# Patient Record
Sex: FEMALE | Race: WHITE | Employment: PART TIME | URBAN - NONMETROPOLITAN AREA
[De-identification: names, ages, dates, MRNs, and addresses within clinical notes are randomized per-mention and may not be internally consistent; named-entity substitution may affect disease eponyms.]

---

## 2017-01-03 ENCOUNTER — NURSE TRIAGE (OUTPATIENT)
Dept: ADMINISTRATIVE | Age: 62
End: 2017-01-03

## 2017-02-20 ENCOUNTER — TELEPHONE (OUTPATIENT)
Dept: FAMILY MEDICINE CLINIC | Age: 62
End: 2017-02-20

## 2017-03-22 ENCOUNTER — OFFICE VISIT (OUTPATIENT)
Dept: PULMONOLOGY | Age: 62
End: 2017-03-22

## 2017-03-22 VITALS
HEART RATE: 78 BPM | BODY MASS INDEX: 29.3 KG/M2 | SYSTOLIC BLOOD PRESSURE: 120 MMHG | OXYGEN SATURATION: 97 % | WEIGHT: 171.6 LBS | HEIGHT: 64 IN | DIASTOLIC BLOOD PRESSURE: 84 MMHG

## 2017-03-22 DIAGNOSIS — G47.421 NARCOLEPSY DUE TO UNDERLYING CONDITION WITH CATAPLEXY: ICD-10-CM

## 2017-03-22 DIAGNOSIS — R40.0 DAYTIME SOMNOLENCE: ICD-10-CM

## 2017-03-22 DIAGNOSIS — G47.411 PRIMARY NARCOLEPSY WITH CATAPLEXY: Primary | ICD-10-CM

## 2017-03-22 PROCEDURE — G8419 CALC BMI OUT NRM PARAM NOF/U: HCPCS | Performed by: PHYSICIAN ASSISTANT

## 2017-03-22 PROCEDURE — 99203 OFFICE O/P NEW LOW 30 MIN: CPT | Performed by: PHYSICIAN ASSISTANT

## 2017-03-22 PROCEDURE — 1036F TOBACCO NON-USER: CPT | Performed by: PHYSICIAN ASSISTANT

## 2017-03-22 PROCEDURE — 3014F SCREEN MAMMO DOC REV: CPT | Performed by: PHYSICIAN ASSISTANT

## 2017-03-22 PROCEDURE — 3017F COLORECTAL CA SCREEN DOC REV: CPT | Performed by: PHYSICIAN ASSISTANT

## 2017-03-22 PROCEDURE — G8484 FLU IMMUNIZE NO ADMIN: HCPCS | Performed by: PHYSICIAN ASSISTANT

## 2017-03-22 PROCEDURE — G8427 DOCREV CUR MEDS BY ELIG CLIN: HCPCS | Performed by: PHYSICIAN ASSISTANT

## 2017-03-22 RX ORDER — MODAFINIL 200 MG/1
TABLET ORAL
Qty: 360 TABLET | Refills: 0 | Status: SHIPPED | OUTPATIENT
Start: 2017-03-22 | End: 2017-10-18 | Stop reason: SDUPTHER

## 2017-06-09 RX ORDER — LEVOTHYROXINE AND LIOTHYRONINE 57; 13.5 UG/1; UG/1
135 TABLET ORAL DAILY
Qty: 45 TABLET | Refills: 3 | Status: SHIPPED | OUTPATIENT
Start: 2017-06-09 | End: 2017-07-11 | Stop reason: SDUPTHER

## 2017-07-11 RX ORDER — LEVOTHYROXINE AND LIOTHYRONINE 57; 13.5 UG/1; UG/1
135 TABLET ORAL DAILY
Qty: 45 TABLET | Refills: 3 | Status: SHIPPED | OUTPATIENT
Start: 2017-07-11 | End: 2017-08-25 | Stop reason: SDUPTHER

## 2017-07-11 RX ORDER — ESOMEPRAZOLE MAGNESIUM 40 MG/1
40 CAPSULE, DELAYED RELEASE ORAL
Qty: 30 CAPSULE | Refills: 3 | Status: SHIPPED | OUTPATIENT
Start: 2017-07-11 | End: 2017-08-25 | Stop reason: SDUPTHER

## 2017-08-18 LAB
ABSOLUTE BASO #: 0 K/UL (ref 0–0.1)
ABSOLUTE EOS #: 0.2 K/UL (ref 0.1–0.4)
ABSOLUTE LYMPH #: 1.9 K/UL (ref 0.8–5.2)
ABSOLUTE MONO #: 0.7 K/UL (ref 0.1–0.9)
ABSOLUTE NEUT #: 3.2 K/UL (ref 1.3–9.1)
ANTI-NUCLEAR ANTIBODY (ANA): NORMAL
BASOPHILS RELATIVE PERCENT: 0.7 %
CALCIUM SERPL-MCNC: 9.5 MG/DL (ref 8.7–10.8)
EOSINOPHILS RELATIVE PERCENT: 3.2 %
ESTRADIOL LEVEL: 15 PG/ML
HCT VFR BLD CALC: 44.7 % (ref 36–48)
HEMOGLOBIN: 14.5 G/DL (ref 12–16)
LYMPHOCYTE %: 31.1 %
MAGNESIUM: 2.3 MG/DL (ref 1.7–2.4)
MCH RBC QN AUTO: 29.2 PG (ref 27–34)
MCHC RBC AUTO-ENTMCNC: 32.4 G/DL (ref 31–36)
MCV RBC AUTO: 90.1 FL (ref 80–100)
MONOCYTES # BLD: 11.1 %
NEUTROPHILS RELATIVE PERCENT: 53.7 %
PDW BLD-RTO: 12.3 % (ref 10.8–14.8)
PLATELETS: 265 K/UL (ref 150–450)
RBC: 4.96 M/UL (ref 4–5.5)
RHEUMATOID FACTOR: <10 IU/ML
SEDIMENTATION RATE, ERYTHROCYTE: 1 MM/HR (ref 0–30)
WBC: 6 K/UL (ref 3.7–10.8)

## 2017-08-22 LAB
ALBUMIN SERUM: 4.2 G/DL (ref 3.6–5.1)
DEHYDROEPIANDROSTERONE: 3.4 NG/ML (ref 1.3–9.8)
DHEAS (DHEA SULFATE): 87 UG/DL
HIGH SENSITIVE C-REACTIVE PROTEIN: 1.8 MG/L
PARATHYROID HORMONE INTACT: 31 PG/ML (ref 11–67)
SEX HORMONE BINDING GLOBULIN: 47 NMOL/L (ref 30–135)
TESTOSTERONE FREE: 2 PG/ML (ref 0.6–3.8)
TESTOSTERONE, LCMS: 14 NG/DL (ref 5–32)
VITAMIN D 25-HYDROXY: 61 NG/ML

## 2017-08-25 ENCOUNTER — OFFICE VISIT (OUTPATIENT)
Dept: FAMILY MEDICINE CLINIC | Age: 62
End: 2017-08-25
Payer: COMMERCIAL

## 2017-08-25 VITALS
WEIGHT: 174 LBS | DIASTOLIC BLOOD PRESSURE: 70 MMHG | SYSTOLIC BLOOD PRESSURE: 110 MMHG | RESPIRATION RATE: 12 BRPM | HEIGHT: 64 IN | TEMPERATURE: 98.4 F | BODY MASS INDEX: 29.71 KG/M2 | HEART RATE: 72 BPM

## 2017-08-25 DIAGNOSIS — F32.A ANXIETY AND DEPRESSION: ICD-10-CM

## 2017-08-25 DIAGNOSIS — R40.0 DAYTIME SOMNOLENCE: ICD-10-CM

## 2017-08-25 DIAGNOSIS — R11.0 NAUSEA: ICD-10-CM

## 2017-08-25 DIAGNOSIS — B37.9 CANDIDA ALBICANS INFECTION: ICD-10-CM

## 2017-08-25 DIAGNOSIS — F41.9 ANXIETY AND DEPRESSION: ICD-10-CM

## 2017-08-25 DIAGNOSIS — E78.2 MIXED HYPERLIPIDEMIA: ICD-10-CM

## 2017-08-25 DIAGNOSIS — K59.09 OTHER CONSTIPATION: ICD-10-CM

## 2017-08-25 DIAGNOSIS — K21.00 GASTROESOPHAGEAL REFLUX DISEASE WITH ESOPHAGITIS: ICD-10-CM

## 2017-08-25 DIAGNOSIS — K56.609 SBO (SMALL BOWEL OBSTRUCTION) (HCC): ICD-10-CM

## 2017-08-25 DIAGNOSIS — M25.511 RIGHT SHOULDER PAIN, UNSPECIFIED CHRONICITY: ICD-10-CM

## 2017-08-25 DIAGNOSIS — K90.0 CELIAC DISEASE: ICD-10-CM

## 2017-08-25 DIAGNOSIS — R19.4 CHANGE IN BOWEL HABITS: ICD-10-CM

## 2017-08-25 DIAGNOSIS — R35.0 URINARY FREQUENCY: ICD-10-CM

## 2017-08-25 DIAGNOSIS — R10.13 EPIGASTRIC PAIN: ICD-10-CM

## 2017-08-25 DIAGNOSIS — Z09 S/P ABDOMINAL SURGERY, FOLLOW-UP EXAM: ICD-10-CM

## 2017-08-25 DIAGNOSIS — R63.0 DECREASED APPETITE: ICD-10-CM

## 2017-08-25 DIAGNOSIS — R53.1 WEAKNESS GENERALIZED: ICD-10-CM

## 2017-08-25 DIAGNOSIS — D50.0 IRON DEFICIENCY ANEMIA DUE TO CHRONIC BLOOD LOSS: ICD-10-CM

## 2017-08-25 DIAGNOSIS — E03.4 HYPOTHYROIDISM DUE TO ACQUIRED ATROPHY OF THYROID: ICD-10-CM

## 2017-08-25 DIAGNOSIS — Z91.09 ENVIRONMENTAL ALLERGIES: ICD-10-CM

## 2017-08-25 PROCEDURE — 3014F SCREEN MAMMO DOC REV: CPT | Performed by: FAMILY MEDICINE

## 2017-08-25 PROCEDURE — 99214 OFFICE O/P EST MOD 30 MIN: CPT | Performed by: FAMILY MEDICINE

## 2017-08-25 PROCEDURE — 1036F TOBACCO NON-USER: CPT | Performed by: FAMILY MEDICINE

## 2017-08-25 PROCEDURE — G8419 CALC BMI OUT NRM PARAM NOF/U: HCPCS | Performed by: FAMILY MEDICINE

## 2017-08-25 PROCEDURE — 3017F COLORECTAL CA SCREEN DOC REV: CPT | Performed by: FAMILY MEDICINE

## 2017-08-25 PROCEDURE — G8427 DOCREV CUR MEDS BY ELIG CLIN: HCPCS | Performed by: FAMILY MEDICINE

## 2017-08-25 RX ORDER — RANITIDINE 300 MG/1
300 TABLET ORAL EVERY EVENING
Qty: 90 TABLET | Refills: 3 | Status: ON HOLD | OUTPATIENT
Start: 2017-08-25 | End: 2018-09-21 | Stop reason: HOSPADM

## 2017-08-25 RX ORDER — ESOMEPRAZOLE MAGNESIUM 40 MG/1
40 CAPSULE, DELAYED RELEASE ORAL
Qty: 90 CAPSULE | Refills: 3 | Status: SHIPPED | OUTPATIENT
Start: 2017-08-25 | End: 2018-08-28 | Stop reason: SDUPTHER

## 2017-08-25 RX ORDER — LEVOTHYROXINE AND LIOTHYRONINE 57; 13.5 UG/1; UG/1
135 TABLET ORAL DAILY
Qty: 135 TABLET | Refills: 3 | Status: ON HOLD | OUTPATIENT
Start: 2017-08-25 | End: 2018-02-09 | Stop reason: HOSPADM

## 2017-08-25 ASSESSMENT — ENCOUNTER SYMPTOMS
BACK PAIN: 1
ABDOMINAL PAIN: 1
ABDOMINAL DISTENTION: 1
CONSTIPATION: 1
SINUS PRESSURE: 1
RESPIRATORY NEGATIVE: 1

## 2017-10-06 RX ORDER — ROSUVASTATIN CALCIUM 20 MG/1
20 TABLET, COATED ORAL DAILY
Qty: 90 TABLET | Refills: 3 | Status: SHIPPED | OUTPATIENT
Start: 2017-10-06 | End: 2018-11-05 | Stop reason: SDUPTHER

## 2017-10-06 RX ORDER — ESCITALOPRAM OXALATE 10 MG/1
10 TABLET ORAL DAILY
Qty: 30 TABLET | Refills: 0 | Status: SHIPPED | OUTPATIENT
Start: 2017-10-06 | End: 2018-02-07

## 2017-10-17 ENCOUNTER — TELEPHONE (OUTPATIENT)
Dept: PULMONOLOGY | Age: 62
End: 2017-10-17

## 2017-10-17 DIAGNOSIS — G47.421 NARCOLEPSY DUE TO UNDERLYING CONDITION WITH CATAPLEXY: ICD-10-CM

## 2017-10-17 DIAGNOSIS — R40.0 DAYTIME SOMNOLENCE: ICD-10-CM

## 2017-10-18 RX ORDER — MODAFINIL 200 MG/1
200 TABLET ORAL 2 TIMES DAILY
Qty: 60 TABLET | Refills: 0 | Status: SHIPPED | OUTPATIENT
Start: 2017-10-18 | End: 2017-11-14 | Stop reason: SDUPTHER

## 2017-10-18 NOTE — TELEPHONE ENCOUNTER
I spoke with Pharmacist from [facility] jordyn, she stated the script is too old since it is a controlled substance, she needs a new RX escribed for the modafinil.  PLEASE :)

## 2017-10-18 NOTE — TELEPHONE ENCOUNTER
The [facility] report shows it has not been filled since March but she was given 360 pills.   Tell pharmacy to go ahead a fill the script

## 2017-10-18 NOTE — TELEPHONE ENCOUNTER
I sent in a month prescription since she is getting it local.  Also based on her last refill she has been taking it 1 pill BID and that is how I filled it

## 2017-10-18 NOTE — TELEPHONE ENCOUNTER
Patient states she is taking it everyday, when she was here you gave her a refill and it wasn't needed at the time. She states she takes it faithfully. But she said she just didn't need the refill the last time she was here so now she needs a refill and that is the one they have. There was a mix up from the switch from [facility] to you? Please advise.

## 2017-12-19 LAB
LIPASE: 25 IU/L (ref 11–82)
T3 TOTAL: 49 NG/DL (ref 84–172)
T4 TOTAL: 1.3 MCG/DL (ref 4.5–12.5)
TSH SERPL DL<=0.05 MIU/L-ACNC: 17.3 UIU/ML (ref 0.4–4.4)

## 2017-12-20 LAB
IRON SATURATION: 20 % (ref 20–50)
IRON, SERUM: 62 UG/DL (ref 37–145)
T3 FREE: 1.6 PG/ML (ref 2.2–4.2)
THYROID PEROXIDASE ANTIBODY: 1 IU/ML
TOTAL IRON BINDING CAPACITY: 310 UG/DL (ref 250–450)
UNSATURATED IRON BINDING CAPACITY: 248 UG/DL (ref 112–347)

## 2017-12-29 ENCOUNTER — OFFICE VISIT (OUTPATIENT)
Dept: FAMILY MEDICINE CLINIC | Age: 62
End: 2017-12-29
Payer: COMMERCIAL

## 2017-12-29 VITALS
BODY MASS INDEX: 30.87 KG/M2 | HEART RATE: 103 BPM | DIASTOLIC BLOOD PRESSURE: 78 MMHG | HEIGHT: 64 IN | WEIGHT: 180.8 LBS | TEMPERATURE: 98 F | OXYGEN SATURATION: 98 % | SYSTOLIC BLOOD PRESSURE: 120 MMHG

## 2017-12-29 DIAGNOSIS — F41.9 ANXIETY AND DEPRESSION: ICD-10-CM

## 2017-12-29 DIAGNOSIS — Z09 S/P ABDOMINAL SURGERY, FOLLOW-UP EXAM: ICD-10-CM

## 2017-12-29 DIAGNOSIS — E03.8 HYPOTHYROIDISM DUE TO HASHIMOTO'S THYROIDITIS: Primary | ICD-10-CM

## 2017-12-29 DIAGNOSIS — R53.1 WEAKNESS GENERALIZED: ICD-10-CM

## 2017-12-29 DIAGNOSIS — F32.A ANXIETY AND DEPRESSION: ICD-10-CM

## 2017-12-29 DIAGNOSIS — K90.0 CELIAC DISEASE: ICD-10-CM

## 2017-12-29 DIAGNOSIS — G47.421 NARCOLEPSY DUE TO UNDERLYING CONDITION WITH CATAPLEXY: ICD-10-CM

## 2017-12-29 DIAGNOSIS — B37.9 CANDIDA ALBICANS INFECTION: ICD-10-CM

## 2017-12-29 DIAGNOSIS — M25.511 RIGHT SHOULDER PAIN, UNSPECIFIED CHRONICITY: ICD-10-CM

## 2017-12-29 DIAGNOSIS — R63.0 DECREASED APPETITE: ICD-10-CM

## 2017-12-29 DIAGNOSIS — K59.01 SLOW TRANSIT CONSTIPATION: ICD-10-CM

## 2017-12-29 DIAGNOSIS — E06.3 HYPOTHYROIDISM DUE TO HASHIMOTO'S THYROIDITIS: Primary | ICD-10-CM

## 2017-12-29 DIAGNOSIS — R40.0 DAYTIME SOMNOLENCE: ICD-10-CM

## 2017-12-29 DIAGNOSIS — R11.0 NAUSEA: ICD-10-CM

## 2017-12-29 DIAGNOSIS — R19.4 CHANGE IN BOWEL HABITS: ICD-10-CM

## 2017-12-29 DIAGNOSIS — R35.0 URINARY FREQUENCY: ICD-10-CM

## 2017-12-29 DIAGNOSIS — Z91.09 ENVIRONMENTAL ALLERGIES: ICD-10-CM

## 2017-12-29 DIAGNOSIS — K21.00 GASTROESOPHAGEAL REFLUX DISEASE WITH ESOPHAGITIS: ICD-10-CM

## 2017-12-29 DIAGNOSIS — E78.2 MIXED HYPERLIPIDEMIA: ICD-10-CM

## 2017-12-29 DIAGNOSIS — D50.8 OTHER IRON DEFICIENCY ANEMIA: ICD-10-CM

## 2017-12-29 PROCEDURE — 3014F SCREEN MAMMO DOC REV: CPT | Performed by: FAMILY MEDICINE

## 2017-12-29 PROCEDURE — 1036F TOBACCO NON-USER: CPT | Performed by: FAMILY MEDICINE

## 2017-12-29 PROCEDURE — G8417 CALC BMI ABV UP PARAM F/U: HCPCS | Performed by: FAMILY MEDICINE

## 2017-12-29 PROCEDURE — G8427 DOCREV CUR MEDS BY ELIG CLIN: HCPCS | Performed by: FAMILY MEDICINE

## 2017-12-29 PROCEDURE — 99214 OFFICE O/P EST MOD 30 MIN: CPT | Performed by: FAMILY MEDICINE

## 2017-12-29 PROCEDURE — 3017F COLORECTAL CA SCREEN DOC REV: CPT | Performed by: FAMILY MEDICINE

## 2017-12-29 PROCEDURE — G8484 FLU IMMUNIZE NO ADMIN: HCPCS | Performed by: FAMILY MEDICINE

## 2017-12-29 ASSESSMENT — ENCOUNTER SYMPTOMS
SINUS PAIN: 1
ABDOMINAL PAIN: 1
SINUS PRESSURE: 1
ABDOMINAL DISTENTION: 1
CONSTIPATION: 1
BACK PAIN: 1
ROS SKIN COMMENTS: DRY

## 2017-12-29 NOTE — PROGRESS NOTES
Subjective:      Patient ID: Pablo Epperson is a 58 y.o. female. HPI   Pablo Epperson is a 58 y.o. White female. Neftali Valera  presents to the [address] today for   Chief Complaint   Patient presents with    Follow-up     2 month WEE follow up     Discuss Labs     thyroid labs     Other     check right ear feels plugged    ,  and;   Hypothyroidism due to Hashimoto's thyroiditis    Other iron deficiency anemia    Narcolepsy due to underlying condition with cataplexy    Celiac disease    Gastroesophageal reflux disease with esophagitis    S/P abdominal surgery, follow-up exam    Anxiety and depression    Mixed hyperlipidemia    Environmental allergies    Nausea    Slow transit constipation    Decreased appetite    Weakness generalized    Urinary frequency    Daytime somnolence    Candida albicans infection    Change in bowel habits    Right shoulder pain, unspecified chronicity      I have reviewed Pablo Epperson medical, surgical and other pertinent history in detail, and have updated medication and allergy information in the computerized patient record. Clinical Care Team:     -Referring Provider for today's consult: Self Referred  -Primary Care Provider: Paulino Lopez MD    Medical/Surgical History:   She  has a past medical history of Anemia; Anxiety; Arm DVT (deep venous thromboembolism), acute ([facility] [address] Arthritis; Broken shoulder; Cancer ([facility] [address] Celiac disease; CRPS (complex regional pain syndrome type I); Depression; GERD (gastroesophageal reflux disease); GERD (gastroesophageal reflux disease); Headache(784.0); History of blood transfusion; Hx of blood clots; Hx of reactive hypoglycemia; Hyperlipidemia; Hypothyroidism; IBS (irritable bowel syndrome); Irritable bowel syndrome; Meniere disease; Mitral valve prolapse syndrome; MRSA (methicillin resistant Staphylococcus aureus); Narcolepsy; Partial bowel obstruction;  Restless legs syndrome; RSD upper limb; and Unspecified diseases of blood and blood-forming organs. Her  has a past surgical history that includes Cholecystectomy (2003 ); Appendectomy (1985); Abdominal adhesion surgery (10/2010); knee surgery (Right, 2006); sinus surgery (x4 1982, 1990, 1997, 2005 ); Abdomen surgery (04/23/2013); Hysterectomy (1985); Carpal tunnel release (Left, 2006); sigmoidoscopy (1/14/97); other surgical history (2006); Colonoscopy (8296,1522,9486, 2015); Upper gastrointestinal endoscopy (1998,2009,2010,2011,2012, 2015); Endoscopy, colon, diagnostic; and fracture surgery. Family/Social History:     Her family history includes Asthma in her brother; Blindness in her maternal grandmother; Cancer in her father and paternal grandfather; Celiac Disease in her brother; Colon Cancer (age of onset: 79) in her father, paternal aunt, paternal uncle, and paternal uncle; Diabetes in her maternal grandmother, mother, and paternal grandmother; Emphysema in her maternal grandfather;  Problems in her brother; Heart Attack (age of onset: 66) in her mother; Heart Disease in her brother and mother; High Blood Pressure in her brother and mother; Irritable Bowel Syndrome in her sister; Kidney Disease in her brother and mother; Other in her sister; Ovarian Cancer (age of onset: 48) in her paternal grandmother; Stroke in her mother and paternal grandmother. She  reports that she has never smoked. She has never used smokeless tobacco. She reports that she drinks alcohol. She reports that she does not use drugs.     Medications/Allergies/Immunizations:     Her current medication(s) include   Current Outpatient Prescriptions:     Thyroid (LEVOTHYROXINE-LIOTHYRONINE) 15 MG TABS, Take 1 tablet by mouth every morning (before breakfast), Disp: 30 tablet, Rfl: 2    modafinil (PROVIGIL) 200 MG tablet, TAKE 1 TABLET BY MOUTH 2 TIMES DAILY, Disp: 60 tablet, Rfl: 0    rosuvastatin (CRESTOR) 20 MG tablet, Take 1 tablet by mouth daily, Disp: 90 tablet, Rfl: 3    escitalopram (LEXAPRO) 10 MG tablet, Take 1 tablet by mouth daily, Disp: 30 tablet, Rfl: 0    Menaquinone-7 (VITAMIN K2 PO), Take 1 capsule by mouth 2 times daily, Disp: , Rfl:     ranitidine (ZANTAC) 300 MG tablet, Take 1 tablet by mouth every evening Taken at bedtime, Disp: 90 tablet, Rfl: 3    thyroid (NP THYROID) 90 MG tablet, Take 1.5 tablets by mouth daily, Disp: 135 tablet, Rfl: 3    esomeprazole (NEXIUM) 40 MG delayed release capsule, Take 1 capsule by mouth every morning (before breakfast), Disp: 90 capsule, Rfl: 3    DHEA 25 MG TABS, Take 1 tablet by mouth every morning (before breakfast) , Disp: , Rfl:     ascorbic acid (VITAMIN C) 1000 MG tablet, Take 1,000 mg by mouth 4 times daily (before meals and nightly), Disp: , Rfl:     Lysine 500 MG TABS, Take 1 tablet by mouth 4 times daily (before meals and nightly), Disp: , Rfl:     Cinnamon 500 MG CAPS, Take 2 capsules by mouth 4 times daily (before meals and nightly), Disp: , Rfl:     B Complex-Folic Acid (Q-609 BALANCED TR PO), Take 100 mg by mouth 3 times daily (before meals) [facility] , Disp: , Rfl:     Magnesium Oxide 250 MG TABS, Take 1 tablet by mouth three times daily Increase if need more movements , Disp: , Rfl:     HYDROcodone-acetaminophen (NORCO) 5-325 MG per tablet, 1  To  2  divya  4 hours  Prn pain, Disp: 40 tablet, Rfl: 0    guaiFENesin (MUCINEX) 600 MG SR tablet, Take 1,200 mg by mouth 2 times daily. , Disp: , Rfl:     vitamin D (CHOLECALCIFEROL) 5000 UNITS CAPS capsule, Take 5,000 Units by mouth daily Skip sunday, Disp: , Rfl:     Omega 3 1000 MG CAPS, Take 4 capsules by mouth 4 times daily (before meals and nightly) [facility] # [id number] or [employer] pharmaceutical grade at  [facility] , Disp: , Rfl:     calcium carbonate (OSCAL) 500 MG TABS tablet, Take 250 mg by mouth 5 times daily , Disp: , Rfl:     Magnesium Citrate 100 MG TABS, Take 1 capsule by mouth 5 times daily With last bite of each meal , Disp: , Rfl:     ferrous gluconate (FERGON) 225 (27 FE) MG tablet, Take 1 tablet by mouth daily. , Disp: 30 tablet, Rfl: 0    Triamcinolone Acetonide (NASACORT AQ NA), by Nasal route., Disp: , Rfl:     folic acid (FOLVITE) 1 MG tablet, Take 1 mg by mouth daily. , Disp: , Rfl:   Allergies: Dilaudid [hydromorphone hcl]; Iron; Percodan [oxycodone-aspirin]; Fenoprofen calcium; Gluten; Percocet  [oxycodone-acetaminophen]; Reglan [metoclopramide]; and Sulfa antibiotics,  Immunizations: There is no immunization history for the selected administration types on file for this patient. History of Present Illness:     Ankita's had concerns including Follow-up (2 month WEE follow up ); Discuss Labs (thyroid labs ); and Other (check right ear feels plugged ). Ann-Marie Chen  presents to the [employer] today for;   Chief Complaint   Patient presents with    Follow-up     2 month WEE follow up     Discuss Labs     thyroid labs     Other     check right ear feels plugged    , ,  abnormal labs follow up and these conditions as she  Is looking today for:     Hypothyroidism due to Hashimoto's thyroiditis    Other iron deficiency anemia    Narcolepsy due to underlying condition with cataplexy    Celiac disease    Gastroesophageal reflux disease with esophagitis    S/P abdominal surgery, follow-up exam    Anxiety and depression    Mixed hyperlipidemia    Environmental allergies    Nausea    Slow transit constipation    Decreased appetite    Weakness generalized    Urinary frequency    Daytime somnolence    Candida albicans infection    Change in bowel habits    Right shoulder pain, unspecified chronicity          Review of Systems   Constitutional: Positive for fatigue and unexpected weight change. HENT: Positive for congestion, ear discharge, sinus pain, sinus pressure and tinnitus. Eyes: Positive for visual disturbance. Cardiovascular: Negative. Gastrointestinal: Positive for abdominal distention, abdominal pain and constipation. Endocrine: Negative. Genitourinary: Negative. Musculoskeletal: Positive for back pain. Skin:        dry   Allergic/Immunologic: Positive for environmental allergies. Neurological: Positive for dizziness, light-headedness and headaches. Hematological: Negative. Psychiatric/Behavioral: Positive for decreased concentration, dysphoric mood and sleep disturbance. All other systems reviewed and are negative. Objective:   Physical Exam   Constitutional: She is oriented to person, place, and time. She appears well-developed and well-nourished. HENT:   Head: Normocephalic. Mouth/Throat: Oropharyngeal exudate present. Pulmonary/Chest: Effort normal and breath sounds normal.   Neurological: She is alert and oriented to person, place, and time. Skin: Skin is warm. Psychiatric: She has a normal mood and affect. Thought content normal.   Nursing note and vitals reviewed. Assessment:      Laboratory Data:   Lab results were searched in Care Everywhere and/or those brought by the pateint were reviewed today with Leah River and she has a copy of their most recent labs to take home with them as noted below;       Imaging Data:   Imaging Data:       Assessment & Plan:       Impression:  1. Hypothyroidism due to Hashimoto's thyroiditis    2. Other iron deficiency anemia    3. Narcolepsy due to underlying condition with cataplexy    4. Celiac disease    5. Gastroesophageal reflux disease with esophagitis    6. S/P abdominal surgery, follow-up exam    7. Anxiety and depression    8. Mixed hyperlipidemia    9. Environmental allergies    10. Nausea    11. Slow transit constipation    12. Decreased appetite    13. Weakness generalized    14. Urinary frequency    15. Daytime somnolence    16. Candida albicans infection    17. Change in bowel habits    18.  Right shoulder pain, unspecified chronicity      Assessment and Plan:  After reviewing the patients chief complaints, reviewing their lab findings in great detail (with the patient and those accompanying them) which correlate to their chief complaints, symptoms, and or medical conditions; suggestions were made relating to changes in diet and or supplements which may improve the complaints and which will be reflected in their future lab findings; Chief Complaint   Patient presents with    Follow-up     2 month WEE follow up     Discuss Labs     thyroid labs     Other     check right ear feels plugged    ;    Plans for the next visits:  - Abnormal and non-optimal Labs were ordered today to be repeated in the next 120-365 days to assess changes from adjustments in nutrition and or nutrients. - Patient instructed when having a blood draw to ask the  to divide their lab draws into multiple draws over several days if not feeling good at the time of the lab draw or if either prefers to do several smaller blood draws over several days  - Patient instructed to check with insurer before each lab draw and to go to the lab which the insurer directs them for the most cost effective lab draw with the least patient's cost  - Maria Esther Pineda  will be scheduled subsequent to those results. Susanne Allen will bring in her drink and food log to her next visit    Chronic Problems Addressed on this Visit:                                   1.  Intensity of Service; Uncontrolled items at this visit; Chief Complaint   Patient presents with    Follow-up     2 month WEE follow up    [address]     thyroid labs     Other     check right ear feels plugged    ; Improved items reviewed at this visit; Stable items noted at this visit;  2.  Patient's foods and drinks were reviewed with the patient,       - Maria Esther Pineda will bring food+drink symptom log to next visit for inclusion in their record      - 75 better food list reviewed & given to patient with the omega 6 food list to avoid         - Gluten in corn and oats abstracts sheet reviewed and given to the patient today   3. Greater than 25 minutes were spent face to face on this visit of which >50% was for counseling and coordination of care. Patient's foods, drinks and supplements were reviewed with the patient,   - they will bring a food drink symptom log to future visits for inclusion in their record    - 75 better food list reviewed & given to patient along with the omega 6 food list to avoid      - Gluten in corn and oats abstracts sheet reviewed and given to the patient today    - 51 Foods containing Latex-like proteins was reviewed and copy given to the patient     - Nutrient Supplements list provided and copy given to the patient     - [employer] web site offered to patient to review at their convenience by staff with login information    - www.efaeducation. org site reviewed with the patient so they can identify better foods    - www.cornicopia. org site reviewed with the patient so they can reduce carrageenan by reviewing the carrageenan buyers guide on that site and picking safer foods    Note:   I have discussed with the patient that with all nutraceuticals, there is often mixed data and emerging research which needs to be monitored; as well as an array of NIH fact sheets on nutrients and supplements. If I have recommended cinnamon at the request of this patient to assist them in control of their blood sugar, triglyceride and or weight issues. I discussed that the patient's clinical use of cinnamon bark, calcium, magnesium, Vitamin D and pharmaceutical grade [employer] #[id number] fish oil or triple-strength fish oil, and balanced B-50 or B-100 time-released B complex which does not contain Vit C in the tablet. The dose will be for a time-limited trial to determine their individual effectiveness and tolerance in this patient. I also referred the patient to the reviewing such items as [employer]. [employer] NMCD: Nutrition, Metabolism, and Cardiovascular Diseases (journal) and NCAAM.gov, Searching www.nih.gov for any concerns about long-term use and hepatotoxicity of cinnamon and other nutrients and suggest they frequently search nih.gov for the latest non-proprietary information on nutriceuticals as well as consider a subscription to ICRTec for details on reviewed supplements, or at the least review the nutrient files at [facility], [address] bark, an insulin mimetic, reduces some High Carbohydrate Dietary Impacts. Methylhydroxychalcone polymers insulin-enhancing properties in fat cells are responsible for enhanced glucose uptake, inhibiting hepatic HMG-CoA reductase and lowers lipids. www.jacn. org/content/20/4/327.full     But cinnamon with additives such as Cinnamon Extract are not effective as insulin mimetics. https://www.Captual.net/     Nutrients for Start up from [employer] or [employer] for ease to get started now ;  Gustavo Dougherty has some useable products;  - Triple Strength Fish Oil, enteric coated  - Vit D 3 5000 IU gel caps  - Iron ferrous sulfat 325 mg tabs  - Centrum Silver look-a-like for most patients not needing iron, or  - Centrum plain look-a-like if need iron    Local pharmacy chains such as [facility], [address] [place] [address], [facility].  [employer];  - Triple Strength Fish Oil (enteric coated if available) or    If not enteric coated, can take from freezer for less burps  - B-50 or B-100 time released balanced B complex tabs not containing Vit C in tablet  - Cinnamon bark 500 mg (with Chromium but not extracts)   some brands list 1000 mg / serving of 2 500 mg capsules,    some brands have 1000 mg caps with the chromium but capsule size is huge  - Calcium carbonate/citrate, magnesium oxide/citrate, Vit D 3  as 3-4 tabs/caps/serving     Some Local Brands may contain Zinc which is acceptable for the first bottle or two  - Magnesium oxide 250 mg tabs for those having < 2 bowel movements daily  - soaks, or magnesium spray or cream    Food Drink Symptom Log;  I asked this patient to track these items and any other symptoms on their list on a weekly basis to document their progress or lack of same. This can be done on the symptom tracking sheet available to them at today's visit but looks like this:                                                      Rate on scale of 0-10 with zero = not noticeable  Symptom:                            Week 1               2                 3                 4               Etc            Hair loss    Foot cramps    Paresthesia    Aches    IBS (irritable bowel)    Constipation    Diarrhea  Nocturia    (up to bathroom at night)    Fatigue/Energy level    Stress      On the other side of the sheet they can track their food, drink, environment, activity, symptoms etc      Avoiding Latex-like proteins in my foods; Avocados, Bananas, Celery, Figs & Kiwi proteins have latex-like proteins to inflame our immune systems, see the 51 latex-like foods list  How Can I Have A Latex Allergy? Eating foods with latex-like protein exposes us to latex allergies. Our body cannot tell the difference between these latex-like proteins and latex from rubber products since many people are allergic to fruit, vegetables and latex. Read labels on pre-packaged foods. This list to avoid is only a guide if you are known allergic to latex or have a latex rash on your chin, cheeks and lines on your neck and chest. The amount of latex is different in each food product or fruit variety. Foods to Avoid out of Season if not grown locally: Melon, Nectarine, Papaya, Cherry, Passion fruit, Plum, Chestnuts, and Tomato. Avocado, Banana, Celery, Figs, and Kiwi always contain Latex-like protein and are almost universally toxic    Whats in Season? Strawberries taste better in June than December because June is strawberry season so buy locally grown produce \"in season\" for the best flavor, cost and less Latex. www.nutritioncouncil.org/pdf/healthy/SeasonalProduce. pdf ,   Management of Latex, http://medicalcenter. o[employer].edu/  search for latex

## 2018-01-08 ENCOUNTER — TELEPHONE (OUTPATIENT)
Dept: FAMILY MEDICINE CLINIC | Age: 63
End: 2018-01-08

## 2018-01-08 RX ORDER — LEVOTHYROXINE SODIUM 0.03 MG/1
25 TABLET ORAL
Qty: 30 TABLET | Refills: 3 | Status: SHIPPED | OUTPATIENT
Start: 2018-01-08 | End: 2018-02-19 | Stop reason: ALTCHOICE

## 2018-01-08 NOTE — TELEPHONE ENCOUNTER
Spoke with pt and she stated that Dr Joaquin Lester wanted to get her back on the Synthroid and he sent in NP Thyroid 15mg. The pt has refused to get this from the pharmacy because she was told that it would be Synthroid. The pharmacy said that the smallest dose for Synthroid is 25mg. Please advise as what you want the pt to take so that she can get started since it was supposed to be started on 12/29/17.

## 2018-01-08 NOTE — TELEPHONE ENCOUNTER
Can use either since low on both; would she like armour 30 mg  Results for Cynthia Garner (MRN [id number]) as of 1/8/2018 13:53   Ref.  Range 12/18/2017 12:10   T3, Total Latest Ref Range: 84 - 172 ng/dl 49 (L)   T4, Total Latest Ref Range: 4.5 - 12.5 mcg/dl 1.3 (L)

## 2018-02-07 ENCOUNTER — HOSPITAL ENCOUNTER (INPATIENT)
Age: 63
LOS: 2 days | Discharge: HOME | DRG: 392 | End: 2018-02-09
Attending: INTERNAL MEDICINE | Admitting: INTERNAL MEDICINE
Payer: COMMERCIAL

## 2018-02-07 ENCOUNTER — APPOINTMENT (OUTPATIENT)
Dept: CT IMAGING | Age: 63
DRG: 392 | End: 2018-02-07
Payer: COMMERCIAL

## 2018-02-07 DIAGNOSIS — E03.9 HYPOTHYROIDISM, UNSPECIFIED TYPE: ICD-10-CM

## 2018-02-07 DIAGNOSIS — R10.84 GENERALIZED ABDOMINAL PAIN: Primary | ICD-10-CM

## 2018-02-07 DIAGNOSIS — R19.5 HEME POSITIVE STOOL: ICD-10-CM

## 2018-02-07 LAB
ALBUMIN SERPL-MCNC: 4.1 G/DL (ref 3.5–5.1)
ALP BLD-CCNC: 89 U/L (ref 38–126)
ALT SERPL-CCNC: 34 U/L (ref 11–66)
AMYLASE: 57 U/L (ref 20–104)
ANION GAP SERPL CALCULATED.3IONS-SCNC: 15 MEQ/L (ref 8–16)
AST SERPL-CCNC: 34 U/L (ref 5–40)
BACTERIA: ABNORMAL /HPF
BASOPHILS # BLD: 0.4 %
BASOPHILS ABSOLUTE: 0 THOU/MM3 (ref 0–0.1)
BILIRUB SERPL-MCNC: 0.3 MG/DL (ref 0.3–1.2)
BILIRUBIN DIRECT: < 0.2 MG/DL (ref 0–0.3)
BILIRUBIN URINE: NEGATIVE
BLOOD, URINE: NEGATIVE
BUN BLDV-MCNC: 12 MG/DL (ref 7–22)
CALCIUM SERPL-MCNC: 9.4 MG/DL (ref 8.5–10.5)
CASTS 2: ABNORMAL /LPF
CASTS UA: ABNORMAL /LPF
CHARACTER, URINE: ABNORMAL
CHLORIDE BLD-SCNC: 104 MEQ/L (ref 98–111)
CO2: 24 MEQ/L (ref 23–33)
COLOR: YELLOW
CREAT SERPL-MCNC: 0.5 MG/DL (ref 0.4–1.2)
CRYSTALS, UA: ABNORMAL
EKG ATRIAL RATE: 104 BPM
EKG P AXIS: 48 DEGREES
EKG P-R INTERVAL: 142 MS
EKG Q-T INTERVAL: 352 MS
EKG QRS DURATION: 78 MS
EKG QTC CALCULATION (BAZETT): 462 MS
EKG R AXIS: -13 DEGREES
EKG T AXIS: 52 DEGREES
EKG VENTRICULAR RATE: 104 BPM
EOSINOPHIL # BLD: 2 %
EOSINOPHILS ABSOLUTE: 0.1 THOU/MM3 (ref 0–0.4)
EPITHELIAL CELLS, UA: ABNORMAL /HPF
FLU A ANTIGEN: NEGATIVE
FLU B ANTIGEN: NEGATIVE
GFR SERPL CREATININE-BSD FRML MDRD: > 90 ML/MIN/1.73M2
GLUCOSE BLD-MCNC: 96 MG/DL (ref 70–108)
GLUCOSE URINE: NEGATIVE MG/DL
HCT VFR BLD CALC: 46.1 % (ref 37–47)
HEMOCCULT STL QL: POSITIVE
HEMOGLOBIN: 15.3 GM/DL (ref 12–16)
KETONES, URINE: NEGATIVE
LACTIC ACID: 1.3 MMOL/L (ref 0.5–2.2)
LEUKOCYTE ESTERASE, URINE: NEGATIVE
LIPASE: 27.9 U/L (ref 5.6–51.3)
LYMPHOCYTES # BLD: 20.5 %
LYMPHOCYTES ABSOLUTE: 1.2 THOU/MM3 (ref 1–4.8)
MCH RBC QN AUTO: 31.2 PG (ref 27–31)
MCHC RBC AUTO-ENTMCNC: 33.2 GM/DL (ref 33–37)
MCV RBC AUTO: 93.9 FL (ref 81–99)
MISCELLANEOUS 2: ABNORMAL
MONOCYTES # BLD: 8.6 %
MONOCYTES ABSOLUTE: 0.5 THOU/MM3 (ref 0.4–1.3)
NITRITE, URINE: NEGATIVE
NUCLEATED RED BLOOD CELLS: 0 /100 WBC
OSMOLALITY CALCULATION: 284.6 MOSMOL/KG (ref 275–300)
PDW BLD-RTO: 13.9 % (ref 11.5–14.5)
PH UA: 8
PLATELET # BLD: 206 THOU/MM3 (ref 130–400)
PMV BLD AUTO: 8.4 FL (ref 7.4–10.4)
POTASSIUM SERPL-SCNC: 4.2 MEQ/L (ref 3.5–5.2)
PROTEIN UA: NEGATIVE
RBC # BLD: 4.92 MILL/MM3 (ref 4.2–5.4)
RBC URINE: ABNORMAL /HPF
RENAL EPITHELIAL, UA: ABNORMAL
SEG NEUTROPHILS: 68.5 %
SEGMENTED NEUTROPHILS ABSOLUTE COUNT: 4.1 THOU/MM3 (ref 1.8–7.7)
SODIUM BLD-SCNC: 143 MEQ/L (ref 135–145)
SPECIFIC GRAVITY, URINE: 1.01 (ref 1–1.03)
T4 FREE: 1.36 NG/DL (ref 0.93–1.76)
TOTAL PROTEIN: 6.9 G/DL (ref 6.1–8)
TROPONIN T: < 0.01 NG/ML
TSH SERPL DL<=0.05 MIU/L-ACNC: 0.05 UIU/ML (ref 0.4–4.2)
UROBILINOGEN, URINE: 0.2 EU/DL
WBC # BLD: 6 THOU/MM3 (ref 4.8–10.8)
WBC UA: ABNORMAL /HPF
YEAST: ABNORMAL

## 2018-02-07 PROCEDURE — 74177 CT ABD & PELVIS W/CONTRAST: CPT

## 2018-02-07 PROCEDURE — 96374 THER/PROPH/DIAG INJ IV PUSH: CPT

## 2018-02-07 PROCEDURE — 82248 BILIRUBIN DIRECT: CPT

## 2018-02-07 PROCEDURE — 93005 ELECTROCARDIOGRAM TRACING: CPT | Performed by: INTERNAL MEDICINE

## 2018-02-07 PROCEDURE — 83605 ASSAY OF LACTIC ACID: CPT

## 2018-02-07 PROCEDURE — 99285 EMERGENCY DEPT VISIT HI MDM: CPT

## 2018-02-07 PROCEDURE — 36415 COLL VENOUS BLD VENIPUNCTURE: CPT

## 2018-02-07 PROCEDURE — 6360000002 HC RX W HCPCS: Performed by: INTERNAL MEDICINE

## 2018-02-07 PROCEDURE — 1200000003 HC TELEMETRY R&B

## 2018-02-07 PROCEDURE — 81001 URINALYSIS AUTO W/SCOPE: CPT

## 2018-02-07 PROCEDURE — 83690 ASSAY OF LIPASE: CPT

## 2018-02-07 PROCEDURE — 2580000003 HC RX 258: Performed by: INTERNAL MEDICINE

## 2018-02-07 PROCEDURE — 84439 ASSAY OF FREE THYROXINE: CPT

## 2018-02-07 PROCEDURE — 82150 ASSAY OF AMYLASE: CPT

## 2018-02-07 PROCEDURE — 82272 OCCULT BLD FECES 1-3 TESTS: CPT

## 2018-02-07 PROCEDURE — 87804 INFLUENZA ASSAY W/OPTIC: CPT

## 2018-02-07 PROCEDURE — 80053 COMPREHEN METABOLIC PANEL: CPT

## 2018-02-07 PROCEDURE — 85025 COMPLETE CBC W/AUTO DIFF WBC: CPT

## 2018-02-07 PROCEDURE — 93005 ELECTROCARDIOGRAM TRACING: CPT

## 2018-02-07 PROCEDURE — 84443 ASSAY THYROID STIM HORMONE: CPT

## 2018-02-07 PROCEDURE — 6360000004 HC RX CONTRAST MEDICATION: Performed by: INTERNAL MEDICINE

## 2018-02-07 PROCEDURE — 84484 ASSAY OF TROPONIN QUANT: CPT

## 2018-02-07 RX ORDER — PANTOPRAZOLE SODIUM 40 MG/10ML
40 INJECTION, POWDER, LYOPHILIZED, FOR SOLUTION INTRAVENOUS DAILY
Status: DISCONTINUED | OUTPATIENT
Start: 2018-02-08 | End: 2018-02-09 | Stop reason: HOSPADM

## 2018-02-07 RX ORDER — ASCORBIC ACID 500 MG
1000 TABLET ORAL
Status: DISCONTINUED | OUTPATIENT
Start: 2018-02-08 | End: 2018-02-08

## 2018-02-07 RX ORDER — HYDROCODONE BITARTRATE AND ACETAMINOPHEN 5; 325 MG/1; MG/1
1 TABLET ORAL EVERY 6 HOURS PRN
Status: DISCONTINUED | OUTPATIENT
Start: 2018-02-07 | End: 2018-02-09 | Stop reason: HOSPADM

## 2018-02-07 RX ORDER — DULOXETIN HYDROCHLORIDE 60 MG/1
60 CAPSULE, DELAYED RELEASE ORAL DAILY
COMMUNITY
End: 2018-02-23

## 2018-02-07 RX ORDER — ONDANSETRON 2 MG/ML
4 INJECTION INTRAMUSCULAR; INTRAVENOUS ONCE
Status: COMPLETED | OUTPATIENT
Start: 2018-02-07 | End: 2018-02-07

## 2018-02-07 RX ORDER — DULOXETIN HYDROCHLORIDE 60 MG/1
60 CAPSULE, DELAYED RELEASE ORAL DAILY
Status: DISCONTINUED | OUTPATIENT
Start: 2018-02-08 | End: 2018-02-09 | Stop reason: HOSPADM

## 2018-02-07 RX ORDER — SODIUM CHLORIDE 450 MG/100ML
INJECTION, SOLUTION INTRAVENOUS ONCE
Status: COMPLETED | OUTPATIENT
Start: 2018-02-08 | End: 2018-02-08

## 2018-02-07 RX ORDER — CALCIUM CARBONATE 500(1250)
250 TABLET ORAL
Status: DISCONTINUED | OUTPATIENT
Start: 2018-02-08 | End: 2018-02-09 | Stop reason: HOSPADM

## 2018-02-07 RX ORDER — ONDANSETRON 2 MG/ML
4 INJECTION INTRAMUSCULAR; INTRAVENOUS ONCE
Status: DISCONTINUED | OUTPATIENT
Start: 2018-02-07 | End: 2018-02-07

## 2018-02-07 RX ORDER — CALCIUM CARBONATE 260MG(650)
1 TABLET,CHEWABLE ORAL
Status: DISCONTINUED | OUTPATIENT
Start: 2018-02-08 | End: 2018-02-08

## 2018-02-07 RX ORDER — LEVOTHYROXINE SODIUM 0.03 MG/1
12.5 TABLET ORAL DAILY
Status: DISCONTINUED | OUTPATIENT
Start: 2018-02-08 | End: 2018-02-09 | Stop reason: HOSPADM

## 2018-02-07 RX ORDER — FOLIC ACID 1 MG/1
1 TABLET ORAL DAILY
Status: DISCONTINUED | OUTPATIENT
Start: 2018-02-08 | End: 2018-02-09 | Stop reason: HOSPADM

## 2018-02-07 RX ORDER — RANITIDINE 150 MG/1
300 TABLET ORAL EVERY EVENING
Status: DISCONTINUED | OUTPATIENT
Start: 2018-02-08 | End: 2018-02-08

## 2018-02-07 RX ORDER — MULTIVITAMIN/IRON/FOLIC ACID 18MG-0.4MG
250 TABLET ORAL 3 TIMES DAILY
Status: DISCONTINUED | OUTPATIENT
Start: 2018-02-08 | End: 2018-02-09 | Stop reason: HOSPADM

## 2018-02-07 RX ORDER — AMPICILLIN TRIHYDRATE 250 MG
2 CAPSULE ORAL
Status: DISCONTINUED | OUTPATIENT
Start: 2018-02-08 | End: 2018-02-08

## 2018-02-07 RX ORDER — QUINIDINE GLUCONATE 324 MG
240 TABLET, EXTENDED RELEASE ORAL DAILY
Status: DISCONTINUED | OUTPATIENT
Start: 2018-02-08 | End: 2018-02-09 | Stop reason: HOSPADM

## 2018-02-07 RX ORDER — LORAZEPAM 1 MG/1
1 TABLET ORAL ONCE
Status: DISCONTINUED | OUTPATIENT
Start: 2018-02-07 | End: 2018-02-08 | Stop reason: HOSPADM

## 2018-02-07 RX ORDER — MODAFINIL 100 MG/1
200 TABLET ORAL 2 TIMES DAILY
Status: DISCONTINUED | OUTPATIENT
Start: 2018-02-08 | End: 2018-02-09 | Stop reason: HOSPADM

## 2018-02-07 RX ORDER — 0.9 % SODIUM CHLORIDE 0.9 %
1000 INTRAVENOUS SOLUTION INTRAVENOUS ONCE
Status: COMPLETED | OUTPATIENT
Start: 2018-02-07 | End: 2018-02-07

## 2018-02-07 RX ADMIN — ONDANSETRON 4 MG: 2 INJECTION INTRAMUSCULAR; INTRAVENOUS at 17:58

## 2018-02-07 RX ADMIN — IOPAMIDOL 80 ML: 755 INJECTION, SOLUTION INTRAVENOUS at 19:54

## 2018-02-07 RX ADMIN — SODIUM CHLORIDE 1000 ML: 9 INJECTION, SOLUTION INTRAVENOUS at 18:17

## 2018-02-07 RX ADMIN — ONDANSETRON 4 MG: 2 INJECTION INTRAMUSCULAR; INTRAVENOUS at 23:00

## 2018-02-07 ASSESSMENT — ENCOUNTER SYMPTOMS
SORE THROAT: 0
RHINORRHEA: 0
ABDOMINAL PAIN: 1
NAUSEA: 1
COUGH: 0
DIARRHEA: 1
BLOOD IN STOOL: 1
VOMITING: 0
EYE DISCHARGE: 0
EYE PAIN: 0
BACK PAIN: 0
SHORTNESS OF BREATH: 0
WHEEZING: 0

## 2018-02-07 ASSESSMENT — PAIN DESCRIPTION - LOCATION: LOCATION: ABDOMEN

## 2018-02-07 ASSESSMENT — PAIN DESCRIPTION - FREQUENCY: FREQUENCY: CONTINUOUS

## 2018-02-07 ASSESSMENT — PAIN SCALES - GENERAL: PAINLEVEL_OUTOF10: 10

## 2018-02-07 ASSESSMENT — PAIN DESCRIPTION - PAIN TYPE: TYPE: ACUTE PAIN

## 2018-02-07 ASSESSMENT — PAIN DESCRIPTION - ORIENTATION: ORIENTATION: LEFT;UPPER

## 2018-02-07 ASSESSMENT — PAIN DESCRIPTION - DESCRIPTORS: DESCRIPTORS: CRAMPING

## 2018-02-07 NOTE — ED PROVIDER NOTES
evaluated. Abdominal pain and blood in her stool     MDM:  Patient did not wanted to have Ativan in the emergency department. Patient was given normal saline in the emergency department. Patient does have heme positive stool but her hemoglobin is very good. Patient's tachycardia did improve with IV fluids. Patient's CT scan of abdomen and pelvis was reviewed with the family and the patient in the room and they were told that there is increase in size of the mass in that area. Patient will be admitted by  for further workup and management. Dr. Violet Berman is the primary care physician and he was being covered by Dr. oliver, Dr. oliver advised the patient to be admitted by     CRITICAL CARE:   none     CONSULTS:  Dr Gavin Perez:  none     FINAL IMPRESSION      1. Generalized abdominal pain    2. Heme positive stool          DISPOSITION/PLAN   Admit    PATIENT REFERRED TO:  Elvira Lundborg, MD  [address] [place] [address]  [phone]            DISCHARGE MEDICATIONS:  New Prescriptions    No medications on file       (Please note that portions of this note were completed with a voice recognition program.  Efforts were made to edit the dictations but occasionally words are mis-transcribed.)    Scribe:  Marlene Silva 2/7/18 5:57 PM Scribing for and in the presence of Adia Vinson MD.    Signed by: Elena Mills, 02/07/18 10:47 PM    Provider:  I personally performed the services described in the documentation, reviewed and edited the documentation which was dictated to the scribe in my presence, and it accurately records my words and actions.     Adia Vinson MD 2/7/18 10:47 PM                          Adia Vinson MD  02/07/18 [id number]

## 2018-02-07 NOTE — ED NOTES
PT resting on cot, respires easy and unlabored. Lights dimmed for comfort. Updated on radiology testing. Spouse at bedside. Will continue to monitor.       Carin Conklin RN  02/07/18 [id number]

## 2018-02-07 NOTE — ED TRIAGE NOTES
Pt to ED with c/o abdominal pain, dizziness, and headache. PT stated symptoms started Sunday. PT stated this morning she had blood in the toilet. Pt stated abdominal pain is in LUQ and it feels like cramping. PT c/o nausea and diarrhea at this time. EKG completed.

## 2018-02-08 ENCOUNTER — ANESTHESIA EVENT (OUTPATIENT)
Dept: ENDOSCOPY | Age: 63
DRG: 392 | End: 2018-02-08
Payer: COMMERCIAL

## 2018-02-08 ENCOUNTER — APPOINTMENT (OUTPATIENT)
Dept: MRI IMAGING | Age: 63
DRG: 392 | End: 2018-02-08
Payer: COMMERCIAL

## 2018-02-08 ENCOUNTER — APPOINTMENT (OUTPATIENT)
Dept: CT IMAGING | Age: 63
DRG: 392 | End: 2018-02-08
Payer: COMMERCIAL

## 2018-02-08 ENCOUNTER — ANESTHESIA (OUTPATIENT)
Dept: ENDOSCOPY | Age: 63
DRG: 392 | End: 2018-02-08
Payer: COMMERCIAL

## 2018-02-08 VITALS — DIASTOLIC BLOOD PRESSURE: 69 MMHG | SYSTOLIC BLOOD PRESSURE: 169 MMHG | OXYGEN SATURATION: 99 %

## 2018-02-08 LAB
ALBUMIN SERPL-MCNC: 3.8 G/DL (ref 3.5–5.1)
ALP BLD-CCNC: 83 U/L (ref 38–126)
ALT SERPL-CCNC: 28 U/L (ref 11–66)
ANION GAP SERPL CALCULATED.3IONS-SCNC: 12 MEQ/L (ref 8–16)
AST SERPL-CCNC: 29 U/L (ref 5–40)
BASOPHILS # BLD: 0.5 %
BASOPHILS ABSOLUTE: 0 THOU/MM3 (ref 0–0.1)
BILIRUB SERPL-MCNC: 0.3 MG/DL (ref 0.3–1.2)
BUN BLDV-MCNC: 12 MG/DL (ref 7–22)
CALCIUM SERPL-MCNC: 9 MG/DL (ref 8.5–10.5)
CHLORIDE BLD-SCNC: 105 MEQ/L (ref 98–111)
CO2: 25 MEQ/L (ref 23–33)
CREAT SERPL-MCNC: 0.5 MG/DL (ref 0.4–1.2)
EOSINOPHIL # BLD: 3.5 %
EOSINOPHILS ABSOLUTE: 0.2 THOU/MM3 (ref 0–0.4)
GFR SERPL CREATININE-BSD FRML MDRD: > 90 ML/MIN/1.73M2
GLUCOSE BLD-MCNC: 87 MG/DL (ref 70–108)
HCT VFR BLD CALC: 43.2 % (ref 37–47)
HCT VFR BLD CALC: 43.5 % (ref 37–47)
HEMOGLOBIN: 14 GM/DL (ref 12–16)
HEMOGLOBIN: 14.2 GM/DL (ref 12–16)
LYMPHOCYTES # BLD: 37.9 %
LYMPHOCYTES ABSOLUTE: 2.3 THOU/MM3 (ref 1–4.8)
MCH RBC QN AUTO: 30 PG (ref 27–31)
MCHC RBC AUTO-ENTMCNC: 32.4 GM/DL (ref 33–37)
MCV RBC AUTO: 92.7 FL (ref 81–99)
MONOCYTES # BLD: 9.5 %
MONOCYTES ABSOLUTE: 0.6 THOU/MM3 (ref 0.4–1.3)
NUCLEATED RED BLOOD CELLS: 0 /100 WBC
PDW BLD-RTO: 14.3 % (ref 11.5–14.5)
PLATELET # BLD: 204 THOU/MM3 (ref 130–400)
PMV BLD AUTO: 8.3 FL (ref 7.4–10.4)
POTASSIUM SERPL-SCNC: 4.1 MEQ/L (ref 3.5–5.2)
RBC # BLD: 4.66 MILL/MM3 (ref 4.2–5.4)
SEG NEUTROPHILS: 48.6 %
SEGMENTED NEUTROPHILS ABSOLUTE COUNT: 2.9 THOU/MM3 (ref 1.8–7.7)
SODIUM BLD-SCNC: 142 MEQ/L (ref 135–145)
TOTAL PROTEIN: 6.5 G/DL (ref 6.1–8)
WBC # BLD: 6 THOU/MM3 (ref 4.8–10.8)

## 2018-02-08 PROCEDURE — 74183 MRI ABD W/O CNTR FLWD CNTR: CPT

## 2018-02-08 PROCEDURE — A9579 GAD-BASE MR CONTRAST NOS,1ML: HCPCS | Performed by: INTERNAL MEDICINE

## 2018-02-08 PROCEDURE — 3700000000 HC ANESTHESIA ATTENDED CARE: Performed by: INTERNAL MEDICINE

## 2018-02-08 PROCEDURE — 80053 COMPREHEN METABOLIC PANEL: CPT

## 2018-02-08 PROCEDURE — 93010 ELECTROCARDIOGRAM REPORT: CPT | Performed by: INTERNAL MEDICINE

## 2018-02-08 PROCEDURE — 7100000000 HC PACU RECOVERY - FIRST 15 MIN: Performed by: INTERNAL MEDICINE

## 2018-02-08 PROCEDURE — C9113 INJ PANTOPRAZOLE SODIUM, VIA: HCPCS | Performed by: INTERNAL MEDICINE

## 2018-02-08 PROCEDURE — 6360000002 HC RX W HCPCS: Performed by: INTERNAL MEDICINE

## 2018-02-08 PROCEDURE — 85018 HEMOGLOBIN: CPT

## 2018-02-08 PROCEDURE — 88305 TISSUE EXAM BY PATHOLOGIST: CPT

## 2018-02-08 PROCEDURE — 2580000003 HC RX 258: Performed by: NURSE ANESTHETIST, CERTIFIED REGISTERED

## 2018-02-08 PROCEDURE — 6360000004 HC RX CONTRAST MEDICATION: Performed by: INTERNAL MEDICINE

## 2018-02-08 PROCEDURE — 2500000003 HC RX 250 WO HCPCS: Performed by: NURSE ANESTHETIST, CERTIFIED REGISTERED

## 2018-02-08 PROCEDURE — 36415 COLL VENOUS BLD VENIPUNCTURE: CPT

## 2018-02-08 PROCEDURE — 85025 COMPLETE CBC W/AUTO DIFF WBC: CPT

## 2018-02-08 PROCEDURE — 87081 CULTURE SCREEN ONLY: CPT

## 2018-02-08 PROCEDURE — 85014 HEMATOCRIT: CPT

## 2018-02-08 PROCEDURE — 1200000003 HC TELEMETRY R&B

## 2018-02-08 PROCEDURE — 70450 CT HEAD/BRAIN W/O DYE: CPT

## 2018-02-08 PROCEDURE — 0DB98ZX EXCISION OF DUODENUM, VIA NATURAL OR ARTIFICIAL OPENING ENDOSCOPIC, DIAGNOSTIC: ICD-10-PCS | Performed by: INTERNAL MEDICINE

## 2018-02-08 PROCEDURE — 3609012400 HC EGD TRANSORAL BIOPSY SINGLE/MULTIPLE: Performed by: INTERNAL MEDICINE

## 2018-02-08 PROCEDURE — 99253 IP/OBS CNSLTJ NEW/EST LOW 45: CPT | Performed by: SURGERY

## 2018-02-08 PROCEDURE — 2580000003 HC RX 258: Performed by: INTERNAL MEDICINE

## 2018-02-08 PROCEDURE — 3700000001 HC ADD 15 MINUTES (ANESTHESIA): Performed by: INTERNAL MEDICINE

## 2018-02-08 PROCEDURE — 6370000000 HC RX 637 (ALT 250 FOR IP): Performed by: INTERNAL MEDICINE

## 2018-02-08 PROCEDURE — 6360000002 HC RX W HCPCS: Performed by: NURSE ANESTHETIST, CERTIFIED REGISTERED

## 2018-02-08 RX ORDER — FEXOFENADINE HCL 180 MG/1
180 TABLET ORAL DAILY PRN
COMMUNITY
End: 2020-06-09

## 2018-02-08 RX ORDER — SODIUM CHLORIDE 9 MG/ML
INJECTION, SOLUTION INTRAVENOUS CONTINUOUS PRN
Status: DISCONTINUED | OUTPATIENT
Start: 2018-02-08 | End: 2018-02-08 | Stop reason: SDUPTHER

## 2018-02-08 RX ORDER — ONDANSETRON 2 MG/ML
4 INJECTION INTRAMUSCULAR; INTRAVENOUS EVERY 6 HOURS PRN
Status: DISCONTINUED | OUTPATIENT
Start: 2018-02-08 | End: 2018-02-09 | Stop reason: HOSPADM

## 2018-02-08 RX ORDER — LIDOCAINE HYDROCHLORIDE 20 MG/ML
INJECTION, SOLUTION EPIDURAL; INFILTRATION; INTRACAUDAL; PERINEURAL PRN
Status: DISCONTINUED | OUTPATIENT
Start: 2018-02-08 | End: 2018-02-08 | Stop reason: SDUPTHER

## 2018-02-08 RX ORDER — ASCORBIC ACID 500 MG
500 TABLET ORAL 3 TIMES DAILY
Status: DISCONTINUED | OUTPATIENT
Start: 2018-02-08 | End: 2018-02-09 | Stop reason: HOSPADM

## 2018-02-08 RX ORDER — FAMOTIDINE 20 MG/1
40 TABLET, FILM COATED ORAL EVERY EVENING
Status: DISCONTINUED | OUTPATIENT
Start: 2018-02-08 | End: 2018-02-09 | Stop reason: HOSPADM

## 2018-02-08 RX ORDER — PROPOFOL 10 MG/ML
INJECTION, EMULSION INTRAVENOUS PRN
Status: DISCONTINUED | OUTPATIENT
Start: 2018-02-08 | End: 2018-02-08 | Stop reason: SDUPTHER

## 2018-02-08 RX ORDER — MULTIVITAMIN WITH IRON
250 TABLET ORAL 2 TIMES DAILY
Status: DISCONTINUED | OUTPATIENT
Start: 2018-02-08 | End: 2018-02-08

## 2018-02-08 RX ADMIN — PANTOPRAZOLE SODIUM 40 MG: 40 INJECTION, POWDER, FOR SOLUTION INTRAVENOUS at 08:42

## 2018-02-08 RX ADMIN — CALCIUM 250 MG: 500 TABLET ORAL at 01:31

## 2018-02-08 RX ADMIN — PROPOFOL 70 MG: 10 INJECTION, EMULSION INTRAVENOUS at 11:39

## 2018-02-08 RX ADMIN — SODIUM CHLORIDE: 9 INJECTION, SOLUTION INTRAVENOUS at 11:30

## 2018-02-08 RX ADMIN — GADOTERIDOL 15 ML: 279.3 INJECTION, SOLUTION INTRAVENOUS at 17:13

## 2018-02-08 RX ADMIN — SODIUM CHLORIDE: 4.5 INJECTION, SOLUTION INTRAVENOUS at 01:34

## 2018-02-08 RX ADMIN — FAMOTIDINE 40 MG: 20 TABLET, FILM COATED ORAL at 18:24

## 2018-02-08 RX ADMIN — CALCIUM 250 MG: 500 TABLET ORAL at 15:13

## 2018-02-08 RX ADMIN — Medication 250 MG: at 21:17

## 2018-02-08 RX ADMIN — FOLIC ACID 1 MG: 1 TABLET ORAL at 13:01

## 2018-02-08 RX ADMIN — MODAFINIL 200 MG: 100 TABLET ORAL at 13:00

## 2018-02-08 RX ADMIN — VITAMIN D, TAB 1000IU (100/BT) 5000 UNITS: 25 TAB at 13:01

## 2018-02-08 RX ADMIN — CALCIUM 250 MG: 500 TABLET ORAL at 13:02

## 2018-02-08 RX ADMIN — Medication 250 MG: at 15:13

## 2018-02-08 RX ADMIN — Medication 500 MG: at 15:13

## 2018-02-08 RX ADMIN — LEVOTHYROXINE SODIUM 12.5 MCG: 25 TABLET ORAL at 05:01

## 2018-02-08 RX ADMIN — PROPOFOL 20 MG: 10 INJECTION, EMULSION INTRAVENOUS at 11:42

## 2018-02-08 RX ADMIN — Medication 500 MG: at 21:16

## 2018-02-08 RX ADMIN — HYDROCODONE BITARTRATE AND ACETAMINOPHEN 1 TABLET: 5; 325 TABLET ORAL at 03:47

## 2018-02-08 RX ADMIN — CALCIUM 250 MG: 500 TABLET ORAL at 21:16

## 2018-02-08 RX ADMIN — CALCIUM 250 MG: 500 TABLET ORAL at 05:00

## 2018-02-08 RX ADMIN — CALCIUM 250 MG: 500 TABLET ORAL at 18:24

## 2018-02-08 RX ADMIN — Medication 500 MG: at 13:01

## 2018-02-08 RX ADMIN — Medication 240 MG: at 13:00

## 2018-02-08 RX ADMIN — Medication 250 MG: at 13:01

## 2018-02-08 RX ADMIN — LIDOCAINE HYDROCHLORIDE 100 MG: 20 INJECTION, SOLUTION EPIDURAL; INFILTRATION; INTRACAUDAL; PERINEURAL at 11:39

## 2018-02-08 RX ADMIN — DULOXETINE HYDROCHLORIDE 60 MG: 60 CAPSULE, DELAYED RELEASE ORAL at 13:01

## 2018-02-08 ASSESSMENT — PAIN SCALES - GENERAL
PAINLEVEL_OUTOF10: 5
PAINLEVEL_OUTOF10: 6
PAINLEVEL_OUTOF10: 6
PAINLEVEL_OUTOF10: 0
PAINLEVEL_OUTOF10: 3
PAINLEVEL_OUTOF10: 3
PAINLEVEL_OUTOF10: 0
PAINLEVEL_OUTOF10: 4

## 2018-02-08 ASSESSMENT — PAIN - FUNCTIONAL ASSESSMENT: PAIN_FUNCTIONAL_ASSESSMENT: 0-10

## 2018-02-08 ASSESSMENT — PAIN DESCRIPTION - LOCATION
LOCATION: ABDOMEN

## 2018-02-08 ASSESSMENT — PAIN DESCRIPTION - PAIN TYPE
TYPE: ACUTE PAIN

## 2018-02-08 ASSESSMENT — PAIN DESCRIPTION - FREQUENCY: FREQUENCY: CONTINUOUS

## 2018-02-08 ASSESSMENT — PAIN DESCRIPTION - DESCRIPTORS: DESCRIPTORS: CRAMPING

## 2018-02-08 NOTE — CARE COORDINATION
18, 8:45 AM      Dot Perez       Admitted from: ED     2018/ [facility] day: 1   Location: -023-A Reason for admit: Abdominal pain [R10.9] Status: inpatient  Admit order signed?: no  - message left for Dr QUIROZ:  has a past medical history of Anemia; Anxiety; Arm DVT (deep venous thromboembolism), acute ([facility] [address] Arthritis; Broken shoulder; Cancer ([facility] [address] Celiac disease; CRPS (complex regional pain syndrome type I); Depression; GERD (gastroesophageal reflux disease); GERD (gastroesophageal reflux disease); Headache(784.0); History of blood transfusion; Hx of blood clots; Hx of reactive hypoglycemia; Hyperlipidemia; Hypothyroidism; IBS (irritable bowel syndrome); Irritable bowel syndrome; Meniere disease; Mitral valve prolapse syndrome; MRSA (methicillin resistant Staphylococcus aureus); Narcolepsy; Partial bowel obstruction; Restless legs syndrome; RSD upper limb; RSD upper limb; and Unspecified diseases of blood and blood-forming organs. Procedure:    Pertinent abnormal Imagin/7  CT abdomen with contrast\"  Impression:        1. There is a 4.1 x 3.9 cm mixed attenuation lesion demonstrated within the mesentery of the left mid abdomen on axial image 36. This is increased in size from the prior examination, at which time it measured approximately 2.3 x 2.67 m. Previously this   demonstrated more a cystic appearance. However, on the current examination there are areas of fat attenuation as well. Differential considerations would include a mesenteric dermoid/teratoma versus an extra-adrenal myelolipoma. Liposarcoma is not   excluded but considered less likely.  Consider further characterization with abdominal MRI with and without contrast.         Medications:  Scheduled Meds:   ascorbic acid  500 mg Oral TID    magnesium  250 mg Oral BID    famotidine  40 mg Oral QPM    calcium elemental  250 mg Oral 5x Daily    DULoxetine  60 mg Oral Daily    ferrous gluconate  240 mg Oral Daily   

## 2018-02-08 NOTE — FLOWSHEET NOTE
02/08/18 0118   Provider Notification   Reason for Communication Evaluate   Provider Name Dr. Butler Headings   Provider Notification Physician   Method of Communication Secure Message   Response Waiting for response   Notification Time Josias Butler Headings paged regarding consult for abdominal pain, waiting for response. 56 - Dr. Butler Headings returned call, new order Zofran q4 hours PRN.  Will add to list.

## 2018-02-08 NOTE — ANESTHESIA PRE PROCEDURE
Evaluation  Patient summary reviewed and Nursing notes reviewed no history of anesthetic complications:   Airway: Mallampati: II  TM distance: >3 FB   Neck ROM: full  Mouth opening: > = 3 FB Dental:          Pulmonary:Negative Pulmonary ROS and normal exam                               Cardiovascular:  Exercise tolerance: good (>4 METS),   (+) hyperlipidemia      ECG reviewed               Beta Blocker:  Not on Beta Blocker         Neuro/Psych:   (+) headaches: migraine headaches, psychiatric history:depression/anxiety             GI/Hepatic/Renal:   (+) GERD: well controlled,           Endo/Other:    (+) hypothyroidism::., .          Pt had no PAT visit       Abdominal:       Abdomen: soft. Vascular:                                        Anesthesia Plan      MAC     ASA 3       Induction: intravenous. Anesthetic plan and risks discussed with patient. Plan discussed with attending.     Attending anesthesiologist reviewed and agrees with Pre Eval content              Xena Mcgovern CRNA   2/8/2018

## 2018-02-08 NOTE — H&P
[address] [place] [address]                               HISTORY AND PHYSICAL    PATIENT NAME: Cephas Severe                 :        1955  MED REC NO:   [id number]                           ROOM:       0023  ACCOUNT NO:   [de-identified]                           ADMIT DATE: 2018  PROVIDER:     Imtiaz Mills M.D.          CHIEF COMPLAINT:  Abdominal pain. HISTORY OF PRESENT ILLNESS:  This is a 14-year-old woman with past medical  history of small bowel obstruction, had resection twice; history of  anxiety; celiac disease; complex regional pain syndrome on her right  shoulder following fracture; acid reflux; chronic migraine headaches;  irritable bowel syndrome; Meniere's disease; mitral valve prolapse. Apparently, has been having abdominal discomfort for the last 4 days. She  also had some nausea, but no emesis but has been having loose stools. Yesterday, she had one bowel movement with bright red blood. She also felt  dizzy and had headache and she could not take any longer and requested her   to bring her to the hospital.  Workup in the ER, a CAT scan did  show a mesenteric lesion that was not as cystic as it was before and it  increased in size. Her Hemoccult stools were positive. Hemoglobin was  stable. GI was consulted. In fact, she already had undergone EGD earlier  this morning and had hiatal hernia noted. She had no further rectal  bleeding. She does have chronic headaches. She complains of no headache  right now, but has been lightheaded. She also has ringing in her ears as  she has Meniere's disease. She also has history of narcolepsy. The  patient denied having any fever, any chills.     PAST MEDICAL HISTORY:  As dictated, has narcolepsy, restless leg syndrome,  previous history of bowel obstruction requiring surgery with resection of  bowel x2, history of irritable bowel syndrome, celiac disease, Meniere's  disease, mitral valve prolapse, hypothyroidism and is on two different  kinds of thyroid medications, hyperlipidemia, blood clot in her right arm  following a PICC line placement and as well as on Coumadin for 6 months,  history of acid reflux, anxiety and depression, history of broken shoulder  and had complex regional pain syndrome following. PAST SURGICAL HISTORY:  Includes bowel resection x2, cholecystectomy,  appendectomy, right knee arthroscopy, sinus surgery, total abdominal  hysterectomy, carpal tunnel release, EGD, and colonoscopy. ALLERGIES:  Listed were DILAUDID, IRON, PERCODAN, FENOPROFEN, GLUTEN,  REGLAN, SULFA. MEDICATIONS:  List had Allegra, Cymbalta, Synthroid, Provigil, Crestor,  Zantac, [place] Thyroid, Nexium, DHEA, vitamin C and lysine, Norco, Mucinex,  cholecalciferol, Omega-3, Fergon, and Nasacort. FAMILY HISTORY:  Significant for diabetes, high blood pressure, strokes,  heart disease, cancer. SOCIAL HISTORY:  She lives with her . She denies any smoking. Agrees to one alcoholic drink a week. Denies any illicit drug use. REVIEW OF SYSTEMS:  Positive for lower abdominal pain. Positive for  diarrhea, nausea. No significant weight loss. Positive for blood in her  stools. Positive for headache, dizziness. No chest pain. No  palpitations. No difficulty breathing. No numbness or tingling or skin  rash. PHYSICAL EXAMINATION:  VITAL SIGNS:  Blood pressure was 138/80, pulse of 82, respirations 18,  temperature 97.7. HEENT:  Pupils are reacting to light. No temporal artery tenderness. Tongue is moist.  Buccal mucosa is moist.  NECK:  Supple. HEART:  S1, S3 heard with a regular rhythm, not tachycardic. LUNGS:  Air exchange is appreciated bilaterally. ABDOMEN:  Soft. Bowel sounds are appreciated. There is no guarding or  tenderness or rebound. RECTAL:  Deferred. EXTREMITIES:  No ankle edema.   Dorsalis pedis and posterior

## 2018-02-08 NOTE — H&P
Per Op History & Physical    Patient: Donneta Galeazzi: 1955  [facility] Rec#: [id number] Acc#: [id number]   Provider Performing Procedure: Ana Laura Demarco  Primary Care Physician: Elvira Lundborg, MD    PRE-PROCEDURE   Full CODE [x]Yes  DNR-CCA/DNR-CC []Yes   Brief History/Pre-Procedure Diagnosis:nausea, vomiting and history of  celiac disease          MEDICAL HISTORY    []Additional information:       has a past medical history of Anemia; Anxiety; Arm DVT (deep venous thromboembolism), acute ([facility] [address] Arthritis; Broken shoulder; Cancer ([facility] [address] Celiac disease; CRPS (complex regional pain syndrome type I); Depression; GERD (gastroesophageal reflux disease); GERD (gastroesophageal reflux disease); Headache(784.0); History of blood transfusion; Hx of blood clots; Hx of reactive hypoglycemia; Hyperlipidemia; Hypothyroidism; IBS (irritable bowel syndrome); Irritable bowel syndrome; Meniere disease; Mitral valve prolapse syndrome; MRSA (methicillin resistant Staphylococcus aureus); Narcolepsy; Partial bowel obstruction; Restless legs syndrome; RSD upper limb; RSD upper limb; and Unspecified diseases of blood and blood-forming organs. SURGICAL HISTORY   has a past surgical history that includes Cholecystectomy (2003 ); Appendectomy (1985); Abdominal adhesion surgery (10/2010); knee surgery (Right, 2006); sinus surgery (x4 1982, 1990, 1997, 2005 ); Abdomen surgery (04/23/2013); Hysterectomy (1985); Carpal tunnel release (Left, 2006); sigmoidoscopy (1/14/97); other surgical history (2006); Colonoscopy (9725,0107,7559, 2015); Upper gastrointestinal endoscopy (1998,2009,2010,2011,2012, 2015); Endoscopy, colon, diagnostic; and fracture surgery.   Additional information:       ALLERGIES   Allergies as of 02/07/2018 - Review Complete 02/07/2018   Allergen Reaction Noted    Dilaudid [hydromorphone hcl]  03/07/2012    Iron Anaphylaxis 04/25/2013    Percodan [oxycodone-aspirin]  03/07/2012    Fenoprofen calcium Hives tablet Take 600 mg by mouth daily    Yes Historical Provider, MD   vitamin D (CHOLECALCIFEROL) 5000 UNITS CAPS capsule Take 5,000 Units by mouth daily    Yes Historical Provider, MD   Omega 3 1000 MG CAPS Take 4 capsules by mouth 4 times daily (before meals and nightly) [facility] # [id number] or [employer] pharmaceutical grade at   [facility]    Yes Historical Provider, MD   calcium carbonate (OSCAL) 500 MG TABS tablet Take 250 mg by mouth 5 times daily    Yes Historical Provider, MD   Magnesium Citrate 100 MG TABS Take 1 capsule by mouth 5 times daily With last bite of each meal    Yes Historical Provider, MD   ferrous gluconate (FERGON) 225 (27 FE) MG tablet Take 1 tablet by mouth daily. 4/7/15  Yes Alix Alicea MD   triamcinolone (NASACORT AQ) 55 MCG/ACT nasal inhaler 2 sprays by Nasal route daily    Yes Historical Provider, MD     Additional information:       PHYSICAL:   Heart:  [x]Regular rate and rhythm  []Other:    Lungs:  [x]Clear    []Other:    Abdomen: [x]Soft    []Other:    Mental Status: [x]Alert & Oriented  []Other:      VITAL SIGNS   See admitting nurses note      PLANNED PROCEDURE   [x]EGD  []Colonoscopy []Flex Sigmoid     Consent: I have discussed with the patient and/or the patient representative the indication, alternatives, and the possible risks and/or complications of the planned procedure and the anesthesia methods. The patient and/or patient representative appear to understand and agree to proceed. SEDATION  Please see anesthesia note. The medication Planned :  Planned agent:[x]Midazolam []Meperidine [x]Sublimaze []Morphine  []Diazepam  [x]Propofol     Airway Assessment:   See anesthesia no please     Monitoring and Safety: The patient will be placed on a cardiac monitor and vital signs, pulse oximetry and level of consciousness will be continuously evaluated throughout the procedure.  The patient will be closely monitored until recovery from the medications is complete and the patient has returned to baseline status. Respiratory therapy will be on standby during the procedure. [x]Pre-procedure diagnostic studies complete and results available. Comment:    [x]Previous sedation/anesthesia experiences assessed. Comment:    [x]The patient is an appropriate candidate to undergo the planned procedure sedation and anesthesia. (Refer to nursing sedation/analgesia documentation record)  [x]Formulation and discussion of sedation/procedure plan, risks, and expectations with patient and/or responsible adult completed. [x]Patient examined immediately prior to the procedure.  (Refer to nursing sedation/analgesia documentation record)    Papito Grande MD   Electronically signed 2/8/2018 at 11:32 AM

## 2018-02-08 NOTE — PROGRESS NOTES
Asked to see patient re: tinnitus bothering her. She is in hospital for rectal bleeding. She is a former patient in our clinic. She has not been seen for tinnitus but there is nothing we can do to alleviate her tinnitus. She needs to be evaluated on discharge. Please set up hearing test, VNG and an appointment in the clinic.     Shani Collado

## 2018-02-08 NOTE — ED NOTES
Pt resting on cot, respires easy and unlabored. Family at bedside.       Caryn Hernandez RN  02/07/18 8378

## 2018-02-09 ENCOUNTER — TELEPHONE (OUTPATIENT)
Dept: INTERNAL MEDICINE CLINIC | Age: 63
End: 2018-02-09

## 2018-02-09 VITALS
HEART RATE: 66 BPM | SYSTOLIC BLOOD PRESSURE: 117 MMHG | RESPIRATION RATE: 18 BRPM | OXYGEN SATURATION: 95 % | WEIGHT: 175.3 LBS | DIASTOLIC BLOOD PRESSURE: 70 MMHG | BODY MASS INDEX: 29.2 KG/M2 | HEIGHT: 65 IN | TEMPERATURE: 97.8 F

## 2018-02-09 LAB
ALBUMIN SERPL-MCNC: 3.7 G/DL (ref 3.5–5.1)
ALP BLD-CCNC: 82 U/L (ref 38–126)
ALT SERPL-CCNC: 30 U/L (ref 11–66)
ANION GAP SERPL CALCULATED.3IONS-SCNC: 12 MEQ/L (ref 8–16)
AST SERPL-CCNC: 29 U/L (ref 5–40)
BASOPHILS # BLD: 0.7 %
BASOPHILS ABSOLUTE: 0 THOU/MM3 (ref 0–0.1)
BILIRUB SERPL-MCNC: 0.2 MG/DL (ref 0.3–1.2)
BUN BLDV-MCNC: 16 MG/DL (ref 7–22)
CALCIUM SERPL-MCNC: 9.2 MG/DL (ref 8.5–10.5)
CHLORIDE BLD-SCNC: 99 MEQ/L (ref 98–111)
CO2: 26 MEQ/L (ref 23–33)
CREAT SERPL-MCNC: 0.5 MG/DL (ref 0.4–1.2)
EOSINOPHIL # BLD: 4.4 %
EOSINOPHILS ABSOLUTE: 0.3 THOU/MM3 (ref 0–0.4)
GFR SERPL CREATININE-BSD FRML MDRD: > 90 ML/MIN/1.73M2
GLUCOSE BLD-MCNC: 94 MG/DL (ref 70–108)
HCT VFR BLD CALC: 43.2 % (ref 37–47)
HEMOGLOBIN: 14.5 GM/DL (ref 12–16)
LYMPHOCYTES # BLD: 36.1 %
LYMPHOCYTES ABSOLUTE: 2.2 THOU/MM3 (ref 1–4.8)
MCH RBC QN AUTO: 30.3 PG (ref 27–31)
MCHC RBC AUTO-ENTMCNC: 33.5 GM/DL (ref 33–37)
MCV RBC AUTO: 90.3 FL (ref 81–99)
MONOCYTES # BLD: 9.9 %
MONOCYTES ABSOLUTE: 0.6 THOU/MM3 (ref 0.4–1.3)
MRSA SCREEN: NORMAL
NUCLEATED RED BLOOD CELLS: 0 /100 WBC
PDW BLD-RTO: 14.1 % (ref 11.5–14.5)
PLATELET # BLD: 199 THOU/MM3 (ref 130–400)
PMV BLD AUTO: 8.1 FL (ref 7.4–10.4)
POTASSIUM SERPL-SCNC: 4.5 MEQ/L (ref 3.5–5.2)
RBC # BLD: 4.78 MILL/MM3 (ref 4.2–5.4)
SEG NEUTROPHILS: 48.9 %
SEGMENTED NEUTROPHILS ABSOLUTE COUNT: 3 THOU/MM3 (ref 1.8–7.7)
SODIUM BLD-SCNC: 137 MEQ/L (ref 135–145)
T4 FREE: 1 NG/DL (ref 0.93–1.76)
TOTAL PROTEIN: 6.4 G/DL (ref 6.1–8)
TSH SERPL DL<=0.05 MIU/L-ACNC: 0.1 UIU/ML (ref 0.4–4.2)
WBC # BLD: 6.2 THOU/MM3 (ref 4.8–10.8)

## 2018-02-09 PROCEDURE — 84443 ASSAY THYROID STIM HORMONE: CPT

## 2018-02-09 PROCEDURE — 80053 COMPREHEN METABOLIC PANEL: CPT

## 2018-02-09 PROCEDURE — 84439 ASSAY OF FREE THYROXINE: CPT

## 2018-02-09 PROCEDURE — 6370000000 HC RX 637 (ALT 250 FOR IP): Performed by: INTERNAL MEDICINE

## 2018-02-09 PROCEDURE — 36415 COLL VENOUS BLD VENIPUNCTURE: CPT

## 2018-02-09 PROCEDURE — 99253 IP/OBS CNSLTJ NEW/EST LOW 45: CPT | Performed by: UROLOGY

## 2018-02-09 PROCEDURE — 6360000002 HC RX W HCPCS: Performed by: INTERNAL MEDICINE

## 2018-02-09 PROCEDURE — 85025 COMPLETE CBC W/AUTO DIFF WBC: CPT

## 2018-02-09 PROCEDURE — C9113 INJ PANTOPRAZOLE SODIUM, VIA: HCPCS | Performed by: INTERNAL MEDICINE

## 2018-02-09 RX ADMIN — Medication 250 MG: at 16:07

## 2018-02-09 RX ADMIN — VITAMIN D, TAB 1000IU (100/BT) 5000 UNITS: 25 TAB at 09:39

## 2018-02-09 RX ADMIN — PANTOPRAZOLE SODIUM 40 MG: 40 INJECTION, POWDER, FOR SOLUTION INTRAVENOUS at 09:39

## 2018-02-09 RX ADMIN — LEVOTHYROXINE SODIUM 12.5 MCG: 25 TABLET ORAL at 04:31

## 2018-02-09 RX ADMIN — Medication 500 MG: at 16:07

## 2018-02-09 RX ADMIN — FOLIC ACID 1 MG: 1 TABLET ORAL at 09:39

## 2018-02-09 RX ADMIN — MODAFINIL 200 MG: 100 TABLET ORAL at 16:07

## 2018-02-09 RX ADMIN — CALCIUM 250 MG: 500 TABLET ORAL at 04:30

## 2018-02-09 RX ADMIN — CALCIUM 250 MG: 500 TABLET ORAL at 10:57

## 2018-02-09 RX ADMIN — Medication 500 MG: at 09:39

## 2018-02-09 RX ADMIN — DULOXETINE HYDROCHLORIDE 60 MG: 60 CAPSULE, DELAYED RELEASE ORAL at 09:39

## 2018-02-09 RX ADMIN — MODAFINIL 200 MG: 100 TABLET ORAL at 09:40

## 2018-02-09 RX ADMIN — Medication 240 MG: at 09:40

## 2018-02-09 RX ADMIN — CALCIUM 250 MG: 500 TABLET ORAL at 16:07

## 2018-02-09 RX ADMIN — Medication 250 MG: at 09:39

## 2018-02-09 ASSESSMENT — PAIN DESCRIPTION - LOCATION
LOCATION: ABDOMEN

## 2018-02-09 ASSESSMENT — PAIN SCALES - GENERAL
PAINLEVEL_OUTOF10: 4
PAINLEVEL_OUTOF10: 3
PAINLEVEL_OUTOF10: 3

## 2018-02-09 ASSESSMENT — PAIN DESCRIPTION - PAIN TYPE
TYPE: ACUTE PAIN

## 2018-02-09 NOTE — PROGRESS NOTES
Inpatient Monitoring, Education Initiated (Encouraged oral intake and good protein sources.  )    Nutrition Evaluation:   · Evaluation: Goals set   · Goals: Pt will consume 75% or more of meals during LOS. · Monitoring: Meal Intake, Diet Tolerance, Weight, Pertinent Labs, Nausea or Vomiting, Diarrhea (Abdominal pain)    See Adult Nutrition Doc Flowsheet for more detail.      Electronically signed by Bjorn Manning RD, LD on 2/9/18 at 12:08 PM    Contact Number: [phone]

## 2018-02-09 NOTE — PLAN OF CARE
Problem: Nutrition  Goal: Optimal nutrition therapy  Outcome: Ongoing  Nutrition Problem: Inadequate oral intake  Intervention: Food and/or Nutrient Delivery: Continue current diet (Offered ONS, pt declined need. Provided handout with gluten free menu options. )  Nutritional Goals: Pt will consume 75% or more of meals during LOS.

## 2018-02-09 NOTE — CONSULTS
[address] [id number]                                   CONSULTATION    PATIENT NAME: Ethyl Boys                 :        1955  MED REC NO:   [id number]                           ROOM:       0023  ACCOUNT NO:   [de-identified]                           ADMIT DATE: 2018  PROVIDER:     Bishop Kobs, M.D. Raynold Claude:  2018    HISTORY OF PRESENT ILLNESS:  The patient is known to the practice, seen on  2018 with abdominal discomfort, nausea, vomiting. Denies diarrhea. The patient with significant past medical history of celiac disease. The  patient has nausea and vomit. She has severe abdominal pain over a sufficient  area. Actually, the pain is severe. Also, she  diarrhea. She has  no associated chills or fever. Pain came on and off for a few days, has  not improved. She has no odynophagia or dysphagia. She said she is very  compliant with celiac disease diet. She lost weight. Appetite is fine. Pain is going on for the last 4 days. She has also one bowel movement with  bright red blood per rectum. Recent changes in bowel habit. Abnormal imaging   study is with recurrent of cyst or mesenteric lesion , which has disappear in the   limited  , because her stomach was enlarged. The patient is afraid  Past that she has history of celiac disease, she has had this before, she to R/o   malignant. At the time of evaluation, she is improved. completed. PAST MEDICAL HISTORY:  Significant for celiac disease, recurrent bowel  obstruction in  requiring surgical management , restless leg  syndrome, IBS, Meniere's disease, mitral valve prolapse, hypothyroidism,  gastroesophageal reflux, anxiety, depression, cholecystectomy,  appendectomy, arthroscopy of the knee, total abdominal hysterectomy, carpal  tunnel release. ALLERGIES:  IRON, PERCODAN, DILAUDID, SULFA.     MEDICATIONS:  She is on

## 2018-02-09 NOTE — CONSULTS
Urology Consult Note      History Obtained From:  patient    ED Chief complaint:   Chief Complaint   Patient presents with    Abdominal Pain    Rectal Bleeding    Headache       Reason for consultation: Abdominal mass    HISTORY OF PRESENT ILLNESS:      The patient is a 58 y.o. female with significant past medical history of abdominal pain who presented with a recent CT/MRI showing an abdominal mass near the lower pole of the left kidney. It appears to be benign. Denies voiding complaints. Denies gross hematuria, dysuria, Hx stone disease or past  surgery. Voiding spontaneously.   Past Medical History:        Diagnosis Date    Anemia     Anxiety     Arm DVT (deep venous thromboembolism), acute ([address] 5/15/13    Arthritis     Broken shoulder 09/06/2016    right    Cancer (HCC)     Celiac disease     CRPS (complex regional pain syndrome type I) 2016    Depression     GERD (gastroesophageal reflux disease)     GERD (gastroesophageal reflux disease)     Headache(784.0)     migraines-Dr. Rhett Shaffer    History of blood transfusion     Hx of blood clots     Hx of reactive hypoglycemia     Dr. Balbuenaphus Pro    Hyperlipidemia     Hypothyroidism     IBS (irritable bowel syndrome)     Irritable bowel syndrome 1997    Dr. Matilde Chauhan at [facility] [email or web address] [facility]    Meniere disease 2005    Dr. Roxanna Briscoe Mitral valve prolapse syndrome     MRSA (methicillin resistant Staphylococcus aureus) 2013    left forearm     Narcolepsy     Partial bowel obstruction     Restless legs syndrome     RSD upper limb     Right Shoulder    RSD upper limb     Unspecified diseases of blood and blood-forming organs      Past Surgical History:        Procedure Laterality Date    ABDOMEN SURGERY  04/23/2013    ABD Exploration, removal of ommentum, lysis of adhesions-Dr. Ethel Lundberg    ABDOMINAL ADHESION SURGERY  10/2010    Resection-Dr. Ethel Lundberg    APPENDECTOMY  1985    CARPAL TUNNEL RELEASE Left 2006    CHOLECYSTECTOMY  2003 Allergen Reactions    Dilaudid [Hydromorphone Hcl]      Respiratory failure    Iron Anaphylaxis    Percodan [Oxycodone-Aspirin]      Anxiety     Fenoprofen Calcium Hives    Gluten Diarrhea    Percocet  [Oxycodone-Acetaminophen]     Reglan [Metoclopramide]     Sulfa Antibiotics Hives       ROS:  Constitutional: Negative for chills, fatigue, fever, or weight loss. Eyes: Denies reported visual changes. ENT: Denies headache, difficulty swallowing, nose bleeds, ringing in ears, or earaches. Cardiovascular: Negative for chest pain, palpitations, tachycardia or edema. Respiratory: Denies cough or SOB. GI:The patient denies abdominal or flank pain, anorexia, nausea or vomiting. : See HPI  Musculoskeletal: Patient denies low back pain or painful or reduced ROM of the back or extremities. Neurological: The patient denies any symptoms of neurological impairment or               TIA's; no history of stroke. Lymphatic: Denies swollen glands in neck, axillary or inguinal areas. Psychiatric: Denies anxiety or depression. Skin: Denies rash or lesions. The remainder of the complete ROS is negative    PHYSICAL EXAM:  VITALS:  /70   Pulse 66   Temp 97.8 °F (36.6 °C) (Oral)   Resp 18   Ht 5' 5\" (1.651 m)   Wt 175 lb 4.8 oz (79.5 kg)   SpO2 95%   BMI 29.17 kg/m² . Nursing note and vitals reviewed. Constitutional:   Alert and oriented, no acute distress and cooperative to examination with appropriate mood and affect. HEENT:   Head::Normocephalic and atraumatic. Eyes: No scleral icterus. Nose:The external appearance of the nose is normal  Ears: The ears appear normal to external inspection   Neck: Supple, symmetrical    Pulmonary/Chest:  Chest symmetric with normal A/P diameter,  Normal respiratory rate and rhthym. No use of accessory muscles. Abdominal:   Soft. Tenderness on palpation of the LUQ. Negative CVA tenderness bilaterally.       Extremities:   No edema or

## 2018-02-09 NOTE — PROGRESS NOTES
Surg.  Patient seen consult to follow patient has long history of chronic abdominal pain similar iliac disease irritable bowel syndrome who had onset over the last several days of her recurrent abdominal pain particularly in the left upper quadrant.   CT scan has shown a mesenteric mass/cyst that has been present since 2015 but it has enlarged her abdomen is overall soft she had an MRI today we will check MRI results and make recommendations at that point I doubt that this mass/cyst is causing her pain may also be difficult to remove due to its position

## 2018-02-09 NOTE — PLAN OF CARE
Problem: Pain:  Goal: Pain level will decrease  Pain level will decrease    Outcome: Ongoing  Pt has pain/discomfort <5 on 0-10 pain scale at this time. Pt would like to wait to use pain medication. Will continue to assess and encourage nonmedical measures. Problem: Discharge Planning:  Goal: Participates in care planning  Participates in care planning   Outcome: Ongoing  Pt is active in plan of care, continue to update as needed. Goal: Discharged to appropriate level of care  Discharged to appropriate level of care   Outcome: Ongoing  Pt plans to return home upon discharge with significant other. Problem: Anxiety/Stress:  Goal: Level of anxiety will decrease  Level of anxiety will decrease   Outcome: Ongoing  Pt does not show signs of anxiety at this time, she is resting quietly. Will continue to assess. Problem: Bowel Function - Altered:  Goal: Bowel elimination is within specified parameters  Bowel elimination is within specified parameters   Outcome: Ongoing  Pt has not had bowel movement at this time, but still has active bowel sounds. Continue to assess intake and output. Problem: Pain:  Goal: Pain level will decrease  Pain level will decrease    Outcome: Ongoing  Pt has pain/discomfort <5 on 0-10 pain scale at this time. Pt would like to wait to use pain medication. Will continue to assess and encourage nonmedical measures. Problem: Skin Integrity - Impaired:  Goal: Will show no infection signs and symptoms  Will show no infection signs and symptoms   Outcome: Ongoing  Will continue to assess for signs and symptoms of infection, no signs of infection at this time. Continue to assess. Comments: Care plan reviewed with patient. Patient verbalize understanding of the plan of care and contribute to goal setting.

## 2018-02-09 NOTE — PROGRESS NOTES
left kidney measuring 4.0 x 3.6 x 3.5 cm in longitudinal, transverse and AP dimensions. This mass contains fluid levels with fat signal within the nondependent   portion and fluid and debris levels dependently. Fat signal is seen on T1-weighted imaging however there is no indication of proteinaceous debris or blood degradation products. There is also no enhancement. This mass is nonspecific. A dermoid would be a   diagnostic consideration.      **This report has been created using voice recognition software. It may contain minor errors which are inherent in voice recognition technology. **     Final report electronically signed by Dr. Roberta Juarez on 2/9/2018 5:54 AM    PROCEDURE: CT ABDOMEN PELVIS W IV CONTRAST  2/7/2018   CLINICAL INFORMATION: Abdominal pain    COMPARISON: 1/4/2017   TECHNIQUE:  Axial CT images were obtained through the abdomen and pelvis following the intravenous administration of 80 mL Isovue contrast. Coronal and sagittal reformatted images were rendered. All CT scans at this facility use dose modulation,   iterative reconstruction, and/or weight-based dosing when appropriate to reduce radiation dose to as low as reasonably achievable.  FINDINGS:    There is a 4.1 x 3.9 cm mixed attenuation lesion demonstrated within the mesentery of the left mid abdomen on axial image 36. This is increased in size from the prior examination, at which time it measured approximately 2.3 x 2.67 m. Previously this   demonstrated more a cystic appearance. However, on the current examination there are areas of fat attenuation as well. Differential considerations would include a mesenteric dermoid/teratoma versus an extra-adrenal myelolipoma. Liposarcoma is not   excluded but considered less likely.   Limited evaluation of the lung bases appear unremarkable.   The liver, spleen, pancreas and adrenal glands appear within normal limits. There is evidence of prior cholecystectomy.  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 This is a chronic finding.   There is no evidence of bowel obstruction. No free air is demonstrated. The appendix is not well seen. However, no pericecal inflammatory changes are seen. There is an anastomotic suture line demonstrated within the right lower abdomen.   The urinary bladder appears normal.   There is mild nonspecific right pelviectasis. However, no obstructive uropathy is seen. The kidneys enhance normally bilaterally.   No acute osseous findings are demonstrated. There is bilateral SI joint arthrosis noted.      Impression  1. There is a 4.1 x 3.9 cm mixed attenuation lesion demonstrated within the mesentery of the left mid abdomen on axial image 36. This is increased in size from the prior examination, at which time it measured approximately 2.3 x 2.67 m. Previously this   demonstrated more a cystic appearance. However, on the current examination there are areas of fat attenuation as well. Differential considerations would include a mesenteric dermoid/teratoma versus an extra-adrenal myelolipoma. Liposarcoma is not   excluded but considered less likely. Consider further characterization with abdominal MRI with and without contrast.      **This report has been created using voice recognition software.  It may contain minor errors which are inherent in voice recognition technology. **     Final report electronically signed by Dr. Donovan Dumas on 2/7/2018 8:59 PM      Endoscopy Finding:    PROCEDURE PERFORMED:  EGD with biopsy:  A standard video upper scope  advanced under direct vision from the oral cavity up into the small bowel. Esophagus featured slightly thickened surface. The gastroesophageal  junction was at 35 cm from the incisors. Scope was advanced into the  stomach. Retroflex examination of the cardia revealed small hiatus hernia.   Mucosa of the

## 2018-02-09 NOTE — PROGRESS NOTES
intracranial abnormality. **This report has been created using voice recognition software. It may contain minor errors which are inherent in voice recognition technology. ** Final report electronically signed by Dr. Yulissa Leonardo on 2/8/2018 9:15 PM    Mri Abdomen W Wo Contrast    Result Date: 2/9/2018  PROCEDURE: MRI ABDOMEN W WO CONTRAST CLINICAL INFORMATION: Mesenteric mass. COMPARISON: CT abdomen/pelvis dated 2/7/2018, 1/4/2017 and 10/5/2015. TECHNIQUE: Coronal T2 and axial breath-hold T2 haste, fat sat T2 free breathing, T1 gradient in and out of phase, fat sat T1 vibe precontrast and fat-sat T1 vibe postcontrast including arterial, portal venous and equilibrium phases. FINDINGS: There is a stable mesenteric mass, abutting the anteromedial margin of the lower pole the left kidney measuring 4.0 x 3.6 x 3.5 cm in longitudinal, transverse and AP dimensions. This mass contains fluid levels with fat signal within the nondependent portion and fluid and debris levels dependently. Fat signal is seen on T1-weighted imaging however there is no indication of proteinaceous debris or blood degradation products. There is also no enhancement. This mass is nonspecific. A dermoid would be a diagnostic consideration. The liver is unremarkable. The patient has had a prior cholecystectomy. There is mild prominence of the common duct measuring 0.7 cm in transverse dimension which most likely is normal for the patient's age and postcholecystectomy. The pancreas is unremarkable. The spleen is unremarkable. The bilateral adrenal glands are unremarkable. The bilateral kidneys are unremarkable. The bowel gas pattern is nonobstructive. The distal esophagus, stomach and imaged portions of the small and large bowel are unremarkable. There is no free air or free fluid. There is no inflammatory process. The abdominal aorta is unremarkable. Inferior vena cava is flattened however otherwise unremarkable.  There is no pathologically enlarged T2 haste, fat sat T2 free breathing, T1 gradient in and out of phase, fat sat T1 vibe precontrast and fat-sat T1 vibe postcontrast including arterial, portal venous and equilibrium phases.           FINDINGS:    There is a stable mesenteric mass, abutting the anteromedial margin of the lower pole the left kidney measuring 4.0 x 3.6 x 3.5 cm in longitudinal, transverse and AP dimensions. This mass contains fluid levels with fat signal within the nondependent    portion and fluid and debris levels dependently. Fat signal is seen on T1-weighted imaging however there is no indication of proteinaceous debris or blood degradation products. There is also no enhancement. This mass is nonspecific. A dermoid would be a    diagnostic consideration.       The liver is unremarkable. The patient has had a prior cholecystectomy. There is mild prominence of the common duct measuring 0.7 cm in transverse dimension which most likely is normal for the patient's age and postcholecystectomy. The pancreas is    unremarkable. The spleen is unremarkable. The bilateral adrenal glands are unremarkable. The bilateral kidneys are unremarkable.       The bowel gas pattern is nonobstructive. The distal esophagus, stomach and imaged portions of the small and large bowel are unremarkable.       There is no free air or free fluid. There is no inflammatory process.       The abdominal aorta is unremarkable. Inferior vena cava is flattened however otherwise unremarkable.       There is no pathologically enlarged lymphadenopathy.       There is mild dextroscoliosis and degenerative changes along the spine.               Impression   1. There is a stable mesenteric mass, abutting the anteromedial margin of the lower pole the left kidney measuring 4.0 x 3.6 x 3.5 cm in longitudinal, transverse and AP dimensions. This mass contains fluid levels with fat signal within the nondependent    portion and fluid and debris levels dependently.  Fat signal is seen on T1-weighted imaging however there is no indication of proteinaceous debris or blood degradation products. There is also no enhancement. This mass is nonspecific. A dermoid would be a    diagnostic consideration.                   **This report has been created using voice recognition software. It may contain minor errors which are inherent in voice recognition technology. **       Final report electronically signed by Dr. Fei Ryan on 2/9/2018 5:54 AM         Physical Exam:  Vitals: /70   Pulse 66   Temp 97.8 °F (36.6 °C) (Oral)   Resp 18   Ht 5' 5\" (1.651 m)   Wt 175 lb 4.8 oz (79.5 kg)   SpO2 95%   BMI 29.17 kg/m²   24 hour intake/output:    Intake/Output Summary (Last 24 hours) at 02/09/18 1110  Last data filed at 02/09/18 0910   Gross per 24 hour   Intake             1618 ml   Output             1200 ml   Net              418 ml     Last 3 weights: Wt Readings from Last 3 Encounters:   02/09/18 175 lb 4.8 oz (79.5 kg)   12/29/17 180 lb 12.8 oz (82 kg)   08/30/17 175 lb 8 oz (79.6 kg)       General appearance - oriented to person, place, and time  HEENT: Normocephalic and Atraumatic  Chest - clear to auscultation, no wheezes, rales or rhonchi, symmetric air entry  Cardiovascular - normal rate and regular rhythm  Abdomen - soft, nontender, nondistended, no masses or organomegaly   Neurological - Alert and oriented and Normal speech  Integumentary - Skin color, texture, turgor normal. No Rashes or lesions  Musculoskeletal -Full ROM times 4 extremities      DVT prophylaxis: [] Lovenox                                 [x] SCDs                                 [] SQ Heparin                                 [] Encourage ambulation           [] Already on Anticoagulation                 Assessment:  1. MRI -  stable mesenteric mass, abutting the anteromedial margin of the lower pole the left kidney measuring 4.0 x 3.6 x 3.5 cm, possible dermoid  2. Diarrhea resolved  3.  Abdominal pain resolved 4. Chronic anticoagulation   5. EGD with biopsy complete  6. Biopsy pending     Active Problems:    Generalized abdominal pain  Resolved Problems:    * No resolved hospital problems. *       Plan:  1. Conservative treatment  2. General diet- gluten free  3. Analgesia and antiemetics as needed  4. EGD complete  5. Monitor Labs, lytes per protocol  6. ENT  for dizziness will see as outpatient   7. Consult urology for mesenteric mass, due to the location   8. General surgery signing off, will follow prn.   Call if needed      Electronically signed by Faustino Suggs CNP on 2/9/2018 at 11:10 AM

## 2018-02-10 NOTE — DISCHARGE SUMMARY
As tolerated. ACTIVITY:  As tolerated.         Sally Aburto M.D.    D: 02/09/2018 13:01:16       T: 02/09/2018 14:32:23     [email or web address]  Job#: [id number]     Doc#: [id number]:

## 2018-02-12 ENCOUNTER — TELEPHONE (OUTPATIENT)
Dept: ENT CLINIC | Age: 63
End: 2018-02-12

## 2018-02-12 NOTE — TELEPHONE ENCOUNTER
Called and spoke with patient, rescheduled her appointment for Monday 2/19/18 at 2pm, I informed her that jacques from Audiology will be calling her to get her scheduled for those appointments for the VNG and audio. She voiced understanding.

## 2018-02-14 ENCOUNTER — TELEPHONE (OUTPATIENT)
Dept: FAMILY MEDICINE CLINIC | Age: 63
End: 2018-02-14

## 2018-02-14 DIAGNOSIS — E78.2 MIXED HYPERLIPIDEMIA: ICD-10-CM

## 2018-02-14 DIAGNOSIS — D50.0 IRON DEFICIENCY ANEMIA DUE TO CHRONIC BLOOD LOSS: ICD-10-CM

## 2018-02-14 DIAGNOSIS — Z09 S/P ABDOMINAL SURGERY, FOLLOW-UP EXAM: ICD-10-CM

## 2018-02-14 DIAGNOSIS — R35.0 URINARY FREQUENCY: ICD-10-CM

## 2018-02-14 DIAGNOSIS — F32.A ANXIETY AND DEPRESSION: ICD-10-CM

## 2018-02-14 DIAGNOSIS — B37.9 CANDIDA ALBICANS INFECTION: ICD-10-CM

## 2018-02-14 DIAGNOSIS — G47.411 PRIMARY NARCOLEPSY WITH CATAPLEXY: ICD-10-CM

## 2018-02-14 DIAGNOSIS — E06.3 HYPOTHYROIDISM DUE TO HASHIMOTO'S THYROIDITIS: Primary | ICD-10-CM

## 2018-02-14 DIAGNOSIS — K59.00 CONSTIPATION, UNSPECIFIED CONSTIPATION TYPE: ICD-10-CM

## 2018-02-14 DIAGNOSIS — R40.0 DAYTIME SOMNOLENCE: ICD-10-CM

## 2018-02-14 DIAGNOSIS — M25.511 RIGHT SHOULDER PAIN, UNSPECIFIED CHRONICITY: ICD-10-CM

## 2018-02-14 DIAGNOSIS — R19.4 CHANGE IN BOWEL HABITS: ICD-10-CM

## 2018-02-14 DIAGNOSIS — K21.00 GASTROESOPHAGEAL REFLUX DISEASE WITH ESOPHAGITIS: ICD-10-CM

## 2018-02-14 DIAGNOSIS — E03.8 HYPOTHYROIDISM DUE TO HASHIMOTO'S THYROIDITIS: Primary | ICD-10-CM

## 2018-02-14 DIAGNOSIS — R11.0 NAUSEA: ICD-10-CM

## 2018-02-14 DIAGNOSIS — F41.9 ANXIETY AND DEPRESSION: ICD-10-CM

## 2018-02-14 DIAGNOSIS — Z91.09 ENVIRONMENTAL ALLERGIES: ICD-10-CM

## 2018-02-14 DIAGNOSIS — R63.0 DECREASED APPETITE: ICD-10-CM

## 2018-02-14 DIAGNOSIS — H93.13 TINNITUS OF BOTH EARS: Primary | ICD-10-CM

## 2018-02-14 DIAGNOSIS — R10.84 GENERALIZED ABDOMINAL PAIN: ICD-10-CM

## 2018-02-14 DIAGNOSIS — K90.0 CELIAC DISEASE: ICD-10-CM

## 2018-02-14 DIAGNOSIS — R53.1 WEAKNESS GENERALIZED: ICD-10-CM

## 2018-02-14 NOTE — TELEPHONE ENCOUNTER
Bring her in with all her Rx and supplement bottles  She can bring in her food and symptom log if not coming soon

## 2018-02-14 NOTE — TELEPHONE ENCOUNTER
Needs  Magnesium, vit D, and intact pTH, plus T4, T3, TPO labs done  Needs DHEA DHEA sulfate, womens testosterone and SBG test  ESR and Crp would be helpful

## 2018-02-14 NOTE — TELEPHONE ENCOUNTER
Patient was recently seen at [address] [place] [address],[address] and the attending physician took her off all of her thyroid medications. She states she now does not feel well at all and would like a nurse to call her so she can give more detailed information. Please call her at your soonest convenience. Thank you.

## 2018-02-15 ENCOUNTER — OFFICE VISIT (OUTPATIENT)
Dept: FAMILY MEDICINE CLINIC | Age: 63
End: 2018-02-15
Payer: COMMERCIAL

## 2018-02-15 ENCOUNTER — HOSPITAL ENCOUNTER (OUTPATIENT)
Age: 63
Discharge: HOME | End: 2018-02-15
Payer: COMMERCIAL

## 2018-02-15 ENCOUNTER — HOSPITAL ENCOUNTER (OUTPATIENT)
Dept: AUDIOLOGY | Age: 63
Discharge: HOME | End: 2018-02-15
Payer: COMMERCIAL

## 2018-02-15 VITALS
SYSTOLIC BLOOD PRESSURE: 141 MMHG | OXYGEN SATURATION: 97 % | HEART RATE: 93 BPM | DIASTOLIC BLOOD PRESSURE: 86 MMHG | RESPIRATION RATE: 12 BRPM

## 2018-02-15 DIAGNOSIS — R40.0 DAYTIME SOMNOLENCE: ICD-10-CM

## 2018-02-15 DIAGNOSIS — K56.609 SBO (SMALL BOWEL OBSTRUCTION) (HCC): ICD-10-CM

## 2018-02-15 DIAGNOSIS — R10.84 GENERALIZED ABDOMINAL PAIN: ICD-10-CM

## 2018-02-15 DIAGNOSIS — K59.00 CONSTIPATION, UNSPECIFIED CONSTIPATION TYPE: ICD-10-CM

## 2018-02-15 DIAGNOSIS — R19.4 CHANGE IN BOWEL HABITS: ICD-10-CM

## 2018-02-15 DIAGNOSIS — R35.0 URINARY FREQUENCY: ICD-10-CM

## 2018-02-15 DIAGNOSIS — E06.3 HYPOTHYROIDISM DUE TO HASHIMOTO'S THYROIDITIS: ICD-10-CM

## 2018-02-15 DIAGNOSIS — D50.0 IRON DEFICIENCY ANEMIA DUE TO CHRONIC BLOOD LOSS: ICD-10-CM

## 2018-02-15 DIAGNOSIS — H93.13 TINNITUS OF BOTH EARS: ICD-10-CM

## 2018-02-15 DIAGNOSIS — E03.8 HYPOTHYROIDISM DUE TO HASHIMOTO'S THYROIDITIS: ICD-10-CM

## 2018-02-15 DIAGNOSIS — K21.00 GASTROESOPHAGEAL REFLUX DISEASE WITH ESOPHAGITIS: ICD-10-CM

## 2018-02-15 DIAGNOSIS — E78.2 MIXED HYPERLIPIDEMIA: ICD-10-CM

## 2018-02-15 DIAGNOSIS — R53.1 WEAKNESS GENERALIZED: ICD-10-CM

## 2018-02-15 DIAGNOSIS — R11.0 NAUSEA: ICD-10-CM

## 2018-02-15 DIAGNOSIS — G47.411 PRIMARY NARCOLEPSY WITH CATAPLEXY: ICD-10-CM

## 2018-02-15 DIAGNOSIS — F32.A ANXIETY AND DEPRESSION: ICD-10-CM

## 2018-02-15 DIAGNOSIS — R63.0 DECREASED APPETITE: ICD-10-CM

## 2018-02-15 DIAGNOSIS — K90.0 CELIAC DISEASE: ICD-10-CM

## 2018-02-15 DIAGNOSIS — Z91.09 ENVIRONMENTAL ALLERGIES: ICD-10-CM

## 2018-02-15 DIAGNOSIS — F41.9 ANXIETY AND DEPRESSION: ICD-10-CM

## 2018-02-15 DIAGNOSIS — B37.9 CANDIDA ALBICANS INFECTION: ICD-10-CM

## 2018-02-15 DIAGNOSIS — Z09 S/P ABDOMINAL SURGERY, FOLLOW-UP EXAM: ICD-10-CM

## 2018-02-15 DIAGNOSIS — M25.511 RIGHT SHOULDER PAIN, UNSPECIFIED CHRONICITY: ICD-10-CM

## 2018-02-15 LAB
D-DIMER QUANTITATIVE: < 215 NG/ML FEU (ref 0–500)
MAGNESIUM: 2.2 MG/DL (ref 1.6–2.4)
PTH INTACT: 30.2 PG/ML (ref 15–65)
SEDIMENTATION RATE, ERYTHROCYTE: 2 MM/HR (ref 0–20)
T3 TOTAL: 83 NG/DL (ref 72–181)
TSH SERPL DL<=0.05 MIU/L-ACNC: 6.79 UIU/ML (ref 0.4–4.2)
VITAMIN D 25-HYDROXY: 55 NG/ML (ref 30–100)

## 2018-02-15 PROCEDURE — 3014F SCREEN MAMMO DOC REV: CPT | Performed by: FAMILY MEDICINE

## 2018-02-15 PROCEDURE — 36415 COLL VENOUS BLD VENIPUNCTURE: CPT

## 2018-02-15 PROCEDURE — 84270 ASSAY OF SEX HORMONE GLOBUL: CPT

## 2018-02-15 PROCEDURE — 92700 UNLISTED ORL SERVICE/PX: CPT | Performed by: AUDIOLOGIST

## 2018-02-15 PROCEDURE — 83497 ASSAY OF 5-HIAA: CPT

## 2018-02-15 PROCEDURE — 92540 BASIC VESTIBULAR EVALUATION: CPT | Performed by: AUDIOLOGIST

## 2018-02-15 PROCEDURE — 82627 DEHYDROEPIANDROSTERONE: CPT

## 2018-02-15 PROCEDURE — 83970 ASSAY OF PARATHORMONE: CPT

## 2018-02-15 PROCEDURE — 86141 C-REACTIVE PROTEIN HS: CPT

## 2018-02-15 PROCEDURE — 92538 CALORIC VSTBLR TEST W/REC: CPT | Performed by: AUDIOLOGIST

## 2018-02-15 PROCEDURE — 84480 ASSAY TRIIODOTHYRONINE (T3): CPT

## 2018-02-15 PROCEDURE — 86376 MICROSOMAL ANTIBODY EACH: CPT

## 2018-02-15 PROCEDURE — 84436 ASSAY OF TOTAL THYROXINE: CPT

## 2018-02-15 PROCEDURE — 82384 ASSAY THREE CATECHOLAMINES: CPT

## 2018-02-15 PROCEDURE — 1111F DSCHRG MED/CURRENT MED MERGE: CPT | Performed by: FAMILY MEDICINE

## 2018-02-15 PROCEDURE — G8417 CALC BMI ABV UP PARAM F/U: HCPCS | Performed by: FAMILY MEDICINE

## 2018-02-15 PROCEDURE — G8482 FLU IMMUNIZE ORDER/ADMIN: HCPCS | Performed by: FAMILY MEDICINE

## 2018-02-15 PROCEDURE — 82306 VITAMIN D 25 HYDROXY: CPT

## 2018-02-15 PROCEDURE — 82626 DEHYDROEPIANDROSTERONE: CPT

## 2018-02-15 PROCEDURE — 85379 FIBRIN DEGRADATION QUANT: CPT

## 2018-02-15 PROCEDURE — 86316 IMMUNOASSAY TUMOR OTHER: CPT

## 2018-02-15 PROCEDURE — 84481 FREE ASSAY (FT-3): CPT

## 2018-02-15 PROCEDURE — 3017F COLORECTAL CA SCREEN DOC REV: CPT | Performed by: FAMILY MEDICINE

## 2018-02-15 PROCEDURE — 84443 ASSAY THYROID STIM HORMONE: CPT

## 2018-02-15 PROCEDURE — 85651 RBC SED RATE NONAUTOMATED: CPT

## 2018-02-15 PROCEDURE — 99214 OFFICE O/P EST MOD 30 MIN: CPT | Performed by: FAMILY MEDICINE

## 2018-02-15 PROCEDURE — 84403 ASSAY OF TOTAL TESTOSTERONE: CPT

## 2018-02-15 PROCEDURE — 92557 COMPREHENSIVE HEARING TEST: CPT | Performed by: AUDIOLOGIST

## 2018-02-15 PROCEDURE — 1036F TOBACCO NON-USER: CPT | Performed by: FAMILY MEDICINE

## 2018-02-15 PROCEDURE — 83735 ASSAY OF MAGNESIUM: CPT

## 2018-02-15 PROCEDURE — 83835 ASSAY OF METANEPHRINES: CPT

## 2018-02-15 PROCEDURE — 92567 TYMPANOMETRY: CPT | Performed by: AUDIOLOGIST

## 2018-02-15 PROCEDURE — G8427 DOCREV CUR MEDS BY ELIG CLIN: HCPCS | Performed by: FAMILY MEDICINE

## 2018-02-15 ASSESSMENT — PATIENT HEALTH QUESTIONNAIRE - PHQ9
2. FEELING DOWN, DEPRESSED OR HOPELESS: 0
1. LITTLE INTEREST OR PLEASURE IN DOING THINGS: 0
SUM OF ALL RESPONSES TO PHQ9 QUESTIONS 1 & 2: 0
SUM OF ALL RESPONSES TO PHQ QUESTIONS 1-9: 0

## 2018-02-15 NOTE — PROGRESS NOTES
Administered Date(s) Administered    Influenza Virus Vaccine 11/01/2017    Pneumococcal 13-valent Conjugate (Naina Patel) 11/01/2017        History of Present Illness:     Abdullahi had concerns including Other (feeling teary eyed and crying easily x 1/2 weeks); Dizziness (hearing scheduled today at 3:30); Anxiety (anxiety attack this morning ); Other (hot flashes ); Other (3 am to 7 am pacing the floor); Insomnia; Tinnitus; and Abdominal Pain. Gladys Bennett  presents to the [address] today for;   Chief Complaint   Patient presents with    Other     feeling teary eyed and crying easily x 1/2 weeks    Dizziness     hearing scheduled today at 3:30    Anxiety     anxiety attack this morning     Other     hot flashes     Other     3 am to 7 am pacing the floor    Insomnia    Tinnitus    Abdominal Pain   , ,  abnormal labs follow up and these conditions as she  Is looking today for:     1. Primary narcolepsy with cataplexy    2. SBO (small bowel obstruction)    3. Daytime somnolence    4. Iron deficiency anemia due to chronic blood loss    5. Hypothyroidism due to Hashimoto's thyroiditis    6. Gastroesophageal reflux disease with esophagitis    7. Celiac disease    8. Right shoulder pain, unspecified chronicity    9. Generalized abdominal pain    10. Change in bowel habits    11. Candida albicans infection    12. Urinary frequency    13. Nausea    14. Constipation, unspecified constipation type    15. Decreased appetite    16. Weakness generalized    17. Anxiety and depression    18. Mixed hyperlipidemia    19. Environmental allergies    20. S/P abdominal surgery, follow-up exam          Review of Systems   Constitutional: Positive for diaphoresis. HENT: Positive for tinnitus. Neurological: Positive for weakness. Psychiatric/Behavioral: Positive for dysphoric mood and sleep disturbance. The patient is nervous/anxious. All other systems reviewed and are negative.       Objective: Physical Exam   Constitutional: She is oriented to person, place, and time. She appears well-developed and well-nourished. HENT:   Head: Normocephalic. Pulmonary/Chest: Effort normal.   Neurological: She is alert and oriented to person, place, and time. Psychiatric: She has a normal mood and affect. Thought content normal.   Nursing note and vitals reviewed. Assessment:     Laboratory Data:   Lab results were searched in Care Everywhere and/or those brought by the pateint were reviewed today with Adrián Morgan and she has a copy of their most recent labs to take home with them as noted below;       Imaging Data:   Imaging Data:       Assessment & Plan:       Impression:  1. Primary narcolepsy with cataplexy    2. SBO (small bowel obstruction)    3. Daytime somnolence    4. Iron deficiency anemia due to chronic blood loss    5. Hypothyroidism due to Hashimoto's thyroiditis    6. Gastroesophageal reflux disease with esophagitis    7. Celiac disease    8. Right shoulder pain, unspecified chronicity    9. Generalized abdominal pain    10. Change in bowel habits    11. Candida albicans infection    12. Urinary frequency    13. Nausea    14. Constipation, unspecified constipation type    15. Decreased appetite    16. Weakness generalized    17. Anxiety and depression    18. Mixed hyperlipidemia    19. Environmental allergies    20. S/P abdominal surgery, follow-up exam      Assessment and Plan:  After reviewing the patients chief complaints, reviewing their lab findings in great detail (with the patient and those accompanying them) which correlate to their chief complaints, symptoms, and or medical conditions; suggestions were made relating to changes in diet and or supplements which may improve the complaints and which will be reflected in their future lab findings;   Chief Complaint   Patient presents with    Other     feeling teary eyed and crying easily x 1/2 weeks    Dizziness     hearing scheduled today at on a weekly basis to document their progress or lack of same. This can be done on the symptom tracking sheet available to them at today's visit but looks like this:                                                      Rate on scale of 0-10 with zero = not noticeable  Symptom:                            Week 1               2                 3                 4               Etc            Hair loss    Foot cramps    Paresthesia    Aches    IBS (irritable bowel)    Constipation    Diarrhea  Nocturia    (up to bathroom at night)    Fatigue/Energy level    Stress      On the other side of the sheet they can track their food, drink, environment, activity, symptoms etc      Avoiding Latex-like proteins in my foods; Avocados, Bananas, Celery, Figs & Kiwi proteins have latex-like proteins to inflame our immune systems, see the 51 latex-like foods list  How Can I Have A Latex Allergy? Eating foods with latex-like protein exposes us to latex allergies. Our body cannot tell the difference between these latex-like proteins and latex from rubber products since many people are allergic to fruit, vegetables and latex. Read labels on pre-packaged foods. This list to avoid is only a guide if you are known allergic to latex or have a latex rash on your chin, cheeks and lines on your neck and chest. The amount of latex is different in each food product or fruit variety. Foods to Avoid out of Season if not grown locally: Melon, Nectarine, Papaya, Cherry, Passion fruit, Plum, Chestnuts, and Tomato. Avocado, Banana, Celery, Figs, and Kiwi always contain Latex-like protein and are almost universally toxic    Whats in Season? Strawberries taste better in June than December because June is strawberry season so buy locally grown produce \"in season\" for the best flavor, cost and less Latex. Locally grown produce not only tastes great requires little of no ethylene exposure in food distribution so has less latex content.   Out of

## 2018-02-16 ENCOUNTER — OFFICE VISIT (OUTPATIENT)
Dept: FAMILY MEDICINE CLINIC | Age: 63
End: 2018-02-16
Payer: COMMERCIAL

## 2018-02-16 ENCOUNTER — TELEPHONE (OUTPATIENT)
Dept: FAMILY MEDICINE CLINIC | Age: 63
End: 2018-02-16

## 2018-02-16 VITALS
HEART RATE: 94 BPM | SYSTOLIC BLOOD PRESSURE: 145 MMHG | DIASTOLIC BLOOD PRESSURE: 91 MMHG | RESPIRATION RATE: 12 BRPM | OXYGEN SATURATION: 97 % | BODY MASS INDEX: 28.66 KG/M2 | HEIGHT: 65 IN | WEIGHT: 172 LBS

## 2018-02-16 DIAGNOSIS — F32.A ANXIETY AND DEPRESSION: ICD-10-CM

## 2018-02-16 DIAGNOSIS — G47.411 PRIMARY NARCOLEPSY WITH CATAPLEXY: ICD-10-CM

## 2018-02-16 DIAGNOSIS — K59.01 SLOW TRANSIT CONSTIPATION: ICD-10-CM

## 2018-02-16 DIAGNOSIS — D50.8 OTHER IRON DEFICIENCY ANEMIA: ICD-10-CM

## 2018-02-16 DIAGNOSIS — E78.2 MIXED HYPERLIPIDEMIA: ICD-10-CM

## 2018-02-16 DIAGNOSIS — R11.0 NAUSEA: ICD-10-CM

## 2018-02-16 DIAGNOSIS — R63.0 DECREASED APPETITE: ICD-10-CM

## 2018-02-16 DIAGNOSIS — R40.0 DAYTIME SOMNOLENCE: ICD-10-CM

## 2018-02-16 DIAGNOSIS — B37.9 CANDIDA ALBICANS INFECTION: ICD-10-CM

## 2018-02-16 DIAGNOSIS — R35.0 URINARY FREQUENCY: ICD-10-CM

## 2018-02-16 DIAGNOSIS — R19.4 CHANGE IN BOWEL HABITS: ICD-10-CM

## 2018-02-16 DIAGNOSIS — K56.609 SBO (SMALL BOWEL OBSTRUCTION) (HCC): ICD-10-CM

## 2018-02-16 DIAGNOSIS — K90.0 CELIAC DISEASE: ICD-10-CM

## 2018-02-16 DIAGNOSIS — R10.84 GENERALIZED ABDOMINAL PAIN: ICD-10-CM

## 2018-02-16 DIAGNOSIS — F41.9 ANXIETY AND DEPRESSION: ICD-10-CM

## 2018-02-16 DIAGNOSIS — R53.1 WEAKNESS GENERALIZED: ICD-10-CM

## 2018-02-16 DIAGNOSIS — Z09 S/P ABDOMINAL SURGERY, FOLLOW-UP EXAM: ICD-10-CM

## 2018-02-16 DIAGNOSIS — Z91.09 ENVIRONMENTAL ALLERGIES: ICD-10-CM

## 2018-02-16 LAB
T3 FREE: 2.37 PG/ML (ref 2.02–4.43)
THYROXINE (T4): 4.6 UG/DL (ref 4.5–12)

## 2018-02-16 PROCEDURE — 3014F SCREEN MAMMO DOC REV: CPT | Performed by: FAMILY MEDICINE

## 2018-02-16 PROCEDURE — 1111F DSCHRG MED/CURRENT MED MERGE: CPT | Performed by: FAMILY MEDICINE

## 2018-02-16 PROCEDURE — G8427 DOCREV CUR MEDS BY ELIG CLIN: HCPCS | Performed by: FAMILY MEDICINE

## 2018-02-16 PROCEDURE — 99214 OFFICE O/P EST MOD 30 MIN: CPT | Performed by: FAMILY MEDICINE

## 2018-02-16 PROCEDURE — 1036F TOBACCO NON-USER: CPT | Performed by: FAMILY MEDICINE

## 2018-02-16 PROCEDURE — G8417 CALC BMI ABV UP PARAM F/U: HCPCS | Performed by: FAMILY MEDICINE

## 2018-02-16 PROCEDURE — G8482 FLU IMMUNIZE ORDER/ADMIN: HCPCS | Performed by: FAMILY MEDICINE

## 2018-02-16 PROCEDURE — 3017F COLORECTAL CA SCREEN DOC REV: CPT | Performed by: FAMILY MEDICINE

## 2018-02-16 NOTE — TELEPHONE ENCOUNTER
Dr. Poonam Martin,    [phone]  spoke with Doug Johnson, patient is down with GI pain and anxiety is bothering her. She wants to know what to do for the thyroid medication? Should she take it? How much of it? Which one? I reactivated her mycart so she can see her results and see your letter from today. I schedule a new patient appointment with Dr. Zach Albarado next Friday, she wanted to see a new PCP asap. PATIENT WANTS RESPONSE TODAY. SHE IS VERY UPSET.

## 2018-02-16 NOTE — PROGRESS NOTES
Subjective:      Patient ID: Gretta Warner is a 58 y.o. female. HPI   Gertta Warner is a 58 y.o. White female. Yamilka Heredia  presents to the [address] [place] [address] today for   Chief Complaint   Patient presents with    Discuss Labs    Mood Swings    Anxiety    Alopecia   ,  and;   Primary narcolepsy with cataplexy    SBO (small bowel obstruction)    Daytime somnolence    Other iron deficiency anemia    Celiac disease    Generalized abdominal pain    Change in bowel habits    Candida albicans infection    Urinary frequency    Nausea    Slow transit constipation    Decreased appetite    Weakness generalized    Anxiety and depression    Mixed hyperlipidemia    Environmental allergies      I have reviewed Gretta Warner medical, surgical and other pertinent history in detail, and have updated medication and allergy information in the computerized patient record. Clinical Care Team:     -Referring Provider for today's consult: Self Referred  -Primary Care Provider: Mirza Hernandez MD    Medical/Surgical History:   She  has a past medical history of Anemia; Anxiety; Arm DVT (deep venous thromboembolism), acute ([facility] [address] Arthritis; Broken shoulder; Cancer ([facility] [address] Celiac disease; CRPS (complex regional pain syndrome type I); Depression; GERD (gastroesophageal reflux disease); GERD (gastroesophageal reflux disease); Headache(784.0); History of blood transfusion; Hx of blood clots; Hx of reactive hypoglycemia; Hyperlipidemia; Hypothyroidism; IBS (irritable bowel syndrome); Irritable bowel syndrome; Meniere disease; Mitral valve prolapse syndrome; MRSA (methicillin resistant Staphylococcus aureus); Narcolepsy; Partial bowel obstruction; Restless legs syndrome; RSD upper limb; RSD upper limb; and Unspecified diseases of blood and blood-forming organs. Her  has a past surgical history that includes Cholecystectomy (2003 ); Appendectomy (1985);  Abdominal adhesion surgery (10/2010); knee surgery (Right, reviewing their lab findings in great detail (with the patient and those accompanying them) which correlate to their chief complaints, symptoms, and or medical conditions; suggestions were made relating to changes in diet and or supplements which may improve the complaints and which will be reflected in their future lab findings; Chief Complaint   Patient presents with    Discuss Labs    Mood Swings    Anxiety    Alopecia   ;    Plans for the next visits:  - Abnormal and non-optimal Labs were ordered today to be repeated in the next 120-365 days to assess changes from adjustments in nutrition and or nutrients. - Patient instructed when having a blood draw to ask the  to divide their lab draws into multiple draws over several days if not feeling good at the time of the lab draw or if either prefers to do several smaller blood draws over several days  - Patient instructed to check with insurer before each lab draw and to go to the lab which the insurer directs them for the most cost effective lab draw with the least patient's cost  - Doug Johnson  will be scheduled subsequent to those results. Bridgett Kingsleyrachel will bring in her drink and food log to her next visit    Chronic Problems Addressed on this Visit:                                   1.  Intensity of Service; Uncontrolled items at this visit; Chief Complaint   Patient presents with    Discuss Labs    Mood Swings    Anxiety    Alopecia   ; Improved items reviewed at this visit; Stable items noted at this visit;  2. Patient's foods and drinks were reviewed with the patient,       - Doug Johnson will bring food+drink symptom log to next visit for inclusion in their record      - 75 better food list reviewed & given to patient with the omega 6 food list to avoid         - Gluten in corn and oats abstracts sheet reviewed and given to the patient today   3.    Greater than 25 minutes were spent face to face on this visit of which >50% was for counseling and coordination of care. Patient's foods, drinks and supplements were reviewed with the patient,   - they will bring a food drink symptom log to future visits for inclusion in their record    - 75 better food list reviewed & given to patient along with the omega 6 food list to avoid      - Gluten in corn and oats abstracts sheet reviewed and given to the patient today    - 51 Foods containing Latex-like proteins was reviewed and copy given to the patient     - Nutrient Supplements list provided and copy given to the patient     - [employer] web site offered to patient to review at their convenience by staff with login information    - www.efaeducation. org site reviewed with the patient so they can identify better foods    - www.cornicopia. org site reviewed with the patient so they can reduce carrageenan by reviewing the carrageenan buyers guide on that site and picking safer foods    Note:   I have discussed with the patient that with all nutraceuticals, there is often mixed data and emerging research which needs to be monitored; as well as an array of NIH fact sheets on nutrients and supplements. If I have recommended cinnamon at the request of this patient to assist them in control of their blood sugar, triglyceride and or weight issues. I discussed that the patient's clinical use of cinnamon bark, calcium, magnesium, Vitamin D and pharmaceutical grade [employer] #[id number] fish oil or triple-strength fish oil, and balanced B-50 or B-100 time-released B complex which does not contain Vit C in the tablet. The dose will be for a time-limited trial to determine their individual effectiveness and tolerance in this patient. I also referred the patient to the reviewing such items as consumerlabs. com NMCD: Nutrition, Metabolism, and Cardiovascular Diseases (journal) and NCAAM.gov,  Searching www.nih.gov for any concerns about long-term use and hepatotoxicity of cinnamon and other latex. People with fruit allergies should warn physicians before undergoing procedures which may cause anaphylactic reaction if in contact with latex gloves. Some of the common foods with defined cross-reactivity to latex are avocado, banana, kiwi, chestnut, raw potato, tomato, stone fruits (e.g., peach, cherry), hazelnut, melons, celery, carrot, apple, pear, papaya, and almond. Foods with less well-defined cross-reactivity to latex are peanuts, peppers, citrus fruits, coconut, pineapple, micah, fig, passion fruit, Ugli fruit, and grape    This fruit/latex cross-reactivity is worsened by ethylene, a gas used to hasten commercial ripening. In nature, plants produce low levels of the hormone ethylene, which regulates germination, flowering, and ripening. Forced ripening by high ethylene concentrations, plants produce allergenic wound-repair proteins, which are similar to wound-repair proteins made during the tapping of rubber trees. Sensitive individuals who ingest the fruit get a higher dose and worse reaction. Some people may even first become sensitized to latex through fruit. Can food processing increase the concentrations of allergenic proteins? Latex-sensitized children (and adults) in [place] often experience allergic reactions after eating bananas ripened artificially with ethylene. In the [place], food distribution centers treat unripe bananas and other produce with ethylene to ripen; not commonly done in [facility] since fruit is tree-ripened there. Does treatment of food with ethylene induce banana proteins that cross-react with latex? (Ernestina et al.    References:   Latex in Foods Allergy, http://ehp.niehs.nih.gov/members/2003/5811/5811.html    Search web for \" Whats in Season \" for where you live or are at the time you food shop  www.nutritioncouncil.org/pdf/healthy/SeasonalProduce. pdf ,   Management of Latex, http://medicalcenter. [employer].edu/  search for latex

## 2018-02-17 ENCOUNTER — OFFICE VISIT (OUTPATIENT)
Dept: UROLOGY | Age: 63
End: 2018-02-17
Payer: COMMERCIAL

## 2018-02-17 VITALS
DIASTOLIC BLOOD PRESSURE: 70 MMHG | WEIGHT: 160 LBS | HEIGHT: 65 IN | BODY MASS INDEX: 26.66 KG/M2 | SYSTOLIC BLOOD PRESSURE: 124 MMHG

## 2018-02-17 DIAGNOSIS — R19.00 ABDOMINAL MASS, UNSPECIFIED ABDOMINAL LOCATION: Primary | ICD-10-CM

## 2018-02-17 PROCEDURE — 99214 OFFICE O/P EST MOD 30 MIN: CPT | Performed by: UROLOGY

## 2018-02-17 PROCEDURE — 3014F SCREEN MAMMO DOC REV: CPT | Performed by: UROLOGY

## 2018-02-17 PROCEDURE — G8427 DOCREV CUR MEDS BY ELIG CLIN: HCPCS | Performed by: UROLOGY

## 2018-02-17 PROCEDURE — G8482 FLU IMMUNIZE ORDER/ADMIN: HCPCS | Performed by: UROLOGY

## 2018-02-17 PROCEDURE — 1036F TOBACCO NON-USER: CPT | Performed by: UROLOGY

## 2018-02-17 PROCEDURE — 1111F DSCHRG MED/CURRENT MED MERGE: CPT | Performed by: UROLOGY

## 2018-02-17 PROCEDURE — 3017F COLORECTAL CA SCREEN DOC REV: CPT | Performed by: UROLOGY

## 2018-02-17 PROCEDURE — G8417 CALC BMI ABV UP PARAM F/U: HCPCS | Performed by: UROLOGY

## 2018-02-17 NOTE — PROGRESS NOTES
Ms. Radha Walker was seen in follow up for an abdominal mass found on recent CT scan. The mass is close to the left kidney. It appears to be located medial to the lower pole on the left. She is not having symptoms from the mass. She has no pain associated with the mass. This has not been biopsied and it is cystic so I do not believe that would be a good idea. HPI from recent hospitalization:    HISTORY OF PRESENT ILLNESS:       The patient is a 58 y.o. female with significant past medical history of abdominal pain who presented with a recent CT/MRI showing an abdominal mass near the lower pole of the left kidney. It appears to be benign. Denies voiding complaints. Denies gross hematuria, dysuria, Hx stone disease or past  surgery. Voiding spontaneously.       Past Medical History:   Diagnosis Date    Anemia     Anxiety     Arm DVT (deep venous thromboembolism), acute ([address] 5/15/13    Arthritis     Broken shoulder 09/06/2016    right    Cancer (HCC)     Celiac disease     CRPS (complex regional pain syndrome type I) 2016    Depression     GERD (gastroesophageal reflux disease)     GERD (gastroesophageal reflux disease)     Headache(784.0)     migraines-Dr. Cintia Saul    History of blood transfusion     Hx of blood clots     Hx of reactive hypoglycemia     Dr. Vannesa Puentes    Hyperlipidemia     Hypothyroidism     IBS (irritable bowel syndrome)     Irritable bowel syndrome 1997    [facility]    Meniere disease 2005    Dr. Colin Newton Mitral valve prolapse syndrome     MRSA (methicillin resistant Staphylococcus aureus) 2013    left forearm     Narcolepsy     Partial bowel obstruction     Restless legs syndrome     RSD upper limb     Right Shoulder    RSD upper limb     Unspecified diseases of blood and blood-forming organs        Past Surgical History:   Procedure Laterality Date    ABDOMEN SURGERY  04/23/2013    ABD Exploration, removal of ommentum, lysis of vitamin D (CHOLECALCIFEROL) 5000 UNITS CAPS capsule Take 5,000 Units by mouth daily       calcium carbonate (OSCAL) 500 MG TABS tablet Take 250 mg by mouth 5 times daily       ferrous gluconate (FERGON) 225 (27 FE) MG tablet Take 1 tablet by mouth daily. 30 tablet 0    triamcinolone (NASACORT AQ) 55 MCG/ACT nasal inhaler 2 sprays by Nasal route daily        No current facility-administered medications on file prior to visit.         Allergies   Allergen Reactions    Dilaudid [Hydromorphone Hcl]      Respiratory failure    Iron Anaphylaxis    Percodan [Oxycodone-Aspirin]      Anxiety     Fenoprofen Calcium Hives    Gluten Diarrhea    Percocet  [Oxycodone-Acetaminophen]     Reglan [Metoclopramide]     Sulfa Antibiotics Hives       Family History   Problem Relation Age of Onset    Diabetes Mother      type II    Stroke Mother     High Blood Pressure Mother     Kidney Disease Mother     Heart Disease Mother     Heart Attack Mother 66     2/28/15    Colon Cancer Father 79     jejunum   [facility] Father      Lyphoma    Irritable Bowel Syndrome Sister     High Blood Pressure Brother     Kidney Disease Brother     Celiac Disease Brother     Diabetes Maternal Grandmother      type II    Blindness Maternal Grandmother     Emphysema Maternal Grandfather     Stroke Paternal Grandmother     Diabetes Paternal Grandmother      type II    Ovarian Cancer Paternal Grandmother 48    Cancer Paternal Grandfather      brain tumor    Asthma Brother      Problems Brother     Heart Disease Brother     Other Sister      1days old, pneumonia complications    Colon Cancer Paternal Aunt 79    Colon Cancer Paternal Uncle 79    Colon Cancer Paternal Uncle 79       Social History     Social History    Marital status:      Spouse name: Arsh Stewart Number of children: 3    Years of education: 12     Occupational History    disability      Social History Main Topics    Smoking status: Never Smoker    kidney. We will likely have Dr. Eloina Jaquez look at the films and possibly be available at the time of surgery, should we need to do surgery, just in case we would need help with the left colon. I have reviewed all notes sent along with this referral including notes from a recent visit to her primary care provider. These records demonstrated the following past medical history:  Past Medical History:   Diagnosis Date    Anemia     Anxiety     Arm DVT (deep venous thromboembolism), acute ([facility] [address] 5/15/13    Arthritis     Broken shoulder 09/06/2016    right    Cancer (HCC)     Celiac disease     CRPS (complex regional pain syndrome type I) 2016    Depression     GERD (gastroesophageal reflux disease)     GERD (gastroesophageal reflux disease)     Headache(784.0)     migraines-Dr. Chantel Beaulieu    History of blood transfusion     Hx of blood clots     Hx of reactive hypoglycemia     Dr. Monique Ohara    Hyperlipidemia     Hypothyroidism     IBS (irritable bowel syndrome)     Irritable bowel syndrome 1997    [facility]    Meniere disease 2005    Dr. Yost Borne    Mitral valve prolapse syndrome     MRSA (methicillin resistant Staphylococcus aureus) 2013    left forearm     Narcolepsy     Partial bowel obstruction     Restless legs syndrome     RSD upper limb     Right Shoulder    RSD upper limb     Unspecified diseases of blood and blood-forming organs        We will order a follow up MRI for 4 months from now. We will see her back then.

## 2018-02-18 LAB
C-REACTIVE PROTEIN, HIGH SENSITIVITY: 2.2 MG/L
DHEAS (DHEA SULFATE): 75 UG/DL (ref 13–130)
THYROID PEROXIDASE ANTIBODY: 0.5 IU/ML (ref 0–9)

## 2018-02-19 ENCOUNTER — OFFICE VISIT (OUTPATIENT)
Dept: FAMILY MEDICINE CLINIC | Age: 63
End: 2018-02-19
Payer: COMMERCIAL

## 2018-02-19 ENCOUNTER — OFFICE VISIT (OUTPATIENT)
Dept: ENT CLINIC | Age: 63
End: 2018-02-19
Payer: COMMERCIAL

## 2018-02-19 VITALS
SYSTOLIC BLOOD PRESSURE: 124 MMHG | TEMPERATURE: 97.4 F | BODY MASS INDEX: 29.2 KG/M2 | RESPIRATION RATE: 16 BRPM | DIASTOLIC BLOOD PRESSURE: 74 MMHG | HEART RATE: 76 BPM | HEIGHT: 65 IN | WEIGHT: 175.3 LBS

## 2018-02-19 VITALS — DIASTOLIC BLOOD PRESSURE: 86 MMHG | TEMPERATURE: 98.1 F | HEART RATE: 89 BPM | SYSTOLIC BLOOD PRESSURE: 138 MMHG

## 2018-02-19 DIAGNOSIS — E03.2 HYPOTHYROIDISM DUE TO MEDICATION: Primary | ICD-10-CM

## 2018-02-19 DIAGNOSIS — H93.13 TINNITUS OF BOTH EARS: Primary | ICD-10-CM

## 2018-02-19 DIAGNOSIS — N95.1 HOT FLASHES, MENOPAUSAL: ICD-10-CM

## 2018-02-19 DIAGNOSIS — F32.A ANXIETY AND DEPRESSION: ICD-10-CM

## 2018-02-19 DIAGNOSIS — F41.9 ANXIETY AND DEPRESSION: ICD-10-CM

## 2018-02-19 LAB — DHEA UNCONJUGATED: 2.46 NG/ML (ref 0.63–4.7)

## 2018-02-19 PROCEDURE — 3017F COLORECTAL CA SCREEN DOC REV: CPT | Performed by: OTOLARYNGOLOGY

## 2018-02-19 PROCEDURE — 3014F SCREEN MAMMO DOC REV: CPT | Performed by: OTOLARYNGOLOGY

## 2018-02-19 PROCEDURE — G8417 CALC BMI ABV UP PARAM F/U: HCPCS | Performed by: FAMILY MEDICINE

## 2018-02-19 PROCEDURE — 99203 OFFICE O/P NEW LOW 30 MIN: CPT | Performed by: OTOLARYNGOLOGY

## 2018-02-19 PROCEDURE — G8482 FLU IMMUNIZE ORDER/ADMIN: HCPCS | Performed by: OTOLARYNGOLOGY

## 2018-02-19 PROCEDURE — 1111F DSCHRG MED/CURRENT MED MERGE: CPT | Performed by: OTOLARYNGOLOGY

## 2018-02-19 PROCEDURE — 1036F TOBACCO NON-USER: CPT | Performed by: OTOLARYNGOLOGY

## 2018-02-19 PROCEDURE — 3017F COLORECTAL CA SCREEN DOC REV: CPT | Performed by: FAMILY MEDICINE

## 2018-02-19 PROCEDURE — G8427 DOCREV CUR MEDS BY ELIG CLIN: HCPCS | Performed by: FAMILY MEDICINE

## 2018-02-19 PROCEDURE — 99214 OFFICE O/P EST MOD 30 MIN: CPT | Performed by: FAMILY MEDICINE

## 2018-02-19 PROCEDURE — 1036F TOBACCO NON-USER: CPT | Performed by: FAMILY MEDICINE

## 2018-02-19 PROCEDURE — 1111F DSCHRG MED/CURRENT MED MERGE: CPT | Performed by: FAMILY MEDICINE

## 2018-02-19 PROCEDURE — G8427 DOCREV CUR MEDS BY ELIG CLIN: HCPCS | Performed by: OTOLARYNGOLOGY

## 2018-02-19 PROCEDURE — 3014F SCREEN MAMMO DOC REV: CPT | Performed by: FAMILY MEDICINE

## 2018-02-19 PROCEDURE — G8482 FLU IMMUNIZE ORDER/ADMIN: HCPCS | Performed by: FAMILY MEDICINE

## 2018-02-19 PROCEDURE — G8417 CALC BMI ABV UP PARAM F/U: HCPCS | Performed by: OTOLARYNGOLOGY

## 2018-02-19 RX ORDER — CHLORAL HYDRATE 500 MG
2000 CAPSULE ORAL 4 TIMES DAILY
COMMUNITY
End: 2020-06-09

## 2018-02-19 RX ORDER — LEVOTHYROXINE AND LIOTHYRONINE 19; 4.5 UG/1; UG/1
45 TABLET ORAL DAILY
COMMUNITY
End: 2018-06-29 | Stop reason: ALTCHOICE

## 2018-02-19 ASSESSMENT — ENCOUNTER SYMPTOMS
DIARRHEA: 0
CONSTIPATION: 0
BACK PAIN: 0
CHOKING: 0
EYE REDNESS: 0
ANAL BLEEDING: 0
VOMITING: 0
PHOTOPHOBIA: 0
EYE PAIN: 0
CHEST TIGHTNESS: 0
FACIAL SWELLING: 1
RECTAL PAIN: 0
WHEEZING: 0
SHORTNESS OF BREATH: 0
APNEA: 0
SINUS PRESSURE: 1
STRIDOR: 0
COUGH: 0
VOICE CHANGE: 0
SORE THROAT: 0
EYE ITCHING: 0
BLOOD IN STOOL: 1
COLOR CHANGE: 0
EYE DISCHARGE: 0
NAUSEA: 0
ABDOMINAL PAIN: 1
RHINORRHEA: 0
ABDOMINAL DISTENTION: 0
TROUBLE SWALLOWING: 0

## 2018-02-19 NOTE — PROGRESS NOTES
Subjective:      Patient ID: Shahrzad Arteaga is a 58 y.o. female. HPI Tinnitus, X ten years, but recently, ringing in right ear has gotten very annoying. Dizziness started two weeks ago. Intermittent. Doesn't describe a vertigo. Lasts a few seconds. Occurs if turns head right. Makes her somewhat nauseated. Three years ago, fell and broke arm right and developed RSD. HAs celiac's disease. Vitamin levels good        Review of Systems   Constitutional: Positive for diaphoresis. Negative for activity change, appetite change, chills, fatigue, fever and unexpected weight change. HENT: Positive for congestion, dental problem, drooling, ear discharge, ear pain, facial swelling, postnasal drip, sinus pressure and tinnitus. Negative for hearing loss, mouth sores, nosebleeds, rhinorrhea, sneezing, sore throat, trouble swallowing and voice change. Eyes: Negative for photophobia, pain, discharge, redness, itching and visual disturbance. Respiratory: Negative for apnea, cough, choking, chest tightness, shortness of breath, wheezing and stridor. Cardiovascular: Negative for chest pain, palpitations and leg swelling. Gastrointestinal: Positive for abdominal pain and blood in stool. Negative for abdominal distention, anal bleeding, constipation, diarrhea, nausea, rectal pain and vomiting. Endocrine: Positive for cold intolerance and heat intolerance. Negative for polydipsia, polyphagia and polyuria. Genitourinary: Negative for decreased urine volume, difficulty urinating, dyspareunia, dysuria, enuresis, flank pain, frequency, genital sores, hematuria, menstrual problem, pelvic pain, urgency, vaginal bleeding, vaginal discharge and vaginal pain. Musculoskeletal: Positive for joint swelling and myalgias. Negative for arthralgias, back pain, gait problem, neck pain and neck stiffness. Skin: Negative for color change, pallor, rash and wound.    Allergic/Immunologic: Positive for environmental allergies and food allergies. Negative for immunocompromised state. Neurological: Positive for dizziness. Negative for tremors, seizures, syncope, facial asymmetry, speech difficulty, weakness, light-headedness, numbness and headaches. Hematological: Negative for adenopathy. Does not bruise/bleed easily. Psychiatric/Behavioral: Negative for agitation, behavioral problems, confusion, decreased concentration, dysphoric mood, hallucinations, self-injury, sleep disturbance and suicidal ideas. The patient is nervous/anxious. The patient is not hyperactive.       Patient Active Problem List   Diagnosis    Anemia, iron deficiency    Hypothyroidism    Celiac disease    GERD (gastroesophageal reflux disease)    S/P abdominal surgery, follow-up exam    Arm DVT (deep venous thromboembolism), acute (HCC)    Anxiety and depression    Hyperlipemia    Environmental allergies    Nausea    Constipation    Decreased appetite    Weakness generalized    Urinary frequency    SBO (small bowel obstruction)    Daytime somnolence    Candida albicans infection    Generalized abdominal pain    Change in bowel habits    Narcolepsy    Shoulder pain, right       Past Medical History:   Diagnosis Date    Anemia     Anxiety     Arm DVT (deep venous thromboembolism), acute ([facility] [address] 5/15/13    Arthritis     Broken shoulder 09/06/2016    right    Cancer (HCC)     Celiac disease     CRPS (complex regional pain syndrome type I) 2016    Depression     GERD (gastroesophageal reflux disease)     GERD (gastroesophageal reflux disease)     Headache(784.0)     migraines-[facility]. [facility]    History of blood transfusion     Hx of blood clots     Hx of reactive hypoglycemia     Dr. Monique Ohara    Hyperlipidemia     Hypothyroidism     IBS (irritable bowel syndrome)     [facility] syndrome 1997    [facility]    Meniere disease 2005    Dr. Yost Borne    Mitral valve prolapse syndrome     MRSA (methicillin resistant pneumonia complications    Colon Cancer Paternal Aunt 79    Colon Cancer Paternal Uncle 79    Colon Cancer Paternal Uncle 79       Social History     Social History    Marital status:      Spouse name: Crystal Serrato Number of children: 3    Years of education: 15     Occupational History    disability      Social History Main Topics    Smoking status: Never Smoker    Smokeless tobacco: Never Used    Alcohol use Yes      Comment: occassionally    Drug use: No    Sexual activity: No     Other Topics Concern    Not on file     Social History Narrative    No narrative on file         Objective:   Physical Exam  This is a 58 y.o. female. Patient is in no respiratory distress, alert and oriented to time and place. Not nasal, not hoarse. Not obviously hearing impaired. Vitals:    02/19/18 1412   BP: 124/74   Site: Left Arm   Position: Sitting   Pulse: 76   Resp: 16   Temp: 97.4 °F (36.3 °C)   TempSrc: Oral   Weight: 175 lb 4.8 oz (79.5 kg)   Height: 5' 4.5\" (1.638 m)       Head is normocephalic. VIRY, EOM full,  no diplopia, no nystagmus. Conjunctivae pink, no discharge    Pinnae are WNL  R External auditory canal clear and free of any pathology  L  External auditory canal clear and free of any pathology                                                Tympanic membranes:      R intact, translucent                                                L intact, translucent    Nasal bones midline  Septum: Deviated slightly  Mucosa: Inflamed, dry, superficial ulceration right side ( Perforation posteriorly )  Turbinates: congested  Discharge:  none    No facial redness, tenderness or swelling    No facial weakness. Salivary glands not enlarged or palpable    Lips, tongue and oral cavity show tongue is midline, mobile. Dentition: good               No malocclusion  Oral mucosa: Moist, no lesions  Tonsils: atrophic  Oropharynx: clear  Uvula midline.    Gag reflex present and symmetrical      Neck supple  Cervical adenopathy: none    Trachea midline  Thyroid not enlarged, no masses    Chest equal and symmetrical expansion, no retractions    Extremities: no clubbing, cyanosis or edema                        Gait normal    Cranial nerves grossly intact    Data:  All of the past medical history, past surgical history, family history, social history, allergies and current medications were reviewed. Assessment:      1. Tinnitus of both ears            Plan:      Reviewed and discussed: Fall asleep to white noise. Advised no meds for tinnitus. Audio shows hearing WNL. Mild high frequency loss.  Normal tymps

## 2018-02-19 NOTE — PROGRESS NOTES
reduce carrageenan by reviewing the carrageenan buyers guide on that site and picking safer foods    Note:   I have discussed with the patient that with all nutraceuticals, there is often mixed data and emerging research which needs to be monitored; as well as an array of NIH fact sheets on nutrients and supplements. If I have recommended cinnamon at the request of this patient to assist them in control of their blood sugar, triglyceride and or weight issues. I discussed that the patient's clinical use of cinnamon bark, calcium, magnesium, Vitamin D and pharmaceutical grade CVS #23168_REV3 fish oil or triple-strength fish oil, and balanced B-50 or B-100 time-released B complex which does not contain Vit C in the tablet. The dose will be for a time-limited trial to determine their individual effectiveness and tolerance in this patient. I also referred the patient to the reviewing such items as consumerlabs. com NMCD: Nutrition, Metabolism, and Cardiovascular Diseases (journal) and NCAAM.gov,  Searching www.nih.gov for any concerns about long-term use and hepatotoxicity of cinnamon and other nutrients and suggest they frequently search nih.gov for the latest non-proprietary information on nutriceuticals as well as consider a subscription to [employer] for details on reviewed supplements, or at the least review the nutrient files at [facility], [address] bark, an insulin mimetic, reduces some High Carbohydrate Dietary Impacts. Methylhydroxychalcone polymers insulin-enhancing properties in fat cells are responsible for enhanced glucose uptake, inhibiting hepatic HMG-CoA reductase and lowers lipids. www.jacn. org/content/20/4/327.full     But cinnamon with additives such as Cinnamon Extract are not effective as insulin mimetics.  https://www.hurtado.net/     Nutrients for Start up from [employer] or [employer] for ease to get started now ;  Gustavo Dougherty has some useable products;  - Triple Strength Fish Oil, enteric coated  - Vit D 3 5000 IU gel caps  - Iron ferrous sulfat 325 mg tabs  - Centrum Silver look-a-like for most patients not needing iron, or  - Centrum plain look-a-like if need iron    Local pharmacy chains such as [facility], [facility], [facility], [address]. [employer] Eaglehave;  - Triple Strength Fish Oil (enteric coated if available) or    If not enteric coated, can take from freezer for less burps  - B-50 or B-100 time released balanced B complex tabs not containing Vit C in tablet  - Cinnamon bark 500 mg (with Chromium but not extracts)   some brands list 1000 mg / serving of 2 500 mg capsules,    some brands have 1000 mg caps with the chromium but capsule size is huge  - Calcium carbonate/citrate, magnesium oxide/citrate, Vit D 3  as 3-4 tabs/caps/serving     Some Local Brands may contain Zinc which is acceptable for the first bottle or two  - Magnesium oxide 250 mg tabs for those having < 2 bowel movements daily  - Magnesium citrate TABLETS  or Slow Mag, or magnesium gluconate if having > 2 bowel movement/day  - Centrum Silver or look-a-like for most patients, Centrum plain or look-a-like with iron  - Vitamin D-3 comes as 1,000 IU or 2,000 IU or 5,000 IU gel caps or Liquid drops      Some brands containing or derived from soy oil or corn oil are OK if not allergic to soy  - Elemental Iron 65 mg tabs at bedtime is available over the counter if need more iron     Usually turns bowel movements grey, green or black but not a concern  - Apricot Kernel Oil (by Now) for dry skin and use in sensitive perineal area skin    Nutrients for ongoing use by Mail order for less expense from www.amazon. com, wwwLast Second Tickets, www.YesWeAdacoAha Mobile   - Triple Strength Fish Oil , Softgels   - B-100 time released balanced B complex   - Cinnamon bark 500 mg with or without Chromium but not extract (ineffective)  - Calcium carbonate 1000 mg, Magnesium oxide 500 mg,

## 2018-02-20 ENCOUNTER — NURSE ONLY (OUTPATIENT)
Dept: FAMILY MEDICINE CLINIC | Age: 63
End: 2018-02-20
Payer: COMMERCIAL

## 2018-02-20 DIAGNOSIS — E03.2 HYPOTHYROIDISM DUE TO MEDICATION: ICD-10-CM

## 2018-02-20 DIAGNOSIS — N95.1 HOT FLASHES, MENOPAUSAL: ICD-10-CM

## 2018-02-20 DIAGNOSIS — F41.9 ANXIETY AND DEPRESSION: ICD-10-CM

## 2018-02-20 DIAGNOSIS — F32.A ANXIETY AND DEPRESSION: ICD-10-CM

## 2018-02-20 LAB
CHROMOGRANIN A: 541 NG/ML (ref 0–95)
ESTRADIOL LEVEL: < 5 PG/ML
FOLLICLE STIMULATING HORMONE: 51.3 MIU/ML (ref 16–160)
LUTEINIZING HORMONE: 22.8 MIU/ML (ref 3.3–70.6)
METANEPHRINES PLASMA: NORMAL
PROGESTERONE LEVEL: 0.12 NG/ML
SEROTONIN BLOOD: NORMAL
TESTOSTERONE, FREE W SHGB, FEMALES/CHILDREN: NORMAL

## 2018-02-20 PROCEDURE — 36415 COLL VENOUS BLD VENIPUNCTURE: CPT | Performed by: FAMILY MEDICINE

## 2018-02-21 ENCOUNTER — TELEPHONE (OUTPATIENT)
Dept: FAMILY MEDICINE CLINIC | Age: 63
End: 2018-02-21

## 2018-02-21 ENCOUNTER — OFFICE VISIT (OUTPATIENT)
Dept: FAMILY MEDICINE CLINIC | Age: 63
End: 2018-02-21
Payer: COMMERCIAL

## 2018-02-21 ENCOUNTER — TELEPHONE (OUTPATIENT)
Dept: UROLOGY | Age: 63
End: 2018-02-21

## 2018-02-21 VITALS
WEIGHT: 175.27 LBS | HEIGHT: 64 IN | BODY MASS INDEX: 29.92 KG/M2 | DIASTOLIC BLOOD PRESSURE: 83 MMHG | TEMPERATURE: 98.1 F | SYSTOLIC BLOOD PRESSURE: 143 MMHG | RESPIRATION RATE: 12 BRPM | HEART RATE: 97 BPM

## 2018-02-21 DIAGNOSIS — F41.9 ANXIETY AND DEPRESSION: Primary | ICD-10-CM

## 2018-02-21 DIAGNOSIS — R79.9 ABNORMAL BLOOD CHEMISTRY: ICD-10-CM

## 2018-02-21 DIAGNOSIS — F32.A ANXIETY AND DEPRESSION: Primary | ICD-10-CM

## 2018-02-21 LAB — CATECHOLAMINES TOTAL 24 HOUR URINE: NORMAL

## 2018-02-21 PROCEDURE — 1111F DSCHRG MED/CURRENT MED MERGE: CPT | Performed by: FAMILY MEDICINE

## 2018-02-21 PROCEDURE — 3014F SCREEN MAMMO DOC REV: CPT | Performed by: FAMILY MEDICINE

## 2018-02-21 PROCEDURE — 3017F COLORECTAL CA SCREEN DOC REV: CPT | Performed by: FAMILY MEDICINE

## 2018-02-21 PROCEDURE — 99213 OFFICE O/P EST LOW 20 MIN: CPT | Performed by: FAMILY MEDICINE

## 2018-02-21 PROCEDURE — G8427 DOCREV CUR MEDS BY ELIG CLIN: HCPCS | Performed by: FAMILY MEDICINE

## 2018-02-21 PROCEDURE — 1036F TOBACCO NON-USER: CPT | Performed by: FAMILY MEDICINE

## 2018-02-21 PROCEDURE — G8417 CALC BMI ABV UP PARAM F/U: HCPCS | Performed by: FAMILY MEDICINE

## 2018-02-21 PROCEDURE — G8482 FLU IMMUNIZE ORDER/ADMIN: HCPCS | Performed by: FAMILY MEDICINE

## 2018-02-21 NOTE — PROGRESS NOTES
their blood sugar, triglyceride and or weight issues. I discussed that the patient's clinical use of cinnamon bark, calcium, magnesium, Vitamin D and pharmaceutical grade CVS #23168_REV3 fish oil or triple-strength fish oil, and balanced B-50 or B-100 time-released B complex which does not contain Vit C in the tablet. The dose will be for a time-limited trial to determine their individual effectiveness and tolerance in this patient. I also referred the patient to the reviewing such items as consumerlabs. com NMCD: Nutrition, Metabolism, and Cardiovascular Diseases (journal) and NCAAM.gov,  Searching www.nih.gov for any concerns about long-term use and hepatotoxicity of cinnamon and other nutrients and suggest they frequently search [employer].gov for the latest non-proprietary information on nutriceuticals as well as consider a subscription to [employer] for details on reviewed supplements, or at the least review the nutrient files at [facility], [address] bark, an insulin mimetic, reduces some High Carbohydrate Dietary Impacts. Methylhydroxychalcone polymers insulin-enhancing properties in fat cells are responsible for enhanced glucose uptake, inhibiting hepatic HMG-CoA reductase and lowers lipids. www.jacn. org/content/20/4/327.full     But cinnamon with additives such as Cinnamon Extract are not effective as insulin mimetics. https://www.hurtado.net/     Nutrients for Start up from [facility] or [employer] for ease to get started now ;  Gustavo Dougherty has some useable products;  - Triple Strength Fish Oil, enteric coated  - Vit D 3 5000 IU gel caps  - Iron ferrous sulfat 325 mg tabs  - Centrum Silver look-a-like for most patients not needing iron, or  - Centrum plain look-a-like if need iron    Local pharmacy chains such as [facility], [facility], [address] [facility].  [employer]have;  - Triple Strength Fish Oil (enteric coated if available) or

## 2018-02-21 NOTE — TELEPHONE ENCOUNTER
Dr. Demetrice Jordan would like to see patient today to go over results or tomorrow when the 24 hour urine test are final.     Called [phone], spoke with Adrián Morgan , she would like to seen tomorrow when all test are final.     2/22/18 (u) 1:20 PM 20 min Dionisio Luna MD [address]     Copay: $20.00    Insurance: [employer] [facility] Effective dates: 1/01/15 -     Patient instructions: Please arrive 15 minutes prior to appointment, bring photo ID and insurance card. Please arrive 15 minutes prior to appointment, bring photo ID and insurance card.     Notes: f/u lab results

## 2018-02-23 ENCOUNTER — OFFICE VISIT (OUTPATIENT)
Dept: FAMILY MEDICINE CLINIC | Age: 63
End: 2018-02-23
Payer: COMMERCIAL

## 2018-02-23 VITALS
DIASTOLIC BLOOD PRESSURE: 82 MMHG | RESPIRATION RATE: 10 BRPM | HEART RATE: 83 BPM | BODY MASS INDEX: 29.79 KG/M2 | OXYGEN SATURATION: 98 % | HEIGHT: 64 IN | WEIGHT: 174.5 LBS | SYSTOLIC BLOOD PRESSURE: 124 MMHG | TEMPERATURE: 97.9 F

## 2018-02-23 DIAGNOSIS — D48.4 NEOPLASM OF UNCERTAIN BEHAVIOR OF MESENTERY: ICD-10-CM

## 2018-02-23 DIAGNOSIS — F41.9 ANXIETY: Primary | ICD-10-CM

## 2018-02-23 PROCEDURE — 1036F TOBACCO NON-USER: CPT | Performed by: FAMILY MEDICINE

## 2018-02-23 PROCEDURE — 3017F COLORECTAL CA SCREEN DOC REV: CPT | Performed by: FAMILY MEDICINE

## 2018-02-23 PROCEDURE — G8482 FLU IMMUNIZE ORDER/ADMIN: HCPCS | Performed by: FAMILY MEDICINE

## 2018-02-23 PROCEDURE — G8427 DOCREV CUR MEDS BY ELIG CLIN: HCPCS | Performed by: FAMILY MEDICINE

## 2018-02-23 PROCEDURE — 1111F DSCHRG MED/CURRENT MED MERGE: CPT | Performed by: FAMILY MEDICINE

## 2018-02-23 PROCEDURE — G8417 CALC BMI ABV UP PARAM F/U: HCPCS | Performed by: FAMILY MEDICINE

## 2018-02-23 PROCEDURE — 99213 OFFICE O/P EST LOW 20 MIN: CPT | Performed by: FAMILY MEDICINE

## 2018-02-23 PROCEDURE — 3014F SCREEN MAMMO DOC REV: CPT | Performed by: FAMILY MEDICINE

## 2018-02-23 RX ORDER — DULOXETIN HYDROCHLORIDE 30 MG/1
CAPSULE, DELAYED RELEASE ORAL
Qty: 90 CAPSULE | Refills: 1 | Status: SHIPPED | OUTPATIENT
Start: 2018-02-23 | End: 2018-06-11 | Stop reason: SDUPTHER

## 2018-02-23 NOTE — PATIENT INSTRUCTIONS
You may receive a survey about your visit with us today. The feedback from our patients helps us identify what is working well and where the service to all patients can be enhanced. Thank you! Patient Education        Pneumococcal Conjugate Vaccine (PCV13): What You Need to Know  Why get vaccinated? Vaccination can protect both children and adults from pneumococcal disease. Pneumococcal disease is caused by bacteria that can spread from person to person through close contact. It can cause ear infections, and it can also lead to more serious infections of the:  · Lungs (pneumonia). · Blood (bacteremia). · Covering of the brain and spinal cord (meningitis). Pneumococcal pneumonia is most common among adults. Pneumococcal meningitis can cause deafness and brain damage, and it kills about 1 child in 10 who get it. Anyone can get pneumococcal disease, but children under 3years of age and adults 72 years and older, people with certain medical conditions, and cigarette smokers are at the highest risk. Before there was a vaccine, the [facility] saw the following in children under 5 each year from pneumococcal disease:  · More than 700 cases of meningitis  · About 13,000 blood infections  · About 5 million ear infections  · About 200 deaths  Since the vaccine became available, severe pneumococcal disease in these children has fallen by 88%. About 18,000 older adults die of pneumococcal disease each year in the [place]. Treatment of pneumococcal infections with penicillin and other drugs is not as effective as it used to be, because some strains of the disease have become resistant to these drugs. This makes prevention of the disease through vaccination even more important. PCV13 vaccine  Pneumococcal conjugate vaccine (called PCV13) protects against 13 types of pneumococcal bacteria. PCV13 is routinely given to children at 2, 4, 6, and 1515 months of age.  It is also recommended for children and adults 3to 59years of age with certain health conditions, and for all adults 72years of age and older. Your doctor can give you details. Some people should not get this vaccine  Anyone who has ever had a life-threatening allergic reaction to a dose of this vaccine, to an earlier pneumococcal vaccine called PCV7, or to any vaccine containing diphtheria toxoid (for example, DTaP), should not get PCV13. Anyone with a severe allergy to any component of PCV13 should not get the vaccine. Tell your doctor if the person being vaccinated has any severe allergies. If the person scheduled for vaccination is not feeling well, your healthcare provider might decide to reschedule the shot on another day. Risks of a vaccine reaction  With any medicine, including vaccines, there is a chance of reactions. These are usually mild and go away on their own, but serious reactions are also possible. Problems reported following PCV13 varied by age and dose in the series. The most common problems reported among children were:  · About half became drowsy after the shot, had a temporary loss of appetite, or had redness or tenderness where the shot was given. · About 1 out of 3 had swelling where the shot was given. · About 1 out of 3 had a mild fever, and about 1 in 20 had a fever over 102.2°F.  · Up to about 8 out of 10 became fussy or irritable. Adults have reported pain, redness, and swelling where the shot was given; also mild fever, fatigue, headache, chills, or muscle pain. Emogene Amabile children who get PCV13 along with inactivated flu vaccine at the same time may be at increased risk for seizures caused by fever. Ask your doctor for more information. Problems that could happen after any vaccine:  · People sometimes faint after a medical procedure, including vaccination. Sitting or lying down for about 15 minutes can help prevent fainting and the injuries caused by a fall.  Tell your doctor if you feel dizzy or have vision changes

## 2018-02-27 ENCOUNTER — TELEPHONE (OUTPATIENT)
Dept: FAMILY MEDICINE CLINIC | Age: 63
End: 2018-02-27

## 2018-03-21 ENCOUNTER — HOSPITAL ENCOUNTER (OUTPATIENT)
Age: 63
Discharge: HOME | End: 2018-03-21
Payer: COMMERCIAL

## 2018-03-21 DIAGNOSIS — F41.9 ANXIETY AND DEPRESSION: ICD-10-CM

## 2018-03-21 DIAGNOSIS — E03.2 HYPOTHYROIDISM DUE TO MEDICATION: ICD-10-CM

## 2018-03-21 DIAGNOSIS — F32.A ANXIETY AND DEPRESSION: ICD-10-CM

## 2018-03-21 LAB
BASOPHILS # BLD: 1.2 %
BASOPHILS ABSOLUTE: 0.1 THOU/MM3 (ref 0–0.1)
EOSINOPHIL # BLD: 3.1 %
EOSINOPHILS ABSOLUTE: 0.2 THOU/MM3 (ref 0–0.4)
HCT VFR BLD CALC: 45.5 % (ref 37–47)
HEMOGLOBIN: 15.4 GM/DL (ref 12–16)
LYMPHOCYTES # BLD: 18.6 %
LYMPHOCYTES ABSOLUTE: 1.4 THOU/MM3 (ref 1–4.8)
MCH RBC QN AUTO: 31.3 PG (ref 27–31)
MCHC RBC AUTO-ENTMCNC: 33.9 GM/DL (ref 33–37)
MCV RBC AUTO: 92.3 FL (ref 81–99)
MONOCYTES # BLD: 6.2 %
MONOCYTES ABSOLUTE: 0.5 THOU/MM3 (ref 0.4–1.3)
NUCLEATED RED BLOOD CELLS: 0 /100 WBC
PDW BLD-RTO: 14 % (ref 11.5–14.5)
PLATELET # BLD: 279 THOU/MM3 (ref 130–400)
PMV BLD AUTO: 8.2 FL (ref 7.4–10.4)
RBC # BLD: 4.92 MILL/MM3 (ref 4.2–5.4)
SEG NEUTROPHILS: 70.9 %
SEGMENTED NEUTROPHILS ABSOLUTE COUNT: 5.3 THOU/MM3 (ref 1.8–7.7)
T3 TOTAL: 136 NG/DL (ref 72–181)
T4 FREE: 0.73 NG/DL (ref 0.93–1.76)
TSH SERPL DL<=0.05 MIU/L-ACNC: 17.4 UIU/ML (ref 0.4–4.2)
WBC # BLD: 7.5 THOU/MM3 (ref 4.8–10.8)

## 2018-03-21 PROCEDURE — 84439 ASSAY OF FREE THYROXINE: CPT

## 2018-03-21 PROCEDURE — 84443 ASSAY THYROID STIM HORMONE: CPT

## 2018-03-21 PROCEDURE — 84480 ASSAY TRIIODOTHYRONINE (T3): CPT

## 2018-03-21 PROCEDURE — 36415 COLL VENOUS BLD VENIPUNCTURE: CPT

## 2018-03-21 PROCEDURE — 86376 MICROSOMAL ANTIBODY EACH: CPT

## 2018-03-21 PROCEDURE — 84481 FREE ASSAY (FT-3): CPT

## 2018-03-21 PROCEDURE — 84436 ASSAY OF TOTAL THYROXINE: CPT

## 2018-03-21 PROCEDURE — 85025 COMPLETE CBC W/AUTO DIFF WBC: CPT

## 2018-03-22 LAB
T3 FREE: 3.62 PG/ML (ref 2.02–4.43)
THYROXINE (T4): 4.8 UG/DL (ref 4.5–12)

## 2018-03-23 ENCOUNTER — OFFICE VISIT (OUTPATIENT)
Dept: FAMILY MEDICINE CLINIC | Age: 63
End: 2018-03-23
Payer: COMMERCIAL

## 2018-03-23 VITALS
HEART RATE: 93 BPM | RESPIRATION RATE: 12 BRPM | SYSTOLIC BLOOD PRESSURE: 126 MMHG | OXYGEN SATURATION: 97 % | WEIGHT: 177.6 LBS | DIASTOLIC BLOOD PRESSURE: 89 MMHG | HEIGHT: 65 IN | BODY MASS INDEX: 29.59 KG/M2

## 2018-03-23 DIAGNOSIS — L24.1 IRRITANT CONTACT DERMATITIS DUE TO OILS: Primary | ICD-10-CM

## 2018-03-23 DIAGNOSIS — R10.84 GENERALIZED ABDOMINAL PAIN: ICD-10-CM

## 2018-03-23 DIAGNOSIS — R79.9 ABNORMAL BLOOD CHEMISTRY: ICD-10-CM

## 2018-03-23 DIAGNOSIS — E06.3 HYPOTHYROIDISM DUE TO HASHIMOTO'S THYROIDITIS: ICD-10-CM

## 2018-03-23 DIAGNOSIS — J01.91 ACUTE RECURRENT SINUSITIS, UNSPECIFIED LOCATION: ICD-10-CM

## 2018-03-23 DIAGNOSIS — E03.8 HYPOTHYROIDISM DUE TO HASHIMOTO'S THYROIDITIS: ICD-10-CM

## 2018-03-23 LAB — THYROID PEROXIDASE ANTIBODY: 0.8 IU/ML (ref 0–9)

## 2018-03-23 PROCEDURE — 3014F SCREEN MAMMO DOC REV: CPT | Performed by: FAMILY MEDICINE

## 2018-03-23 PROCEDURE — 1036F TOBACCO NON-USER: CPT | Performed by: FAMILY MEDICINE

## 2018-03-23 PROCEDURE — G8482 FLU IMMUNIZE ORDER/ADMIN: HCPCS | Performed by: FAMILY MEDICINE

## 2018-03-23 PROCEDURE — G8417 CALC BMI ABV UP PARAM F/U: HCPCS | Performed by: FAMILY MEDICINE

## 2018-03-23 PROCEDURE — 99214 OFFICE O/P EST MOD 30 MIN: CPT | Performed by: FAMILY MEDICINE

## 2018-03-23 PROCEDURE — G8427 DOCREV CUR MEDS BY ELIG CLIN: HCPCS | Performed by: FAMILY MEDICINE

## 2018-03-23 PROCEDURE — 3017F COLORECTAL CA SCREEN DOC REV: CPT | Performed by: FAMILY MEDICINE

## 2018-03-23 RX ORDER — LEVOTHYROXINE SODIUM 0.03 MG/1
25 TABLET ORAL DAILY
Qty: 30 TABLET | Refills: 3 | Status: SHIPPED | OUTPATIENT
Start: 2018-03-23 | End: 2018-06-29 | Stop reason: DRUGHIGH

## 2018-03-23 RX ORDER — BETAMETHASONE DIPROPIONATE 0.05 %
OINTMENT (GRAM) TOPICAL
Qty: 1 TUBE | Refills: 1 | Status: ON HOLD | OUTPATIENT
Start: 2018-03-23 | End: 2018-09-11

## 2018-03-23 RX ORDER — AZITHROMYCIN 250 MG/1
TABLET, FILM COATED ORAL
Qty: 1 PACKET | Refills: 0 | Status: SHIPPED | OUTPATIENT
Start: 2018-03-23 | End: 2018-04-02

## 2018-03-23 ASSESSMENT — ENCOUNTER SYMPTOMS
SINUS PRESSURE: 1
ABDOMINAL PAIN: 1
SORE THROAT: 1
SINUS PAIN: 1

## 2018-03-23 NOTE — LETTER
[address] [address]  Phone: [phone]  Fax: [phone]    Grace Thurman MD          2018     Dr. 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 Apparently in your conversations with Ankita's daughter Peter Pinto, you may have mentioned that you could work with Prasanna Holt to work up the elevated Chromagranin A levels, ie carcinoid tumor work up. You saw her in your office for a mass on her left kidney. She  has a past medical history of Anemia; Anxiety; Arm DVT (deep venous thromboembolism), acute ([address] Broken shoulder; Celiac disease; CRPS (complex regional pain syndrome type I); Depression; GERD (gastroesophageal reflux disease); Headache(784.0); History of blood transfusion; Hx of reactive hypoglycemia; Hyperlipidemia; Hypothyroidism; Irritable bowel syndrome; Meniere disease; Mitral valve prolapse syndrome; MRSA (methicillin resistant Staphylococcus aureus); Narcolepsy; Partial bowel obstruction; Restless legs syndrome; RSD upper limb; and Squamous cell skin cancer. Her  has a past surgical history that includes Cholecystectomy ( ); Abdominal adhesion surgery (10/2010); knee surgery (Right, ); sinus surgery (x4 , , ,  ); Abdomen surgery (2013); Hysterectomy (); Carpal tunnel release (Left, ); sigmoidoscopy (97); other surgical history (); Colonoscopy (3356,0192,8305, ); Upper gastrointestinal endoscopy (7423,2036,0804,2814,2473, 2015, 2018);  Endoscopy, colon, diagnostic; fracture surgery; pr egd transoral biopsy single/multiple (Left, 2/8/2018); Appendectomy (1985); and Hysterectomy, total abdominal (1985). She  reports that she has never smoked. She has never used smokeless tobacco. She reports that she drinks alcohol. She reports that she does not use drugs. She has a current medication list which includes the following prescription(s): azithromycin, betamethasone dipropionate, levothyroxine, duloxetine, thyroid, fish oil, magnesium citrate, ascorbic acid, fexofenadine, modafinil, rosuvastatin, ranitidine, esomeprazole, b complex-folic acid, magnesium oxide, hydrocodone-acetaminophen, vitamin d, calcium carbonate, ferrous gluconate, and triamcinolone. She is allergic to dilaudid [hydromorphone hcl]; hydromorphone; iron; percodan [oxycodone-aspirin]; fenoprofen; fenoprofen calcium; gluten; gluten meal; oxycodone-acetaminophen; oxycodone-aspirin; percocet  [oxycodone-acetaminophen]; reglan [metoclopramide]; and sulfa antibiotics. I appreciate your assistance in her care and look forward to your findings and recommendations.     Sincerely,                           Opal Clay MD

## 2018-03-23 NOTE — PROGRESS NOTES
to avoid is only a guide if you are known allergic to latex or have a latex rash on your chin, cheeks and lines on your neck and chest. The amount of latex is different in each food product or fruit variety. Foods to Avoid out of Season if not grown locally: Melon, Nectarine, Papaya, Cherry, Passion fruit, Plum, Chestnuts, and Tomato. Avocado, Banana, Celery, Figs, and Kiwi always contain Latex-like protein and are almost universally toxic    Whats in Season? Strawberries taste better in June than December because June is strawberry season so buy locally grown produce \"in season\" for the best flavor, cost and less Latex. Locally grown produce not only tastes great requires little of no ethylene exposure in food distribution so has less latex content. Out of season, use canned, frozen or dried since processed ripe and are latex lower!!!   Month     [place] Locally Grown Produce  January, February, March: use canned, frozen or dried fruits since lower in latex  April; asparagus, radishes  May; asparagus, broccoli, green onions, greens, peas, radishes, rhubarb  June; asparagus, beets, beans, broccoli, cabbage, cantaloupe, carrots, green onions, greens, lettuce, onions, parsley, peas, radishes, rhubarb, strawberries, watermelons  July; beans, beets, blueberries, broccoli, cabbage, cantaloupe, carrots, cauliflower, celery, cucumbers, eggplant, grapes, green onions, greens, lettuce, onions, parsley, peas, peaches, bell peppers, potatoes, radishes, summer raspberries, squash, sweet corn, tomatoes, turnips, watermelons  August; apples, beans, beets, blueberries, cabbage, cantaloupe, carrots, cauliflower, celery, cucumbers, eggplant, grapes, green onions, greens, lettuce, onions, parsley, peas, peaches, pears, bell peppers, potatoes, radishes, squash, sweet corn, tomatoes, turnips, watermelons  September; apples, beans, beets, blueberries, cabbage, cantaloupe, carrots, cauliflower, celery, cucumbers, eggplant, grapes, Torsten often experience allergic reactions after eating bananas ripened artificially with ethylene. In the [place], food distribution centers treat unripe bananas and other produce with ethylene to ripen; not commonly done in [facility] since fruit is tree-ripened there. Does treatment of food with ethylene induce banana proteins that cross-react with latex? (Ernestina et al.    References:   Latex in Foods Allergy, http://ehp.niehs.nih.gov/members/2003/5811/5811.html    Search web for \" Whats in Season \" for where you live or are at the time you food shop  www.nutritioncouncil.org/pdf/healthy/SeasonalProduce. pdf ,   Management of Latex, http://medicalcenter. [employer].edu/  search for latex

## 2018-03-23 NOTE — TELEPHONE ENCOUNTER
Dr Pawan Bryant this patient is asking for refill for Cymbalta 60 mg tabs with 90-day refills    Per Dr Pawan Bryant 2/23/18 office note:  Plan:   Will increase the Cymbalta to 90 mg PO daily  Keep new appointment with Dr. Jose Santos to discuss options    This pt is asking for 60 mg tabs specifically     Please approve or deny

## 2018-03-26 RX ORDER — DULOXETIN HYDROCHLORIDE 60 MG/1
60 CAPSULE, DELAYED RELEASE ORAL DAILY
Qty: 90 CAPSULE | Refills: 1 | Status: SHIPPED | OUTPATIENT
Start: 2018-03-26 | End: 2018-06-11 | Stop reason: SDUPTHER

## 2018-04-03 ENCOUNTER — OFFICE VISIT (OUTPATIENT)
Dept: PULMONOLOGY | Age: 63
End: 2018-04-03
Payer: COMMERCIAL

## 2018-04-03 ENCOUNTER — HOSPITAL ENCOUNTER (OUTPATIENT)
Dept: WOMENS IMAGING | Age: 63
Discharge: HOME | End: 2018-04-03
Payer: COMMERCIAL

## 2018-04-03 VITALS
WEIGHT: 180 LBS | OXYGEN SATURATION: 98 % | BODY MASS INDEX: 29.99 KG/M2 | DIASTOLIC BLOOD PRESSURE: 74 MMHG | HEIGHT: 65 IN | HEART RATE: 101 BPM | SYSTOLIC BLOOD PRESSURE: 126 MMHG

## 2018-04-03 DIAGNOSIS — R40.0 DAYTIME SOMNOLENCE: ICD-10-CM

## 2018-04-03 DIAGNOSIS — Z13.9 VISIT FOR SCREENING: ICD-10-CM

## 2018-04-03 DIAGNOSIS — G47.11 IDIOPATHIC HYPERSOMNOLENCE: Primary | ICD-10-CM

## 2018-04-03 DIAGNOSIS — G47.421 NARCOLEPSY DUE TO UNDERLYING CONDITION WITH CATAPLEXY: ICD-10-CM

## 2018-04-03 PROCEDURE — 1036F TOBACCO NON-USER: CPT | Performed by: PHYSICIAN ASSISTANT

## 2018-04-03 PROCEDURE — 99213 OFFICE O/P EST LOW 20 MIN: CPT | Performed by: PHYSICIAN ASSISTANT

## 2018-04-03 PROCEDURE — 77063 BREAST TOMOSYNTHESIS BI: CPT

## 2018-04-03 PROCEDURE — 3017F COLORECTAL CA SCREEN DOC REV: CPT | Performed by: PHYSICIAN ASSISTANT

## 2018-04-03 PROCEDURE — G8417 CALC BMI ABV UP PARAM F/U: HCPCS | Performed by: PHYSICIAN ASSISTANT

## 2018-04-03 PROCEDURE — 3014F SCREEN MAMMO DOC REV: CPT | Performed by: PHYSICIAN ASSISTANT

## 2018-04-03 PROCEDURE — G8427 DOCREV CUR MEDS BY ELIG CLIN: HCPCS | Performed by: PHYSICIAN ASSISTANT

## 2018-04-03 RX ORDER — MODAFINIL 200 MG/1
200 TABLET ORAL 2 TIMES DAILY
Qty: 180 TABLET | Refills: 0 | Status: SHIPPED | OUTPATIENT
Start: 2018-04-03 | End: 2018-05-03 | Stop reason: SDUPTHER

## 2018-04-03 RX ORDER — METHYLPHENIDATE HYDROCHLORIDE 10 MG/1
10 TABLET ORAL DAILY
Qty: 30 TABLET | Refills: 0 | Status: SHIPPED | OUTPATIENT
Start: 2018-04-03 | End: 2018-05-03

## 2018-05-03 ENCOUNTER — OFFICE VISIT (OUTPATIENT)
Dept: PULMONOLOGY | Age: 63
End: 2018-05-03
Payer: COMMERCIAL

## 2018-05-03 VITALS
HEART RATE: 71 BPM | RESPIRATION RATE: 14 BRPM | DIASTOLIC BLOOD PRESSURE: 78 MMHG | OXYGEN SATURATION: 98 % | BODY MASS INDEX: 29.96 KG/M2 | SYSTOLIC BLOOD PRESSURE: 130 MMHG | WEIGHT: 179.8 LBS | HEIGHT: 65 IN

## 2018-05-03 DIAGNOSIS — G47.421 NARCOLEPSY DUE TO UNDERLYING CONDITION WITH CATAPLEXY: ICD-10-CM

## 2018-05-03 DIAGNOSIS — R40.0 DAYTIME SOMNOLENCE: ICD-10-CM

## 2018-05-03 DIAGNOSIS — G47.419 PRIMARY NARCOLEPSY WITHOUT CATAPLEXY: Primary | ICD-10-CM

## 2018-05-03 PROCEDURE — 3017F COLORECTAL CA SCREEN DOC REV: CPT | Performed by: PHYSICIAN ASSISTANT

## 2018-05-03 PROCEDURE — G8427 DOCREV CUR MEDS BY ELIG CLIN: HCPCS | Performed by: PHYSICIAN ASSISTANT

## 2018-05-03 PROCEDURE — 99213 OFFICE O/P EST LOW 20 MIN: CPT | Performed by: PHYSICIAN ASSISTANT

## 2018-05-03 PROCEDURE — 1036F TOBACCO NON-USER: CPT | Performed by: PHYSICIAN ASSISTANT

## 2018-05-03 PROCEDURE — G8417 CALC BMI ABV UP PARAM F/U: HCPCS | Performed by: PHYSICIAN ASSISTANT

## 2018-05-03 RX ORDER — METHYLPHENIDATE HYDROCHLORIDE 20 MG/1
20 TABLET ORAL DAILY
Qty: 30 TABLET | Refills: 0 | Status: SHIPPED | OUTPATIENT
Start: 2018-05-03 | End: 2018-06-06 | Stop reason: SDUPTHER

## 2018-05-03 RX ORDER — MODAFINIL 200 MG/1
200 TABLET ORAL 2 TIMES DAILY
Qty: 180 TABLET | Refills: 0 | Status: SHIPPED | OUTPATIENT
Start: 2018-05-03 | End: 2018-07-02

## 2018-06-01 LAB
T3 TOTAL: 136 NG/DL (ref 80–200)
T4 FREE: 1.05 NG/DL (ref 0.8–1.9)
T4 TOTAL: 6.6 UG/DL (ref 4.5–12)
TSH SERPL DL<=0.05 MIU/L-ACNC: 8.6 UIU/ML (ref 0.4–4.1)

## 2018-06-02 LAB — T3 FREE: 3.6 PG/ML (ref 2.2–4.2)

## 2018-06-03 LAB — CHROMOGRANIN A: 260 NG/ML (ref 0–95)

## 2018-06-06 DIAGNOSIS — G47.419 PRIMARY NARCOLEPSY WITHOUT CATAPLEXY: Primary | ICD-10-CM

## 2018-06-06 RX ORDER — METHYLPHENIDATE HYDROCHLORIDE 20 MG/1
20 TABLET ORAL DAILY
Qty: 30 TABLET | Refills: 0 | Status: SHIPPED | OUTPATIENT
Start: 2018-06-06 | End: 2018-07-02

## 2018-06-06 NOTE — TELEPHONE ENCOUNTER
Delmis Burn called requesting a refill on the following medications:  Requested Prescriptions     Pending Prescriptions Disp Refills    methylphenidate (RITALIN) 20 MG tablet 30 tablet 0     Sig: Take 1 tablet by mouth daily for 30 days. .     Pharm[facility] verified:  [facility]  Patient is out of medication     Date of last visit: 5/3/2018   Date of next visit (if applicable): 8/6/9914

## 2018-06-11 ENCOUNTER — OFFICE VISIT (OUTPATIENT)
Dept: FAMILY MEDICINE CLINIC | Age: 63
End: 2018-06-11
Payer: COMMERCIAL

## 2018-06-11 VITALS
RESPIRATION RATE: 12 BRPM | SYSTOLIC BLOOD PRESSURE: 132 MMHG | WEIGHT: 179 LBS | OXYGEN SATURATION: 98 % | BODY MASS INDEX: 30.56 KG/M2 | HEIGHT: 64 IN | DIASTOLIC BLOOD PRESSURE: 88 MMHG | TEMPERATURE: 97.6 F | HEART RATE: 79 BPM

## 2018-06-11 DIAGNOSIS — S99.922A INJURY OF TOENAIL OF LEFT FOOT, INITIAL ENCOUNTER: ICD-10-CM

## 2018-06-11 DIAGNOSIS — F41.9 ANXIETY: Primary | ICD-10-CM

## 2018-06-11 DIAGNOSIS — L81.9 HYPERPIGMENTATION OF SKIN: ICD-10-CM

## 2018-06-11 PROCEDURE — 99214 OFFICE O/P EST MOD 30 MIN: CPT | Performed by: FAMILY MEDICINE

## 2018-06-11 PROCEDURE — G8427 DOCREV CUR MEDS BY ELIG CLIN: HCPCS | Performed by: FAMILY MEDICINE

## 2018-06-11 PROCEDURE — 3017F COLORECTAL CA SCREEN DOC REV: CPT | Performed by: FAMILY MEDICINE

## 2018-06-11 PROCEDURE — G8417 CALC BMI ABV UP PARAM F/U: HCPCS | Performed by: FAMILY MEDICINE

## 2018-06-11 PROCEDURE — 1036F TOBACCO NON-USER: CPT | Performed by: FAMILY MEDICINE

## 2018-06-11 RX ORDER — DULOXETIN HYDROCHLORIDE 60 MG/1
60 CAPSULE, DELAYED RELEASE ORAL DAILY
Qty: 90 CAPSULE | Refills: 1 | Status: SHIPPED | OUTPATIENT
Start: 2018-06-11 | End: 2019-08-30 | Stop reason: SDUPTHER

## 2018-06-11 RX ORDER — DULOXETIN HYDROCHLORIDE 30 MG/1
30 CAPSULE, DELAYED RELEASE ORAL DAILY
Qty: 90 CAPSULE | Refills: 1 | Status: SHIPPED | OUTPATIENT
Start: 2018-06-11 | End: 2018-12-04 | Stop reason: ALTCHOICE

## 2018-06-11 RX ORDER — AMPICILLIN TRIHYDRATE 250 MG
1 CAPSULE ORAL
Status: ON HOLD | COMMUNITY
End: 2018-09-07 | Stop reason: HOSPADM

## 2018-06-11 RX ORDER — FLUCONAZOLE 150 MG/1
150 TABLET ORAL ONCE
Qty: 1 TABLET | Refills: 0 | Status: SHIPPED | OUTPATIENT
Start: 2018-06-11 | End: 2018-06-11

## 2018-06-11 RX ORDER — ANTIARTHRITIC COMBINATION NO.2 900 MG
0.5 TABLET ORAL
Status: ON HOLD | COMMUNITY
End: 2018-09-07 | Stop reason: HOSPADM

## 2018-06-12 ENCOUNTER — HOSPITAL ENCOUNTER (OUTPATIENT)
Dept: MRI IMAGING | Age: 63
Discharge: HOME | End: 2018-06-12
Payer: COMMERCIAL

## 2018-06-12 DIAGNOSIS — R19.00 ABDOMINAL MASS, UNSPECIFIED ABDOMINAL LOCATION: ICD-10-CM

## 2018-06-12 LAB — POC CREATININE WHOLE BLOOD: 0.5 MG/DL (ref 0.5–1.2)

## 2018-06-12 PROCEDURE — 82565 ASSAY OF CREATININE: CPT

## 2018-06-12 PROCEDURE — A9579 GAD-BASE MR CONTRAST NOS,1ML: HCPCS | Performed by: UROLOGY

## 2018-06-12 PROCEDURE — 6360000004 HC RX CONTRAST MEDICATION: Performed by: UROLOGY

## 2018-06-12 PROCEDURE — 74183 MRI ABD W/O CNTR FLWD CNTR: CPT

## 2018-06-12 RX ADMIN — GADOTERIDOL 15 ML: 279.3 INJECTION, SOLUTION INTRAVENOUS at 14:06

## 2018-06-14 ENCOUNTER — OFFICE VISIT (OUTPATIENT)
Dept: FAMILY MEDICINE CLINIC | Age: 63
End: 2018-06-14
Payer: COMMERCIAL

## 2018-06-14 VITALS
WEIGHT: 177.6 LBS | SYSTOLIC BLOOD PRESSURE: 156 MMHG | BODY MASS INDEX: 29.59 KG/M2 | HEIGHT: 65 IN | RESPIRATION RATE: 16 BRPM | DIASTOLIC BLOOD PRESSURE: 97 MMHG | HEART RATE: 90 BPM | TEMPERATURE: 98 F

## 2018-06-14 DIAGNOSIS — K59.01 SLOW TRANSIT CONSTIPATION: ICD-10-CM

## 2018-06-14 DIAGNOSIS — E03.8 HYPOTHYROIDISM DUE TO HASHIMOTO'S THYROIDITIS: Primary | ICD-10-CM

## 2018-06-14 DIAGNOSIS — K90.0 CELIAC DISEASE: ICD-10-CM

## 2018-06-14 DIAGNOSIS — R79.9 ABNORMAL BLOOD CHEMISTRY: ICD-10-CM

## 2018-06-14 DIAGNOSIS — R10.84 GENERALIZED ABDOMINAL PAIN: ICD-10-CM

## 2018-06-14 DIAGNOSIS — E06.3 HYPOTHYROIDISM DUE TO HASHIMOTO'S THYROIDITIS: Primary | ICD-10-CM

## 2018-06-14 DIAGNOSIS — F32.A ANXIETY AND DEPRESSION: ICD-10-CM

## 2018-06-14 DIAGNOSIS — F41.9 ANXIETY AND DEPRESSION: ICD-10-CM

## 2018-06-14 DIAGNOSIS — K21.00 GASTROESOPHAGEAL REFLUX DISEASE WITH ESOPHAGITIS: ICD-10-CM

## 2018-06-14 DIAGNOSIS — E78.2 MIXED HYPERLIPIDEMIA: ICD-10-CM

## 2018-06-14 PROCEDURE — G8417 CALC BMI ABV UP PARAM F/U: HCPCS | Performed by: FAMILY MEDICINE

## 2018-06-14 PROCEDURE — 3017F COLORECTAL CA SCREEN DOC REV: CPT | Performed by: FAMILY MEDICINE

## 2018-06-14 PROCEDURE — 99214 OFFICE O/P EST MOD 30 MIN: CPT | Performed by: FAMILY MEDICINE

## 2018-06-14 PROCEDURE — 1036F TOBACCO NON-USER: CPT | Performed by: FAMILY MEDICINE

## 2018-06-14 PROCEDURE — G8427 DOCREV CUR MEDS BY ELIG CLIN: HCPCS | Performed by: FAMILY MEDICINE

## 2018-06-14 ASSESSMENT — ENCOUNTER SYMPTOMS: ABDOMINAL PAIN: 1

## 2018-06-20 ENCOUNTER — OFFICE VISIT (OUTPATIENT)
Dept: UROLOGY | Age: 63
End: 2018-06-20
Payer: COMMERCIAL

## 2018-06-20 ENCOUNTER — TELEPHONE (OUTPATIENT)
Dept: UROLOGY | Age: 63
End: 2018-06-20

## 2018-06-20 VITALS
BODY MASS INDEX: 29.61 KG/M2 | HEIGHT: 65 IN | WEIGHT: 177.7 LBS | SYSTOLIC BLOOD PRESSURE: 124 MMHG | DIASTOLIC BLOOD PRESSURE: 74 MMHG

## 2018-06-20 DIAGNOSIS — R19.02 ABDOMINAL MASS, LEFT UPPER QUADRANT: Primary | ICD-10-CM

## 2018-06-20 DIAGNOSIS — K63.89 MESENTERIC MASS: Primary | ICD-10-CM

## 2018-06-20 DIAGNOSIS — Z01.818 PRE-OP TESTING: ICD-10-CM

## 2018-06-20 DIAGNOSIS — E78.49 OTHER HYPERLIPIDEMIA: ICD-10-CM

## 2018-06-20 PROCEDURE — 1036F TOBACCO NON-USER: CPT | Performed by: NURSE PRACTITIONER

## 2018-06-20 PROCEDURE — 3017F COLORECTAL CA SCREEN DOC REV: CPT | Performed by: NURSE PRACTITIONER

## 2018-06-20 PROCEDURE — 99214 OFFICE O/P EST MOD 30 MIN: CPT | Performed by: NURSE PRACTITIONER

## 2018-06-20 PROCEDURE — G8427 DOCREV CUR MEDS BY ELIG CLIN: HCPCS | Performed by: NURSE PRACTITIONER

## 2018-06-20 PROCEDURE — G8417 CALC BMI ABV UP PARAM F/U: HCPCS | Performed by: NURSE PRACTITIONER

## 2018-06-27 ENCOUNTER — OFFICE VISIT (OUTPATIENT)
Dept: INTERNAL MEDICINE CLINIC | Age: 63
End: 2018-06-27
Payer: COMMERCIAL

## 2018-06-27 VITALS
DIASTOLIC BLOOD PRESSURE: 76 MMHG | HEIGHT: 65 IN | BODY MASS INDEX: 29.82 KG/M2 | SYSTOLIC BLOOD PRESSURE: 120 MMHG | HEART RATE: 76 BPM | WEIGHT: 179 LBS

## 2018-06-27 DIAGNOSIS — G47.419 PRIMARY NARCOLEPSY WITHOUT CATAPLEXY: ICD-10-CM

## 2018-06-27 DIAGNOSIS — K21.00 GASTROESOPHAGEAL REFLUX DISEASE WITH ESOPHAGITIS: ICD-10-CM

## 2018-06-27 DIAGNOSIS — K56.609 SBO (SMALL BOWEL OBSTRUCTION) (HCC): ICD-10-CM

## 2018-06-27 DIAGNOSIS — G90.59 COMPLEX REGIONAL PAIN SYNDROME TYPE 1 AFFECTING OTHER SITE: ICD-10-CM

## 2018-06-27 DIAGNOSIS — K90.0 CELIAC DISEASE: ICD-10-CM

## 2018-06-27 DIAGNOSIS — E03.9 HYPOTHYROIDISM, UNSPECIFIED TYPE: Primary | ICD-10-CM

## 2018-06-27 DIAGNOSIS — K63.89 MESENTERIC MASS: ICD-10-CM

## 2018-06-27 PROCEDURE — 3017F COLORECTAL CA SCREEN DOC REV: CPT | Performed by: INTERNAL MEDICINE

## 2018-06-27 PROCEDURE — 1036F TOBACCO NON-USER: CPT | Performed by: INTERNAL MEDICINE

## 2018-06-27 PROCEDURE — G8417 CALC BMI ABV UP PARAM F/U: HCPCS | Performed by: INTERNAL MEDICINE

## 2018-06-27 PROCEDURE — 99214 OFFICE O/P EST MOD 30 MIN: CPT | Performed by: INTERNAL MEDICINE

## 2018-06-27 PROCEDURE — G8427 DOCREV CUR MEDS BY ELIG CLIN: HCPCS | Performed by: INTERNAL MEDICINE

## 2018-06-27 RX ORDER — LEVOTHYROXINE SODIUM 0.1 MG/1
100 TABLET ORAL DAILY
Qty: 90 TABLET | Refills: 3 | Status: ON HOLD | OUTPATIENT
Start: 2018-06-27 | End: 2018-09-11 | Stop reason: SDUPTHER

## 2018-06-29 ENCOUNTER — OFFICE VISIT (OUTPATIENT)
Dept: FAMILY MEDICINE CLINIC | Age: 63
End: 2018-06-29

## 2018-06-29 ENCOUNTER — TELEPHONE (OUTPATIENT)
Dept: FAMILY MEDICINE CLINIC | Age: 63
End: 2018-06-29

## 2018-06-29 VITALS
HEIGHT: 65 IN | DIASTOLIC BLOOD PRESSURE: 80 MMHG | HEART RATE: 80 BPM | SYSTOLIC BLOOD PRESSURE: 122 MMHG | RESPIRATION RATE: 16 BRPM | WEIGHT: 179 LBS | BODY MASS INDEX: 29.82 KG/M2

## 2018-06-29 DIAGNOSIS — Z01.818 PRE-OP EVALUATION: Primary | ICD-10-CM

## 2018-06-29 DIAGNOSIS — E03.9 HYPOTHYROIDISM, UNSPECIFIED TYPE: ICD-10-CM

## 2018-06-29 PROCEDURE — 99214 OFFICE O/P EST MOD 30 MIN: CPT | Performed by: FAMILY MEDICINE

## 2018-06-29 RX ORDER — FLUCONAZOLE 150 MG/1
150 TABLET ORAL ONCE
Qty: 1 TABLET | Refills: 1 | Status: SHIPPED | OUTPATIENT
Start: 2018-06-29 | End: 2018-06-29

## 2018-06-29 RX ORDER — LEVOTHYROXINE SODIUM 0.1 MG/1
TABLET ORAL
Qty: 90 TABLET | Refills: 3 | Status: CANCELLED | OUTPATIENT
Start: 2018-06-29

## 2018-06-29 ASSESSMENT — ENCOUNTER SYMPTOMS
RHINORRHEA: 0
SHORTNESS OF BREATH: 0
SORE THROAT: 0
DIARRHEA: 0

## 2018-07-02 DIAGNOSIS — G47.10 HYPERSOMNIA: Primary | ICD-10-CM

## 2018-07-02 RX ORDER — METHYLPHENIDATE HYDROCHLORIDE 20 MG/1
20 TABLET ORAL DAILY
Qty: 30 TABLET | Refills: 0 | Status: SHIPPED | OUTPATIENT
Start: 2018-07-02 | End: 2018-08-02 | Stop reason: SDUPTHER

## 2018-07-02 RX ORDER — LEVOTHYROXINE SODIUM 100 MCG
100 TABLET ORAL DAILY
Qty: 90 TABLET | Refills: 3 | Status: ON HOLD | OUTPATIENT
Start: 2018-07-02 | End: 2018-10-03 | Stop reason: HOSPADM

## 2018-07-18 ENCOUNTER — TELEPHONE (OUTPATIENT)
Dept: UROLOGY | Age: 63
End: 2018-07-18

## 2018-07-26 DIAGNOSIS — G47.421 NARCOLEPSY DUE TO UNDERLYING CONDITION WITH CATAPLEXY: ICD-10-CM

## 2018-07-26 DIAGNOSIS — R40.0 DAYTIME SOMNOLENCE: ICD-10-CM

## 2018-07-27 DIAGNOSIS — R40.0 DAYTIME SOMNOLENCE: ICD-10-CM

## 2018-07-27 DIAGNOSIS — G47.421 NARCOLEPSY DUE TO UNDERLYING CONDITION WITH CATAPLEXY: ICD-10-CM

## 2018-07-27 RX ORDER — MODAFINIL 200 MG/1
200 TABLET ORAL 2 TIMES DAILY
Qty: 6 TABLET | Refills: 0 | Status: SHIPPED | OUTPATIENT
Start: 2018-07-27 | End: 2018-07-30

## 2018-07-30 RX ORDER — MODAFINIL 200 MG/1
200 TABLET ORAL 2 TIMES DAILY
Qty: 180 TABLET | Refills: 0 | Status: ON HOLD | OUTPATIENT
Start: 2018-07-30 | End: 2018-10-03 | Stop reason: HOSPADM

## 2018-08-01 ENCOUNTER — TELEPHONE (OUTPATIENT)
Dept: FAMILY MEDICINE CLINIC | Age: 63
End: 2018-08-01

## 2018-08-02 DIAGNOSIS — G47.10 HYPERSOMNIA: ICD-10-CM

## 2018-08-02 RX ORDER — METHYLPHENIDATE HYDROCHLORIDE 20 MG/1
20 TABLET ORAL DAILY
Qty: 30 TABLET | Refills: 0 | Status: ON HOLD | OUTPATIENT
Start: 2018-08-02 | End: 2018-09-07

## 2018-08-02 NOTE — TELEPHONE ENCOUNTER
Meng Redd called requesting a refill on the following medications:  Requested Prescriptions     Pending Prescriptions Disp Refills    methylphenidate (RITALIN) 20 MG tablet 30 tablet 0     Sig: Take 1 tablet by mouth daily for 30 days. .   pt will be out boaz, but leaving today to go to [address] [place] [address] verified:  .viri       Date of last visit: 05-03-18  Date of next visit (if applicable): 2/4/9058

## 2018-08-16 ENCOUNTER — TELEPHONE (OUTPATIENT)
Dept: UROLOGY | Age: 63
End: 2018-08-16

## 2018-08-21 ENCOUNTER — TELEPHONE (OUTPATIENT)
Dept: UROLOGY | Age: 63
End: 2018-08-21

## 2018-08-21 DIAGNOSIS — Z01.818 PRE-OP TESTING: ICD-10-CM

## 2018-08-21 DIAGNOSIS — R19.02 ABDOMINAL MASS, LEFT UPPER QUADRANT: Primary | ICD-10-CM

## 2018-08-21 NOTE — TELEPHONE ENCOUNTER
DO NOT TAKE FISH OIL, CINNAMON CAPS,  ASPIRIN, PLAVIX, COUMADIN, OR MOTRIN-LIKE DRUGS 7 DAYS      PRIOR TO SURGERY AND 3 DAYS FOLLOWING     Antonio Close 1955 Diagnosis: Left pararenal abdominal mass    Surgical Physician:   Dr KEON Cifuentes have been scheduled for the procedure marked below:     Surgery-Robot assisted laparoscopic excision of pararenal mass            Date: 8/31/18     Anesthesia: Anesthesiologist (General/Spinal)     Place of Service: [address] [place] [address]         Please be at the Outpatient Department Second Floor at:   6:00am        INSTRUCTIONS AS MARKED BELOW:    1.  Follow the bowel prep the day before the surgery. 2.  If you are having (General) Anesthesia:  DO NOT eat or drink anything after midnight before surgery. 3.  We prefer that you shower or bathe with liquid antibacterial soap (like Dial) the day of surgery. 4.  PLEASE BRING THIS LETTER WITH YOU AND SHOW IT TO THE  AT [place] [address]. 5.  Momo Camilo PA-C may assist with your surgery. 6.  Does patient have a Daily Secret? No  7. Please send a copy to the Family Dr: Ronaldo Hernandez MD  8. If you are a Medicare patient please bring your home Medications with you. 9. Plan to spend the overnight at the hospital the day of surgery.       Date: 8/21/2018

## 2018-08-21 NOTE — TELEPHONE ENCOUNTER
Robotic Surgery Scheduling Form   [facility] [address]    Phone * [phone]   Surgical Scheduling Direct line Phone * [phone]  Fax * [phone]      Ian Bernal      1955    female    [address] [facility]   Marital Status:         Home Phone: [phone]   Cell Phone:   Telephone Information:   Mobile [phone]              Surgeon: Dr KEON Ghosh Surgery Date:8/31/2018 Time: 0730 (2.5 hour case per Samy Barahona)     Procedure: Robot assisted laparoscopic excision of pararenal mass   Outpatient/OBS     Diagnosis: Left pararenal abdominal mass    Important Medical History: Chronic restrictive pain syndrome of the right arm. Flag this to ALERT the OR STAFF that there will be postioning difficulties with the right arm per Samy Barahona    Special Inst/Equip: Fortec BK Ultrasound with drop in probe requested. Keila conf#-[id number]. CPT Codes: 04833    Latex Allergy:   no Cardiac Device:  no    Case Location:  Main OR     Preadmission Testing: Phone Call      PAT Date and Time: ________________________________    PAT Confirmation #: _________________________________    Post Op Visit:  ______________________________________    Need Preop Cardiac Clearance:   Yes    Does patient have Cardiologist/physician?     Dr Romelia Wilson #:  ______________________________________________    Mary Allendale: __________________________________ Date:____________________    Firefly:  no    Dual Console:  no   Ultrasound:  no    Single Site: no    RNFA (colon resection only): no Assisting Surgeon: Carmel Samuel [address] [place] [address] Name:  [facility]

## 2018-08-22 ENCOUNTER — TELEPHONE (OUTPATIENT)
Dept: UROLOGY | Age: 63
End: 2018-08-22

## 2018-08-27 ENCOUNTER — TELEPHONE (OUTPATIENT)
Dept: UROLOGY | Age: 63
End: 2018-08-27

## 2018-08-27 DIAGNOSIS — Z01.818 PREOP TESTING: Primary | ICD-10-CM

## 2018-08-27 NOTE — H&P
History and Physical performed by Socorro Dodson CNP    Arsenio Javed is a 58 y.o. female was seen in follow up for abdominal mass.      Pt was initially referred to our office 2/17/18 secondary to an abdominal mass noted on CT scan close to the lower pole of the left kidney. Pt is here today to review serial imaging. Pt denies pain at rest in abdomen. Notes nonspecific pain to back at times but isn't sure if it is musculoskeletal or the mass.       Liane Solorio denies dysuria, hesitancy, weak stream, urgency, frequency, gross hematuria, flank pain, fever, chills, suprapubic pain, and feeling of incomplete emptying.     She also denies night sweats, poor appetite, unexplained weight loss, fatigue, malaise.     Pt reports a hx of bowel obstruction more than 15 times with 2 bowel resections and a hx of hysterectomy.       Current Facility-Administered Medications          Current Outpatient Prescriptions   Medication Sig Dispense Refill    DHEA 25 MG TABS Take 0.5 tablets by mouth every morning (before breakfast)        Cinnamon 500 MG CAPS Take 1 capsule by mouth 4 times daily (before meals and nightly)        Lysine 1000 MG TABS Take 1 tablet by mouth 3 times daily        DULoxetine (CYMBALTA) 30 MG extended release capsule Take 1 capsule by mouth daily 90 capsule 1    DULoxetine (CYMBALTA) 60 MG extended release capsule Take 1 capsule by mouth daily 90 capsule 1    methylphenidate (RITALIN) 20 MG tablet Take 1 tablet by mouth daily for 30 days. . 30 tablet 0    modafinil (PROVIGIL) 200 MG tablet Take 1 tablet by mouth 2 times daily for 90 days. . 180 tablet 0    polyethylene glycol (GLYCOLAX) powder Colonoscopy Prep Dispense 255 Gram Bottle. Use as Directed 255 g 0    betamethasone dipropionate (DIPROLENE) 0.05 % ointment Apply topically daily.  1 Tube 1    levothyroxine (SYNTHROID) 25 MCG tablet Take 1 tablet by mouth Daily 30 tablet 3    thyroid (ARMOUR) 30 MG tablet Take 45 mg by mouth daily        Omega-3 Fatty Acids (FISH OIL) 1000 MG CAPS Take 2,000 mg by mouth 4 times daily        Magnesium Citrate 200 MG TABS Take 1 capsule by mouth 6 times daily         ascorbic acid (VITAMIN C) 1000 MG tablet Take 0.5 tablets by mouth 3 times daily (Patient taking differently: Take 1,000 mg by mouth 3 times daily ) 30 tablet 3    fexofenadine (ALLEGRA ALLERGY) 180 MG tablet Take 180 mg by mouth daily        rosuvastatin (CRESTOR) 20 MG tablet Take 1 tablet by mouth daily 90 tablet 3    ranitidine (ZANTAC) 300 MG tablet Take 1 tablet by mouth every evening Taken at bedtime 90 tablet 3    esomeprazole (NEXIUM) 40 MG delayed release capsule Take 1 capsule by mouth every morning (before breakfast) 90 capsule 3    B Complex-Folic Acid (J-716 BALANCED TR PO) Take 100 mg by mouth 3 times daily (before meals) [facility]         vitamin D (CHOLECALCIFEROL) 5000 UNITS CAPS capsule Take 5,000 Units by mouth daily         calcium carbonate (OSCAL) 500 MG TABS tablet Take 1,000 mg by mouth 4 times daily         ferrous gluconate (FERGON) 225 (27 FE) MG tablet Take 1 tablet by mouth daily. 30 tablet 0    triamcinolone (NASACORT AQ) 55 MCG/ACT nasal inhaler 2 sprays by Nasal route daily           No current facility-administered medications for this visit.             Past Medical History  Donis Hayward  has a past medical history of Anemia; Anxiety; Arm DVT (deep venous thromboembolism), acute ([address] Broken shoulder; Celiac disease; CRPS (complex regional pain syndrome type I); Depression; GERD (gastroesophageal reflux disease); Headache(784.0); History of blood transfusion; Hx of reactive hypoglycemia; Hyperlipidemia; Hypothyroidism; Irritable bowel syndrome; Meniere disease; Mitral valve prolapse syndrome; MRSA (methicillin resistant Staphylococcus aureus); Narcolepsy; Partial bowel obstruction;  Restless legs syndrome; RSD upper limb; and Squamous cell skin cancer.     Past Surgical History  The patient  has a past surgical history that includes Cholecystectomy (2003 ); Abdominal adhesion surgery (10/2010); knee surgery (Right, 2006); sinus surgery (x4 1982, 1990, 1997, 2005 ); Abdomen surgery (04/23/2013); Hysterectomy (1985); Carpal tunnel release (Left, 2006); sigmoidoscopy (1/14/97); other surgical history (2006); Colonoscopy (6857,6590,0790, 2015); Upper gastrointestinal endoscopy (5688,7574,2496,9731,0683, 2015, 2/2018); Endoscopy, colon, diagnostic; fracture surgery; pr egd transoral biopsy single/multiple (Left, 2/8/2018); Appendectomy (1985); and Hysterectomy, total abdominal (1985).    Family History  This patient's family history includes Asthma in her brother; Blindness in her maternal grandmother; Cancer in her father and paternal grandfather; Celiac Disease in her brother; Colon Cancer (age of onset: 79) in her father, paternal aunt, paternal uncle, and paternal uncle; Diabetes in her maternal grandmother, mother, and paternal grandmother; Emphysema in her maternal grandfather;  Problems in her brother; Heart Attack (age of onset: 66) in her mother; Heart Disease in her brother and mother; High Blood Pressure in her brother and mother; Irritable Bowel Syndrome in her sister; Kidney Disease in her brother and mother; Other in her sister; Ovarian Cancer (age of onset: 48) in her paternal grandmother; Stroke in her mother and paternal grandmother.     Social History  Zuly West  reports that she has never smoked. She has never used smokeless tobacco. She reports that she drinks alcohol. She reports that she does not use drugs.        Review of Systems  No problems with ears, nose or throat. No problems with eyes. No chest pain, shortness of breath, abdominal pain, extremity pain or weakness, and no neurological deficits. No rashes. No swollen glands or lymph nodes.  symptoms per HPI. The remainder of the review of symptoms is negative.     Exam  There were no vitals taken for this visit.   Nursing note within the mass. Previously mass measured 4 x 3.6 x 3.6 cm in size.          Assessment & Plan  Left pararenal Abdominal mass--increasing in size  L CVA tenderness on exam  CRPS of R arm and shoulder        I reviewed imaging with Dr. Edgar Ghosh and recommend RAL excision of mass at this time as mass is increasing in size.       I described the procedure in detail and also described the associated risks and benefits at length. We discussed possible alternative therapies. We discussed the risks and benefits of not undergoing therapy. Patient understands these risks and benefits and desires to proceed. We discussed activity restrictions for 6 weeks following surgery and normal postoperative course and expectations.       Pt does note a history of chronic restrictive pain syndrome of the R shoulder for which she has been undergoing physical therapy for 2 years time and receives nerve blocks under the care of Dr. Travis Her at the [facility]. She reports she cannot have venipuncture on the R arm. She reports reduced ROM of the R shoulder and is only able to lift right arm to the level of the shoulder. She reports she has severe pain when she lies on her R side.        Pt will be scheduled for a robot-assisted laparoscopic excision of pararenal tumor by Dr. Edgar Ghosh with appropriate medical clearance by Dr. Cathy kennedy.

## 2018-08-28 RX ORDER — ESOMEPRAZOLE MAGNESIUM 40 MG/1
40 CAPSULE, DELAYED RELEASE ORAL
Qty: 90 CAPSULE | Refills: 3 | Status: SHIPPED | OUTPATIENT
Start: 2018-08-28 | End: 2019-05-01 | Stop reason: SDUPTHER

## 2018-08-28 NOTE — TELEPHONE ENCOUNTER
Last visit- 6/14/2018  Next visit- 12/12/2018    Requested Prescriptions     Pending Prescriptions Disp Refills    esomeprazole (iCents.net) 40 MG delayed release capsule [Pharmacy Med Name: ESOMEPRAZOLE MAG DR 40 MG CAP] 90 capsule 3     Sig: TAKE 1 CAPSULE BY MOUTH EVERY MORNING (BEFORE BREAKFAST)

## 2018-08-29 ENCOUNTER — TELEPHONE (OUTPATIENT)
Dept: UROLOGY | Age: 63
End: 2018-08-29

## 2018-08-29 LAB
ABSOLUTE BASO #: 0 K/UL (ref 0–0.1)
ABSOLUTE EOS #: 0.1 K/UL (ref 0.1–0.4)
ABSOLUTE LYMPH #: 1.3 K/UL (ref 0.8–5.2)
ABSOLUTE MONO #: 0.6 K/UL (ref 0.1–0.9)
ABSOLUTE NEUT #: 3.5 K/UL (ref 1.3–9.1)
AMORPHOUS PHOSPHATES, URINE: ABNORMAL
ANION GAP SERPL CALCULATED.3IONS-SCNC: 9 MEQ/L (ref 10–19)
APPEARANCE: NORMAL
AVERAGE GLUCOSE: 114 MG/DL (ref 66–114)
BACTERIA: ABNORMAL PER HPF
BASOPHILS RELATIVE PERCENT: 0.5 %
BILIRUBIN: NEGATIVE
BUN BLDV-MCNC: 15 MG/DL (ref 8–23)
CALCIUM SERPL-MCNC: 9.3 MG/DL (ref 8.5–10.5)
CALCIUM SERPL-MCNC: 9.3 MG/DL (ref 8.5–10.5)
CHLORIDE BLD-SCNC: 100 MEQ/L (ref 95–107)
CO2: 30 MEQ/L (ref 19–31)
COLOR: YELLOW
CREAT SERPL-MCNC: 0.7 MG/DL (ref 0.6–1.3)
EGFR AFRICAN AMERICAN: 107.6 ML/MIN/1.73 M2
EGFR IF NONAFRICAN AMERICAN: 92.9 ML/MIN/1.73 M2
EOSINOPHILS RELATIVE PERCENT: 2.2 %
EPITHELIAL CELLS: ABNORMAL PER HPF
ESTRADIOL LEVEL: <5 PG/ML
GLUCOSE BLD-MCNC: NEGATIVE MG/DL
GLUCOSE: 113 MG/DL (ref 70–99)
HBA1C MFR BLD: 5.6 % (ref 4.2–5.8)
HCT VFR BLD CALC: 44.9 % (ref 36–48)
HEMOGLOBIN: 15.2 G/DL (ref 12–16)
HIGH SENSITIVE C-REACTIVE PROTEIN: 5.52 MG/L
KETONES, URINE: NEGATIVE
LEUKOCYTE ESTERASE, URINE: NEGATIVE
LYMPHOCYTE %: 22.7 %
MAGNESIUM: 2.8 MG/DL (ref 1.6–2.6)
MCH RBC QN AUTO: 30.5 PG (ref 27–34)
MCHC RBC AUTO-ENTMCNC: 33.9 G/DL (ref 31–36)
MCV RBC AUTO: 90.2 FL (ref 80–100)
MONOCYTES # BLD: 10.5 %
NEUTROPHILS RELATIVE PERCENT: 63.9 %
NITRITE, URINE: NEGATIVE
OCCULT BLOOD,URINE: NEGATIVE
PDW BLD-RTO: 12.8 % (ref 10.8–14.8)
PH: 7.5 (ref 5–9)
PLATELETS: 229 K/UL (ref 150–450)
POTASSIUM SERPL-SCNC: 5 MEQ/L (ref 3.5–5.4)
PROTEIN, URINE: NEGATIVE
RBC: 4.98 M/UL (ref 4–5.5)
RBC: ABNORMAL PER HPF (ref 0–5)
SEDIMENTATION RATE, ERYTHROCYTE: 20 MM/HR (ref 0–30)
SODIUM BLD-SCNC: 139 MEQ/L (ref 135–146)
SP GRAVITY MISCELLANEOUS: 1.02 (ref 1–1.03)
T3 TOTAL: 103 NG/DL (ref 80–200)
T4 FREE: 1.31 NG/DL (ref 0.8–1.9)
T4 FREE: 1.31 NG/DL (ref 0.8–1.9)
T4 TOTAL: 7.8 UG/DL (ref 4.5–12)
TRIPLE PHOSPHATE CRYSTALS: ABNORMAL
TSH SERPL DL<=0.05 MIU/L-ACNC: 5.4 UIU/ML (ref 0.4–4.1)
TSH SERPL DL<=0.05 MIU/L-ACNC: 5.4 UIU/ML (ref 0.4–4.1)
UROBILINOGEN, URINE: NORMAL
WBC: 5.6 K/UL (ref 3.7–10.8)
WBC: ABNORMAL PER HPF (ref 0–5)

## 2018-08-30 ENCOUNTER — TELEPHONE (OUTPATIENT)
Dept: UROLOGY | Age: 63
End: 2018-08-30

## 2018-08-30 LAB
ANTI-THYROGLOB ABS: <1 IU/ML
DHEAS (DHEA SULFATE): 53 UG/DL
GLIADIN ANTIBODIES IGA: 6 U/ML
GLIADIN ANTIBODIES IGG: 1.5 U/ML
HOMOCYSTINE, SERUM: 8 UMOL/L
PARATHYROID HORMONE INTACT: 27 PG/ML (ref 11–67)
T3 FREE: 2.5 PG/ML (ref 2.2–4.2)
THYROGLOBULIN: 0.5 NG/ML
THYROID PEROXIDASE ANTIBODY: 1 IU/ML
THYROID PEROXIDASE ANTIBODY: 1 IU/ML
VITAMIN D 25-HYDROXY: 57 NG/ML

## 2018-08-30 NOTE — TELEPHONE ENCOUNTER
At [facility] request the surgery instructions, bowel prep were faxed to [phone].

## 2018-08-31 ENCOUNTER — ANESTHESIA EVENT (OUTPATIENT)
Dept: OPERATING ROOM | Age: 63
DRG: 674 | End: 2018-08-31
Payer: COMMERCIAL

## 2018-08-31 ENCOUNTER — HOSPITAL ENCOUNTER (INPATIENT)
Age: 63
LOS: 7 days | Discharge: HOME HEALTH | DRG: 674 | End: 2018-09-07
Attending: UROLOGY | Admitting: UROLOGY
Payer: COMMERCIAL

## 2018-08-31 ENCOUNTER — ANESTHESIA (OUTPATIENT)
Dept: OPERATING ROOM | Age: 63
DRG: 674 | End: 2018-08-31
Payer: COMMERCIAL

## 2018-08-31 VITALS
DIASTOLIC BLOOD PRESSURE: 87 MMHG | TEMPERATURE: 95.4 F | SYSTOLIC BLOOD PRESSURE: 135 MMHG | OXYGEN SATURATION: 100 % | RESPIRATION RATE: 3 BRPM

## 2018-08-31 DIAGNOSIS — K63.89 MESENTERIC MASS: Primary | ICD-10-CM

## 2018-08-31 DIAGNOSIS — G47.10 HYPERSOMNIA: ICD-10-CM

## 2018-08-31 LAB
ABO: NORMAL
ABSOLUTE BASO #: 0 K/UL (ref 0–0.1)
ABSOLUTE EOS #: 0.1 K/UL (ref 0.1–0.4)
ABSOLUTE LYMPH #: 1.3 K/UL (ref 0.8–5.2)
ABSOLUTE MONO #: 0.6 K/UL (ref 0.1–0.9)
ABSOLUTE NEUT #: 3.5 K/UL (ref 1.3–9.1)
ANTIBODY SCREEN: NORMAL
BASOPHILS RELATIVE PERCENT: 0.5 %
DEHYDROEPIANDROSTERONE: 2.1 NG/ML (ref 1.3–9.8)
EOSINOPHILS RELATIVE PERCENT: 2.2 %
HCT VFR BLD CALC: 44.9 % (ref 36–48)
HEMOGLOBIN: 15.2 G/DL (ref 12–16)
LYMPHOCYTE %: 22.7 %
MCH RBC QN AUTO: 30.5 PG (ref 27–34)
MCHC RBC AUTO-ENTMCNC: 33.9 G/DL (ref 31–36)
MCV RBC AUTO: 90.2 FL (ref 80–100)
MONOCYTES # BLD: 10.5 %
MRSA SCREEN: NORMAL
NEUTROPHILS RELATIVE PERCENT: 63.9 %
PDW BLD-RTO: 12.8 % (ref 10.8–14.8)
PLATELETS: 229 K/UL (ref 150–450)
RBC: 4.98 M/UL (ref 4–5.5)
RH FACTOR: NORMAL
WBC: 5.5 K/UL (ref 3.7–10.8)

## 2018-08-31 PROCEDURE — 2500000003 HC RX 250 WO HCPCS: Performed by: ANESTHESIOLOGY

## 2018-08-31 PROCEDURE — 3700000000 HC ANESTHESIA ATTENDED CARE: Performed by: UROLOGY

## 2018-08-31 PROCEDURE — 2580000003 HC RX 258

## 2018-08-31 PROCEDURE — 3600000009 HC SURGERY ROBOT BASE: Performed by: UROLOGY

## 2018-08-31 PROCEDURE — 3600000019 HC SURGERY ROBOT ADDTL 15MIN: Performed by: UROLOGY

## 2018-08-31 PROCEDURE — 0DNW0ZZ RELEASE PERITONEUM, OPEN APPROACH: ICD-10-PCS | Performed by: SURGERY

## 2018-08-31 PROCEDURE — 49329 UNLSTD LAPS PX ABD PERTM&OMN: CPT | Performed by: PHYSICIAN ASSISTANT

## 2018-08-31 PROCEDURE — 0DBV4ZZ EXCISION OF MESENTERY, PERCUTANEOUS ENDOSCOPIC APPROACH: ICD-10-PCS | Performed by: UROLOGY

## 2018-08-31 PROCEDURE — 2709999900 HC NON-CHARGEABLE SUPPLY

## 2018-08-31 PROCEDURE — C1773 RET DEV, INSERTABLE: HCPCS | Performed by: UROLOGY

## 2018-08-31 PROCEDURE — 2700000000 HC OXYGEN THERAPY PER DAY

## 2018-08-31 PROCEDURE — S2900 ROBOTIC SURGICAL SYSTEM: HCPCS | Performed by: UROLOGY

## 2018-08-31 PROCEDURE — C1751 CATH, INF, PER/CENT/MIDLINE: HCPCS | Performed by: UROLOGY

## 2018-08-31 PROCEDURE — 6370000000 HC RX 637 (ALT 250 FOR IP): Performed by: PHYSICIAN ASSISTANT

## 2018-08-31 PROCEDURE — 6360000002 HC RX W HCPCS: Performed by: PHYSICIAN ASSISTANT

## 2018-08-31 PROCEDURE — 44602 SUTURE SMALL INTESTINE: CPT | Performed by: SURGERY

## 2018-08-31 PROCEDURE — 2709999900 HC NON-CHARGEABLE SUPPLY: Performed by: UROLOGY

## 2018-08-31 PROCEDURE — 2580000003 HC RX 258: Performed by: ANESTHESIOLOGY

## 2018-08-31 PROCEDURE — 0DQ80ZZ REPAIR SMALL INTESTINE, OPEN APPROACH: ICD-10-PCS | Performed by: SURGERY

## 2018-08-31 PROCEDURE — 2580000003 HC RX 258: Performed by: PHYSICIAN ASSISTANT

## 2018-08-31 PROCEDURE — 7100000000 HC PACU RECOVERY - FIRST 15 MIN: Performed by: UROLOGY

## 2018-08-31 PROCEDURE — 6360000002 HC RX W HCPCS: Performed by: ANESTHESIOLOGY

## 2018-08-31 PROCEDURE — 3700000001 HC ADD 15 MINUTES (ANESTHESIA): Performed by: UROLOGY

## 2018-08-31 PROCEDURE — 86901 BLOOD TYPING SEROLOGIC RH(D): CPT

## 2018-08-31 PROCEDURE — 88304 TISSUE EXAM BY PATHOLOGIST: CPT

## 2018-08-31 PROCEDURE — 49329 UNLSTD LAPS PX ABD PERTM&OMN: CPT | Performed by: UROLOGY

## 2018-08-31 PROCEDURE — 8E0W4CZ ROBOTIC ASSISTED PROCEDURE OF TRUNK REGION, PERCUTANEOUS ENDOSCOPIC APPROACH: ICD-10-PCS | Performed by: UROLOGY

## 2018-08-31 PROCEDURE — 94761 N-INVAS EAR/PLS OXIMETRY MLT: CPT

## 2018-08-31 PROCEDURE — 86900 BLOOD TYPING SEROLOGIC ABO: CPT

## 2018-08-31 PROCEDURE — 2780000010 HC IMPLANT OTHER: Performed by: UROLOGY

## 2018-08-31 PROCEDURE — 36415 COLL VENOUS BLD VENIPUNCTURE: CPT

## 2018-08-31 PROCEDURE — C1894 INTRO/SHEATH, NON-LASER: HCPCS | Performed by: UROLOGY

## 2018-08-31 PROCEDURE — 86850 RBC ANTIBODY SCREEN: CPT

## 2018-08-31 PROCEDURE — 7100000001 HC PACU RECOVERY - ADDTL 15 MIN: Performed by: UROLOGY

## 2018-08-31 PROCEDURE — 6360000002 HC RX W HCPCS

## 2018-08-31 PROCEDURE — 1200000000 HC SEMI PRIVATE

## 2018-08-31 PROCEDURE — 2500000003 HC RX 250 WO HCPCS: Performed by: UROLOGY

## 2018-08-31 RX ORDER — MORPHINE SULFATE 2 MG/ML
2 INJECTION, SOLUTION INTRAMUSCULAR; INTRAVENOUS EVERY 5 MIN PRN
Status: DISCONTINUED | OUTPATIENT
Start: 2018-08-31 | End: 2018-08-31 | Stop reason: HOSPADM

## 2018-08-31 RX ORDER — ONDANSETRON 2 MG/ML
4 INJECTION INTRAMUSCULAR; INTRAVENOUS
Status: DISCONTINUED | OUTPATIENT
Start: 2018-08-31 | End: 2018-08-31 | Stop reason: HOSPADM

## 2018-08-31 RX ORDER — BUPIVACAINE HYDROCHLORIDE 5 MG/ML
INJECTION, SOLUTION EPIDURAL; INTRACAUDAL PRN
Status: DISCONTINUED | OUTPATIENT
Start: 2018-08-31 | End: 2018-08-31 | Stop reason: HOSPADM

## 2018-08-31 RX ORDER — PROPOFOL 10 MG/ML
INJECTION, EMULSION INTRAVENOUS PRN
Status: DISCONTINUED | OUTPATIENT
Start: 2018-08-31 | End: 2018-08-31 | Stop reason: SDUPTHER

## 2018-08-31 RX ORDER — LABETALOL HYDROCHLORIDE 5 MG/ML
5 INJECTION, SOLUTION INTRAVENOUS EVERY 10 MIN PRN
Status: DISCONTINUED | OUTPATIENT
Start: 2018-08-31 | End: 2018-08-31 | Stop reason: HOSPADM

## 2018-08-31 RX ORDER — DULOXETIN HYDROCHLORIDE 60 MG/1
60 CAPSULE, DELAYED RELEASE ORAL DAILY
Status: DISCONTINUED | OUTPATIENT
Start: 2018-08-31 | End: 2018-09-07 | Stop reason: HOSPADM

## 2018-08-31 RX ORDER — BETAMETHASONE DIPROPIONATE 0.05 %
OINTMENT (GRAM) TOPICAL DAILY
Status: DISCONTINUED | OUTPATIENT
Start: 2018-08-31 | End: 2018-09-07 | Stop reason: HOSPADM

## 2018-08-31 RX ORDER — MORPHINE SULFATE 2 MG/ML
1 INJECTION, SOLUTION INTRAMUSCULAR; INTRAVENOUS EVERY 5 MIN PRN
Status: DISCONTINUED | OUTPATIENT
Start: 2018-08-31 | End: 2018-08-31 | Stop reason: HOSPADM

## 2018-08-31 RX ORDER — ACETAMINOPHEN 325 MG/1
650 TABLET ORAL EVERY 4 HOURS PRN
Status: DISCONTINUED | OUTPATIENT
Start: 2018-08-31 | End: 2018-09-07 | Stop reason: HOSPADM

## 2018-08-31 RX ORDER — SODIUM CHLORIDE, SODIUM LACTATE, POTASSIUM CHLORIDE, CALCIUM CHLORIDE 600; 310; 30; 20 MG/100ML; MG/100ML; MG/100ML; MG/100ML
INJECTION, SOLUTION INTRAVENOUS CONTINUOUS PRN
Status: DISCONTINUED | OUTPATIENT
Start: 2018-08-31 | End: 2018-08-31 | Stop reason: SDUPTHER

## 2018-08-31 RX ORDER — SODIUM CHLORIDE 9 MG/ML
INJECTION, SOLUTION INTRAVENOUS CONTINUOUS
Status: DISCONTINUED | OUTPATIENT
Start: 2018-08-31 | End: 2018-08-31

## 2018-08-31 RX ORDER — CEFOXITIN 1 G/1
INJECTION, POWDER, FOR SOLUTION INTRAVENOUS PRN
Status: DISCONTINUED | OUTPATIENT
Start: 2018-08-31 | End: 2018-08-31

## 2018-08-31 RX ORDER — FENTANYL CITRATE 50 UG/ML
12.5 INJECTION, SOLUTION INTRAMUSCULAR; INTRAVENOUS
Status: DISCONTINUED | OUTPATIENT
Start: 2018-08-31 | End: 2018-09-01

## 2018-08-31 RX ORDER — ROCURONIUM BROMIDE 10 MG/ML
INJECTION, SOLUTION INTRAVENOUS PRN
Status: DISCONTINUED | OUTPATIENT
Start: 2018-08-31 | End: 2018-08-31 | Stop reason: SDUPTHER

## 2018-08-31 RX ORDER — SODIUM CHLORIDE 9 MG/ML
INJECTION, SOLUTION INTRAVENOUS CONTINUOUS PRN
Status: DISCONTINUED | OUTPATIENT
Start: 2018-08-31 | End: 2018-08-31 | Stop reason: SDUPTHER

## 2018-08-31 RX ORDER — METHYLPHENIDATE HYDROCHLORIDE 10 MG/1
20 TABLET ORAL DAILY
Status: DISCONTINUED | OUTPATIENT
Start: 2018-09-01 | End: 2018-09-07 | Stop reason: HOSPADM

## 2018-08-31 RX ORDER — DULOXETIN HYDROCHLORIDE 30 MG/1
30 CAPSULE, DELAYED RELEASE ORAL DAILY
Status: DISCONTINUED | OUTPATIENT
Start: 2018-08-31 | End: 2018-09-07 | Stop reason: HOSPADM

## 2018-08-31 RX ORDER — IBUPROFEN 200 MG
TABLET ORAL PRN
Status: DISCONTINUED | OUTPATIENT
Start: 2018-08-31 | End: 2018-09-07 | Stop reason: HOSPADM

## 2018-08-31 RX ORDER — ONDANSETRON 2 MG/ML
4 INJECTION INTRAMUSCULAR; INTRAVENOUS EVERY 6 HOURS PRN
Status: DISCONTINUED | OUTPATIENT
Start: 2018-08-31 | End: 2018-09-04

## 2018-08-31 RX ORDER — PROMETHAZINE HYDROCHLORIDE 25 MG/ML
6.25 INJECTION, SOLUTION INTRAMUSCULAR; INTRAVENOUS
Status: DISCONTINUED | OUTPATIENT
Start: 2018-08-31 | End: 2018-08-31 | Stop reason: HOSPADM

## 2018-08-31 RX ORDER — FENTANYL CITRATE 50 UG/ML
INJECTION, SOLUTION INTRAMUSCULAR; INTRAVENOUS PRN
Status: DISCONTINUED | OUTPATIENT
Start: 2018-08-31 | End: 2018-08-31 | Stop reason: SDUPTHER

## 2018-08-31 RX ORDER — IBUPROFEN 200 MG
TABLET ORAL 2 TIMES DAILY
Status: DISCONTINUED | OUTPATIENT
Start: 2018-08-31 | End: 2018-09-07 | Stop reason: HOSPADM

## 2018-08-31 RX ORDER — MODAFINIL 100 MG/1
200 TABLET ORAL 2 TIMES DAILY
Status: DISCONTINUED | OUTPATIENT
Start: 2018-09-01 | End: 2018-09-07 | Stop reason: HOSPADM

## 2018-08-31 RX ORDER — LEVOTHYROXINE SODIUM 0.1 MG/1
100 TABLET ORAL DAILY
Status: DISCONTINUED | OUTPATIENT
Start: 2018-08-31 | End: 2018-09-07 | Stop reason: HOSPADM

## 2018-08-31 RX ORDER — PANTOPRAZOLE SODIUM 40 MG/1
40 TABLET, DELAYED RELEASE ORAL
Status: DISCONTINUED | OUTPATIENT
Start: 2018-08-31 | End: 2018-09-07 | Stop reason: HOSPADM

## 2018-08-31 RX ORDER — SODIUM CHLORIDE 9 MG/ML
INJECTION, SOLUTION INTRAVENOUS CONTINUOUS
Status: DISCONTINUED | OUTPATIENT
Start: 2018-08-31 | End: 2018-09-03

## 2018-08-31 RX ORDER — MORPHINE SULFATE 10 MG/ML
INJECTION, SOLUTION INTRAMUSCULAR; INTRAVENOUS PRN
Status: DISCONTINUED | OUTPATIENT
Start: 2018-08-31 | End: 2018-08-31 | Stop reason: SDUPTHER

## 2018-08-31 RX ORDER — FENTANYL CITRATE 50 UG/ML
25 INJECTION, SOLUTION INTRAMUSCULAR; INTRAVENOUS EVERY 5 MIN PRN
Status: DISCONTINUED | OUTPATIENT
Start: 2018-08-31 | End: 2018-08-31 | Stop reason: HOSPADM

## 2018-08-31 RX ORDER — HYDRALAZINE HYDROCHLORIDE 20 MG/ML
5 INJECTION INTRAMUSCULAR; INTRAVENOUS EVERY 10 MIN PRN
Status: DISCONTINUED | OUTPATIENT
Start: 2018-08-31 | End: 2018-08-31 | Stop reason: HOSPADM

## 2018-08-31 RX ORDER — SODIUM CHLORIDE 0.9 % (FLUSH) 0.9 %
10 SYRINGE (ML) INJECTION PRN
Status: DISCONTINUED | OUTPATIENT
Start: 2018-08-31 | End: 2018-09-07 | Stop reason: HOSPADM

## 2018-08-31 RX ORDER — NEOSTIGMINE METHYLSULFATE 1 MG/ML
INJECTION, SOLUTION INTRAVENOUS PRN
Status: DISCONTINUED | OUTPATIENT
Start: 2018-08-31 | End: 2018-08-31 | Stop reason: SDUPTHER

## 2018-08-31 RX ORDER — SODIUM CHLORIDE 0.9 % (FLUSH) 0.9 %
10 SYRINGE (ML) INJECTION EVERY 12 HOURS SCHEDULED
Status: DISCONTINUED | OUTPATIENT
Start: 2018-08-31 | End: 2018-09-07 | Stop reason: HOSPADM

## 2018-08-31 RX ORDER — OXYBUTYNIN CHLORIDE 10 MG/1
10 TABLET, EXTENDED RELEASE ORAL DAILY
Status: DISCONTINUED | OUTPATIENT
Start: 2018-08-31 | End: 2018-09-07 | Stop reason: HOSPADM

## 2018-08-31 RX ORDER — FLUTICASONE PROPIONATE 50 MCG
2 SPRAY, SUSPENSION (ML) NASAL DAILY
Status: DISCONTINUED | OUTPATIENT
Start: 2018-08-31 | End: 2018-09-07 | Stop reason: HOSPADM

## 2018-08-31 RX ORDER — FENTANYL CITRATE 50 UG/ML
50 INJECTION, SOLUTION INTRAMUSCULAR; INTRAVENOUS EVERY 5 MIN PRN
Status: DISCONTINUED | OUTPATIENT
Start: 2018-08-31 | End: 2018-08-31 | Stop reason: HOSPADM

## 2018-08-31 RX ORDER — GLYCOPYRROLATE 1 MG/5 ML
SYRINGE (ML) INTRAVENOUS PRN
Status: DISCONTINUED | OUTPATIENT
Start: 2018-08-31 | End: 2018-08-31 | Stop reason: SDUPTHER

## 2018-08-31 RX ORDER — FAMOTIDINE 20 MG/1
20 TABLET, FILM COATED ORAL 2 TIMES DAILY
Status: DISCONTINUED | OUTPATIENT
Start: 2018-08-31 | End: 2018-09-07 | Stop reason: HOSPADM

## 2018-08-31 RX ORDER — MEPERIDINE HYDROCHLORIDE 25 MG/ML
12.5 INJECTION INTRAMUSCULAR; INTRAVENOUS; SUBCUTANEOUS EVERY 5 MIN PRN
Status: DISCONTINUED | OUTPATIENT
Start: 2018-08-31 | End: 2018-08-31 | Stop reason: HOSPADM

## 2018-08-31 RX ORDER — MIDAZOLAM HYDROCHLORIDE 1 MG/ML
INJECTION INTRAMUSCULAR; INTRAVENOUS PRN
Status: DISCONTINUED | OUTPATIENT
Start: 2018-08-31 | End: 2018-08-31 | Stop reason: SDUPTHER

## 2018-08-31 RX ORDER — LIDOCAINE HYDROCHLORIDE 20 MG/ML
INJECTION, SOLUTION INFILTRATION; PERINEURAL PRN
Status: DISCONTINUED | OUTPATIENT
Start: 2018-08-31 | End: 2018-08-31 | Stop reason: SDUPTHER

## 2018-08-31 RX ORDER — PROMETHAZINE HYDROCHLORIDE 25 MG/ML
INJECTION, SOLUTION INTRAMUSCULAR; INTRAVENOUS PRN
Status: DISCONTINUED | OUTPATIENT
Start: 2018-08-31 | End: 2018-08-31 | Stop reason: SDUPTHER

## 2018-08-31 RX ADMIN — FENTANYL CITRATE 100 MCG: 50 INJECTION INTRAMUSCULAR; INTRAVENOUS at 07:57

## 2018-08-31 RX ADMIN — PROMETHAZINE HYDROCHLORIDE 12.5 MG: 25 INJECTION INTRAMUSCULAR; INTRAVENOUS at 10:39

## 2018-08-31 RX ADMIN — LIDOCAINE HYDROCHLORIDE 60 MG: 20 INJECTION, SOLUTION INFILTRATION; PERINEURAL at 07:45

## 2018-08-31 RX ADMIN — MORPHINE SULFATE 1 MG: 10 INJECTION, SOLUTION INTRAMUSCULAR; INTRAVENOUS at 12:18

## 2018-08-31 RX ADMIN — SODIUM CHLORIDE: 9 INJECTION, SOLUTION INTRAVENOUS at 07:35

## 2018-08-31 RX ADMIN — PHENYLEPHRINE HYDROCHLORIDE 100 MCG: 10 INJECTION INTRAVENOUS at 08:59

## 2018-08-31 RX ADMIN — ROCURONIUM BROMIDE 20 MG: 10 INJECTION INTRAVENOUS at 10:05

## 2018-08-31 RX ADMIN — MORPHINE SULFATE 2 MG: 2 INJECTION, SOLUTION INTRAMUSCULAR; INTRAVENOUS at 13:15

## 2018-08-31 RX ADMIN — FENTANYL CITRATE 50 MCG: 50 INJECTION INTRAMUSCULAR; INTRAVENOUS at 09:21

## 2018-08-31 RX ADMIN — ROCURONIUM BROMIDE 10 MG: 10 INJECTION INTRAVENOUS at 11:08

## 2018-08-31 RX ADMIN — MORPHINE SULFATE 2 MG: 2 INJECTION, SOLUTION INTRAMUSCULAR; INTRAVENOUS at 13:00

## 2018-08-31 RX ADMIN — MORPHINE SULFATE 1 MG: 10 INJECTION, SOLUTION INTRAMUSCULAR; INTRAVENOUS at 12:15

## 2018-08-31 RX ADMIN — PIPERACILLIN SODIUM AND TAZOBACTAM SODIUM 3.38 G: 3; .375 INJECTION, POWDER, LYOPHILIZED, FOR SOLUTION INTRAVENOUS at 16:17

## 2018-08-31 RX ADMIN — MORPHINE SULFATE 2 MG: 2 INJECTION, SOLUTION INTRAMUSCULAR; INTRAVENOUS at 13:45

## 2018-08-31 RX ADMIN — FENTANYL CITRATE 12.5 MCG: 50 INJECTION INTRAMUSCULAR; INTRAVENOUS at 22:28

## 2018-08-31 RX ADMIN — FAMOTIDINE 20 MG: 20 TABLET ORAL at 20:03

## 2018-08-31 RX ADMIN — MORPHINE SULFATE 1 MG: 10 INJECTION, SOLUTION INTRAMUSCULAR; INTRAVENOUS at 12:23

## 2018-08-31 RX ADMIN — CEFOXITIN 2 G: 2 INJECTION, POWDER, FOR SOLUTION INTRAVENOUS at 08:06

## 2018-08-31 RX ADMIN — SODIUM CHLORIDE: 9 INJECTION, SOLUTION INTRAVENOUS at 13:00

## 2018-08-31 RX ADMIN — FENTANYL CITRATE 50 MCG: 50 INJECTION INTRAMUSCULAR; INTRAVENOUS at 09:47

## 2018-08-31 RX ADMIN — MIDAZOLAM HYDROCHLORIDE 2 MG: 1 INJECTION, SOLUTION INTRAMUSCULAR; INTRAVENOUS at 07:46

## 2018-08-31 RX ADMIN — ROCURONIUM BROMIDE 50 MG: 10 INJECTION INTRAVENOUS at 07:57

## 2018-08-31 RX ADMIN — SODIUM CHLORIDE: 9 INJECTION, SOLUTION INTRAVENOUS at 20:12

## 2018-08-31 RX ADMIN — FENTANYL CITRATE 12.5 MCG: 50 INJECTION INTRAMUSCULAR; INTRAVENOUS at 17:47

## 2018-08-31 RX ADMIN — Medication 10 ML: at 20:12

## 2018-08-31 RX ADMIN — FENTANYL CITRATE 50 MCG: 50 INJECTION INTRAMUSCULAR; INTRAVENOUS at 08:53

## 2018-08-31 RX ADMIN — Medication 0.5 MG: at 08:57

## 2018-08-31 RX ADMIN — SODIUM CHLORIDE: 9 INJECTION, SOLUTION INTRAVENOUS at 07:46

## 2018-08-31 RX ADMIN — MORPHINE SULFATE 1 MG: 10 INJECTION, SOLUTION INTRAMUSCULAR; INTRAVENOUS at 12:25

## 2018-08-31 RX ADMIN — CEFOXITIN 2 G: 2 INJECTION, POWDER, FOR SOLUTION INTRAVENOUS at 11:06

## 2018-08-31 RX ADMIN — SODIUM CHLORIDE, POTASSIUM CHLORIDE, SODIUM LACTATE AND CALCIUM CHLORIDE: 600; 310; 30; 20 INJECTION, SOLUTION INTRAVENOUS at 10:20

## 2018-08-31 RX ADMIN — NEOSTIGMINE METHYLSULFATE 3.5 MG: 1 INJECTION, SOLUTION INTRAVENOUS at 12:02

## 2018-08-31 RX ADMIN — ROCURONIUM BROMIDE 20 MG: 10 INJECTION INTRAVENOUS at 08:59

## 2018-08-31 RX ADMIN — PROPOFOL 100 MG: 10 INJECTION, EMULSION INTRAVENOUS at 07:57

## 2018-08-31 RX ADMIN — FENTANYL CITRATE 12.5 MCG: 50 INJECTION INTRAMUSCULAR; INTRAVENOUS at 20:12

## 2018-08-31 RX ADMIN — Medication 0.5 MG: at 12:02

## 2018-08-31 ASSESSMENT — PULMONARY FUNCTION TESTS
PIF_VALUE: 16
PIF_VALUE: 15
PIF_VALUE: 24
PIF_VALUE: 22
PIF_VALUE: 23
PIF_VALUE: 16
PIF_VALUE: 25
PIF_VALUE: 17
PIF_VALUE: 25
PIF_VALUE: 24
PIF_VALUE: 23
PIF_VALUE: 23
PIF_VALUE: 15
PIF_VALUE: 16
PIF_VALUE: 14
PIF_VALUE: 17
PIF_VALUE: 24
PIF_VALUE: 17
PIF_VALUE: 25
PIF_VALUE: 14
PIF_VALUE: 15
PIF_VALUE: 24
PIF_VALUE: 17
PIF_VALUE: 16
PIF_VALUE: 17
PIF_VALUE: 24
PIF_VALUE: 15
PIF_VALUE: 16
PIF_VALUE: 23
PIF_VALUE: 24
PIF_VALUE: 17
PIF_VALUE: 24
PIF_VALUE: 2
PIF_VALUE: 25
PIF_VALUE: 17
PIF_VALUE: 14
PIF_VALUE: 25
PIF_VALUE: 2
PIF_VALUE: 14
PIF_VALUE: 25
PIF_VALUE: 24
PIF_VALUE: 1
PIF_VALUE: 23
PIF_VALUE: 16
PIF_VALUE: 17
PIF_VALUE: 15
PIF_VALUE: 17
PIF_VALUE: 23
PIF_VALUE: 23
PIF_VALUE: 17
PIF_VALUE: 16
PIF_VALUE: 25
PIF_VALUE: 18
PIF_VALUE: 17
PIF_VALUE: 14
PIF_VALUE: 15
PIF_VALUE: 17
PIF_VALUE: 16
PIF_VALUE: 17
PIF_VALUE: 23
PIF_VALUE: 17
PIF_VALUE: 24
PIF_VALUE: 14
PIF_VALUE: 25
PIF_VALUE: 23
PIF_VALUE: 15
PIF_VALUE: 16
PIF_VALUE: 15
PIF_VALUE: 24
PIF_VALUE: 11
PIF_VALUE: 22
PIF_VALUE: 14
PIF_VALUE: 17
PIF_VALUE: 23
PIF_VALUE: 15
PIF_VALUE: 17
PIF_VALUE: 23
PIF_VALUE: 25
PIF_VALUE: 21
PIF_VALUE: 17
PIF_VALUE: 23
PIF_VALUE: 25
PIF_VALUE: 22
PIF_VALUE: 15
PIF_VALUE: 14
PIF_VALUE: 17
PIF_VALUE: 6
PIF_VALUE: 23
PIF_VALUE: 1
PIF_VALUE: 17
PIF_VALUE: 17
PIF_VALUE: 1
PIF_VALUE: 14
PIF_VALUE: 25
PIF_VALUE: 24
PIF_VALUE: 0
PIF_VALUE: 24
PIF_VALUE: 24
PIF_VALUE: 23
PIF_VALUE: 23
PIF_VALUE: 24
PIF_VALUE: 23
PIF_VALUE: 17
PIF_VALUE: 31
PIF_VALUE: 4
PIF_VALUE: 23
PIF_VALUE: 15
PIF_VALUE: 23
PIF_VALUE: 15
PIF_VALUE: 17
PIF_VALUE: 25
PIF_VALUE: 25
PIF_VALUE: 23
PIF_VALUE: 16
PIF_VALUE: 17
PIF_VALUE: 17
PIF_VALUE: 15
PIF_VALUE: 17
PIF_VALUE: 17
PIF_VALUE: 23
PIF_VALUE: 17
PIF_VALUE: 4
PIF_VALUE: 23
PIF_VALUE: 27
PIF_VALUE: 23
PIF_VALUE: 17
PIF_VALUE: 17
PIF_VALUE: 23
PIF_VALUE: 18
PIF_VALUE: 24
PIF_VALUE: 24
PIF_VALUE: 17
PIF_VALUE: 23
PIF_VALUE: 17
PIF_VALUE: 23
PIF_VALUE: 15
PIF_VALUE: 17
PIF_VALUE: 23
PIF_VALUE: 24
PIF_VALUE: 15
PIF_VALUE: 25
PIF_VALUE: 1
PIF_VALUE: 14
PIF_VALUE: 17
PIF_VALUE: 25
PIF_VALUE: 2
PIF_VALUE: 23
PIF_VALUE: 17
PIF_VALUE: 22
PIF_VALUE: 16
PIF_VALUE: 24
PIF_VALUE: 23
PIF_VALUE: 24
PIF_VALUE: 17
PIF_VALUE: 17
PIF_VALUE: 15
PIF_VALUE: 15
PIF_VALUE: 23
PIF_VALUE: 24
PIF_VALUE: 16
PIF_VALUE: 17
PIF_VALUE: 17
PIF_VALUE: 16
PIF_VALUE: 23
PIF_VALUE: 24
PIF_VALUE: 17
PIF_VALUE: 1
PIF_VALUE: 23
PIF_VALUE: 24
PIF_VALUE: 23
PIF_VALUE: 17
PIF_VALUE: 14
PIF_VALUE: 15
PIF_VALUE: 17
PIF_VALUE: 15
PIF_VALUE: 17
PIF_VALUE: 24
PIF_VALUE: 2
PIF_VALUE: 23
PIF_VALUE: 16
PIF_VALUE: 1
PIF_VALUE: 24
PIF_VALUE: 17
PIF_VALUE: 17
PIF_VALUE: 16
PIF_VALUE: 18
PIF_VALUE: 14
PIF_VALUE: 17
PIF_VALUE: 17
PIF_VALUE: 19
PIF_VALUE: 14
PIF_VALUE: 17
PIF_VALUE: 17
PIF_VALUE: 25
PIF_VALUE: 17
PIF_VALUE: 1
PIF_VALUE: 14
PIF_VALUE: 25
PIF_VALUE: 15
PIF_VALUE: 16
PIF_VALUE: 25
PIF_VALUE: 24
PIF_VALUE: 24
PIF_VALUE: 17
PIF_VALUE: 24
PIF_VALUE: 0
PIF_VALUE: 15
PIF_VALUE: 17
PIF_VALUE: 23
PIF_VALUE: 1
PIF_VALUE: 14
PIF_VALUE: 16
PIF_VALUE: 23
PIF_VALUE: 17
PIF_VALUE: 20
PIF_VALUE: 24
PIF_VALUE: 22
PIF_VALUE: 0
PIF_VALUE: 27
PIF_VALUE: 14
PIF_VALUE: 17
PIF_VALUE: 24
PIF_VALUE: 14
PIF_VALUE: 14
PIF_VALUE: 17
PIF_VALUE: 23
PIF_VALUE: 15
PIF_VALUE: 27
PIF_VALUE: 15
PIF_VALUE: 17
PIF_VALUE: 23
PIF_VALUE: 17
PIF_VALUE: 23
PIF_VALUE: 25
PIF_VALUE: 1
PIF_VALUE: 23
PIF_VALUE: 17
PIF_VALUE: 16
PIF_VALUE: 23
PIF_VALUE: 23
PIF_VALUE: 24
PIF_VALUE: 23
PIF_VALUE: 17
PIF_VALUE: 16
PIF_VALUE: 24
PIF_VALUE: 14
PIF_VALUE: 24
PIF_VALUE: 17
PIF_VALUE: 25
PIF_VALUE: 16
PIF_VALUE: 0
PIF_VALUE: 23
PIF_VALUE: 15
PIF_VALUE: 24
PIF_VALUE: 23
PIF_VALUE: 0
PIF_VALUE: 17
PIF_VALUE: 23
PIF_VALUE: 24
PIF_VALUE: 14

## 2018-08-31 ASSESSMENT — PAIN SCALES - GENERAL
PAINLEVEL_OUTOF10: 8
PAINLEVEL_OUTOF10: 8
PAINLEVEL_OUTOF10: 9
PAINLEVEL_OUTOF10: 6
PAINLEVEL_OUTOF10: 10
PAINLEVEL_OUTOF10: 9
PAINLEVEL_OUTOF10: 8

## 2018-08-31 ASSESSMENT — PAIN DESCRIPTION - DESCRIPTORS
DESCRIPTORS: CONSTANT
DESCRIPTORS: CONSTANT

## 2018-08-31 ASSESSMENT — PAIN DESCRIPTION - ORIENTATION
ORIENTATION: MID;LOWER;UPPER
ORIENTATION: MID;LOWER;UPPER

## 2018-08-31 ASSESSMENT — PAIN DESCRIPTION - FREQUENCY
FREQUENCY: CONTINUOUS
FREQUENCY: CONTINUOUS

## 2018-08-31 ASSESSMENT — PAIN DESCRIPTION - LOCATION
LOCATION: ABDOMEN
LOCATION: ABDOMEN

## 2018-08-31 ASSESSMENT — PAIN DESCRIPTION - PROGRESSION
CLINICAL_PROGRESSION: NOT CHANGED
CLINICAL_PROGRESSION: NOT CHANGED

## 2018-08-31 ASSESSMENT — PAIN DESCRIPTION - ONSET
ONSET: ON-GOING
ONSET: ON-GOING

## 2018-08-31 ASSESSMENT — PAIN - FUNCTIONAL ASSESSMENT: PAIN_FUNCTIONAL_ASSESSMENT: 0-10

## 2018-08-31 ASSESSMENT — PAIN DESCRIPTION - PAIN TYPE
TYPE: SURGICAL PAIN
TYPE: SURGICAL PAIN

## 2018-08-31 NOTE — INTERVAL H&P NOTE
[address] [place] [address]  History and Physical Update    Pt Name: Fabiola Pate  MRN: [id number]  YOB: 1955  Date of evaluation: 8/31/2018    [] I have examined the patient and reviewed the H&P/Consult and there are no changes to the patient or plans. [x] I have examined the patient and reviewed the H&P/Consult and have noted the following changes: No changes per patient.         Veena Estrella MD  Electronically signed 8/31/2018 at 7:53 AM

## 2018-08-31 NOTE — ANESTHESIA PRE PROCEDURE
Department of Anesthesiology  Preprocedure Note       Name:  Stewart Antonio   Age:  58 y.o.  :  1955                                          MRN:  [id number]         Date:  2018      Surgeon: Jomar Love):  Kendal Hammans, MD    Procedure: Procedure(s):  ROBOTIC EXCISION OF PARARENAL MASS    Medications prior to admission:   Prior to Admission medications    Medication Sig Start Date End Date Taking? Authorizing Provider   esomeprazole (NEXIUM) 40 MG delayed release capsule TAKE 1 CAPSULE BY MOUTH EVERY MORNING (BEFORE BREAKFAST) 18  Yes Carey Cabot, MD   methylphenidate (RITALIN) 20 MG tablet Take 1 tablet by mouth daily for 30 days. . 18 Yes Amairani Montano PA-C   modafinil (PROVIGIL) 200 MG tablet Take 1 tablet by mouth 2 times daily for 90 days. . 7/30/18 10/28/18 Yes Amairani Montano PA-C   SYNTHROID 100 MCG tablet Take 1 tablet by mouth Daily 18  Yes Quinten Bolaños MD   levothyroxine (SYNTHROID) 100 MCG tablet Take 1 tablet by mouth Daily Dispense as written 18  Yes Quinten Bolaños MD   DHEA 25 MG TABS Take 0.5 tablets by mouth every morning (before breakfast)   Yes Historical Provider, MD   Lysine 1000 MG TABS Take 1 tablet by mouth 3 times daily   Yes Historical Provider, MD   DULoxetine (CYMBALTA) 30 MG extended release capsule Take 1 capsule by mouth daily 18  Yes Vin Beck MD   DULoxetine (CYMBALTA) 60 MG extended release capsule Take 1 capsule by mouth daily 18  Yes Vin Beck MD   Omega-3 Fatty Acids (FISH OIL) 1000 MG CAPS Take 2,000 mg by mouth 4 times daily   Yes Historical Provider, MD   Magnesium Citrate 200 MG TABS Take 1 capsule by mouth 6 times daily    Yes Historical Provider, MD   ascorbic acid (VITAMIN C) 1000 MG tablet Take 0.5 tablets by mouth 3 times daily  Patient taking differently: Take 1,000 mg by mouth 3 times daily  18  Yes Veva Schilder, MD   fexofenadine (ALLEGRA ALLERGY) 180 MG tablet Take 180 mg by Date of last solid food consumption: 08/30/18    BMI:   Wt Readings from Last 3 Encounters:   08/31/18 175 lb (79.4 kg)   06/29/18 179 lb (81.2 kg)   06/27/18 179 lb (81.2 kg)     Body mass index is 29.57 kg/m². CBC:   Lab Results   Component Value Date    WBC 5.6 08/28/2018    WBC 0-5 08/28/2018    WBC 7.5 03/21/2018    RBC 4.98 08/28/2018    RBC 0-5 08/28/2018    HGB 15.2 08/28/2018    HCT 44.9 08/28/2018    MCV 90.2 08/28/2018    RDW 12.8 08/28/2018     08/28/2018     03/21/2018       CMP:   Lab Results   Component Value Date     08/28/2018    K 5.0 08/28/2018     08/28/2018    CO2 30 08/28/2018    BUN 15 08/28/2018    CREATININE 0.7 08/28/2018    LABGLOM >90 02/09/2018    GLUCOSE 113 08/28/2018    GLUCOSE NEGATIVE 08/28/2018    PROT 6.4 02/09/2018    CALCIUM 9.3 08/28/2018    BILITOT NEGATIVE 08/28/2018    ALKPHOS 82 02/09/2018    AST 29 02/09/2018    ALT 30 02/09/2018       POC Tests: No results for input(s): POCGLU, POCNA, POCK, POCCL, POCBUN, POCHEMO, POCHCT in the last 72 hours. Coags:   Lab Results   Component Value Date    INR 0.90 01/03/2017    APTT 30.3 01/03/2017       HCG (If Applicable): No results found for: PREGTESTUR, PREGSERUM, HCG, HCGQUANT     ABGs: No results found for: PHART, PO2ART, VTC0NMI, DNT6PXG, BEART, G0XTBPOR     Type & Screen (If Applicable):  Lab Results   Component Value Date    LABRH POS 01/04/2017       Anesthesia Evaluation   no history of anesthetic complications:   Airway: Mallampati: II  TM distance: >3 FB   Neck ROM: full  Mouth opening: > = 3 FB Dental:          Pulmonary:Negative Pulmonary ROS and normal exam              Patient did not smoke on day of surgery.                  Cardiovascular:  Exercise tolerance: good (>4 METS),                     Neuro/Psych:   (+) headaches:, psychiatric history:            GI/Hepatic/Renal:   (+) GERD:,           Endo/Other:    (+) hypothyroidism::., .          Pt had no PAT visit       Abdominal:

## 2018-09-01 LAB
ANION GAP SERPL CALCULATED.3IONS-SCNC: 11 MEQ/L (ref 8–16)
BASOPHILS # BLD: 0.5 %
BASOPHILS ABSOLUTE: 0 THOU/MM3 (ref 0–0.1)
BUN BLDV-MCNC: 7 MG/DL (ref 7–22)
CALCIUM SERPL-MCNC: 8.5 MG/DL (ref 8.5–10.5)
CHLORIDE BLD-SCNC: 105 MEQ/L (ref 98–111)
CO2: 24 MEQ/L (ref 23–33)
CREAT SERPL-MCNC: 0.6 MG/DL (ref 0.4–1.2)
EOSINOPHIL # BLD: 0.3 %
EOSINOPHILS ABSOLUTE: 0 THOU/MM3 (ref 0–0.4)
ERYTHROCYTE [DISTWIDTH] IN BLOOD BY AUTOMATED COUNT: 14.2 % (ref 11.5–14.5)
ERYTHROCYTE [DISTWIDTH] IN BLOOD BY AUTOMATED COUNT: 49.5 FL (ref 35–45)
GFR SERPL CREATININE-BSD FRML MDRD: > 90 ML/MIN/1.73M2
GLUCOSE BLD-MCNC: 101 MG/DL (ref 70–108)
HCT VFR BLD CALC: 42.9 % (ref 37–47)
HEMOGLOBIN: 13.9 GM/DL (ref 12–16)
IMMATURE GRANS (ABS): 0.04 THOU/MM3 (ref 0–0.07)
IMMATURE GRANULOCYTES: 0.5 %
LYMPHOCYTES # BLD: 17.4 %
LYMPHOCYTES ABSOLUTE: 1.4 THOU/MM3 (ref 1–4.8)
MCH RBC QN AUTO: 30.7 PG (ref 26–33)
MCHC RBC AUTO-ENTMCNC: 32.4 GM/DL (ref 32.2–35.5)
MCV RBC AUTO: 94.7 FL (ref 81–99)
MONOCYTES # BLD: 11.4 %
MONOCYTES ABSOLUTE: 0.9 THOU/MM3 (ref 0.4–1.3)
NUCLEATED RED BLOOD CELLS: 0 /100 WBC
PLATELET # BLD: 205 THOU/MM3 (ref 130–400)
PMV BLD AUTO: 9.8 FL (ref 9.4–12.4)
POTASSIUM REFLEX MAGNESIUM: 4.6 MEQ/L (ref 3.5–5.2)
RBC # BLD: 4.53 MILL/MM3 (ref 4.2–5.4)
SEG NEUTROPHILS: 69.9 %
SEGMENTED NEUTROPHILS ABSOLUTE COUNT: 5.6 THOU/MM3 (ref 1.8–7.7)
SODIUM BLD-SCNC: 140 MEQ/L (ref 135–145)
WBC # BLD: 8 THOU/MM3 (ref 4.8–10.8)

## 2018-09-01 PROCEDURE — 99024 POSTOP FOLLOW-UP VISIT: CPT | Performed by: SURGERY

## 2018-09-01 PROCEDURE — 6360000002 HC RX W HCPCS: Performed by: NURSE PRACTITIONER

## 2018-09-01 PROCEDURE — 94761 N-INVAS EAR/PLS OXIMETRY MLT: CPT

## 2018-09-01 PROCEDURE — 6360000002 HC RX W HCPCS: Performed by: PHYSICIAN ASSISTANT

## 2018-09-01 PROCEDURE — 2700000000 HC OXYGEN THERAPY PER DAY

## 2018-09-01 PROCEDURE — 85025 COMPLETE CBC W/AUTO DIFF WBC: CPT

## 2018-09-01 PROCEDURE — 80048 BASIC METABOLIC PNL TOTAL CA: CPT

## 2018-09-01 PROCEDURE — 36415 COLL VENOUS BLD VENIPUNCTURE: CPT

## 2018-09-01 PROCEDURE — 1200000000 HC SEMI PRIVATE

## 2018-09-01 PROCEDURE — 87081 CULTURE SCREEN ONLY: CPT

## 2018-09-01 PROCEDURE — 2580000003 HC RX 258: Performed by: PHYSICIAN ASSISTANT

## 2018-09-01 PROCEDURE — 2709999900 HC NON-CHARGEABLE SUPPLY

## 2018-09-01 PROCEDURE — 6360000002 HC RX W HCPCS: Performed by: SURGERY

## 2018-09-01 PROCEDURE — 99024 POSTOP FOLLOW-UP VISIT: CPT | Performed by: NURSE PRACTITIONER

## 2018-09-01 PROCEDURE — 6370000000 HC RX 637 (ALT 250 FOR IP): Performed by: PHYSICIAN ASSISTANT

## 2018-09-01 RX ORDER — MORPHINE SULFATE 2 MG/ML
2 INJECTION, SOLUTION INTRAMUSCULAR; INTRAVENOUS
Status: DISCONTINUED | OUTPATIENT
Start: 2018-09-01 | End: 2018-09-04

## 2018-09-01 RX ORDER — FENTANYL CITRATE 50 UG/ML
50 INJECTION, SOLUTION INTRAMUSCULAR; INTRAVENOUS
Status: DISCONTINUED | OUTPATIENT
Start: 2018-09-01 | End: 2018-09-01

## 2018-09-01 RX ORDER — MORPHINE SULFATE 4 MG/ML
4 INJECTION, SOLUTION INTRAMUSCULAR; INTRAVENOUS
Status: DISCONTINUED | OUTPATIENT
Start: 2018-09-01 | End: 2018-09-04

## 2018-09-01 RX ADMIN — FAMOTIDINE 20 MG: 20 TABLET ORAL at 11:28

## 2018-09-01 RX ADMIN — ENOXAPARIN SODIUM 40 MG: 40 INJECTION SUBCUTANEOUS at 11:26

## 2018-09-01 RX ADMIN — ACETAMINOPHEN 650 MG: 325 TABLET ORAL at 21:02

## 2018-09-01 RX ADMIN — DULOXETINE HYDROCHLORIDE 30 MG: 60 CAPSULE, DELAYED RELEASE ORAL at 11:29

## 2018-09-01 RX ADMIN — FENTANYL CITRATE 12.5 MCG: 50 INJECTION INTRAMUSCULAR; INTRAVENOUS at 00:41

## 2018-09-01 RX ADMIN — ONDANSETRON 4 MG: 2 INJECTION INTRAMUSCULAR; INTRAVENOUS at 15:03

## 2018-09-01 RX ADMIN — PIPERACILLIN SODIUM AND TAZOBACTAM SODIUM 3.38 G: 3; .375 INJECTION, POWDER, LYOPHILIZED, FOR SOLUTION INTRAVENOUS at 10:48

## 2018-09-01 RX ADMIN — FENTANYL CITRATE 50 MCG: 50 INJECTION INTRAMUSCULAR; INTRAVENOUS at 12:25

## 2018-09-01 RX ADMIN — OXYBUTYNIN CHLORIDE 10 MG: 10 TABLET, EXTENDED RELEASE ORAL at 14:46

## 2018-09-01 RX ADMIN — FAMOTIDINE 20 MG: 20 TABLET ORAL at 21:02

## 2018-09-01 RX ADMIN — BACITRACIN ZINC NEOMYCIN SULFATE POLYMYXIN B SULFATE: 400; 3.5; 5 OINTMENT TOPICAL at 21:02

## 2018-09-01 RX ADMIN — OXYBUTYNIN CHLORIDE 10 MG: 10 TABLET, EXTENDED RELEASE ORAL at 05:15

## 2018-09-01 RX ADMIN — PANTOPRAZOLE SODIUM 40 MG: 40 TABLET, DELAYED RELEASE ORAL at 05:15

## 2018-09-01 RX ADMIN — FENTANYL CITRATE 12.5 MCG: 50 INJECTION INTRAMUSCULAR; INTRAVENOUS at 09:43

## 2018-09-01 RX ADMIN — FENTANYL CITRATE 12.5 MCG: 50 INJECTION INTRAMUSCULAR; INTRAVENOUS at 02:44

## 2018-09-01 RX ADMIN — FENTANYL CITRATE 12.5 MCG: 50 INJECTION INTRAMUSCULAR; INTRAVENOUS at 05:20

## 2018-09-01 RX ADMIN — BACITRACIN ZINC NEOMYCIN SULFATE POLYMYXIN B SULFATE: 400; 3.5; 5 OINTMENT TOPICAL at 00:40

## 2018-09-01 RX ADMIN — BACITRACIN ZINC NEOMYCIN SULFATE POLYMYXIN B SULFATE: 400; 3.5; 5 OINTMENT TOPICAL at 12:05

## 2018-09-01 RX ADMIN — MODAFINIL 200 MG: 100 TABLET ORAL at 11:27

## 2018-09-01 RX ADMIN — FENTANYL CITRATE 50 MCG: 50 INJECTION INTRAMUSCULAR; INTRAVENOUS at 14:46

## 2018-09-01 RX ADMIN — MORPHINE SULFATE 4 MG: 4 INJECTION INTRAVENOUS at 19:33

## 2018-09-01 RX ADMIN — LEVOTHYROXINE SODIUM 100 MCG: 100 TABLET ORAL at 05:15

## 2018-09-01 RX ADMIN — MORPHINE SULFATE 4 MG: 4 INJECTION INTRAVENOUS at 23:59

## 2018-09-01 RX ADMIN — MODAFINIL 200 MG: 100 TABLET ORAL at 18:26

## 2018-09-01 RX ADMIN — DULOXETINE HYDROCHLORIDE 60 MG: 60 CAPSULE, DELAYED RELEASE ORAL at 11:27

## 2018-09-01 RX ADMIN — Medication 10 ML: at 10:49

## 2018-09-01 RX ADMIN — METHYLPHENIDATE HYDROCHLORIDE 20 MG: 10 TABLET ORAL at 15:03

## 2018-09-01 RX ADMIN — FLUTICASONE PROPIONATE 2 SPRAY: 50 SPRAY, METERED NASAL at 11:28

## 2018-09-01 RX ADMIN — PIPERACILLIN SODIUM AND TAZOBACTAM SODIUM 3.38 G: 3; .375 INJECTION, POWDER, LYOPHILIZED, FOR SOLUTION INTRAVENOUS at 00:41

## 2018-09-01 RX ADMIN — PIPERACILLIN SODIUM AND TAZOBACTAM SODIUM 3.38 G: 3; .375 INJECTION, POWDER, LYOPHILIZED, FOR SOLUTION INTRAVENOUS at 19:33

## 2018-09-01 RX ADMIN — MORPHINE SULFATE 4 MG: 4 INJECTION INTRAVENOUS at 21:48

## 2018-09-01 RX ADMIN — MORPHINE SULFATE 4 MG: 4 INJECTION INTRAVENOUS at 17:27

## 2018-09-01 RX ADMIN — Medication 10 ML: at 21:03

## 2018-09-01 ASSESSMENT — PAIN DESCRIPTION - LOCATION
LOCATION: ABDOMEN
LOCATION: HEAD
LOCATION: ABDOMEN

## 2018-09-01 ASSESSMENT — PAIN SCALES - GENERAL
PAINLEVEL_OUTOF10: 9
PAINLEVEL_OUTOF10: 8
PAINLEVEL_OUTOF10: 9
PAINLEVEL_OUTOF10: 7
PAINLEVEL_OUTOF10: 0
PAINLEVEL_OUTOF10: 9
PAINLEVEL_OUTOF10: 10
PAINLEVEL_OUTOF10: 5
PAINLEVEL_OUTOF10: 10
PAINLEVEL_OUTOF10: 10
PAINLEVEL_OUTOF10: 7
PAINLEVEL_OUTOF10: 9
PAINLEVEL_OUTOF10: 5
PAINLEVEL_OUTOF10: 10
PAINLEVEL_OUTOF10: 3
PAINLEVEL_OUTOF10: 9

## 2018-09-01 ASSESSMENT — PAIN DESCRIPTION - DESCRIPTORS
DESCRIPTORS: RADIATING;STABBING
DESCRIPTORS: ACHING;SHARP
DESCRIPTORS: HEADACHE
DESCRIPTORS: ACHING;SHARP

## 2018-09-01 ASSESSMENT — PAIN DESCRIPTION - PROGRESSION
CLINICAL_PROGRESSION: NOT CHANGED

## 2018-09-01 ASSESSMENT — PAIN DESCRIPTION - ORIENTATION
ORIENTATION: MID
ORIENTATION: MID

## 2018-09-01 ASSESSMENT — PAIN DESCRIPTION - PAIN TYPE
TYPE: SURGICAL PAIN
TYPE: ACUTE PAIN

## 2018-09-01 ASSESSMENT — PAIN DESCRIPTION - ONSET
ONSET: ON-GOING
ONSET: PROGRESSIVE
ONSET: ON-GOING

## 2018-09-01 ASSESSMENT — PAIN DESCRIPTION - FREQUENCY
FREQUENCY: CONTINUOUS

## 2018-09-01 NOTE — PROGRESS NOTES
Urology Progress Note    Chief Complaint: Mesenteric mass    Subjective:   Patient is resting in chair, jain draining clear yellow urnie, no flatus,  ambulating with assistance, NPO, reports some nausea earlier today. There are complaints of severe generalized pain in the abdomen at this time. Denies chest pain, shortness of breath, or calf pain. Negative Homans.             Vitals:  /69   Pulse 80   Temp 97.5 °F (36.4 °C) (Oral)   Resp 18   Ht 5' 4.5\" (1.638 m)   Wt 175 lb (79.4 kg)   SpO2 94%   BMI 29.57 kg/m²   Temp  Av.2 °F (36.8 °C)  Min: 97.5 °F (36.4 °C)  Max: 98.6 °F (37 °C)    Intake/Output Summary (Last 24 hours) at 18 1609  Last data filed at 18   Gross per 24 hour   Intake              709 ml   Output             1225 ml   Net             -516 ml       Social History     Social History    Marital status:      Spouse name: Akhil Gallegos Number of children: 3    Years of education: 12     Occupational History    disability      Social History Main Topics    Smoking status: Never Smoker    Smokeless tobacco: Never Used    Alcohol use Yes      Comment: occassionally    Drug use: No    Sexual activity: No     Other Topics Concern    Not on file     Social History Narrative    No narrative on file     Family History   Problem Relation Age of Onset    Diabetes Mother         type II    Stroke Mother     High Blood Pressure Mother     Kidney Disease Mother     Heart Disease Mother     Heart Attack Mother 66        2/28/15    Colon Cancer Father 79        jejunum   Haro Dakins Cancer Father         Lyphoma    Irritable Bowel Syndrome Sister     High Blood Pressure Brother     Kidney Disease Brother     Celiac Disease Brother     Diabetes Maternal Grandmother         type II    Blindness Maternal Grandmother     Emphysema Maternal Grandfather     Stroke Paternal Grandmother     Diabetes Paternal Grandmother         type II    Ovarian Cancer Paternal

## 2018-09-02 LAB
ANION GAP SERPL CALCULATED.3IONS-SCNC: 12 MEQ/L (ref 8–16)
BASOPHILS # BLD: 0.3 %
BASOPHILS ABSOLUTE: 0 THOU/MM3 (ref 0–0.1)
BUN BLDV-MCNC: 11 MG/DL (ref 7–22)
CALCIUM SERPL-MCNC: 8.3 MG/DL (ref 8.5–10.5)
CHLORIDE BLD-SCNC: 104 MEQ/L (ref 98–111)
CO2: 24 MEQ/L (ref 23–33)
CREAT SERPL-MCNC: 0.7 MG/DL (ref 0.4–1.2)
EOSINOPHIL # BLD: 2.6 %
EOSINOPHILS ABSOLUTE: 0.2 THOU/MM3 (ref 0–0.4)
ERYTHROCYTE [DISTWIDTH] IN BLOOD BY AUTOMATED COUNT: 14.1 % (ref 11.5–14.5)
ERYTHROCYTE [DISTWIDTH] IN BLOOD BY AUTOMATED COUNT: 50.4 FL (ref 35–45)
GFR SERPL CREATININE-BSD FRML MDRD: 85 ML/MIN/1.73M2
GLUCOSE BLD-MCNC: 78 MG/DL (ref 70–108)
HCT VFR BLD CALC: 40.2 % (ref 37–47)
HEMOGLOBIN: 12.9 GM/DL (ref 12–16)
IMMATURE GRANS (ABS): 0.03 THOU/MM3 (ref 0–0.07)
IMMATURE GRANULOCYTES: 0.3 %
LYMPHOCYTES # BLD: 10.7 %
LYMPHOCYTES ABSOLUTE: 1 THOU/MM3 (ref 1–4.8)
MCH RBC QN AUTO: 30.9 PG (ref 26–33)
MCHC RBC AUTO-ENTMCNC: 32.1 GM/DL (ref 32.2–35.5)
MCV RBC AUTO: 96.4 FL (ref 81–99)
MONOCYTES # BLD: 9.9 %
MONOCYTES ABSOLUTE: 0.9 THOU/MM3 (ref 0.4–1.3)
NUCLEATED RED BLOOD CELLS: 0 /100 WBC
PLATELET # BLD: 196 THOU/MM3 (ref 130–400)
PMV BLD AUTO: 9.4 FL (ref 9.4–12.4)
POTASSIUM SERPL-SCNC: 4 MEQ/L (ref 3.5–5.2)
RBC # BLD: 4.17 MILL/MM3 (ref 4.2–5.4)
SEG NEUTROPHILS: 76.2 %
SEGMENTED NEUTROPHILS ABSOLUTE COUNT: 6.9 THOU/MM3 (ref 1.8–7.7)
SODIUM BLD-SCNC: 140 MEQ/L (ref 135–145)
WBC # BLD: 9 THOU/MM3 (ref 4.8–10.8)

## 2018-09-02 PROCEDURE — 36592 COLLECT BLOOD FROM PICC: CPT

## 2018-09-02 PROCEDURE — 2709999900 HC NON-CHARGEABLE SUPPLY

## 2018-09-02 PROCEDURE — 36415 COLL VENOUS BLD VENIPUNCTURE: CPT

## 2018-09-02 PROCEDURE — 99024 POSTOP FOLLOW-UP VISIT: CPT | Performed by: NURSE PRACTITIONER

## 2018-09-02 PROCEDURE — 6360000002 HC RX W HCPCS: Performed by: NURSE PRACTITIONER

## 2018-09-02 PROCEDURE — 94761 N-INVAS EAR/PLS OXIMETRY MLT: CPT

## 2018-09-02 PROCEDURE — 6370000000 HC RX 637 (ALT 250 FOR IP): Performed by: PHYSICIAN ASSISTANT

## 2018-09-02 PROCEDURE — 6360000002 HC RX W HCPCS: Performed by: PHYSICIAN ASSISTANT

## 2018-09-02 PROCEDURE — 1200000000 HC SEMI PRIVATE

## 2018-09-02 PROCEDURE — 85025 COMPLETE CBC W/AUTO DIFF WBC: CPT

## 2018-09-02 PROCEDURE — 80048 BASIC METABOLIC PNL TOTAL CA: CPT

## 2018-09-02 PROCEDURE — 99024 POSTOP FOLLOW-UP VISIT: CPT | Performed by: SURGERY

## 2018-09-02 PROCEDURE — 2580000003 HC RX 258: Performed by: PHYSICIAN ASSISTANT

## 2018-09-02 PROCEDURE — 6370000000 HC RX 637 (ALT 250 FOR IP): Performed by: NURSE PRACTITIONER

## 2018-09-02 PROCEDURE — L0625 LO FLEX L1-BELOW L5 PRE OTS: HCPCS

## 2018-09-02 RX ORDER — POLYETHYLENE GLYCOL 3350 17 G/17G
17 POWDER, FOR SOLUTION ORAL DAILY
Status: DISCONTINUED | OUTPATIENT
Start: 2018-09-02 | End: 2018-09-07 | Stop reason: HOSPADM

## 2018-09-02 RX ORDER — DOCUSATE SODIUM 100 MG/1
100 CAPSULE, LIQUID FILLED ORAL 2 TIMES DAILY
Status: DISCONTINUED | OUTPATIENT
Start: 2018-09-02 | End: 2018-09-04

## 2018-09-02 RX ADMIN — FAMOTIDINE 20 MG: 20 TABLET ORAL at 20:37

## 2018-09-02 RX ADMIN — PANTOPRAZOLE SODIUM 40 MG: 40 TABLET, DELAYED RELEASE ORAL at 06:22

## 2018-09-02 RX ADMIN — MORPHINE SULFATE 4 MG: 4 INJECTION INTRAVENOUS at 09:19

## 2018-09-02 RX ADMIN — Medication 10 ML: at 20:38

## 2018-09-02 RX ADMIN — MORPHINE SULFATE 4 MG: 4 INJECTION INTRAVENOUS at 11:52

## 2018-09-02 RX ADMIN — MORPHINE SULFATE 4 MG: 4 INJECTION INTRAVENOUS at 04:14

## 2018-09-02 RX ADMIN — Medication 10 ML: at 09:19

## 2018-09-02 RX ADMIN — FLUTICASONE PROPIONATE 2 SPRAY: 50 SPRAY, METERED NASAL at 09:28

## 2018-09-02 RX ADMIN — DOCUSATE SODIUM 100 MG: 100 CAPSULE, LIQUID FILLED ORAL at 16:44

## 2018-09-02 RX ADMIN — MORPHINE SULFATE 4 MG: 4 INJECTION INTRAVENOUS at 22:48

## 2018-09-02 RX ADMIN — ONDANSETRON 4 MG: 2 INJECTION INTRAMUSCULAR; INTRAVENOUS at 19:09

## 2018-09-02 RX ADMIN — LEVOTHYROXINE SODIUM 100 MCG: 100 TABLET ORAL at 06:22

## 2018-09-02 RX ADMIN — MORPHINE SULFATE 4 MG: 4 INJECTION INTRAVENOUS at 13:51

## 2018-09-02 RX ADMIN — MORPHINE SULFATE 4 MG: 4 INJECTION INTRAVENOUS at 18:23

## 2018-09-02 RX ADMIN — BACITRACIN ZINC NEOMYCIN SULFATE POLYMYXIN B SULFATE: 400; 3.5; 5 OINTMENT TOPICAL at 09:28

## 2018-09-02 RX ADMIN — MORPHINE SULFATE 4 MG: 4 INJECTION INTRAVENOUS at 02:02

## 2018-09-02 RX ADMIN — ENOXAPARIN SODIUM 40 MG: 40 INJECTION SUBCUTANEOUS at 09:19

## 2018-09-02 RX ADMIN — DULOXETINE HYDROCHLORIDE 60 MG: 60 CAPSULE, DELAYED RELEASE ORAL at 09:26

## 2018-09-02 RX ADMIN — ACETAMINOPHEN 650 MG: 325 TABLET ORAL at 20:44

## 2018-09-02 RX ADMIN — MORPHINE SULFATE 4 MG: 4 INJECTION INTRAVENOUS at 16:09

## 2018-09-02 RX ADMIN — METHYLPHENIDATE HYDROCHLORIDE 20 MG: 10 TABLET ORAL at 13:42

## 2018-09-02 RX ADMIN — POLYETHYLENE GLYCOL 3350 17 G: 17 POWDER, FOR SOLUTION ORAL at 16:44

## 2018-09-02 RX ADMIN — MODAFINIL 200 MG: 100 TABLET ORAL at 16:44

## 2018-09-02 RX ADMIN — DOCUSATE SODIUM 100 MG: 100 CAPSULE, LIQUID FILLED ORAL at 20:37

## 2018-09-02 RX ADMIN — SODIUM CHLORIDE: 9 INJECTION, SOLUTION INTRAVENOUS at 03:08

## 2018-09-02 RX ADMIN — SODIUM CHLORIDE: 9 INJECTION, SOLUTION INTRAVENOUS at 13:42

## 2018-09-02 RX ADMIN — MORPHINE SULFATE 4 MG: 4 INJECTION INTRAVENOUS at 20:37

## 2018-09-02 RX ADMIN — DULOXETINE HYDROCHLORIDE 30 MG: 60 CAPSULE, DELAYED RELEASE ORAL at 09:25

## 2018-09-02 RX ADMIN — FAMOTIDINE 20 MG: 20 TABLET ORAL at 09:26

## 2018-09-02 RX ADMIN — MODAFINIL 200 MG: 100 TABLET ORAL at 09:26

## 2018-09-02 RX ADMIN — PIPERACILLIN SODIUM AND TAZOBACTAM SODIUM 3.38 G: 3; .375 INJECTION, POWDER, LYOPHILIZED, FOR SOLUTION INTRAVENOUS at 11:52

## 2018-09-02 RX ADMIN — OXYBUTYNIN CHLORIDE 10 MG: 10 TABLET, EXTENDED RELEASE ORAL at 09:26

## 2018-09-02 RX ADMIN — SODIUM CHLORIDE: 9 INJECTION, SOLUTION INTRAVENOUS at 22:49

## 2018-09-02 RX ADMIN — PIPERACILLIN SODIUM AND TAZOBACTAM SODIUM 3.38 G: 3; .375 INJECTION, POWDER, LYOPHILIZED, FOR SOLUTION INTRAVENOUS at 19:18

## 2018-09-02 RX ADMIN — PIPERACILLIN SODIUM AND TAZOBACTAM SODIUM 3.38 G: 3; .375 INJECTION, POWDER, LYOPHILIZED, FOR SOLUTION INTRAVENOUS at 03:08

## 2018-09-02 RX ADMIN — MORPHINE SULFATE 4 MG: 4 INJECTION INTRAVENOUS at 06:22

## 2018-09-02 ASSESSMENT — PAIN DESCRIPTION - PAIN TYPE
TYPE: SURGICAL PAIN
TYPE: SURGICAL PAIN
TYPE: ACUTE PAIN
TYPE: SURGICAL PAIN

## 2018-09-02 ASSESSMENT — PAIN DESCRIPTION - DESCRIPTORS
DESCRIPTORS: SHARP
DESCRIPTORS: ACHING;SHARP
DESCRIPTORS: ACHING;SHARP
DESCRIPTORS: STABBING
DESCRIPTORS: HEADACHE
DESCRIPTORS: SHARP
DESCRIPTORS: STABBING

## 2018-09-02 ASSESSMENT — PAIN DESCRIPTION - LOCATION
LOCATION: ABDOMEN
LOCATION: ABDOMEN
LOCATION: HEAD
LOCATION: ABDOMEN

## 2018-09-02 ASSESSMENT — PAIN SCALES - GENERAL
PAINLEVEL_OUTOF10: 9
PAINLEVEL_OUTOF10: 8
PAINLEVEL_OUTOF10: 6
PAINLEVEL_OUTOF10: 7
PAINLEVEL_OUTOF10: 7
PAINLEVEL_OUTOF10: 8
PAINLEVEL_OUTOF10: 9
PAINLEVEL_OUTOF10: 8
PAINLEVEL_OUTOF10: 6
PAINLEVEL_OUTOF10: 8
PAINLEVEL_OUTOF10: 7
PAINLEVEL_OUTOF10: 3
PAINLEVEL_OUTOF10: 8

## 2018-09-02 ASSESSMENT — PAIN DESCRIPTION - ONSET
ONSET: ON-GOING

## 2018-09-02 ASSESSMENT — PAIN DESCRIPTION - FREQUENCY
FREQUENCY: CONTINUOUS

## 2018-09-02 ASSESSMENT — PAIN DESCRIPTION - PROGRESSION
CLINICAL_PROGRESSION: NOT CHANGED

## 2018-09-02 ASSESSMENT — PAIN DESCRIPTION - ORIENTATION
ORIENTATION: MID

## 2018-09-02 NOTE — OP NOTE
John Singh 60  RECORD OF OPERATION     PATIENT NAME: Ian Montemayor               MEDICAL RECORD NO. [id number]                DATE OF PROCEDURE: 08/31/2018  SURGEON: Katherine Ghosh MD  PRIMARY CARE PHYSICIAN: Ewa Morales MD        PREOPERATIVE DIAGNOSIS: left mesenteric, murphy-duodenal, left murphy-renal tumor      POSTOPERATIVE DIAGNOSIS: same      PROCEDURE PERFORMED: Robot-Assisted Laparoscopic excision / removal of left mesenteric, murphy-duodenal, left murphy-nephric tumor -- ADDED PROCEDURAL SERVICES     SURGEON: Mary Obregon. Edgar Ghosh MD    ASSISTANT(S): Carmel Samuel PA-C     ANESTHESIA: general     BLOOD LOSS:  10 cc     SPECIMENS: abdominal / mesenteric tumor     COMPLICATIONS:  none immediately appreciated. DISCUSSION:  Afshan Regalado is a 58y.o.-year-old female who has a diagnosis of a left abdominal mass that appeared to be in the left colonic mesentery, against the duodenum and . After a history and physical examination was performed, potential diagnostic and therapeutic modalities were discussed with the patient. RAL partial nephrectomy was recommended and a discussion of the risks, possible complications, possible side effects, along with the anticipated benefits were reviewed. She was given the opportunity to ask questions. Once answered, informed consent was obtained. She was brought to the operating on 08/31/2018 for this procedure. DESCRIPTION OF PROCEDURE: The patient was brought to the Operating Room and placed supine on the operating room table. After initiation of anesthesia, she was positioned on the beanbag and placed in a right lateral position with her left flank up. She was well-padded and secured to the table by deflating the beanbag and then using wide cloth tape. This was done very carefully as she had  His abdomen was clipped and cleaned. He was prepped and draped in the standard fashion for surgery.      We began by obtaining pneumoperitoneum through a small

## 2018-09-02 NOTE — PROGRESS NOTES
type II    Ovarian Cancer Paternal Grandmother 48    Cancer Paternal Grandfather         brain tumor    Asthma Brother      Problems Brother     Heart Disease Brother     Other Sister         1days old, pneumonia complications    Colon Cancer Paternal Aunt 79    Colon Cancer Paternal Uncle 79    Colon Cancer Paternal Uncle 79     Allergies   Allergen Reactions    Dilaudid [Hydromorphone Hcl]      Respiratory failure    Hydromorphone Shortness Of Breath    Iron Anaphylaxis    Percodan [Oxycodone-Aspirin]      Anxiety     Fenoprofen Hives    Fenoprofen Calcium Hives    Gluten Diarrhea    Gluten Meal Other (See Comments)     Celiac disease    Oxycodone-Acetaminophen     Oxycodone-Aspirin     Percocet  [Oxycodone-Acetaminophen]     Reglan [Metoclopramide]     Sulfa Antibiotics Hives         Constitutional: Alert and oriented times x3, no acute distress, and cooperative to examination with appropriate mood and affect. HEENT:   Head:         Normocephalic and atraumatic. Mucous membranes are normal.   Eyes:         EOM are normal. No scleral icterus. Nose:    The external appearance of the nose is normal  Ears: The ears appear normal to external inspection. Cardiovascular:       Normal rate, regular rhythm. Pulmonary/Chest:  Normal respiratory rate and rhthym. No use of accessory muscles. Lungs clear bilaterally. Abdominal:          Soft. Generalized abdominal tenderness. Active bowel sounds. Abdominal incisions MINE, healing, well approximated, no redness or drainage. Genitalia:    Obando catheter draining clear yellow urine. Musculoskeletal:    Normal range of motion. He exhibits no edema or tenderness of lower extremities. Extremities:    No cyanosis, clubbing, or edema present. Neurological:    Alert and oriented.      Labs:  WBC:    Lab Results   Component Value Date    WBC 9.0 09/02/2018     Hemoglobin/Hematocrit:  Lab Results   Component Value Date    HGB 12.9

## 2018-09-03 LAB
ALBUMIN SERUM: 4.3 G/DL (ref 3.6–5.1)
ANION GAP SERPL CALCULATED.3IONS-SCNC: 9 MEQ/L (ref 8–16)
BASOPHILS # BLD: 0.3 %
BASOPHILS ABSOLUTE: 0 THOU/MM3 (ref 0–0.1)
BUN BLDV-MCNC: 7 MG/DL (ref 7–22)
CALCIUM SERPL-MCNC: 8 MG/DL (ref 8.5–10.5)
CHLORIDE BLD-SCNC: 104 MEQ/L (ref 98–111)
CO2: 26 MEQ/L (ref 23–33)
CREAT SERPL-MCNC: 0.5 MG/DL (ref 0.4–1.2)
EOSINOPHIL # BLD: 3 %
EOSINOPHILS ABSOLUTE: 0.2 THOU/MM3 (ref 0–0.4)
ERYTHROCYTE [DISTWIDTH] IN BLOOD BY AUTOMATED COUNT: 13.8 % (ref 11.5–14.5)
ERYTHROCYTE [DISTWIDTH] IN BLOOD BY AUTOMATED COUNT: 48.8 FL (ref 35–45)
GFR SERPL CREATININE-BSD FRML MDRD: > 90 ML/MIN/1.73M2
GLUCOSE BLD-MCNC: 104 MG/DL (ref 70–108)
HCT VFR BLD CALC: 36.2 % (ref 37–47)
HEMOGLOBIN: 11.6 GM/DL (ref 12–16)
IMMATURE GRANS (ABS): 0.02 THOU/MM3 (ref 0–0.07)
IMMATURE GRANULOCYTES: 0.3 %
LYMPHOCYTES # BLD: 12 %
LYMPHOCYTES ABSOLUTE: 0.8 THOU/MM3 (ref 1–4.8)
MCH RBC QN AUTO: 30.5 PG (ref 26–33)
MCHC RBC AUTO-ENTMCNC: 32 GM/DL (ref 32.2–35.5)
MCV RBC AUTO: 95.3 FL (ref 81–99)
MONOCYTES # BLD: 8.9 %
MONOCYTES ABSOLUTE: 0.6 THOU/MM3 (ref 0.4–1.3)
MRSA SCREEN: NORMAL
NUCLEATED RED BLOOD CELLS: 0 /100 WBC
PLATELET # BLD: 190 THOU/MM3 (ref 130–400)
PMV BLD AUTO: 9.3 FL (ref 9.4–12.4)
POTASSIUM SERPL-SCNC: 3.9 MEQ/L (ref 3.5–5.2)
RBC # BLD: 3.8 MILL/MM3 (ref 4.2–5.4)
SEG NEUTROPHILS: 75.5 %
SEGMENTED NEUTROPHILS ABSOLUTE COUNT: 5.1 THOU/MM3 (ref 1.8–7.7)
SEX HORMONE BINDING GLOBULIN: 40 NMOL/L (ref 17.3–125)
SODIUM BLD-SCNC: 139 MEQ/L (ref 135–145)
TESTOSTERONE FREE: 1.4 PG/ML (ref 0.6–3.8)
TESTOSTERONE, LCMS: 9 NG/DL (ref 5–32)
WBC # BLD: 6.8 THOU/MM3 (ref 4.8–10.8)

## 2018-09-03 PROCEDURE — 2580000003 HC RX 258: Performed by: PHYSICIAN ASSISTANT

## 2018-09-03 PROCEDURE — 36592 COLLECT BLOOD FROM PICC: CPT

## 2018-09-03 PROCEDURE — 36415 COLL VENOUS BLD VENIPUNCTURE: CPT

## 2018-09-03 PROCEDURE — 1200000000 HC SEMI PRIVATE

## 2018-09-03 PROCEDURE — 2709999900 HC NON-CHARGEABLE SUPPLY

## 2018-09-03 PROCEDURE — 6370000000 HC RX 637 (ALT 250 FOR IP): Performed by: PHYSICIAN ASSISTANT

## 2018-09-03 PROCEDURE — 6360000002 HC RX W HCPCS: Performed by: NURSE PRACTITIONER

## 2018-09-03 PROCEDURE — 6370000000 HC RX 637 (ALT 250 FOR IP): Performed by: NURSE PRACTITIONER

## 2018-09-03 PROCEDURE — 99024 POSTOP FOLLOW-UP VISIT: CPT | Performed by: NURSE PRACTITIONER

## 2018-09-03 PROCEDURE — 99024 POSTOP FOLLOW-UP VISIT: CPT | Performed by: SURGERY

## 2018-09-03 PROCEDURE — 6360000002 HC RX W HCPCS: Performed by: PHYSICIAN ASSISTANT

## 2018-09-03 PROCEDURE — 80048 BASIC METABOLIC PNL TOTAL CA: CPT

## 2018-09-03 PROCEDURE — 85025 COMPLETE CBC W/AUTO DIFF WBC: CPT

## 2018-09-03 RX ADMIN — POLYETHYLENE GLYCOL 3350 17 G: 17 POWDER, FOR SOLUTION ORAL at 08:38

## 2018-09-03 RX ADMIN — FAMOTIDINE 20 MG: 20 TABLET ORAL at 08:37

## 2018-09-03 RX ADMIN — METHYLPHENIDATE HYDROCHLORIDE 20 MG: 10 TABLET ORAL at 13:33

## 2018-09-03 RX ADMIN — MORPHINE SULFATE 4 MG: 4 INJECTION INTRAVENOUS at 02:57

## 2018-09-03 RX ADMIN — MODAFINIL 200 MG: 100 TABLET ORAL at 08:37

## 2018-09-03 RX ADMIN — SODIUM CHLORIDE: 9 INJECTION, SOLUTION INTRAVENOUS at 09:13

## 2018-09-03 RX ADMIN — DOCUSATE SODIUM 100 MG: 100 CAPSULE, LIQUID FILLED ORAL at 08:36

## 2018-09-03 RX ADMIN — Medication 10 ML: at 08:38

## 2018-09-03 RX ADMIN — PIPERACILLIN SODIUM AND TAZOBACTAM SODIUM 3.38 G: 3; .375 INJECTION, POWDER, LYOPHILIZED, FOR SOLUTION INTRAVENOUS at 19:05

## 2018-09-03 RX ADMIN — FLUTICASONE PROPIONATE 2 SPRAY: 50 SPRAY, METERED NASAL at 08:37

## 2018-09-03 RX ADMIN — ACETAMINOPHEN 650 MG: 325 TABLET ORAL at 06:27

## 2018-09-03 RX ADMIN — MORPHINE SULFATE 4 MG: 4 INJECTION INTRAVENOUS at 07:08

## 2018-09-03 RX ADMIN — ENOXAPARIN SODIUM 40 MG: 40 INJECTION SUBCUTANEOUS at 08:37

## 2018-09-03 RX ADMIN — OXYBUTYNIN CHLORIDE 10 MG: 10 TABLET, EXTENDED RELEASE ORAL at 08:36

## 2018-09-03 RX ADMIN — MORPHINE SULFATE 4 MG: 4 INJECTION INTRAVENOUS at 17:45

## 2018-09-03 RX ADMIN — DULOXETINE HYDROCHLORIDE 60 MG: 60 CAPSULE, DELAYED RELEASE ORAL at 08:36

## 2018-09-03 RX ADMIN — ONDANSETRON 4 MG: 2 INJECTION INTRAMUSCULAR; INTRAVENOUS at 06:27

## 2018-09-03 RX ADMIN — MODAFINIL 200 MG: 100 TABLET ORAL at 15:29

## 2018-09-03 RX ADMIN — MORPHINE SULFATE 4 MG: 4 INJECTION INTRAVENOUS at 20:50

## 2018-09-03 RX ADMIN — PIPERACILLIN SODIUM AND TAZOBACTAM SODIUM 3.38 G: 3; .375 INJECTION, POWDER, LYOPHILIZED, FOR SOLUTION INTRAVENOUS at 11:18

## 2018-09-03 RX ADMIN — MORPHINE SULFATE 4 MG: 4 INJECTION INTRAVENOUS at 15:29

## 2018-09-03 RX ADMIN — LEVOTHYROXINE SODIUM 100 MCG: 100 TABLET ORAL at 05:06

## 2018-09-03 RX ADMIN — MORPHINE SULFATE 4 MG: 4 INJECTION INTRAVENOUS at 11:18

## 2018-09-03 RX ADMIN — FAMOTIDINE 20 MG: 20 TABLET ORAL at 20:50

## 2018-09-03 RX ADMIN — ONDANSETRON 4 MG: 2 INJECTION INTRAMUSCULAR; INTRAVENOUS at 17:45

## 2018-09-03 RX ADMIN — MORPHINE SULFATE 4 MG: 4 INJECTION INTRAVENOUS at 00:48

## 2018-09-03 RX ADMIN — DOCUSATE SODIUM 100 MG: 100 CAPSULE, LIQUID FILLED ORAL at 20:50

## 2018-09-03 RX ADMIN — ACETAMINOPHEN 650 MG: 325 TABLET ORAL at 17:45

## 2018-09-03 RX ADMIN — MORPHINE SULFATE 4 MG: 4 INJECTION INTRAVENOUS at 05:07

## 2018-09-03 RX ADMIN — DULOXETINE HYDROCHLORIDE 30 MG: 60 CAPSULE, DELAYED RELEASE ORAL at 08:37

## 2018-09-03 RX ADMIN — PIPERACILLIN SODIUM AND TAZOBACTAM SODIUM 3.38 G: 3; .375 INJECTION, POWDER, LYOPHILIZED, FOR SOLUTION INTRAVENOUS at 02:58

## 2018-09-03 RX ADMIN — MORPHINE SULFATE 4 MG: 4 INJECTION INTRAVENOUS at 09:13

## 2018-09-03 RX ADMIN — MORPHINE SULFATE 4 MG: 4 INJECTION INTRAVENOUS at 13:28

## 2018-09-03 RX ADMIN — PANTOPRAZOLE SODIUM 40 MG: 40 TABLET, DELAYED RELEASE ORAL at 05:06

## 2018-09-03 ASSESSMENT — PAIN SCALES - GENERAL
PAINLEVEL_OUTOF10: 9
PAINLEVEL_OUTOF10: 8
PAINLEVEL_OUTOF10: 7
PAINLEVEL_OUTOF10: 7
PAINLEVEL_OUTOF10: 0
PAINLEVEL_OUTOF10: 8
PAINLEVEL_OUTOF10: 9
PAINLEVEL_OUTOF10: 8
PAINLEVEL_OUTOF10: 8
PAINLEVEL_OUTOF10: 7
PAINLEVEL_OUTOF10: 9
PAINLEVEL_OUTOF10: 8
PAINLEVEL_OUTOF10: 3
PAINLEVEL_OUTOF10: 9
PAINLEVEL_OUTOF10: 7
PAINLEVEL_OUTOF10: 9
PAINLEVEL_OUTOF10: 7
PAINLEVEL_OUTOF10: 8

## 2018-09-03 ASSESSMENT — PAIN DESCRIPTION - PAIN TYPE
TYPE: SURGICAL PAIN
TYPE: ACUTE PAIN
TYPE: SURGICAL PAIN

## 2018-09-03 ASSESSMENT — PAIN DESCRIPTION - DESCRIPTORS
DESCRIPTORS: SHARP
DESCRIPTORS: STABBING
DESCRIPTORS: SHARP
DESCRIPTORS: CONSTANT;DISCOMFORT;SHARP;SHOOTING;THROBBING
DESCRIPTORS: SHARP
DESCRIPTORS: ACHING;SHARP
DESCRIPTORS: HEADACHE

## 2018-09-03 ASSESSMENT — PAIN DESCRIPTION - LOCATION
LOCATION: ABDOMEN
LOCATION: HEAD
LOCATION: ABDOMEN

## 2018-09-03 ASSESSMENT — PAIN DESCRIPTION - ORIENTATION
ORIENTATION: MID

## 2018-09-03 ASSESSMENT — PAIN DESCRIPTION - PROGRESSION
CLINICAL_PROGRESSION: NOT CHANGED

## 2018-09-03 ASSESSMENT — PAIN DESCRIPTION - FREQUENCY
FREQUENCY: CONTINUOUS

## 2018-09-03 ASSESSMENT — PAIN DESCRIPTION - ONSET
ONSET: ON-GOING

## 2018-09-04 LAB
ANION GAP SERPL CALCULATED.3IONS-SCNC: 11 MEQ/L (ref 8–16)
BUN BLDV-MCNC: 5 MG/DL (ref 7–22)
CALCIUM SERPL-MCNC: 8.4 MG/DL (ref 8.5–10.5)
CHLORIDE BLD-SCNC: 101 MEQ/L (ref 98–111)
CO2: 27 MEQ/L (ref 23–33)
CREAT SERPL-MCNC: 0.4 MG/DL (ref 0.4–1.2)
ERYTHROCYTE [DISTWIDTH] IN BLOOD BY AUTOMATED COUNT: 13.5 % (ref 11.5–14.5)
ERYTHROCYTE [DISTWIDTH] IN BLOOD BY AUTOMATED COUNT: 46.5 FL (ref 35–45)
GFR SERPL CREATININE-BSD FRML MDRD: > 90 ML/MIN/1.73M2
GLUCOSE BLD-MCNC: 90 MG/DL (ref 70–108)
HCT VFR BLD CALC: 36.2 % (ref 37–47)
HEMOGLOBIN: 11.9 GM/DL (ref 12–16)
MCH RBC QN AUTO: 30.8 PG (ref 26–33)
MCHC RBC AUTO-ENTMCNC: 32.9 GM/DL (ref 32.2–35.5)
MCV RBC AUTO: 93.8 FL (ref 81–99)
PLATELET # BLD: 181 THOU/MM3 (ref 130–400)
PMV BLD AUTO: 9.3 FL (ref 9.4–12.4)
POTASSIUM SERPL-SCNC: 3.6 MEQ/L (ref 3.5–5.2)
RBC # BLD: 3.86 MILL/MM3 (ref 4.2–5.4)
SODIUM BLD-SCNC: 139 MEQ/L (ref 135–145)
WBC # BLD: 5.6 THOU/MM3 (ref 4.8–10.8)

## 2018-09-04 PROCEDURE — 6360000002 HC RX W HCPCS: Performed by: NURSE PRACTITIONER

## 2018-09-04 PROCEDURE — 80048 BASIC METABOLIC PNL TOTAL CA: CPT

## 2018-09-04 PROCEDURE — 6370000000 HC RX 637 (ALT 250 FOR IP): Performed by: NURSE PRACTITIONER

## 2018-09-04 PROCEDURE — 85027 COMPLETE CBC AUTOMATED: CPT

## 2018-09-04 PROCEDURE — 99024 POSTOP FOLLOW-UP VISIT: CPT | Performed by: SURGERY

## 2018-09-04 PROCEDURE — 6370000000 HC RX 637 (ALT 250 FOR IP): Performed by: PHYSICIAN ASSISTANT

## 2018-09-04 PROCEDURE — 2580000003 HC RX 258: Performed by: PHYSICIAN ASSISTANT

## 2018-09-04 PROCEDURE — APPSS30 APP SPLIT SHARED TIME 16-30 MINUTES: Performed by: NURSE PRACTITIONER

## 2018-09-04 PROCEDURE — 99024 POSTOP FOLLOW-UP VISIT: CPT | Performed by: NURSE PRACTITIONER

## 2018-09-04 PROCEDURE — 36415 COLL VENOUS BLD VENIPUNCTURE: CPT

## 2018-09-04 PROCEDURE — 1200000000 HC SEMI PRIVATE

## 2018-09-04 PROCEDURE — 6360000002 HC RX W HCPCS: Performed by: PHYSICIAN ASSISTANT

## 2018-09-04 RX ORDER — KETOROLAC TROMETHAMINE 30 MG/ML
15 INJECTION, SOLUTION INTRAMUSCULAR; INTRAVENOUS EVERY 6 HOURS PRN
Status: DISCONTINUED | OUTPATIENT
Start: 2018-09-04 | End: 2018-09-07 | Stop reason: HOSPADM

## 2018-09-04 RX ORDER — HYDROCODONE BITARTRATE AND ACETAMINOPHEN 5; 325 MG/1; MG/1
1 TABLET ORAL EVERY 4 HOURS PRN
Status: DISCONTINUED | OUTPATIENT
Start: 2018-09-04 | End: 2018-09-07 | Stop reason: HOSPADM

## 2018-09-04 RX ORDER — HYDROCODONE BITARTRATE AND ACETAMINOPHEN 5; 325 MG/1; MG/1
2 TABLET ORAL EVERY 4 HOURS PRN
Status: DISCONTINUED | OUTPATIENT
Start: 2018-09-04 | End: 2018-09-07 | Stop reason: HOSPADM

## 2018-09-04 RX ORDER — SENNA PLUS 8.6 MG/1
2 TABLET ORAL 2 TIMES DAILY
Status: DISCONTINUED | OUTPATIENT
Start: 2018-09-04 | End: 2018-09-07 | Stop reason: HOSPADM

## 2018-09-04 RX ORDER — BISACODYL 10 MG
10 SUPPOSITORY, RECTAL RECTAL ONCE
Status: COMPLETED | OUTPATIENT
Start: 2018-09-04 | End: 2018-09-04

## 2018-09-04 RX ORDER — ONDANSETRON 4 MG/1
4 TABLET, FILM COATED ORAL EVERY 6 HOURS PRN
Status: DISCONTINUED | OUTPATIENT
Start: 2018-09-04 | End: 2018-09-07 | Stop reason: HOSPADM

## 2018-09-04 RX ORDER — DIAZEPAM 2 MG/1
2 TABLET ORAL EVERY 8 HOURS PRN
Status: DISCONTINUED | OUTPATIENT
Start: 2018-09-04 | End: 2018-09-07 | Stop reason: HOSPADM

## 2018-09-04 RX ADMIN — MORPHINE SULFATE 4 MG: 4 INJECTION INTRAVENOUS at 02:02

## 2018-09-04 RX ADMIN — FAMOTIDINE 20 MG: 20 TABLET ORAL at 21:01

## 2018-09-04 RX ADMIN — ONDANSETRON HYDROCHLORIDE 4 MG: 4 TABLET, FILM COATED ORAL at 16:14

## 2018-09-04 RX ADMIN — KETOROLAC TROMETHAMINE 15 MG: 30 INJECTION, SOLUTION INTRAMUSCULAR at 21:01

## 2018-09-04 RX ADMIN — Medication 10 ML: at 19:13

## 2018-09-04 RX ADMIN — BISACODYL 10 MG: 10 SUPPOSITORY RECTAL at 09:14

## 2018-09-04 RX ADMIN — SENNOSIDES 17.2 MG: 8.6 TABLET, FILM COATED ORAL at 11:49

## 2018-09-04 RX ADMIN — OXYBUTYNIN CHLORIDE 10 MG: 10 TABLET, EXTENDED RELEASE ORAL at 09:15

## 2018-09-04 RX ADMIN — POLYETHYLENE GLYCOL 3350 17 G: 17 POWDER, FOR SOLUTION ORAL at 09:15

## 2018-09-04 RX ADMIN — MORPHINE SULFATE 4 MG: 4 INJECTION INTRAVENOUS at 06:11

## 2018-09-04 RX ADMIN — LEVOTHYROXINE SODIUM 100 MCG: 100 TABLET ORAL at 06:11

## 2018-09-04 RX ADMIN — HYDROCODONE BITARTRATE AND ACETAMINOPHEN 2 TABLET: 5; 325 TABLET ORAL at 22:45

## 2018-09-04 RX ADMIN — DIAZEPAM 2 MG: 2 TABLET ORAL at 18:09

## 2018-09-04 RX ADMIN — DIAZEPAM 2 MG: 2 TABLET ORAL at 09:15

## 2018-09-04 RX ADMIN — METHYLPHENIDATE HYDROCHLORIDE 20 MG: 10 TABLET ORAL at 14:11

## 2018-09-04 RX ADMIN — DULOXETINE HYDROCHLORIDE 30 MG: 60 CAPSULE, DELAYED RELEASE ORAL at 09:15

## 2018-09-04 RX ADMIN — Medication 10 ML: at 21:01

## 2018-09-04 RX ADMIN — PANTOPRAZOLE SODIUM 40 MG: 40 TABLET, DELAYED RELEASE ORAL at 06:11

## 2018-09-04 RX ADMIN — MODAFINIL 200 MG: 100 TABLET ORAL at 09:14

## 2018-09-04 RX ADMIN — PIPERACILLIN SODIUM AND TAZOBACTAM SODIUM 3.38 G: 3; .375 INJECTION, POWDER, LYOPHILIZED, FOR SOLUTION INTRAVENOUS at 11:49

## 2018-09-04 RX ADMIN — FLUTICASONE PROPIONATE 2 SPRAY: 50 SPRAY, METERED NASAL at 09:19

## 2018-09-04 RX ADMIN — HYDROCODONE BITARTRATE AND ACETAMINOPHEN 2 TABLET: 5; 325 TABLET ORAL at 09:15

## 2018-09-04 RX ADMIN — PIPERACILLIN SODIUM AND TAZOBACTAM SODIUM 3.38 G: 3; .375 INJECTION, POWDER, LYOPHILIZED, FOR SOLUTION INTRAVENOUS at 19:13

## 2018-09-04 RX ADMIN — SENNOSIDES 17.2 MG: 8.6 TABLET, FILM COATED ORAL at 21:01

## 2018-09-04 RX ADMIN — MODAFINIL 200 MG: 100 TABLET ORAL at 16:14

## 2018-09-04 RX ADMIN — MORPHINE SULFATE 4 MG: 4 INJECTION INTRAVENOUS at 04:09

## 2018-09-04 RX ADMIN — PIPERACILLIN SODIUM AND TAZOBACTAM SODIUM 3.38 G: 3; .375 INJECTION, POWDER, LYOPHILIZED, FOR SOLUTION INTRAVENOUS at 03:26

## 2018-09-04 RX ADMIN — FAMOTIDINE 20 MG: 20 TABLET ORAL at 09:15

## 2018-09-04 RX ADMIN — ONDANSETRON HYDROCHLORIDE 4 MG: 4 TABLET, FILM COATED ORAL at 09:17

## 2018-09-04 RX ADMIN — HYDROCODONE BITARTRATE AND ACETAMINOPHEN 2 TABLET: 5; 325 TABLET ORAL at 18:08

## 2018-09-04 RX ADMIN — HYDROCODONE BITARTRATE AND ACETAMINOPHEN 2 TABLET: 5; 325 TABLET ORAL at 14:11

## 2018-09-04 RX ADMIN — Medication 10 ML: at 09:20

## 2018-09-04 RX ADMIN — DULOXETINE HYDROCHLORIDE 60 MG: 60 CAPSULE, DELAYED RELEASE ORAL at 09:16

## 2018-09-04 RX ADMIN — ENOXAPARIN SODIUM 40 MG: 40 INJECTION SUBCUTANEOUS at 09:16

## 2018-09-04 ASSESSMENT — PAIN DESCRIPTION - PROGRESSION
CLINICAL_PROGRESSION: NOT CHANGED
CLINICAL_PROGRESSION: NOT CHANGED

## 2018-09-04 ASSESSMENT — PAIN DESCRIPTION - ONSET
ONSET: ON-GOING
ONSET: ON-GOING

## 2018-09-04 ASSESSMENT — PAIN DESCRIPTION - LOCATION
LOCATION: ABDOMEN

## 2018-09-04 ASSESSMENT — PAIN SCALES - GENERAL
PAINLEVEL_OUTOF10: 3
PAINLEVEL_OUTOF10: 4
PAINLEVEL_OUTOF10: 8
PAINLEVEL_OUTOF10: 7
PAINLEVEL_OUTOF10: 8
PAINLEVEL_OUTOF10: 0
PAINLEVEL_OUTOF10: 9
PAINLEVEL_OUTOF10: 0
PAINLEVEL_OUTOF10: 9
PAINLEVEL_OUTOF10: 9
PAINLEVEL_OUTOF10: 8

## 2018-09-04 ASSESSMENT — PAIN DESCRIPTION - DESCRIPTORS
DESCRIPTORS: CONSTANT;DISCOMFORT;SHARP;SHOOTING
DESCRIPTORS: CONSTANT;DISCOMFORT;SHARP;SHOOTING;THROBBING
DESCRIPTORS: ACHING;SHARP
DESCRIPTORS: SHARP;ACHING

## 2018-09-04 ASSESSMENT — PAIN DESCRIPTION - ORIENTATION
ORIENTATION: MID

## 2018-09-04 ASSESSMENT — PAIN DESCRIPTION - PAIN TYPE
TYPE: SURGICAL PAIN

## 2018-09-04 ASSESSMENT — PAIN DESCRIPTION - FREQUENCY
FREQUENCY: CONTINUOUS
FREQUENCY: CONTINUOUS

## 2018-09-04 NOTE — PROGRESS NOTES
[facility]  Post Operative Progress Note  Dr Aylin Whitfield    Pt Name: Malia Rodriges Record Number: [id number]  Date of Birth 1955   Today's Date: 9/4/2018    Hospital day # 4   S/p  Abdominal exploration, Lysis of significant adhesions, Closure of one small bowel enterotomy primarily. POD# 4    Chief complaint:  Enterostomy during robotic removal of a perirenal  mass. Patient was stable overnight. Chart reviewed. Updated by nursing staff. She is not moving much. Very tearful today. States that her pain is not controlled, urology change to PO meds with valium this should help. Denies chest discomfort or dyspnea. No N/V; (-) belching, flatus and BM. Tolerating DIET FULL LIQUID; diet. Up with assistance     Past, Family, Social History unchanged from admission.     Diet:  DIET FULL LIQUID;    Medications:  Scheduled Meds:   senna  2 tablet Oral BID    polyethylene glycol  17 g Oral Daily    betamethasone dipropionate   Topical Daily    DULoxetine  30 mg Oral Daily    levothyroxine  100 mcg Oral Daily    methylphenidate  20 mg Oral Daily    modafinil  200 mg Oral BID    fluticasone  2 spray Each Nare Daily    DULoxetine  60 mg Oral Daily    pantoprazole  40 mg Oral QAM AC    sodium chloride flush  10 mL Intravenous 2 times per day    famotidine  20 mg Oral BID    neomycin-bacitracin-polymyxin   Topical BID    piperacillin-tazobactam  3.375 g Intravenous Q8H    oxybutynin  10 mg Oral Daily    enoxaparin  40 mg Subcutaneous Daily     Continuous Infusions:  PRN Meds:HYDROcodone 5 mg - acetaminophen **OR** HYDROcodone 5 mg - acetaminophen, diazepam, ondansetron, sodium chloride flush, acetaminophen, neomycin-bacitracin-polymyxin    Objective:    CBC:   Recent Labs      09/02/18   0525  09/03/18   0516  09/04/18   0330   WBC  9.0  6.8  5.6   HGB  12.9  11.6*  11.9*   PLT  196  190  181     BMP:    Recent Labs      09/02/18   0525  09/03/18   0516  09/04/18   0330   NA seen and examined independently by me. Above discussed and I agree with CNP. Labs, cultures, and radiographs where available were reviewed. See orders for the updated patient care plan.     Jann Martini MD, pathology has returned a benign process however very unusual patient essentially is still having uncontrolled pain patient was literally crying issues being ambulated we'll continue to attempt to gain pain control  9/4/2018   9:49 PM

## 2018-09-05 LAB
ANION GAP SERPL CALCULATED.3IONS-SCNC: 10 MEQ/L (ref 8–16)
BASOPHILS # BLD: 0.4 %
BASOPHILS ABSOLUTE: 0 THOU/MM3 (ref 0–0.1)
BUN BLDV-MCNC: 6 MG/DL (ref 7–22)
CALCIUM SERPL-MCNC: 8.4 MG/DL (ref 8.5–10.5)
CHLORIDE BLD-SCNC: 95 MEQ/L (ref 98–111)
CO2: 29 MEQ/L (ref 23–33)
CREAT SERPL-MCNC: 0.5 MG/DL (ref 0.4–1.2)
EOSINOPHIL # BLD: 6.6 %
EOSINOPHILS ABSOLUTE: 0.3 THOU/MM3 (ref 0–0.4)
ERYTHROCYTE [DISTWIDTH] IN BLOOD BY AUTOMATED COUNT: 13.5 % (ref 11.5–14.5)
ERYTHROCYTE [DISTWIDTH] IN BLOOD BY AUTOMATED COUNT: 45.6 FL (ref 35–45)
GFR SERPL CREATININE-BSD FRML MDRD: > 90 ML/MIN/1.73M2
GLUCOSE BLD-MCNC: 91 MG/DL (ref 70–108)
HCT VFR BLD CALC: 35.9 % (ref 37–47)
HEMOGLOBIN: 12 GM/DL (ref 12–16)
IMMATURE GRANS (ABS): 0 THOU/MM3 (ref 0–0.07)
IMMATURE GRANULOCYTES: 0 %
LYMPHOCYTES # BLD: 15.5 %
LYMPHOCYTES ABSOLUTE: 0.8 THOU/MM3 (ref 1–4.8)
MCH RBC QN AUTO: 30.8 PG (ref 26–33)
MCHC RBC AUTO-ENTMCNC: 33.4 GM/DL (ref 32.2–35.5)
MCV RBC AUTO: 92.3 FL (ref 81–99)
MONOCYTES # BLD: 9.7 %
MONOCYTES ABSOLUTE: 0.5 THOU/MM3 (ref 0.4–1.3)
NUCLEATED RED BLOOD CELLS: 0 /100 WBC
PLATELET # BLD: 201 THOU/MM3 (ref 130–400)
PMV BLD AUTO: 9.4 FL (ref 9.4–12.4)
POTASSIUM SERPL-SCNC: 3.4 MEQ/L (ref 3.5–5.2)
RBC # BLD: 3.89 MILL/MM3 (ref 4.2–5.4)
SEG NEUTROPHILS: 67.8 %
SEGMENTED NEUTROPHILS ABSOLUTE COUNT: 3.5 THOU/MM3 (ref 1.8–7.7)
SODIUM BLD-SCNC: 134 MEQ/L (ref 135–145)
WBC # BLD: 5.2 THOU/MM3 (ref 4.8–10.8)

## 2018-09-05 PROCEDURE — 6370000000 HC RX 637 (ALT 250 FOR IP): Performed by: PHYSICIAN ASSISTANT

## 2018-09-05 PROCEDURE — 80048 BASIC METABOLIC PNL TOTAL CA: CPT

## 2018-09-05 PROCEDURE — 85025 COMPLETE CBC W/AUTO DIFF WBC: CPT

## 2018-09-05 PROCEDURE — 6360000002 HC RX W HCPCS: Performed by: PHYSICIAN ASSISTANT

## 2018-09-05 PROCEDURE — 6370000000 HC RX 637 (ALT 250 FOR IP): Performed by: NURSE PRACTITIONER

## 2018-09-05 PROCEDURE — 6360000002 HC RX W HCPCS: Performed by: NURSE PRACTITIONER

## 2018-09-05 PROCEDURE — 1200000000 HC SEMI PRIVATE

## 2018-09-05 PROCEDURE — 36415 COLL VENOUS BLD VENIPUNCTURE: CPT

## 2018-09-05 PROCEDURE — 99024 POSTOP FOLLOW-UP VISIT: CPT | Performed by: NURSE PRACTITIONER

## 2018-09-05 PROCEDURE — 2580000003 HC RX 258: Performed by: PHYSICIAN ASSISTANT

## 2018-09-05 PROCEDURE — 99024 POSTOP FOLLOW-UP VISIT: CPT | Performed by: SURGERY

## 2018-09-05 PROCEDURE — 2709999900 HC NON-CHARGEABLE SUPPLY

## 2018-09-05 RX ORDER — SCOLOPAMINE TRANSDERMAL SYSTEM 1 MG/1
1 PATCH, EXTENDED RELEASE TRANSDERMAL
Status: DISCONTINUED | OUTPATIENT
Start: 2018-09-05 | End: 2018-09-07 | Stop reason: HOSPADM

## 2018-09-05 RX ORDER — POTASSIUM CHLORIDE 20 MEQ/1
40 TABLET, EXTENDED RELEASE ORAL ONCE
Status: COMPLETED | OUTPATIENT
Start: 2018-09-05 | End: 2018-09-05

## 2018-09-05 RX ADMIN — Medication 10 ML: at 21:57

## 2018-09-05 RX ADMIN — OXYBUTYNIN CHLORIDE 10 MG: 10 TABLET, EXTENDED RELEASE ORAL at 09:37

## 2018-09-05 RX ADMIN — FLUTICASONE PROPIONATE 2 SPRAY: 50 SPRAY, METERED NASAL at 09:38

## 2018-09-05 RX ADMIN — HYDROCODONE BITARTRATE AND ACETAMINOPHEN 2 TABLET: 5; 325 TABLET ORAL at 02:59

## 2018-09-05 RX ADMIN — Medication 10 ML: at 15:10

## 2018-09-05 RX ADMIN — PIPERACILLIN SODIUM AND TAZOBACTAM SODIUM 3.38 G: 3; .375 INJECTION, POWDER, LYOPHILIZED, FOR SOLUTION INTRAVENOUS at 19:42

## 2018-09-05 RX ADMIN — Medication 10 ML: at 11:09

## 2018-09-05 RX ADMIN — MODAFINIL 200 MG: 100 TABLET ORAL at 09:37

## 2018-09-05 RX ADMIN — DULOXETINE HYDROCHLORIDE 30 MG: 60 CAPSULE, DELAYED RELEASE ORAL at 09:39

## 2018-09-05 RX ADMIN — HYDROCODONE BITARTRATE AND ACETAMINOPHEN 2 TABLET: 5; 325 TABLET ORAL at 09:37

## 2018-09-05 RX ADMIN — FAMOTIDINE 20 MG: 20 TABLET ORAL at 09:37

## 2018-09-05 RX ADMIN — POLYETHYLENE GLYCOL 3350 17 G: 17 POWDER, FOR SOLUTION ORAL at 09:37

## 2018-09-05 RX ADMIN — PANTOPRAZOLE SODIUM 40 MG: 40 TABLET, DELAYED RELEASE ORAL at 05:57

## 2018-09-05 RX ADMIN — SENNOSIDES 17.2 MG: 8.6 TABLET, FILM COATED ORAL at 09:38

## 2018-09-05 RX ADMIN — PIPERACILLIN SODIUM AND TAZOBACTAM SODIUM 3.38 G: 3; .375 INJECTION, POWDER, LYOPHILIZED, FOR SOLUTION INTRAVENOUS at 03:00

## 2018-09-05 RX ADMIN — BISACODYL 5 MG: 5 TABLET, COATED ORAL at 16:59

## 2018-09-05 RX ADMIN — LEVOTHYROXINE SODIUM 100 MCG: 100 TABLET ORAL at 05:57

## 2018-09-05 RX ADMIN — KETOROLAC TROMETHAMINE 15 MG: 30 INJECTION, SOLUTION INTRAMUSCULAR at 16:59

## 2018-09-05 RX ADMIN — Medication 10 ML: at 19:42

## 2018-09-05 RX ADMIN — POTASSIUM CHLORIDE 40 MEQ: 20 TABLET, EXTENDED RELEASE ORAL at 16:59

## 2018-09-05 RX ADMIN — KETOROLAC TROMETHAMINE 15 MG: 30 INJECTION, SOLUTION INTRAMUSCULAR at 02:59

## 2018-09-05 RX ADMIN — Medication 10 ML: at 17:01

## 2018-09-05 RX ADMIN — METHYLPHENIDATE HYDROCHLORIDE 20 MG: 10 TABLET ORAL at 17:07

## 2018-09-05 RX ADMIN — HYDROCODONE BITARTRATE AND ACETAMINOPHEN 2 TABLET: 5; 325 TABLET ORAL at 21:57

## 2018-09-05 RX ADMIN — ENOXAPARIN SODIUM 40 MG: 40 INJECTION SUBCUTANEOUS at 09:38

## 2018-09-05 RX ADMIN — MODAFINIL 200 MG: 100 TABLET ORAL at 21:56

## 2018-09-05 RX ADMIN — ONDANSETRON HYDROCHLORIDE 4 MG: 4 TABLET, FILM COATED ORAL at 01:37

## 2018-09-05 RX ADMIN — PIPERACILLIN SODIUM AND TAZOBACTAM SODIUM 3.38 G: 3; .375 INJECTION, POWDER, LYOPHILIZED, FOR SOLUTION INTRAVENOUS at 11:09

## 2018-09-05 RX ADMIN — SENNOSIDES 17.2 MG: 8.6 TABLET, FILM COATED ORAL at 21:57

## 2018-09-05 RX ADMIN — FAMOTIDINE 20 MG: 20 TABLET ORAL at 21:57

## 2018-09-05 RX ADMIN — DULOXETINE HYDROCHLORIDE 60 MG: 60 CAPSULE, DELAYED RELEASE ORAL at 09:38

## 2018-09-05 RX ADMIN — KETOROLAC TROMETHAMINE 15 MG: 30 INJECTION, SOLUTION INTRAMUSCULAR at 09:39

## 2018-09-05 RX ADMIN — DIAZEPAM 2 MG: 2 TABLET ORAL at 21:57

## 2018-09-05 RX ADMIN — HYDROCODONE BITARTRATE AND ACETAMINOPHEN 2 TABLET: 5; 325 TABLET ORAL at 16:59

## 2018-09-05 RX ADMIN — DIAZEPAM 2 MG: 2 TABLET ORAL at 02:59

## 2018-09-05 ASSESSMENT — PAIN DESCRIPTION - LOCATION
LOCATION: ABDOMEN

## 2018-09-05 ASSESSMENT — PAIN DESCRIPTION - PROGRESSION
CLINICAL_PROGRESSION: NOT CHANGED
CLINICAL_PROGRESSION: NOT CHANGED

## 2018-09-05 ASSESSMENT — PAIN SCALES - GENERAL
PAINLEVEL_OUTOF10: 8

## 2018-09-05 ASSESSMENT — PAIN DESCRIPTION - DESCRIPTORS
DESCRIPTORS: ACHING;SHARP
DESCRIPTORS: SHARP;STABBING

## 2018-09-05 ASSESSMENT — PAIN DESCRIPTION - PAIN TYPE
TYPE: SURGICAL PAIN

## 2018-09-05 ASSESSMENT — PAIN DESCRIPTION - ORIENTATION
ORIENTATION: MID

## 2018-09-05 ASSESSMENT — PAIN DESCRIPTION - FREQUENCY
FREQUENCY: CONTINUOUS

## 2018-09-05 ASSESSMENT — PAIN DESCRIPTION - ONSET
ONSET: ON-GOING
ONSET: ON-GOING

## 2018-09-05 NOTE — PROGRESS NOTES
Urology Progress Note    Chief Complaint: Mesenteric Mass    Subjective: Patient is laying in bed, resting,  voiding clear urine spontaneously, +flatus, -BM, ambulating with assistance, up x 3 around unit 1 day ago, tolerating full liquid diet, denies any nausea or vomiting. There are complaints of continued abdominal pain (8/10) at this time.  Denies chest pain, SOB    Objective:         Vitals:  /76   Pulse 68   Temp 97.7 °F (36.5 °C) (Oral)   Resp 16   Ht 5' 4.5\" (1.638 m)   Wt 175 lb (79.4 kg)   SpO2 92%   BMI 29.57 kg/m²   Temp  Av.3 °F (36.3 °C)  Min: 96.5 °F (35.8 °C)  Max: 97.8 °F (36.6 °C)    Intake/Output Summary (Last 24 hours) at 18 0849  Last data filed at 18 0300   Gross per 24 hour   Intake              560 ml   Output             1350 ml   Net             -790 ml       Social History     Social History    Marital status:      Spouse name: Phoebe Martin Number of children: 3    Years of education: 12     Occupational History    disability      Social History Main Topics    Smoking status: Never Smoker    Smokeless tobacco: Never Used    Alcohol use Yes      Comment: occassionally    Drug use: No    Sexual activity: No     Other Topics Concern    Not on file     Social History Narrative    No narrative on file     Family History   Problem Relation Age of Onset    Diabetes Mother         type II    Stroke Mother     High Blood Pressure Mother     Kidney Disease Mother     Heart Disease Mother     Heart Attack Mother 66        2/28/15    Colon Cancer Father 79        jejunum   Royetta Perches Cancer Father         Lyphoma    Irritable Bowel Syndrome Sister     High Blood Pressure Brother     Kidney Disease Brother     Celiac Disease Brother     Diabetes Maternal Grandmother         type II    Blindness Maternal Grandmother     Emphysema Maternal Grandfather     Stroke Paternal Grandmother     Diabetes Paternal Grandmother         type II    Ovarian Cancer Paternal Grandmother 48    Cancer Paternal Grandfather         brain tumor    Asthma Brother      Problems Brother     Heart Disease Brother     Other Sister         1days old, pneumonia complications    Colon Cancer Paternal Aunt 79    Colon Cancer Paternal Uncle 79    Colon Cancer Paternal Uncle 79     Allergies   Allergen Reactions    Dilaudid [Hydromorphone Hcl]      Respiratory failure    Hydromorphone Shortness Of Breath    Iron Anaphylaxis    Percodan [Oxycodone-Aspirin]      Anxiety     Fenoprofen Hives    Fenoprofen Calcium Hives    Gluten Diarrhea    Gluten Meal Other (See Comments)     Celiac disease    Oxycodone-Acetaminophen     Oxycodone-Aspirin     Percocet  [Oxycodone-Acetaminophen]     Reglan [Metoclopramide]     Sulfa Antibiotics Hives       Constitutional: Alert and oriented times x3, no acute distress, and cooperative to examination with appropriate mood and affect. HEENT:   Head:         Normocephalic and atraumatic. Mucous membranes are normal.   Eyes:         EOM are normal. No scleral icterus. Nose:    The external appearance of the nose is normal  Ears: The ears appear normal to external inspection. Cardiovascular:       Normal rate, regular rhythm. Pulmonary/Chest:  Normal respiratory rate and rhthym. No use of accessory muscles. Lungs clear bilaterally. Abdominal:          Soft. No tenderness. HYPOactive bowel sounds, surgical incisions well approximated, no drainage. No distention   Genitalia:    Pt voiding clear/yellow urine. Musculoskeletal:    Normal range of motion. He exhibits no edema or tenderness of lower extremities. Extremities:    No cyanosis, clubbing, or edema present. Neurological:    Alert and oriented.        Labs:  WBC:    Lab Results   Component Value Date    WBC 5.2 09/05/2018     Hemoglobin/Hematocrit:    Lab Results   Component Value Date    HGB 12.0 09/05/2018    HCT 35.9 09/05/2018     BMP:    Lab Results Component Value Date     09/05/2018    K 3.4 09/05/2018    K 4.6 09/01/2018    CL 95 09/05/2018    CO2 29 09/05/2018    BUN 6 09/05/2018    LABALBU 4.3 08/28/2018    LABALBU 3.7 02/09/2018    LABALBU 4.1 03/19/2012    CREATININE 0.5 09/05/2018    CALCIUM 8.4 09/05/2018    LABGLOM >90 09/05/2018       Surgical Pathology:  FINAL DIAGNOSIS:  Soft tissue, left pararenal mass, excision:   Thick walled cyst with xanthogranulomatous inflammation. Impression:  Mesenteric Mass  S/p Robot-Assisted Laparoscopic Excision of Left Mesenteric, Albina-Duodenal, Albina-Nephric Tumor 8/31/18   S/p Abdominal Exploration, Lysis of Adhesions, Closure of small enterotomy 8/31/18   Acute post op pain    Plan:    POD # 5 Robot-Assisted Laparoscopic Excision of Left Pararenal Mass per Dr. Ana Frankel Abdominal exploration, Lysis of Adhesions, Closure of small enterotomy per Dr. Jeovany Roland. Surgical Path discussed with Isabel Lundberg, no malignancy- she is relieved     Continue Toradol & Valium for pain control- Reports she follows with [facility] pain management for RSD in her right upper extremity. If pain does not improve, may need to consult pain management team     Dulcolax suppository today. + flatus last night, - BM. .... Continue Senna. . Need for KUB? Diet per General Surgery. Ambulate around unit x 3    Discharge Planning: Hopefully home in next 1-2 days.  Await better pain control & bowel function return     NEREYDA Damico  09/05/18 8:49 AM  Urology

## 2018-09-05 NOTE — PROGRESS NOTES
[facility]  Post Operative Progress Note  Dr Codey Rai    Pt Name: Fannie Vieira Record Number: [id number]  Date of Birth 1955   Today's Date: 9/5/2018    Hospital day # 5   S/p  Abdominal exploration, Lysis of significant adhesions, Closure of one small bowel enterotomy primarily. POD# 5    Chief complaint:  Enterostomy during robotic removal of a perirenal  mass. Patient was stable overnight. Chart reviewed. Updated by nursing staff. She is not moving much. Spirits better today. Pain is better controlled. Denies chest discomfort or dyspnea. No N/V; (+) belching, flatus and negative BM. Tolerating clears advance to DIET DENTAL SOFT; diet. Up with assistance     Past, Family, Social History unchanged from admission.     Diet:  DIET DENTAL SOFT;    Medications:  Scheduled Meds:   potassium replacement protocol   Other RX Placeholder    bisacodyl  5 mg Oral Once    potassium chloride  40 mEq Oral Once    scopolamine  1 patch Transdermal Q72H    senna  2 tablet Oral BID    polyethylene glycol  17 g Oral Daily    betamethasone dipropionate   Topical Daily    DULoxetine  30 mg Oral Daily    levothyroxine  100 mcg Oral Daily    methylphenidate  20 mg Oral Daily    modafinil  200 mg Oral BID    fluticasone  2 spray Each Nare Daily    DULoxetine  60 mg Oral Daily    pantoprazole  40 mg Oral QAM AC    sodium chloride flush  10 mL Intravenous 2 times per day    famotidine  20 mg Oral BID    neomycin-bacitracin-polymyxin   Topical BID    piperacillin-tazobactam  3.375 g Intravenous Q8H    oxybutynin  10 mg Oral Daily    enoxaparin  40 mg Subcutaneous Daily     Continuous Infusions:  PRN Meds:HYDROcodone 5 mg - acetaminophen **OR** HYDROcodone 5 mg - acetaminophen, diazepam, ondansetron, ketorolac, sodium chloride flush, acetaminophen, neomycin-bacitracin-polymyxin    Objective:    CBC:   Recent Labs      09/03/18   0516  09/04/18   0330  09/05/18   0302   WBC  6.8 5.6  5.2   HGB  11.6*  11.9*  12.0   PLT  190  181  201     BMP:    Recent Labs      09/03/18   0516  09/04/18   0330  09/05/18   0302   NA  139  139  134*   K  3.9  3.6  3.4*   CL  104  101  95*   CO2  26  27  29   BUN  7  5*  6*   CREATININE  0.5  0.4  0.5   GLUCOSE  104  90  91     Calcium:  Recent Labs      09/05/18   0302   CALCIUM  8.4*     Ionized Calcium:No results for input(s): IONCA in the last 72 hours. Magnesium:No results for input(s): MG in the last 72 hours. Phosphorus:No results for input(s): PHOS in the last 72 hours. BNP:No results for input(s): BNP in the last 72 hours. Glucose:No results for input(s): POCGLU in the last 72 hours. HgbA1C: No results for input(s): LABA1C in the last 72 hours. INR: No results for input(s): INR in the last 72 hours. Hepatic: No results for input(s): ALKPHOS, ALT, AST, PROT, BILITOT, BILIDIR, LABALBU in the last 72 hours. Amylase and Lipase:No results for input(s): LACTA, AMYLASE in the last 72 hours. Lactic Acid: No results for input(s): LACTA in the last 72 hours. Troponin: No results for input(s): CKTOTAL, CKMB, TROPONINT in the last 72 hours. BNP: No results for input(s): BNP in the last 72 hours. Lipids: No results for input(s): CHOL, TRIG, HDL, LDLCALC in the last 72 hours. Invalid input(s): LDL  ABGs:   Lab Results   Component Value Date    PH 7.5 08/28/2018       Radiology reports as per the Radiologist  Radiology: No results found. Physical Exam:  Vitals: BP (!) 163/79   Pulse 70   Temp 97.6 °F (36.4 °C) (Oral)   Resp 16   Ht 5' 4.5\" (1.638 m)   Wt 175 lb (79.4 kg)   SpO2 93%   BMI 29.57 kg/m²   24 hour intake/output:    Intake/Output Summary (Last 24 hours) at 09/05/18 1347  Last data filed at 09/05/18 1109   Gross per 24 hour   Intake              570 ml   Output              700 ml   Net             -130 ml     Last 3 weights:   Wt Readings from Last 3 Encounters:   08/31/18 175 lb (79.4 kg)   06/29/18 179 lb (81.2 kg)   06/27/18

## 2018-09-06 PROCEDURE — 2709999900 HC NON-CHARGEABLE SUPPLY

## 2018-09-06 PROCEDURE — 99024 POSTOP FOLLOW-UP VISIT: CPT | Performed by: NURSE PRACTITIONER

## 2018-09-06 PROCEDURE — 2580000003 HC RX 258: Performed by: PHYSICIAN ASSISTANT

## 2018-09-06 PROCEDURE — 6360000002 HC RX W HCPCS: Performed by: NURSE PRACTITIONER

## 2018-09-06 PROCEDURE — 6360000002 HC RX W HCPCS: Performed by: PHYSICIAN ASSISTANT

## 2018-09-06 PROCEDURE — 97535 SELF CARE MNGMENT TRAINING: CPT

## 2018-09-06 PROCEDURE — 99024 POSTOP FOLLOW-UP VISIT: CPT | Performed by: SURGERY

## 2018-09-06 PROCEDURE — APPNB30 APP NON BILLABLE TIME 0-30 MINS: Performed by: NURSE PRACTITIONER

## 2018-09-06 PROCEDURE — 6370000000 HC RX 637 (ALT 250 FOR IP): Performed by: NURSE PRACTITIONER

## 2018-09-06 PROCEDURE — APPSS30 APP SPLIT SHARED TIME 16-30 MINUTES: Performed by: NURSE PRACTITIONER

## 2018-09-06 PROCEDURE — G8988 SELF CARE GOAL STATUS: HCPCS

## 2018-09-06 PROCEDURE — 6370000000 HC RX 637 (ALT 250 FOR IP): Performed by: PHYSICIAN ASSISTANT

## 2018-09-06 PROCEDURE — 97110 THERAPEUTIC EXERCISES: CPT

## 2018-09-06 PROCEDURE — G8987 SELF CARE CURRENT STATUS: HCPCS

## 2018-09-06 PROCEDURE — 1200000000 HC SEMI PRIVATE

## 2018-09-06 PROCEDURE — 97166 OT EVAL MOD COMPLEX 45 MIN: CPT

## 2018-09-06 RX ADMIN — HYDROCODONE BITARTRATE AND ACETAMINOPHEN 2 TABLET: 5; 325 TABLET ORAL at 10:37

## 2018-09-06 RX ADMIN — Medication 10 ML: at 20:43

## 2018-09-06 RX ADMIN — PIPERACILLIN SODIUM AND TAZOBACTAM SODIUM 3.38 G: 3; .375 INJECTION, POWDER, LYOPHILIZED, FOR SOLUTION INTRAVENOUS at 03:31

## 2018-09-06 RX ADMIN — HYDROCODONE BITARTRATE AND ACETAMINOPHEN 2 TABLET: 5; 325 TABLET ORAL at 23:44

## 2018-09-06 RX ADMIN — KETOROLAC TROMETHAMINE 15 MG: 30 INJECTION, SOLUTION INTRAMUSCULAR at 03:31

## 2018-09-06 RX ADMIN — Medication 10 ML: at 10:53

## 2018-09-06 RX ADMIN — HYDROCODONE BITARTRATE AND ACETAMINOPHEN 2 TABLET: 5; 325 TABLET ORAL at 03:31

## 2018-09-06 RX ADMIN — SENNOSIDES 17.2 MG: 8.6 TABLET, FILM COATED ORAL at 10:48

## 2018-09-06 RX ADMIN — DULOXETINE HYDROCHLORIDE 30 MG: 60 CAPSULE, DELAYED RELEASE ORAL at 10:49

## 2018-09-06 RX ADMIN — MODAFINIL 200 MG: 100 TABLET ORAL at 10:47

## 2018-09-06 RX ADMIN — SENNOSIDES 17.2 MG: 8.6 TABLET, FILM COATED ORAL at 20:41

## 2018-09-06 RX ADMIN — FLUTICASONE PROPIONATE 2 SPRAY: 50 SPRAY, METERED NASAL at 10:59

## 2018-09-06 RX ADMIN — FAMOTIDINE 20 MG: 20 TABLET ORAL at 20:40

## 2018-09-06 RX ADMIN — PIPERACILLIN SODIUM AND TAZOBACTAM SODIUM 3.38 G: 3; .375 INJECTION, POWDER, LYOPHILIZED, FOR SOLUTION INTRAVENOUS at 11:23

## 2018-09-06 RX ADMIN — ENOXAPARIN SODIUM 40 MG: 40 INJECTION SUBCUTANEOUS at 10:51

## 2018-09-06 RX ADMIN — KETOROLAC TROMETHAMINE 15 MG: 30 INJECTION, SOLUTION INTRAMUSCULAR at 20:40

## 2018-09-06 RX ADMIN — BACITRACIN ZINC NEOMYCIN SULFATE POLYMYXIN B SULFATE: 400; 3.5; 5 OINTMENT TOPICAL at 20:42

## 2018-09-06 RX ADMIN — POLYETHYLENE GLYCOL 3350 17 G: 17 POWDER, FOR SOLUTION ORAL at 10:58

## 2018-09-06 RX ADMIN — DULOXETINE HYDROCHLORIDE 60 MG: 60 CAPSULE, DELAYED RELEASE ORAL at 10:48

## 2018-09-06 RX ADMIN — MODAFINIL 200 MG: 100 TABLET ORAL at 17:24

## 2018-09-06 RX ADMIN — PIPERACILLIN SODIUM AND TAZOBACTAM SODIUM 3.38 G: 3; .375 INJECTION, POWDER, LYOPHILIZED, FOR SOLUTION INTRAVENOUS at 18:25

## 2018-09-06 RX ADMIN — OXYBUTYNIN CHLORIDE 10 MG: 10 TABLET, EXTENDED RELEASE ORAL at 10:49

## 2018-09-06 RX ADMIN — KETOROLAC TROMETHAMINE 15 MG: 30 INJECTION, SOLUTION INTRAMUSCULAR at 10:49

## 2018-09-06 RX ADMIN — FAMOTIDINE 20 MG: 20 TABLET ORAL at 10:48

## 2018-09-06 RX ADMIN — LEVOTHYROXINE SODIUM 100 MCG: 100 TABLET ORAL at 06:12

## 2018-09-06 RX ADMIN — ONDANSETRON HYDROCHLORIDE 4 MG: 4 TABLET, FILM COATED ORAL at 06:12

## 2018-09-06 RX ADMIN — PANTOPRAZOLE SODIUM 40 MG: 40 TABLET, DELAYED RELEASE ORAL at 06:12

## 2018-09-06 ASSESSMENT — PAIN DESCRIPTION - DESCRIPTORS
DESCRIPTORS: SORE
DESCRIPTORS: ACHING

## 2018-09-06 ASSESSMENT — PAIN SCALES - GENERAL
PAINLEVEL_OUTOF10: 8
PAINLEVEL_OUTOF10: 8
PAINLEVEL_OUTOF10: 7
PAINLEVEL_OUTOF10: 8
PAINLEVEL_OUTOF10: 6
PAINLEVEL_OUTOF10: 7

## 2018-09-06 ASSESSMENT — PAIN DESCRIPTION - LOCATION
LOCATION: ABDOMEN

## 2018-09-06 ASSESSMENT — PAIN DESCRIPTION - PAIN TYPE
TYPE: SURGICAL PAIN

## 2018-09-06 ASSESSMENT — PAIN DESCRIPTION - ONSET
ONSET: ON-GOING

## 2018-09-06 ASSESSMENT — PAIN DESCRIPTION - PROGRESSION
CLINICAL_PROGRESSION: NOT CHANGED

## 2018-09-06 ASSESSMENT — PAIN DESCRIPTION - FREQUENCY
FREQUENCY: CONTINUOUS

## 2018-09-06 ASSESSMENT — PAIN DESCRIPTION - ORIENTATION
ORIENTATION: LOWER;MID
ORIENTATION: LOWER;MID
ORIENTATION: MID
ORIENTATION: MID

## 2018-09-06 NOTE — PROGRESS NOTES
NA  139  134*   K  3.6  3.4*   CL  101  95*   CO2  27  29   BUN  5*  6*   CREATININE  0.4  0.5   GLUCOSE  90  91     Calcium:  Recent Labs      09/05/18   0302   CALCIUM  8.4*     Ionized Calcium:No results for input(s): IONCA in the last 72 hours. Magnesium:No results for input(s): MG in the last 72 hours. Phosphorus:No results for input(s): PHOS in the last 72 hours. BNP:No results for input(s): BNP in the last 72 hours. Glucose:No results for input(s): POCGLU in the last 72 hours. HgbA1C: No results for input(s): LABA1C in the last 72 hours. INR: No results for input(s): INR in the last 72 hours. Hepatic: No results for input(s): ALKPHOS, ALT, AST, PROT, BILITOT, BILIDIR, LABALBU in the last 72 hours. Amylase and Lipase:No results for input(s): LACTA, AMYLASE in the last 72 hours. Lactic Acid: No results for input(s): LACTA in the last 72 hours. Troponin: No results for input(s): CKTOTAL, CKMB, TROPONINT in the last 72 hours. BNP: No results for input(s): BNP in the last 72 hours. Lipids: No results for input(s): CHOL, TRIG, HDL, LDLCALC in the last 72 hours. Invalid input(s): LDL  ABGs:   Lab Results   Component Value Date    PH 7.5 08/28/2018       Radiology reports as per the Radiologist  Radiology: No results found. Physical Exam:  Vitals: /79   Pulse 70   Temp 97.2 °F (36.2 °C) (Oral)   Resp 18   Ht 5' 4.5\" (1.638 m)   Wt 175 lb (79.4 kg)   SpO2 99%   BMI 29.57 kg/m²   24 hour intake/output:    Intake/Output Summary (Last 24 hours) at 09/06/18 1328  Last data filed at 09/06/18 0858   Gross per 24 hour   Intake              690 ml   Output             1200 ml   Net             -510 ml     Last 3 weights:   Wt Readings from Last 3 Encounters:   08/31/18 175 lb (79.4 kg)   06/29/18 179 lb (81.2 kg)   06/27/18 179 lb (81.2 kg)       General appearance - oriented to person, place, and time, overweight, anxious and crying  HEENT: Normocephalic and Atraumatic  Chest - clear to

## 2018-09-06 NOTE — PROGRESS NOTES
(none used PTA)     Bathroom Shower/Tub: Tub/Shower unit  Bathroom Toilet: Standard  Bathroom Equipment:  (none)  Bathroom Accessibility: Accessible       ADL Assistance: Independent (modified ind d/t h/o R shoulder injury)  Homemaking Assistance: Independent (able to complete SMP and assist)       Ambulation Assistance: Independent  Transfer Assistance: Independent    Active : Yes  Leisure & Hobbies: Casino and watch movies  Additional Comments: Current with outpt OT at [facility] just prior to admission for R shoulder, h/o [facility]    Objective                  Sensation  Overall Sensation Status: WNL                           LUE AROM (degrees)  LUE AROM : WNL          RUE AROM (degrees)  RUE AROM : Exceptions  RUE General AROM: Shoulder flex limited to approximately 80 degrees, elbow ext lacking 15 degrees       LUE Strength  L Hand Grasp: 4+/5                RUE Strength  R Hand Grasp: 4-/5                           ADL  Grooming: Stand by assistance, Increased time to complete (hand and oral hygiene at sink)  Toileting: Moderate assistance, Increased time to complete (for murphy hygiene, difficutly with reaching bottom)     Bed mobility  Supine to Sit: Unable to assess (received in chair)  Sit to Supine:  Moderate assistance (BLE)  Scooting: Minimal assistance (positioning in bed)  Comment: HOB flat; cues and edu for log roll tech    Transfers  Sit to stand: Minimal assistance  Stand to sit: Minimal assistance  Transfer Comments: pt yelling out in pain with education of proper technique and cues for deep  breathing  Toilet Transfers  Toilet - Technique: Ambulating  Equipment Used: Standard toilet  Toilet Transfer: Minimal assistance    Balance  Sitting Balance: Supervision  Standing Balance: Contact guard assistance     Time: 5 min  Activity: standing at sink     Functional Mobility  Functional - Mobility Device: No device (IV pole)  Activity: To/from bathroom  Assist Level: Minimal assistance  Functional Mobility Comments: slow pace, min unsteadiness, no LOB, hand held assist with pt wincing in pain           Activity Tolerance:  Activity Tolerance: Patient Tolerated treatment well, Patient limited by pain  Activity Tolerance: Treatment initiated: OT eval completed, see assessment        Assessment:  Assessment: pt would benefit from skilled OT intervnetion for safe return to PLOF and transition to next level of care  Performance deficits / Impairments: Decreased functional mobility , Decreased ADL status, Decreased strength, Decreased balance, Decreased safe awareness, Decreased endurance  Prognosis: Good  Discharge Recommendations: Subacute/Skilled Nursing Facility    Clinical Decision Making: Clinical Decision making was of Moderate Complexity as the result of analysis of data from a detailed assessment, a consideration of several treatment options, the presence of comorbidities affecting the plan of care and the need for minimal to moderate modifications or assistance required to complete the evaluation. Patient Education:  Patient Education: Role of OT, POC and goals, safety, transfers, mobility and ADLs, log roll  Barriers to Learning: pain    Equipment Recommendations:  Equipment Needed: No  Other: defer to next levle of care    Safety:  Safety Devices in place: Yes  Type of devices: All fall risk precautions in place, Bed alarm in place, Call light within reach, Nurse notified, Left in bed, Patient at risk for falls    Plan:  Times per week: 3-5x  Current Treatment Recommendations: Balance Training, Functional Mobility Training, Endurance Training, Self-Care / ADL, Safety Education & Training, Pain Management    Goals:  Patient goals : To have less pain    Short term goals  Time Frame for Short term goals:  Two weeks  Short term goal 1: Pt to complete various functional transfers at SBA with 0 cues for technique for inc ind with toileting  Short term goal 2: Pt to complete BADL routine with no greater than CGA for

## 2018-09-06 NOTE — PROGRESS NOTES
09/05/2018    BUN 6 09/05/2018    LABALBU 4.3 08/28/2018    LABALBU 3.7 02/09/2018    LABALBU 4.1 03/19/2012    CREATININE 0.5 09/05/2018    CALCIUM 8.4 09/05/2018    LABGLOM >90 09/05/2018     FINAL DIAGNOSIS:  Soft tissue, left pararenal mass, excision:   Thick walled cyst with xanthogranulomatous inflammation. Impression:  L Pararenal, mesenteric mass  Acute postop pain  CRPS of R arm and shoulder--follows at [facility] [place]      Plan:    POD # 6 Robot-assisted laparoscopic excision/removal of left mesenteric, murphy-duodenal, left murphy-nephric tumor by Dr. Jose Angel Adler 8/31/18. POD #6 abdominal exploration, lysis of adhesions, closure of one small bowel enterotomy primarily by Dr. Elvie Burgess 8/31/18. Pt passing flatus and had first BM today. Abdominal pain continues but is improving. Continue to encourage activity. Planning possible d/c tomorrow. Pt verbalizes concern with getting up and moving at home independently. Significant other just had surgery and is not able to assist much. Will consult PT/OT for therapy and  for assistance with home going needs.       KUSH Olsen  09/06/18 11:54 AM  Urology

## 2018-09-06 NOTE — PLAN OF CARE
Problem: Pain Control  Goal: Maintain pain level at or below patient's acceptable level (or 5 if patient is unable to determine acceptable level)  Outcome: Ongoing  Patient c/o pain level \"8\" though sleeps often throughout shift. Problem: Cardiovascular  Goal: No DVT, peripheral vascular complications  Outcome: Ongoing  Negative Homans. SCDs and RAFAEL hose utilized for DVT prophylaxis. Patient refused ambulating in kwong. Up to chair and bathroom. Problem: GI  Goal: No bowel complications  Outcome: Ongoing  Hypoactive bowel sounds, minimal flatus, no BM. Problem: Nutrition  Goal: Optimal nutrition therapy  Outcome: Ongoing  Poor appetite. Problem: Skin Integrity/Risk  Goal: No skin breakdown during hospitalization  Outcome: Ongoing  Surgical incisions healing. Problem: Infection - Central Venous Catheter-Associated Bloodstream Infection:  Goal: Will show no infection signs and symptoms  Will show no infection signs and symptoms   Outcome: Met This Shift      Problem: Discharge Planning:  Goal: Patients continuum of care needs are met  Patients continuum of care needs are met   Outcome: Ongoing  Plan home on discharge. No needs identified. Comments: Care plan reviewed with patient. Patient verbalizes understanding of the plan of care and contribute to goal setting. Patient needs much encouragement to be up ambulating.
Problem: Pain Control  Goal: Maintain pain level at or below patient's acceptable level (or 5 if patient is unable to determine acceptable level)  Outcome: Ongoing  Patient rating abdominal pain 10/10, controlled to 8/10 with morphine and ice pack. Not meeting pain goal of 5/10. Will continue to reassess    Problem: Cardiovascular  Goal: No DVT, peripheral vascular complications  Outcome: Ongoing  Patient free from signs of dvt this shift. No pain, warmth or redness to calves. scd's in use. Problem: Respiratory  Goal: No pulmonary complications  Outcome: Ongoing  Lung sounds clear, 02 sats low to mid 90's on room air. Continuous pulse ox in place     Problem: GI  Goal: No bowel complications  Outcome: Ongoing  Abdomen soft/tender, bowel sounds hypoactive. Patient is not passing gas or having bowel movements this shift. Denies nausea or vomiting. Problem:   Goal: Adequate urinary output  Outcome: Ongoing  Obando in place with at least 30 ml/hr outputs. Urine is clear, yellow. Albina care provided every shift. Problem: Nutrition  Goal: Optimal nutrition therapy  Outcome: Ongoing  Patient npo except ice chips at this time. Tolerating well. Problem: Skin Integrity/Risk  Goal: No skin breakdown during hospitalization  Outcome: Ongoing  Patient has midline incision and 5 surgical stab sites with staples. Edges well approximated-no drainage. Problem: Infection - Central Venous Catheter-Associated Bloodstream Infection:  Goal: Will show no infection signs and symptoms  Will show no infection signs and symptoms   Outcome: Ongoing  PICC line clear-no redness or drainage at site. Problem: Infection - Methicillin-Resistant Staphylococcus Aureus Infection:  Goal: Absence of methicillin-resistant Staphylococcus aureus infection  Absence of methicillin-resistant Staphylococcus aureus infection   Outcome: Ongoing  Nasal screen negative, rectal swab sent. Isolation precautions observed.      Problem: Discharge
Problem: Pain Control  Goal: Maintain pain level at or below patient's acceptable level (or 5 if patient is unable to determine acceptable level)  Outcome: Ongoing  Patient rating abdominal surgical pain 8/10. Controlled to 6/10 with morphine and ice pack. Meeting pain goal of 5-6/10    Problem: Cardiovascular  Goal: No DVT, peripheral vascular complications  Outcome: Ongoing  Patient free from signs of dvt this shift. No pain, warmth or redness to calves. scd's in use. Problem: Respiratory  Goal: No pulmonary complications  Outcome: Ongoing  Lungs clear/diminished, oxygen on over night for sats in the high 90's-raises to mid 90's on continuous pulse ox    Problem: GI  Goal: No bowel complications  Outcome: Ongoing  Abdomen soft/tender, bowel sounds active. Patient is not passing gas or having bowel movements. Denies nausea or vomiting. Problem:   Goal: Adequate urinary output  Outcome: Ongoing  Patient having at least 30 ml/hr outputs. Urine is clear/yellow, able to void after jain removed. Problem: Nutrition  Goal: Optimal nutrition therapy  Outcome: Ongoing  Patient tolerating clear liquid diet    Problem: Skin Integrity/Risk  Goal: No skin breakdown during hospitalization  Outcome: Ongoing  Patient has midline incision and 5 stab sites with staples-open to air with no drainage    Problem: Infection - Central Venous Catheter-Associated Bloodstream Infection:  Goal: Will show no infection signs and symptoms  Will show no infection signs and symptoms   Outcome: Ongoing  picc line clear with no redness or drainage.      Problem: Infection - Methicillin-Resistant Staphylococcus Aureus Infection:  Goal: Absence of methicillin-resistant Staphylococcus aureus infection  Absence of methicillin-resistant Staphylococcus aureus infection   Outcome: Ongoing  Contact precautions observed, awaiting mrsa screen    Problem: Discharge Planning:  Goal: Patients continuum of care needs are met  Patients continuum of
Problem: Pain Control  Goal: Maintain pain level at or below patient's acceptable level (or 5 if patient is unable to determine acceptable level)  Outcome: Ongoing  Patient states her pain is better today with the morphine. Not meeting her pain goal of 5. Problem: Cardiovascular  Goal: No DVT, peripheral vascular complications  Outcome: Ongoing  No complains of calf pain. Lovenox and SCD's for dvt prevention     Problem: Respiratory  Goal: No pulmonary complications  Outcome: Ongoing  On room air, Continous pulse ox on. Problem: GI  Goal: No bowel complications  Outcome: Ongoing  No BM or gas today. ABS. Problem: Nutrition  Goal: Optimal nutrition therapy  Outcome: Ongoing  Tolerating clears. Took 50% of supper tray. Problem: Skin Integrity/Risk  Goal: No skin breakdown during hospitalization  Outcome: Ongoing  Surgical sites with staples intact. Chlorhexidine bath today. Problem: Infection - Central Venous Catheter-Associated Bloodstream Infection:  Goal: Will show no infection signs and symptoms  Will show no infection signs and symptoms   Outcome: Ongoing  Patient afebrile. Problem: Infection - Methicillin-Resistant Staphylococcus Aureus Infection:  Goal: Absence of methicillin-resistant Staphylococcus aureus infection  Absence of methicillin-resistant Staphylococcus aureus infection   Outcome: Ongoing  Isolation for MRSA hx, rectal swab sent yesterday. Problem: Discharge Planning:  Goal: Patients continuum of care needs are met  Patients continuum of care needs are met   Outcome: Ongoing  Plans home with s/o at discharge. Comments: Care plan reviewed with patient and family. Patient and family verbalize understanding of the plan of care and contribute to goal setting.
Problem: Pain Control  Goal: Maintain pain level at or below patient's acceptable level (or 5 if patient is unable to determine acceptable level)  Outcome: Ongoing  Patient taking IV pain meds, not meeting pain goal.     Problem: Cardiovascular  Goal: No DVT, peripheral vascular complications  No complains of calf pain, lovenox for dvt prevention. SCD's while in bed.
Problem: Pain Control  Goal: Maintain pain level at or below patient's acceptable level (or 5 if patient is unable to determine acceptable level)  Outcome: Ongoing  Patient's pain medications changed today. Patient states pain is better with the Morphine. Has not met goal.     Problem: Cardiovascular  Goal: No DVT, peripheral vascular complications  Outcome: Ongoing  Patient has no complains of calf pain. Lovenox and SCD's for DVT prevention. Problem: Respiratory  Goal: No pulmonary complications  Outcome: Ongoing  Patient on room air. Continous pulse ox on     Problem: GI  Goal: No bowel complications  Outcome: Ongoing  No BM, not passing. No bowel sounds. Problem:   Goal: Adequate urinary output  Outcome: Ongoing  Obando in place, clear yellow urine. Problem: Nutrition  Goal: Optimal nutrition therapy  Outcome: Ongoing  Patient remains NPO. Taking ice chips. Problem: Skin Integrity/Risk  Goal: No skin breakdown during hospitalization  Outcome: Ongoing  Surgical sites with staples intact. Problem: Infection - Central Venous Catheter-Associated Bloodstream Infection:  Goal: Will show no infection signs and symptoms  Will show no infection signs and symptoms   Outcome: Ongoing  Central line dressing dry and intact. Problem: Infection - Methicillin-Resistant Staphylococcus Aureus Infection:  Goal: Absence of methicillin-resistant Staphylococcus aureus infection  Absence of methicillin-resistant Staphylococcus aureus infection   Outcome: Ongoing  Patient in isolation for history of MRSA    Comments: Care plan reviewed with patient. Patient verbalize understanding of the plan of care and contribute to goal setting.
Problem: Pain Control  Goal: Maintain pain level at or below patient's acceptable level (or 5 if patient is unable to determine acceptable level)  Outcome: Ongoing  Pt states pain is 7-8 not meeting pain goal of 3-4  Pt encouraged to get up ambulating. Use of binder and ice. Problem: Cardiovascular  Goal: No DVT, peripheral vascular complications  Outcome: Met This Shift  Vital signs WNL,  Peripheral circulation noted, no edema, pulses present and palpable. scds on while in bed. Encourage movement of legs to prevent dvt. Problem: GI  Goal: No bowel complications  Outcome: Ongoing  Hypo bowel sounds  No gas. enocurage ambulation    Problem: Nutrition  Goal: Optimal nutrition therapy  Outcome: Met This Shift  Cont on full liquids    Problem: Skin Integrity/Risk  Goal: No skin breakdown during hospitalization  Outcome: Met This Shift  No signs of skin breakdown. Skin warm, dry, intact. Mucous membranes pink, moist. Patient turns self. Assistance with turns/ambulation provided PRN. Problem: Discharge Planning:  Goal: Patients continuum of care needs are met  Patients continuum of care needs are met   Outcome: Met This Shift  Patient speaking openly about discharge plan. Patient up in room as tolerated, ambulating and tolerating well, preforming ADLs. Pt tolerating oral medications. Nutritional status and needs discussed, tolerating PO well. Patient participating in plan of care, discussing options, needs and concerns. Pt will be discharged home with family support. Comments: Pt aware of plan of care, continuing to update and work with patient to address needs and expectations.
infection  Absence of methicillin-resistant Staphylococcus aureus infection   Outcome: Ongoing  Pt states she was cleared from the MRSA screening, PPE still utilized until verified. Comments: Care plan reviewed with patient. Patient and verbalize understanding of the plan of care and contribute to goal setting.

## 2018-09-07 VITALS
DIASTOLIC BLOOD PRESSURE: 78 MMHG | HEIGHT: 65 IN | OXYGEN SATURATION: 95 % | RESPIRATION RATE: 16 BRPM | TEMPERATURE: 96.9 F | HEART RATE: 90 BPM | BODY MASS INDEX: 29.16 KG/M2 | SYSTOLIC BLOOD PRESSURE: 133 MMHG | WEIGHT: 175 LBS

## 2018-09-07 PROCEDURE — 97530 THERAPEUTIC ACTIVITIES: CPT

## 2018-09-07 PROCEDURE — 99024 POSTOP FOLLOW-UP VISIT: CPT | Performed by: UROLOGY

## 2018-09-07 PROCEDURE — 6370000000 HC RX 637 (ALT 250 FOR IP): Performed by: PHYSICIAN ASSISTANT

## 2018-09-07 PROCEDURE — 99024 POSTOP FOLLOW-UP VISIT: CPT | Performed by: NURSE PRACTITIONER

## 2018-09-07 PROCEDURE — 6370000000 HC RX 637 (ALT 250 FOR IP): Performed by: NURSE PRACTITIONER

## 2018-09-07 PROCEDURE — 2580000003 HC RX 258: Performed by: PHYSICIAN ASSISTANT

## 2018-09-07 PROCEDURE — G8978 MOBILITY CURRENT STATUS: HCPCS

## 2018-09-07 PROCEDURE — 2709999900 HC NON-CHARGEABLE SUPPLY

## 2018-09-07 PROCEDURE — 99024 POSTOP FOLLOW-UP VISIT: CPT | Performed by: SURGERY

## 2018-09-07 PROCEDURE — G8979 MOBILITY GOAL STATUS: HCPCS

## 2018-09-07 PROCEDURE — APPSS30 APP SPLIT SHARED TIME 16-30 MINUTES: Performed by: NURSE PRACTITIONER

## 2018-09-07 PROCEDURE — 97163 PT EVAL HIGH COMPLEX 45 MIN: CPT

## 2018-09-07 PROCEDURE — 97535 SELF CARE MNGMENT TRAINING: CPT

## 2018-09-07 PROCEDURE — 6360000002 HC RX W HCPCS: Performed by: PHYSICIAN ASSISTANT

## 2018-09-07 RX ORDER — HYDROCODONE BITARTRATE AND ACETAMINOPHEN 5; 325 MG/1; MG/1
1 TABLET ORAL EVERY 6 HOURS PRN
Qty: 10 TABLET | Refills: 0 | Status: ON HOLD | OUTPATIENT
Start: 2018-09-07 | End: 2018-09-21 | Stop reason: HOSPADM

## 2018-09-07 RX ORDER — METHYLPHENIDATE HYDROCHLORIDE 20 MG/1
20 TABLET ORAL DAILY
Qty: 30 TABLET | Refills: 0 | Status: ON HOLD | OUTPATIENT
Start: 2018-09-07 | End: 2018-10-03 | Stop reason: HOSPADM

## 2018-09-07 RX ORDER — FAMOTIDINE 20 MG/1
20 TABLET, FILM COATED ORAL 2 TIMES DAILY
Qty: 60 TABLET | Refills: 3 | Status: SHIPPED | OUTPATIENT
Start: 2018-09-07 | End: 2018-10-23

## 2018-09-07 RX ADMIN — LEVOTHYROXINE SODIUM 100 MCG: 100 TABLET ORAL at 06:52

## 2018-09-07 RX ADMIN — ENOXAPARIN SODIUM 40 MG: 40 INJECTION SUBCUTANEOUS at 08:02

## 2018-09-07 RX ADMIN — HYDROCODONE BITARTRATE AND ACETAMINOPHEN 1 TABLET: 5; 325 TABLET ORAL at 14:32

## 2018-09-07 RX ADMIN — PANTOPRAZOLE SODIUM 40 MG: 40 TABLET, DELAYED RELEASE ORAL at 06:51

## 2018-09-07 RX ADMIN — DULOXETINE HYDROCHLORIDE 60 MG: 60 CAPSULE, DELAYED RELEASE ORAL at 07:57

## 2018-09-07 RX ADMIN — OXYBUTYNIN CHLORIDE 10 MG: 10 TABLET, EXTENDED RELEASE ORAL at 07:58

## 2018-09-07 RX ADMIN — FAMOTIDINE 20 MG: 20 TABLET ORAL at 08:02

## 2018-09-07 RX ADMIN — DULOXETINE HYDROCHLORIDE 30 MG: 60 CAPSULE, DELAYED RELEASE ORAL at 07:57

## 2018-09-07 RX ADMIN — METHYLPHENIDATE HYDROCHLORIDE 20 MG: 10 TABLET ORAL at 14:32

## 2018-09-07 RX ADMIN — HYDROCODONE BITARTRATE AND ACETAMINOPHEN 1 TABLET: 5; 325 TABLET ORAL at 08:38

## 2018-09-07 RX ADMIN — POLYETHYLENE GLYCOL 3350 17 G: 17 POWDER, FOR SOLUTION ORAL at 08:02

## 2018-09-07 RX ADMIN — MODAFINIL 200 MG: 100 TABLET ORAL at 08:02

## 2018-09-07 RX ADMIN — FLUTICASONE PROPIONATE 2 SPRAY: 50 SPRAY, METERED NASAL at 07:58

## 2018-09-07 RX ADMIN — Medication 10 ML: at 07:58

## 2018-09-07 RX ADMIN — PIPERACILLIN SODIUM AND TAZOBACTAM SODIUM 3.38 G: 3; .375 INJECTION, POWDER, LYOPHILIZED, FOR SOLUTION INTRAVENOUS at 03:22

## 2018-09-07 RX ADMIN — SENNOSIDES 17.2 MG: 8.6 TABLET, FILM COATED ORAL at 07:58

## 2018-09-07 ASSESSMENT — PAIN DESCRIPTION - LOCATION
LOCATION: ABDOMEN
LOCATION: ABDOMEN

## 2018-09-07 ASSESSMENT — PAIN SCALES - GENERAL
PAINLEVEL_OUTOF10: 6
PAINLEVEL_OUTOF10: 6
PAINLEVEL_OUTOF10: 4
PAINLEVEL_OUTOF10: 8
PAINLEVEL_OUTOF10: 0
PAINLEVEL_OUTOF10: 8

## 2018-09-07 ASSESSMENT — PAIN DESCRIPTION - ORIENTATION: ORIENTATION: MID

## 2018-09-07 ASSESSMENT — PAIN DESCRIPTION - DESCRIPTORS: DESCRIPTORS: SORE

## 2018-09-07 ASSESSMENT — PAIN DESCRIPTION - PAIN TYPE
TYPE: SURGICAL PAIN
TYPE: SURGICAL PAIN

## 2018-09-07 NOTE — CARE COORDINATION
9/7/18, 8:47 AM    DISCHARGE BARRIERS      Spoke with Zenaida Gomez at [facility], they are not in network with [address] [place] [address] Street with Joe Oliveira at [facility], they are in network and have beds. They will look at patient and start pre-cert. 9:20 AM  Pt call from [facility], [facility] accepted patient pending pre-cert. Pre-cert started 2:24UO with [facility], trying to push through today. Spoke wit patient, advised of above information. Patient stated her  will transport at discharge. 2:45 PM  Called Pat from [facility] inquiring about the pre-cert. She has not heard, will call and get back. 3:07 PM  Spoke with patient and spouse regarding [facility]. Informed still waiting on pre-cert, could be approved as late ae 5:00-5:30 today. If pre-cert not approved today, patient will be discharged home today or tomorrow with [facility] [place]. Patient and spouse not happy about this but understand. Patient[place] preference is [place], will set up nursing, PT, OT and aide services. Referral made to [place] [facility].

## 2018-09-07 NOTE — PROGRESS NOTES
[facility]  Post Operative Progress Note  Dr Evelyn Cadet    Pt Name: Oli Allen Record Number: [id number]  Date of Birth 1955   Today's Date: 9/7/2018    Hospital day # 7   S/p  Abdominal exploration, Lysis of significant adhesions, Closure of one small bowel enterotomy primarily. POD# 7    Chief complaint:  Enterostomy during robotic removal of a perirenal  mass. Patient was stable overnight. Chart reviewed. Updated by nursing staff. She is not moving much, states she has some nausea and poor appetite. Pain is better controlled. Denies chest discomfort or dyspnea. No N/V; (+) belching, flatus and  BM. Tolerating DIET DENTAL SOFT; diet, appetite poor. Up with assistance     Past, Family, Social History unchanged from admission.     Diet:  DIET DENTAL SOFT;    Medications:  Scheduled Meds:   potassium replacement protocol   Other RX Placeholder    scopolamine  1 patch Transdermal Q72H    senna  2 tablet Oral BID    polyethylene glycol  17 g Oral Daily    betamethasone dipropionate   Topical Daily    DULoxetine  30 mg Oral Daily    levothyroxine  100 mcg Oral Daily    methylphenidate  20 mg Oral Daily    modafinil  200 mg Oral BID    fluticasone  2 spray Each Nare Daily    DULoxetine  60 mg Oral Daily    pantoprazole  40 mg Oral QAM AC    sodium chloride flush  10 mL Intravenous 2 times per day    famotidine  20 mg Oral BID    neomycin-bacitracin-polymyxin   Topical BID    piperacillin-tazobactam  3.375 g Intravenous Q8H    oxybutynin  10 mg Oral Daily    enoxaparin  40 mg Subcutaneous Daily     Continuous Infusions:  PRN Meds:HYDROcodone 5 mg - acetaminophen **OR** HYDROcodone 5 mg - acetaminophen, diazepam, ondansetron, ketorolac, sodium chloride flush, acetaminophen, neomycin-bacitracin-polymyxin    Objective:    CBC:   Recent Labs      09/05/18   0302   WBC  5.2   HGB  12.0   PLT  201     BMP:    Recent Labs      09/05/18   0302   NA  134*   K  3.4*   CL entry  Cardiovascular - normal rate and regular rhythm  Abdomen - tenderness noted throughout, Soft, BS active   Neurological - Alert and oriented and Normal speech  Integumentary - Skin color, texture, turgor normal. No Rashes or lesions  Musculoskeletal -Full ROM times 4 extremities  Surgical Incision:  Midline incisions look good, staples intact, no erythema noted, no drainage     DVT prophylaxis: [x] Lovenox                                 [x] SCDs                                 [] SQ Heparin                                 [] Encourage ambulation           [] Already on Anticoagulation                 ASSESSMENT:  S/p  Abdominal exploration, Lysis of significant adhesions, Closure of one small bowel enterotomy primarily. 1. POD# 7  2. Acute postoperative pain - better controlled  3. mesenteric mass pathology - FINAL DIAGNOSIS:  Soft tissue, left pararenal mass, excision:   Thick walled cyst with xanthogranulomatous inflammation. 4. Poor appetite  5. + flatus  And BM  6. Hypokalemia improved  7. Acute blood loss anemia     has a past medical history of Anemia; Anxiety; Arm DVT (deep venous thromboembolism), acute ([address] Broken shoulder; Celiac disease; CRPS (complex regional pain syndrome type I); Depression; GERD (gastroesophageal reflux disease); Headache(784.0); History of blood transfusion; Hx of reactive hypoglycemia; Hyperlipidemia; Hypothyroidism; Irritable bowel syndrome; MDRO (multiple drug resistant organisms) resistance; Meniere disease; Mitral valve prolapse syndrome; MRSA (methicillin resistant Staphylococcus aureus); Narcolepsy; Partial bowel obstruction ([address] Restless legs syndrome; RSD upper limb; and Squamous cell skin cancer. PLAN:  1. Incisional care daily  2. Monitor labs, lytes per protocol   3. Stool softeners   4. Diet soft  5. Iv hydration dc'd   6. DVT and GI Prophylaxis  7. Activity - ambulate, OOB to chair, C&DB,  IS  8.  Analgesia and antiemetics as needed scope patch  9. Antibiotic Therapy dc  10. Discharge planning to F, okay from general surgery to be discharged today       Electronically signed by NEREYDA Lanza CNP on 9/7/2018 at 9:19 AM Patient seen and examined independently by me. Above discussed and I agree with CNP. Labs, cultures, and radiographs where available were reviewed. See orders for the updated patient care plan.     Lavern Day MD,   9/7/2018   5:46 PM

## 2018-09-07 NOTE — PROGRESS NOTES
Niesha    ABDOMINAL ADHESION SURGERY  10/2010    Resection-Dr. Eric Allison Left 2006   Wilmer Walker  2003     Dr. Gomez Spearing  [id number], 2015    ENDOSCOPY, COLON, DIAGNOSTIC      FRACTURE SURGERY      HYSTERECTOMY  1985    HYSTERECTOMY, [place]    Dr. Ace Christian KNEE SURGERY Right 2006    LAPAROTOMY EXPLORATORY N/A 8/31/2018    LAPAROTOMY EXPLORATORY, lysis of adhesions, closure innerotomoy performed by Tres Sotelo MD at [address]  2006    capsule endoscopy    [facility] SINGLE/MULTIPLE Left 2/8/2018    EGD BIOPSY performed by Brice James MD at [facility] OFFICE/OUTPT [facility] Left 8/31/2018    ROBOTIC EXCISION OF LEFT PARARENAL MASS performed by Tres Sotelo MD at [address]  1/14/97    SINUS SURGERY  x4 1982, 1990, 1997, 2005    [facility] ENDOSCOPY  1998,2009,2010,2011,2012, 2015, 2/2018       Restrictions/Precautions:  General Precautions, Fall Risk                       Spinal: Lumbar Corset  Spinal Other: abd binder    Subjective:  Chart Reviewed: Yes  Patient assessed for rehabilitation services?: Yes  Family / Caregiver Present: No  Subjective: RN approved eval. patient in bed on arrival, agrees to therapy. c/o pain throughout session with all mobility. anxious d/t anticipation of pain    General:  Overall Orientation Status: Within Functional Limits  Follows Commands: Within Functional Limits    Vision: Within Functional Limits    Hearing: Within functional limits         Pain:  Yes.   Pain Assessment  Pain Assessment: 0-10  Pain Level: 8  Pain Type: Surgical pain  Pain Location: Abdomen  Pain Orientation: Mid  Pain Descriptors: Sore       Social/Functional History:    Lives With: Friend(s)  Type of Home:  (duplex)  Home Layout: One level  Home Access: Stairs to enter without endurance, Decreased balance, Decreased safe awareness  Assessment: patient presents s/p abdominal surgery with impaired mobility and tolerance to activity secondary to pain. patient requires safety cues throughout session for proper mobility. requires skilled PT to improve level of independence prior to d/c  Prognosis: Good    Clinical Presentation: High - Unstable with Unpredictable Characteristics: high and varying pain levels, use of AD required, I PTA, abdominal surgery, weakness, decreased balance:    Decision Making: High Complexitybased on patient assessment and decision making process of determining plan of care and establishing reasonable expectations for measurable functional outcomes    REQUIRES PT FOLLOW UP: Yes  Discharge Recommendations: Continue to assess pending progress, Patient would benefit from continued therapy after discharge    Patient Education:  Patient Education: POC, transfers, gait, therex, bed mobility    Equipment Recommendations:  Equipment Needed: Yes  Mobility Devices: Pedro Caicedo: Rolling    Safety:  Type of devices:  All fall risk precautions in place, Call light within reach, Gait belt, Patient at risk for falls, Left in chair    Plan:  Times per week: 3-5x GM  Current Treatment Recommendations: Strengthening, Balance Training, Functional Mobility Training, Transfer Training, Endurance Training, Gait Training, Stair training, Home Exercise Program, Safety Education & Training, Patient/Caregiver Education & Training, Equipment Evaluation, Education, & procurement    Goals:  Patient goals : move better, reduce pain  Short term goals  Time Frame for Short term goals: 1 week  Short term goal 1: patient to perform bed mobility at S to get in and out of bed  Short term goal 2: patient to perform transfers to and from various surfaces at S to rise from bed or chair  Short term goal 3: patient to ambulate 100ft with RW at S for household mobility  Short term goal 4: patient to

## 2018-09-07 NOTE — PROGRESS NOTES
Discharge teaching and instructions for diagnosis/procedure of renal mass removal and bowel construction completed with patient using teachback method. AVS reviewed. e and Printed prescriptions given to patient. Patient voiced understanding regarding prescriptions, follow up appointments, and care of self at home.  Discharged in a wheelchair to  home with support per family

## 2018-09-07 NOTE — PROGRESS NOTES
PLAN    POD #7 S/P right pararenal/mesenteric mass resection    Placement pending.   Likely to SNF or rehab  OOB  IS  Continue PT/OT while in house  Bowel regimen  IS

## 2018-09-07 NOTE — PROGRESS NOTES
John Singh 60  INPATIENT OCCUPATIONAL THERAPY  [facility]  DAILY NOTE    Time:  Time In: 0830  Time Out: 3948  Timed Code Treatment Minutes: 23 Minutes  Minutes: 23          Date: 2018  Patient Name: Norris Ryan,   Gender: female      Room: [facility]/052-A  MRN: [id number]  : 1955  (58 y.o.)  Referring Practitioner: NEREYDA Hough CNP  Diagnosis: Mesenteric Mass  Additional Pertinent Hx: POD 6  Abdominal exploration, Lysis of significant adhesions, Closure of one small bowel enterotomy primarily    Past Medical History:   Diagnosis Date    Anemia 2000    malabsorption/Celiac disease    Anxiety 2018    Dr. Kev Momin Arm DVT (deep venous thromboembolism), acute ([facility] [address] 5/15/13    Broken shoulder 2016    right    Celiac disease     CRPS (complex regional pain syndrome type I)     Dr. Omaira Palacio    Depression 2016    Dr. Omaira Palacio    GERD (gastroesophageal reflux disease)     and celiac- Dr. Mehreen Domínguez- [facility] Headache(784.0)     migraines-Dr. Manoj Salas History of blood transfusion     Hx of reactive hypoglycemia     Dr. Rohan Huston Hyperlipidemia     Dr. Manoj Salas Hypothyroidism     Dr. Sangeetha Domínguez at [facility] MDRO (multiple drug resistant organisms) resistance     Meniere disease     Dr. Tien Reddy Mitral valve prolapse syndrome     MRSA (methicillin resistant Staphylococcus aureus) , 8/1/15    left forearm , nasal screen pos Aug 2015   Royetta Perches Narcolepsy     Dr. Jh Trotter Partial bowel obstruction ([facility] [address]     Multile times.   S/P partial small bowel resection    Restless legs syndrome     Dr. Radha Stuart    RSD upper limb     Right Shoulder- Dr. Omaira Palacio    Squamous cell skin cancer     Dr. Rosalinda Rao     Past Surgical History:   Procedure Laterality Date    ABDOMEN SURGERY  2013    ABD Exploration, removal of ommentum, assistance (min A to don gown)  LE Dressing: Dependent/Total (to doff socks)               Transfers  Sit to stand: Contact guard assistance  Stand to sit: Contact guard assistance       Balance  Sitting Balance: Supervision  Standing Balance: Contact guard assistance     Time: [id number]  Activity: ADLs     Functional Mobility  Functional - Mobility Device: Rolling Walker  Functional Mobility Comments: from chair to bed no LOB slow guarded pace     Activity Tolerance:  Activity Tolerance: Patient Tolerated treatment well;Patient limited by pain    Assessment:     Performance deficits / Impairments: Decreased functional mobility , Decreased ADL status, Decreased strength, Decreased balance, Decreased safe awareness, Decreased endurance  Prognosis: Good  Discharge Recommendations: Subacute/Skilled Nursing Facility    Patient Education:  Patient Education: Role of OT, POC and goals, safety, transfers, mobility and ADLs, log roll  Barriers to Learning: pain    Equipment Recommendations:  Equipment Needed: No  Other: defer to next levle of care    Safety:  Safety Devices in place: Yes  Type of devices: All fall risk precautions in place, Bed alarm in place, Call light within reach, Nurse notified, Left in bed, Patient at risk for falls    Plan:  Times per week: 3-5x  Current Treatment Recommendations: Balance Training, Functional Mobility Training, Endurance Training, Self-Care / ADL, Safety Education & Training, Pain Management    Goals:  Patient goals : To have less pain    Short term goals  Time Frame for Short term goals:  Two weeks  Short term goal 1: Pt to complete various functional transfers at SBA with 0 cues for technique for inc ind with toileting  Short term goal 2: Pt to complete BADL routine with no greater than CGA for inc ind iwth self carees  Short term goal 3: Pt to tolerate standing greater than 7 mins iwth 1-2 hand release at CGA in preparation for bathing tasks  Long term goals  Time Frame for Long

## 2018-09-08 ENCOUNTER — HOSPITAL ENCOUNTER (INPATIENT)
Age: 63
LOS: 12 days | Discharge: HOME | DRG: 394 | End: 2018-09-21
Attending: FAMILY MEDICINE | Admitting: INTERNAL MEDICINE
Payer: COMMERCIAL

## 2018-09-08 ENCOUNTER — APPOINTMENT (OUTPATIENT)
Dept: CT IMAGING | Age: 63
DRG: 394 | End: 2018-09-08
Payer: COMMERCIAL

## 2018-09-08 DIAGNOSIS — K56.609 SBO (SMALL BOWEL OBSTRUCTION) (HCC): Primary | ICD-10-CM

## 2018-09-08 LAB
ALBUMIN SERPL-MCNC: 3.7 G/DL (ref 3.5–5.1)
ALP BLD-CCNC: 76 U/L (ref 38–126)
ALT SERPL-CCNC: 28 U/L (ref 11–66)
AMPHETAMINE+METHAMPHETAMINE URINE SCREEN: NEGATIVE
ANION GAP SERPL CALCULATED.3IONS-SCNC: 14 MEQ/L (ref 8–16)
AST SERPL-CCNC: 35 U/L (ref 5–40)
BACTERIA: ABNORMAL /HPF
BARBITURATE QUANTITATIVE URINE: NEGATIVE
BASOPHILS # BLD: 0.4 %
BASOPHILS ABSOLUTE: 0 THOU/MM3 (ref 0–0.1)
BENZODIAZEPINE QUANTITATIVE URINE: NEGATIVE
BILIRUB SERPL-MCNC: 0.2 MG/DL (ref 0.3–1.2)
BILIRUBIN DIRECT: < 0.2 MG/DL (ref 0–0.3)
BILIRUBIN URINE: NEGATIVE
BLOOD, URINE: NEGATIVE
BUN BLDV-MCNC: 6 MG/DL (ref 7–22)
CALCIUM SERPL-MCNC: 9.1 MG/DL (ref 8.5–10.5)
CANNABINOID QUANTITATIVE URINE: NEGATIVE
CASTS UA: ABNORMAL /LPF
CHARACTER, URINE: ABNORMAL
CHLORIDE BLD-SCNC: 99 MEQ/L (ref 98–111)
CO2: 23 MEQ/L (ref 23–33)
COCAINE METABOLITE QUANTITATIVE URINE: NEGATIVE
COLOR: YELLOW
CREAT SERPL-MCNC: 0.5 MG/DL (ref 0.4–1.2)
CRYSTALS, UA: ABNORMAL
EOSINOPHIL # BLD: 5.5 %
EOSINOPHILS ABSOLUTE: 0.4 THOU/MM3 (ref 0–0.4)
EPITHELIAL CELLS, UA: ABNORMAL /HPF
ERYTHROCYTE [DISTWIDTH] IN BLOOD BY AUTOMATED COUNT: 14.6 % (ref 11.5–14.5)
ERYTHROCYTE [DISTWIDTH] IN BLOOD BY AUTOMATED COUNT: 46.9 FL (ref 35–45)
FECAL LEUKOCYTES: NORMAL
GFR SERPL CREATININE-BSD FRML MDRD: > 90 ML/MIN/1.73M2
GLUCOSE BLD-MCNC: 115 MG/DL (ref 70–108)
GLUCOSE URINE: NEGATIVE MG/DL
HCT VFR BLD CALC: 41.6 % (ref 37–47)
HEMOGLOBIN: 14.4 GM/DL (ref 12–16)
IMMATURE GRANS (ABS): 0.04 THOU/MM3 (ref 0–0.07)
IMMATURE GRANULOCYTES: 0.5 %
KETONES, URINE: 80
LEUKOCYTE ESTERASE, URINE: NEGATIVE
LIPASE: 41.6 U/L (ref 5.6–51.3)
LYMPHOCYTES # BLD: 18.7 %
LYMPHOCYTES ABSOLUTE: 1.4 THOU/MM3 (ref 1–4.8)
MCH RBC QN AUTO: 31.2 PG (ref 26–33)
MCHC RBC AUTO-ENTMCNC: 34.6 GM/DL (ref 32.2–35.5)
MCV RBC AUTO: 90.2 FL (ref 81–99)
MONOCYTES # BLD: 6.9 %
MONOCYTES ABSOLUTE: 0.5 THOU/MM3 (ref 0.4–1.3)
NITRITE, URINE: NEGATIVE
NUCLEATED RED BLOOD CELLS: 0 /100 WBC
OPIATES, URINE: POSITIVE
OSMOLALITY CALCULATION: 270.5 MOSMOL/KG (ref 275–300)
OXYCODONE: NEGATIVE
PH UA: 8.5
PHENCYCLIDINE QUANTITATIVE URINE: NEGATIVE
PLATELET # BLD: 350 THOU/MM3 (ref 130–400)
PMV BLD AUTO: 9.1 FL (ref 9.4–12.4)
POTASSIUM SERPL-SCNC: 3.6 MEQ/L (ref 3.5–5.2)
PROTEIN UA: NEGATIVE
RBC # BLD: 4.61 MILL/MM3 (ref 4.2–5.4)
RBC URINE: ABNORMAL /HPF
SEG NEUTROPHILS: 68 %
SEGMENTED NEUTROPHILS ABSOLUTE COUNT: 5.2 THOU/MM3 (ref 1.8–7.7)
SODIUM BLD-SCNC: 136 MEQ/L (ref 135–145)
SPECIFIC GRAVITY, URINE: 1.02 (ref 1–1.03)
TOTAL PROTEIN: 6.9 G/DL (ref 6.1–8)
UROBILINOGEN, URINE: 0.2 EU/DL
WBC # BLD: 7.6 THOU/MM3 (ref 4.8–10.8)
WBC UA: ABNORMAL /HPF

## 2018-09-08 PROCEDURE — 96374 THER/PROPH/DIAG INJ IV PUSH: CPT

## 2018-09-08 PROCEDURE — 82248 BILIRUBIN DIRECT: CPT

## 2018-09-08 PROCEDURE — 80053 COMPREHEN METABOLIC PANEL: CPT

## 2018-09-08 PROCEDURE — 85025 COMPLETE CBC W/AUTO DIFF WBC: CPT

## 2018-09-08 PROCEDURE — 83690 ASSAY OF LIPASE: CPT

## 2018-09-08 PROCEDURE — 89055 LEUKOCYTE ASSESSMENT FECAL: CPT

## 2018-09-08 PROCEDURE — 87507 IADNA-DNA/RNA PROBE TQ 12-25: CPT

## 2018-09-08 PROCEDURE — 96375 TX/PRO/DX INJ NEW DRUG ADDON: CPT

## 2018-09-08 PROCEDURE — 96376 TX/PRO/DX INJ SAME DRUG ADON: CPT

## 2018-09-08 PROCEDURE — 74177 CT ABD & PELVIS W/CONTRAST: CPT

## 2018-09-08 PROCEDURE — 2500000003 HC RX 250 WO HCPCS: Performed by: FAMILY MEDICINE

## 2018-09-08 PROCEDURE — 6360000002 HC RX W HCPCS: Performed by: FAMILY MEDICINE

## 2018-09-08 PROCEDURE — 6360000002 HC RX W HCPCS

## 2018-09-08 PROCEDURE — 99284 EMERGENCY DEPT VISIT MOD MDM: CPT

## 2018-09-08 PROCEDURE — 36415 COLL VENOUS BLD VENIPUNCTURE: CPT

## 2018-09-08 PROCEDURE — 81001 URINALYSIS AUTO W/SCOPE: CPT

## 2018-09-08 PROCEDURE — 80307 DRUG TEST PRSMV CHEM ANLYZR: CPT

## 2018-09-08 PROCEDURE — 6360000004 HC RX CONTRAST MEDICATION: Performed by: FAMILY MEDICINE

## 2018-09-08 PROCEDURE — S0028 INJECTION, FAMOTIDINE, 20 MG: HCPCS | Performed by: FAMILY MEDICINE

## 2018-09-08 PROCEDURE — 2580000003 HC RX 258: Performed by: FAMILY MEDICINE

## 2018-09-08 PROCEDURE — 87045 FECES CULTURE AEROBIC BACT: CPT

## 2018-09-08 RX ORDER — 0.9 % SODIUM CHLORIDE 0.9 %
1000 INTRAVENOUS SOLUTION INTRAVENOUS ONCE
Status: COMPLETED | OUTPATIENT
Start: 2018-09-08 | End: 2018-09-08

## 2018-09-08 RX ORDER — MORPHINE SULFATE 2 MG/ML
2 INJECTION, SOLUTION INTRAMUSCULAR; INTRAVENOUS ONCE
Status: COMPLETED | OUTPATIENT
Start: 2018-09-08 | End: 2018-09-08

## 2018-09-08 RX ORDER — ONDANSETRON 4 MG/1
4 TABLET, ORALLY DISINTEGRATING ORAL ONCE
Status: COMPLETED | OUTPATIENT
Start: 2018-09-08 | End: 2018-09-08

## 2018-09-08 RX ORDER — DICYCLOMINE HYDROCHLORIDE 10 MG/ML
20 INJECTION INTRAMUSCULAR ONCE
Status: DISCONTINUED | OUTPATIENT
Start: 2018-09-08 | End: 2018-09-08

## 2018-09-08 RX ORDER — ONDANSETRON 4 MG/1
TABLET, ORALLY DISINTEGRATING ORAL
Status: COMPLETED
Start: 2018-09-08 | End: 2018-09-08

## 2018-09-08 RX ORDER — PROMETHAZINE HYDROCHLORIDE 25 MG/ML
6.25 INJECTION, SOLUTION INTRAMUSCULAR; INTRAVENOUS ONCE
Status: DISCONTINUED | OUTPATIENT
Start: 2018-09-08 | End: 2018-09-21 | Stop reason: HOSPADM

## 2018-09-08 RX ADMIN — MORPHINE SULFATE 2 MG: 2 INJECTION, SOLUTION INTRAMUSCULAR; INTRAVENOUS at 23:12

## 2018-09-08 RX ADMIN — ONDANSETRON 4 MG: 4 TABLET, ORALLY DISINTEGRATING ORAL at 23:12

## 2018-09-08 RX ADMIN — SODIUM CHLORIDE 1000 ML: 9 INJECTION, SOLUTION INTRAVENOUS at 20:05

## 2018-09-08 RX ADMIN — ONDANSETRON 4 MG: 4 TABLET, ORALLY DISINTEGRATING ORAL at 20:07

## 2018-09-08 RX ADMIN — FAMOTIDINE 20 MG: 10 INJECTION, SOLUTION INTRAVENOUS at 20:08

## 2018-09-08 RX ADMIN — IOPAMIDOL 80 ML: 755 INJECTION, SOLUTION INTRAVENOUS at 22:03

## 2018-09-08 RX ADMIN — MORPHINE SULFATE 2 MG: 2 INJECTION, SOLUTION INTRAMUSCULAR; INTRAVENOUS at 20:07

## 2018-09-08 ASSESSMENT — ENCOUNTER SYMPTOMS
EYE DISCHARGE: 0
EYE PAIN: 0
WHEEZING: 0
BACK PAIN: 0
SORE THROAT: 0
ABDOMINAL PAIN: 1
DIARRHEA: 1
NAUSEA: 0
VOMITING: 1
SHORTNESS OF BREATH: 0
COUGH: 0
RHINORRHEA: 0

## 2018-09-08 ASSESSMENT — PAIN SCALES - GENERAL
PAINLEVEL_OUTOF10: 8
PAINLEVEL_OUTOF10: 8

## 2018-09-09 ENCOUNTER — APPOINTMENT (OUTPATIENT)
Dept: GENERAL RADIOLOGY | Age: 63
DRG: 394 | End: 2018-09-09
Payer: COMMERCIAL

## 2018-09-09 LAB
ADENOVIRUS F 40 41 PCR: NOT DETECTED
ANION GAP SERPL CALCULATED.3IONS-SCNC: 16 MEQ/L (ref 8–16)
ASTROVIRUS PCR: NOT DETECTED
BUN BLDV-MCNC: 7 MG/DL (ref 7–22)
CALCIUM SERPL-MCNC: 8.8 MG/DL (ref 8.5–10.5)
CAMPYLOBACTER PCR: NOT DETECTED
CHLORIDE BLD-SCNC: 100 MEQ/L (ref 98–111)
CLOSTRIDIUM DIFFICILE, PCR: NOT DETECTED
CO2: 22 MEQ/L (ref 23–33)
CREAT SERPL-MCNC: 0.4 MG/DL (ref 0.4–1.2)
CRYPTOSPORIDIUM PCR: NOT DETECTED
CYCLOSPORA CAYETANENSIS PCR: NOT DETECTED
E COLI 0157 PCR: NORMAL
E COLI ENTEROAGGREGATIVE PCR: NOT DETECTED
E COLI ENTEROPATHOGENIC PCR: NOT DETECTED
E COLI ENTEROTOXIGENIC PCR: NOT DETECTED
E COLI SHIGA LIKE TOXIN PCR: NOT DETECTED
E COLI SHIGELLA/ENTEROINVASIVE PCR: NOT DETECTED
E HISTOLYTICA GI FILM ARRAY: NOT DETECTED
ERYTHROCYTE [DISTWIDTH] IN BLOOD BY AUTOMATED COUNT: 14.9 % (ref 11.5–14.5)
ERYTHROCYTE [DISTWIDTH] IN BLOOD BY AUTOMATED COUNT: 51.3 FL (ref 35–45)
GFR SERPL CREATININE-BSD FRML MDRD: > 90 ML/MIN/1.73M2
GIARDIA LAMBLIA PCR: NOT DETECTED
GLUCOSE BLD-MCNC: 92 MG/DL (ref 70–108)
HCT VFR BLD CALC: 41.5 % (ref 37–47)
HEMOGLOBIN: 13.6 GM/DL (ref 12–16)
LACTIC ACID: 0.4 MMOL/L (ref 0.5–2.2)
MCH RBC QN AUTO: 30.9 PG (ref 26–33)
MCHC RBC AUTO-ENTMCNC: 32.8 GM/DL (ref 32.2–35.5)
MCV RBC AUTO: 94.3 FL (ref 81–99)
NOROVIRUS GI GII PCR: NOT DETECTED
PLATELET # BLD: 346 THOU/MM3 (ref 130–400)
PLESIOMONAS SHIGELLOIDES PCR: NOT DETECTED
PMV BLD AUTO: 9.3 FL (ref 9.4–12.4)
POTASSIUM SERPL-SCNC: 3.7 MEQ/L (ref 3.5–5.2)
RBC # BLD: 4.4 MILL/MM3 (ref 4.2–5.4)
ROTAVIRUS A PCR: NOT DETECTED
SALMONELLA PCR: NOT DETECTED
SAPOVIRUS PCR: NOT DETECTED
SODIUM BLD-SCNC: 138 MEQ/L (ref 135–145)
VIBRIO CHOLERAE PCR: NOT DETECTED
VIBRIO PCR: NOT DETECTED
WBC # BLD: 8.9 THOU/MM3 (ref 4.8–10.8)
YERSINIA ENTEROCOLITICA PCR: NOT DETECTED

## 2018-09-09 PROCEDURE — 74018 RADEX ABDOMEN 1 VIEW: CPT

## 2018-09-09 PROCEDURE — 6360000002 HC RX W HCPCS: Performed by: INTERNAL MEDICINE

## 2018-09-09 PROCEDURE — 0DH64UZ INSERTION OF FEEDING DEVICE INTO STOMACH, PERCUTANEOUS ENDOSCOPIC APPROACH: ICD-10-PCS | Performed by: RADIOLOGY

## 2018-09-09 PROCEDURE — 6370000000 HC RX 637 (ALT 250 FOR IP): Performed by: INTERNAL MEDICINE

## 2018-09-09 PROCEDURE — 36415 COLL VENOUS BLD VENIPUNCTURE: CPT

## 2018-09-09 PROCEDURE — 1200000003 HC TELEMETRY R&B

## 2018-09-09 PROCEDURE — 2709999900 HC NON-CHARGEABLE SUPPLY

## 2018-09-09 PROCEDURE — 80048 BASIC METABOLIC PNL TOTAL CA: CPT

## 2018-09-09 PROCEDURE — 83605 ASSAY OF LACTIC ACID: CPT

## 2018-09-09 PROCEDURE — C9113 INJ PANTOPRAZOLE SODIUM, VIA: HCPCS | Performed by: INTERNAL MEDICINE

## 2018-09-09 PROCEDURE — 2580000003 HC RX 258: Performed by: INTERNAL MEDICINE

## 2018-09-09 PROCEDURE — 85027 COMPLETE CBC AUTOMATED: CPT

## 2018-09-09 RX ORDER — DULOXETIN HYDROCHLORIDE 60 MG/1
60 CAPSULE, DELAYED RELEASE ORAL DAILY
Status: DISCONTINUED | OUTPATIENT
Start: 2018-09-09 | End: 2018-09-09 | Stop reason: SDUPTHER

## 2018-09-09 RX ORDER — PANTOPRAZOLE SODIUM 40 MG/10ML
40 INJECTION, POWDER, LYOPHILIZED, FOR SOLUTION INTRAVENOUS DAILY
Status: DISCONTINUED | OUTPATIENT
Start: 2018-09-09 | End: 2018-09-21

## 2018-09-09 RX ORDER — SCOLOPAMINE TRANSDERMAL SYSTEM 1 MG/1
1 PATCH, EXTENDED RELEASE TRANSDERMAL
Status: DISCONTINUED | OUTPATIENT
Start: 2018-09-09 | End: 2018-09-21 | Stop reason: HOSPADM

## 2018-09-09 RX ORDER — DULOXETIN HYDROCHLORIDE 30 MG/1
30 CAPSULE, DELAYED RELEASE ORAL DAILY
Status: DISCONTINUED | OUTPATIENT
Start: 2018-09-09 | End: 2018-09-09 | Stop reason: SDUPTHER

## 2018-09-09 RX ORDER — MORPHINE SULFATE 2 MG/ML
1 INJECTION, SOLUTION INTRAMUSCULAR; INTRAVENOUS EVERY 4 HOURS PRN
Status: DISCONTINUED | OUTPATIENT
Start: 2018-09-09 | End: 2018-09-11

## 2018-09-09 RX ORDER — MODAFINIL 100 MG/1
200 TABLET ORAL 2 TIMES DAILY
Status: DISCONTINUED | OUTPATIENT
Start: 2018-09-09 | End: 2018-09-21 | Stop reason: HOSPADM

## 2018-09-09 RX ORDER — LEVOTHYROXINE SODIUM 0.1 MG/1
100 TABLET ORAL DAILY
Status: DISCONTINUED | OUTPATIENT
Start: 2018-09-09 | End: 2018-09-09 | Stop reason: SDUPTHER

## 2018-09-09 RX ORDER — ONDANSETRON 2 MG/ML
4 INJECTION INTRAMUSCULAR; INTRAVENOUS EVERY 6 HOURS PRN
Status: DISCONTINUED | OUTPATIENT
Start: 2018-09-09 | End: 2018-09-21 | Stop reason: HOSPADM

## 2018-09-09 RX ORDER — LEVOTHYROXINE SODIUM 0.1 MG/1
100 TABLET ORAL DAILY
Status: DISCONTINUED | OUTPATIENT
Start: 2018-09-09 | End: 2018-09-21 | Stop reason: HOSPADM

## 2018-09-09 RX ORDER — SODIUM CHLORIDE 9 MG/ML
INJECTION, SOLUTION INTRAVENOUS CONTINUOUS
Status: DISCONTINUED | OUTPATIENT
Start: 2018-09-09 | End: 2018-09-12

## 2018-09-09 RX ADMIN — MORPHINE SULFATE 1 MG: 2 INJECTION, SOLUTION INTRAMUSCULAR; INTRAVENOUS at 16:24

## 2018-09-09 RX ADMIN — LEVOTHYROXINE SODIUM 100 MCG: 100 TABLET ORAL at 06:22

## 2018-09-09 RX ADMIN — MORPHINE SULFATE 1 MG: 2 INJECTION, SOLUTION INTRAMUSCULAR; INTRAVENOUS at 11:39

## 2018-09-09 RX ADMIN — MORPHINE SULFATE 1 MG: 2 INJECTION, SOLUTION INTRAMUSCULAR; INTRAVENOUS at 20:16

## 2018-09-09 RX ADMIN — SODIUM CHLORIDE: 9 INJECTION, SOLUTION INTRAVENOUS at 01:33

## 2018-09-09 RX ADMIN — MORPHINE SULFATE 1 MG: 2 INJECTION, SOLUTION INTRAMUSCULAR; INTRAVENOUS at 01:33

## 2018-09-09 RX ADMIN — ONDANSETRON 4 MG: 2 INJECTION INTRAMUSCULAR; INTRAVENOUS at 11:24

## 2018-09-09 RX ADMIN — MORPHINE SULFATE 1 MG: 2 INJECTION, SOLUTION INTRAMUSCULAR; INTRAVENOUS at 06:20

## 2018-09-09 RX ADMIN — ENOXAPARIN SODIUM 40 MG: 40 INJECTION SUBCUTANEOUS at 11:24

## 2018-09-09 RX ADMIN — SODIUM CHLORIDE: 9 INJECTION, SOLUTION INTRAVENOUS at 14:50

## 2018-09-09 RX ADMIN — ONDANSETRON 4 MG: 2 INJECTION INTRAMUSCULAR; INTRAVENOUS at 04:32

## 2018-09-09 RX ADMIN — PANTOPRAZOLE SODIUM 40 MG: 40 INJECTION, POWDER, FOR SOLUTION INTRAVENOUS at 08:52

## 2018-09-09 RX ADMIN — ONDANSETRON 4 MG: 2 INJECTION INTRAMUSCULAR; INTRAVENOUS at 17:24

## 2018-09-09 ASSESSMENT — PAIN DESCRIPTION - DESCRIPTORS
DESCRIPTORS: ACHING
DESCRIPTORS: ACHING;STABBING
DESCRIPTORS: ACHING;STABBING

## 2018-09-09 ASSESSMENT — PAIN DESCRIPTION - PAIN TYPE
TYPE: ACUTE PAIN;SURGICAL PAIN
TYPE: ACUTE PAIN
TYPE: ACUTE PAIN

## 2018-09-09 ASSESSMENT — PAIN DESCRIPTION - ONSET
ONSET: ON-GOING

## 2018-09-09 ASSESSMENT — PAIN DESCRIPTION - LOCATION
LOCATION: ABDOMEN

## 2018-09-09 ASSESSMENT — PAIN SCALES - GENERAL
PAINLEVEL_OUTOF10: 7
PAINLEVEL_OUTOF10: 5
PAINLEVEL_OUTOF10: 6
PAINLEVEL_OUTOF10: 5
PAINLEVEL_OUTOF10: 6
PAINLEVEL_OUTOF10: 7
PAINLEVEL_OUTOF10: 3

## 2018-09-09 ASSESSMENT — PAIN DESCRIPTION - FREQUENCY
FREQUENCY: CONTINUOUS

## 2018-09-09 ASSESSMENT — PAIN DESCRIPTION - PROGRESSION: CLINICAL_PROGRESSION: GRADUALLY WORSENING

## 2018-09-09 ASSESSMENT — PAIN DESCRIPTION - ORIENTATION: ORIENTATION: MID;RIGHT;LEFT

## 2018-09-09 NOTE — ED NOTES
Pt medicated per [facility] and updated on plan of care. Pt resting in bed on left side with family at bedside. No concerns voiced.      Valeria Evans RN  09/08/18 4736

## 2018-09-09 NOTE — ED PROVIDER NOTES
[facility]  eMERGENCY dEPARTMENT eNCOUnter          CHIEF COMPLAINT       Chief Complaint   Patient presents with    Abdominal Pain    Nausea       Nurses Notes reviewed and I agree except as noted in the HPI. HISTORY OF PRESENT ILLNESS    Martin Spence is a 58 y.o. female who presents to the Emergency Department for the evaluation of nausea and abdominal pain. The patient reports benign mesenteric mass removal by Dr Iwona Eduardo (Urology). While in surgery her intestines were hit and Dr Claudette Peterson (General Surgery) came to repair. The patient reports she was discharged yesterday. She reports pain in her abdomen since the surgery that worsened today. She rates the pain as 8/10. The pain radiates down her left upper leg. She reports nausea and dry heaving for which she is not on antinausea medication. She is on Norco for pain. She reports she was given antibiotics in while in the hospital. The patient reports 5 episodes of diarrhea, decreased appetite, and 4 episodes of vomiting. The patient denies urinary symptoms. The HPI was provided by the patient. REVIEW OF SYSTEMS     Review of Systems   Constitutional: Negative for appetite change, chills, fatigue and fever. HENT: Negative for congestion, ear pain, rhinorrhea and sore throat. Eyes: Negative for pain, discharge and visual disturbance. Respiratory: Negative for cough, shortness of breath and wheezing. Cardiovascular: Negative for chest pain, palpitations and leg swelling. Gastrointestinal: Positive for abdominal pain, diarrhea and vomiting. Negative for nausea. Genitourinary: Negative for difficulty urinating, dysuria, hematuria and vaginal discharge. Musculoskeletal: Negative for arthralgias, back pain, joint swelling and neck pain. Skin: Negative for pallor and rash. Neurological: Negative for dizziness, syncope, weakness, light-headedness, numbness and headaches. Hematological: Negative for adenopathy.

## 2018-09-10 LAB
ALBUMIN SERPL-MCNC: 3 G/DL (ref 3.5–5.1)
ALP BLD-CCNC: 72 U/L (ref 38–126)
ALT SERPL-CCNC: 22 U/L (ref 11–66)
ANION GAP SERPL CALCULATED.3IONS-SCNC: 15 MEQ/L (ref 8–16)
AST SERPL-CCNC: 26 U/L (ref 5–40)
BASOPHILS # BLD: 0.2 %
BASOPHILS ABSOLUTE: 0 THOU/MM3 (ref 0–0.1)
BILIRUB SERPL-MCNC: 0.2 MG/DL (ref 0.3–1.2)
BUN BLDV-MCNC: 10 MG/DL (ref 7–22)
CALCIUM SERPL-MCNC: 8.3 MG/DL (ref 8.5–10.5)
CHLORIDE BLD-SCNC: 104 MEQ/L (ref 98–111)
CO2: 21 MEQ/L (ref 23–33)
CREAT SERPL-MCNC: 0.5 MG/DL (ref 0.4–1.2)
CULTURE, STOOL: NORMAL
EOSINOPHIL # BLD: 5.4 %
EOSINOPHILS ABSOLUTE: 0.4 THOU/MM3 (ref 0–0.4)
ERYTHROCYTE [DISTWIDTH] IN BLOOD BY AUTOMATED COUNT: 15.4 % (ref 11.5–14.5)
ERYTHROCYTE [DISTWIDTH] IN BLOOD BY AUTOMATED COUNT: 52.6 FL (ref 35–45)
GFR SERPL CREATININE-BSD FRML MDRD: > 90 ML/MIN/1.73M2
GLUCOSE BLD-MCNC: 76 MG/DL (ref 70–108)
HCT VFR BLD CALC: 39.1 % (ref 37–47)
HEMOGLOBIN: 12.8 GM/DL (ref 12–16)
IMMATURE GRANS (ABS): 0.03 THOU/MM3 (ref 0–0.07)
IMMATURE GRANULOCYTES: 0.4 %
LACTIC ACID: 0.5 MMOL/L (ref 0.5–2.2)
LYMPHOCYTES # BLD: 15.1 %
LYMPHOCYTES ABSOLUTE: 1.3 THOU/MM3 (ref 1–4.8)
MCH RBC QN AUTO: 31 PG (ref 26–33)
MCHC RBC AUTO-ENTMCNC: 32.7 GM/DL (ref 32.2–35.5)
MCV RBC AUTO: 94.7 FL (ref 81–99)
MONOCYTES # BLD: 7.5 %
MONOCYTES ABSOLUTE: 0.6 THOU/MM3 (ref 0.4–1.3)
NUCLEATED RED BLOOD CELLS: 0 /100 WBC
PLATELET # BLD: 331 THOU/MM3 (ref 130–400)
PMV BLD AUTO: 8.9 FL (ref 9.4–12.4)
POTASSIUM SERPL-SCNC: 4.2 MEQ/L (ref 3.5–5.2)
RBC # BLD: 4.13 MILL/MM3 (ref 4.2–5.4)
SEG NEUTROPHILS: 71.4 %
SEGMENTED NEUTROPHILS ABSOLUTE COUNT: 5.9 THOU/MM3 (ref 1.8–7.7)
SODIUM BLD-SCNC: 140 MEQ/L (ref 135–145)
TOTAL PROTEIN: 5.9 G/DL (ref 6.1–8)
WBC # BLD: 8.3 THOU/MM3 (ref 4.8–10.8)

## 2018-09-10 PROCEDURE — 6360000002 HC RX W HCPCS: Performed by: INTERNAL MEDICINE

## 2018-09-10 PROCEDURE — 1200000003 HC TELEMETRY R&B

## 2018-09-10 PROCEDURE — 2709999900 HC NON-CHARGEABLE SUPPLY

## 2018-09-10 PROCEDURE — 2580000003 HC RX 258: Performed by: INTERNAL MEDICINE

## 2018-09-10 PROCEDURE — APPNB60 APP NON BILLABLE TIME 46-60 MINS: Performed by: NURSE PRACTITIONER

## 2018-09-10 PROCEDURE — 85025 COMPLETE CBC W/AUTO DIFF WBC: CPT

## 2018-09-10 PROCEDURE — 83605 ASSAY OF LACTIC ACID: CPT

## 2018-09-10 PROCEDURE — C9113 INJ PANTOPRAZOLE SODIUM, VIA: HCPCS | Performed by: INTERNAL MEDICINE

## 2018-09-10 PROCEDURE — 99253 IP/OBS CNSLTJ NEW/EST LOW 45: CPT | Performed by: SURGERY

## 2018-09-10 PROCEDURE — 80053 COMPREHEN METABOLIC PANEL: CPT

## 2018-09-10 PROCEDURE — 6370000000 HC RX 637 (ALT 250 FOR IP): Performed by: INTERNAL MEDICINE

## 2018-09-10 PROCEDURE — 36415 COLL VENOUS BLD VENIPUNCTURE: CPT

## 2018-09-10 RX ADMIN — PANTOPRAZOLE SODIUM 40 MG: 40 INJECTION, POWDER, FOR SOLUTION INTRAVENOUS at 08:58

## 2018-09-10 RX ADMIN — ONDANSETRON 4 MG: 2 INJECTION INTRAMUSCULAR; INTRAVENOUS at 09:43

## 2018-09-10 RX ADMIN — ONDANSETRON 4 MG: 2 INJECTION INTRAMUSCULAR; INTRAVENOUS at 16:41

## 2018-09-10 RX ADMIN — MODAFINIL 200 MG: 100 TABLET ORAL at 08:58

## 2018-09-10 RX ADMIN — SODIUM CHLORIDE: 9 INJECTION, SOLUTION INTRAVENOUS at 02:49

## 2018-09-10 RX ADMIN — LEVOTHYROXINE SODIUM 100 MCG: 100 TABLET ORAL at 08:58

## 2018-09-10 RX ADMIN — ENOXAPARIN SODIUM 40 MG: 40 INJECTION SUBCUTANEOUS at 08:58

## 2018-09-10 RX ADMIN — MORPHINE SULFATE 1 MG: 2 INJECTION, SOLUTION INTRAMUSCULAR; INTRAVENOUS at 20:15

## 2018-09-10 RX ADMIN — MORPHINE SULFATE 1 MG: 2 INJECTION, SOLUTION INTRAMUSCULAR; INTRAVENOUS at 04:08

## 2018-09-10 RX ADMIN — MORPHINE SULFATE 1 MG: 2 INJECTION, SOLUTION INTRAMUSCULAR; INTRAVENOUS at 09:43

## 2018-09-10 RX ADMIN — ONDANSETRON 4 MG: 2 INJECTION INTRAMUSCULAR; INTRAVENOUS at 00:09

## 2018-09-10 RX ADMIN — SODIUM CHLORIDE: 9 INJECTION, SOLUTION INTRAVENOUS at 15:02

## 2018-09-10 RX ADMIN — DULOXETINE HYDROCHLORIDE 90 MG: 60 CAPSULE, DELAYED RELEASE ORAL at 08:59

## 2018-09-10 RX ADMIN — MORPHINE SULFATE 1 MG: 2 INJECTION, SOLUTION INTRAMUSCULAR; INTRAVENOUS at 00:09

## 2018-09-10 RX ADMIN — MORPHINE SULFATE 1 MG: 2 INJECTION, SOLUTION INTRAMUSCULAR; INTRAVENOUS at 15:01

## 2018-09-10 ASSESSMENT — PAIN SCALES - GENERAL
PAINLEVEL_OUTOF10: 7
PAINLEVEL_OUTOF10: 6
PAINLEVEL_OUTOF10: 4
PAINLEVEL_OUTOF10: 6
PAINLEVEL_OUTOF10: 7
PAINLEVEL_OUTOF10: 6
PAINLEVEL_OUTOF10: 7
PAINLEVEL_OUTOF10: 7

## 2018-09-10 ASSESSMENT — PAIN DESCRIPTION - FREQUENCY: FREQUENCY: CONTINUOUS

## 2018-09-10 ASSESSMENT — PAIN DESCRIPTION - LOCATION
LOCATION: ABDOMEN
LOCATION: ABDOMEN

## 2018-09-10 ASSESSMENT — PAIN DESCRIPTION - PAIN TYPE
TYPE: ACUTE PAIN
TYPE: ACUTE PAIN

## 2018-09-10 ASSESSMENT — PAIN DESCRIPTION - DESCRIPTORS: DESCRIPTORS: ACHING

## 2018-09-10 ASSESSMENT — PAIN DESCRIPTION - ORIENTATION
ORIENTATION: LEFT
ORIENTATION: LEFT

## 2018-09-10 NOTE — CARE COORDINATION
9/10/18, 7:38 AM      Miesha Keller       Admitted from: ER, patient presented the day after being discharged with abdominal pain. 9/8/2018/ [facility] day: 1   Location: -09/009-A Reason for admit: SBO (small bowel obstruction) ([facility] [address] [K56.609] Status: Inpt. Admit order signed?: yes  PMH:  has a past medical history of Anemia; Anxiety; Arm DVT (deep venous thromboembolism), acute ([facility] [address] Broken shoulder; Celiac disease; CRPS (complex regional pain syndrome type I); Depression; GERD (gastroesophageal reflux disease); Headache(784.0); History of blood transfusion; Hx of reactive hypoglycemia; Hyperlipidemia; Hypothyroidism; Irritable bowel syndrome; MDRO (multiple drug resistant organisms) resistance; Meniere disease; Mitral valve prolapse syndrome; MRSA (methicillin resistant Staphylococcus aureus); Narcolepsy; Partial bowel obstruction ([facility] [address] Restless legs syndrome; RSD upper limb; and Squamous cell skin cancer. Procedure: OR prior to admission. Pertinent abnormal Imaging:CT of abdomen:  Probable small bowel obstruction in the distribution of the proximal/mid jejunum. Mild ascites. Interval resection of the previously noted 4.1 x 4.2 cm mass anterior to the lower pole left kidney. Postoperative changes of the anterior abdominal wall as discussed above. Small left pleural effusion. Bilateral lower lobe and lingular atelectasis. Additional findings as detailed above.          Medications:  Scheduled Meds:   modafinil  200 mg Oral BID    levothyroxine  100 mcg Oral Daily    pantoprazole  40 mg Intravenous Daily    DULoxetine  90 mg Oral Daily    enoxaparin  40 mg Subcutaneous Daily    scopolamine  1 patch Transdermal Q72H    promethazine  6.25 mg Intramuscular Once     Continuous Infusions:   sodium chloride 75 mL/hr at 09/10/18 0249      Pertinent Info/Orders/Treatment Plan:  General surgery consult to Dr. Santo Boast, IV fluids, IV Protonix, prn IV Morphine for pain, prn Zofran for

## 2018-09-10 NOTE — PROGRESS NOTES
IM Progress Note  Dr. Camila Del Real  9/10/2018 10:52 AM      Patient name Martin Spence  EEB[id number]  PCP: Jordan Hendrickson MD  Admit Date: 9/8/2018  Acct No. [de-identified]    Subjective: Interval History:   Nausea better  No flatus  abd pain not worse      Diet: Diet NPO Effective Now Exceptions are: Ice Chips    I/O last 3 completed shifts: In: 2095 [P.O.:100; I.V.:1995]  Out: 850 [Emesis/NG output:850]  No intake/output data recorded. Admission weight:   as of 9/8/2018  7:42 PM  Wt Readings from Last 3 Encounters:   08/31/18 175 lb (79.4 kg)   06/29/18 179 lb (81.2 kg)   06/27/18 179 lb (81.2 kg)     There is no height or weight on file to calculate BMI. ROS   CVS;  no cp or palpitation  Resp: no SOB or cough  Neuro:  No numbness or weakness or dizziness  Abd: +nausea       Medications:   Scheduled Meds:   modafinil  200 mg Oral BID    levothyroxine  100 mcg Oral Daily    pantoprazole  40 mg Intravenous Daily    DULoxetine  90 mg Oral Daily    enoxaparin  40 mg Subcutaneous Daily    scopolamine  1 patch Transdermal Q72H    promethazine  6.25 mg Intramuscular Once     Continuous Infusions:   sodium chloride 75 mL/hr at 09/10/18 0249       Labs :     CBC:   Recent Labs      09/08/18 2003 09/09/18   0840  09/10/18   0603   WBC  7.6  8.9  8.3   HGB  14.4  13.6  12.8   PLT  350  346  331     BMP:    Recent Labs      09/08/18 2003 09/09/18   0840  09/10/18   0603   NA  136  138  140   K  3.6  3.7  4.2   CL  99  100  104   CO2  23  22*  21*   BUN  6*  7  10   CREATININE  0.5  0.4  0.5   GLUCOSE  115*  92  76     Hepatic:   Recent Labs      09/08/18   2003  09/10/18   0603   AST  35  26   ALT  28  22   BILITOT  0.2*  0.2*   ALKPHOS  76  72     Troponin: No results for input(s): TROPONINI in the last 72 hours. BNP: No results for input(s): BNP in the last 72 hours. Lipids: No results for input(s): CHOL, HDL in the last 72 hours.     Invalid input(s): LDLCALCU  INR: No results for input(s): INR in the last 72 hours. Radiology    Objective:   Vitals: /77   Pulse 88   Temp 98.1 °F (36.7 °C) (Oral)   Resp 16   SpO2 94%   HEENT: Head:pupils react  Neck: supple  Lungs: clear to auscultation  Heart: regular rate and rhythm   Abdomen: soft BS are sluggish  Extremities: warm no edema  Neurologic:  Alert, oriented X3    Impression:   :   1.  Small bowel obstruction vs ileus  2. Recent robotic-assisted laparoscopic excision of left mesenteric periduodenal, perinephric tumor followed by abdominal exploration with lysis of adhesion and closure of a small enterotomy. 3.  History of narcolepsy. 4.  History of restless legs syndrome. 5.  Previous history of bowel resection for bowel obstruction. 6.  History of irritable bowel syndrome. 7.  History of celiac disease. 8.  History of Meniere's disease. 9.  History of mitral valve prolapse. 10.  Hypothyroidism. 11.  Hyperlipidemia. 12.  Previous history of DVT of the right upper extremity with PICC line placement. 13.  History of anxiety and depression. 14.  History of complex regional pain syndrome.     Plan:    Cont supportive care with IVF  Home meds as tolerated  Noted WBC and lactic acid normal  Increase activity     Jeannine Dowd MD

## 2018-09-10 NOTE — DISCHARGE SUMMARY
Patient Identification  Norris Ryan is a 58 y.o. female. :  1955  Admit Date:  2018    Discharge date: 09/10/18                                    Disposition: home    Discharge Diagnoses:   Patient Active Problem List   Diagnosis    Anemia, iron deficiency    Hypothyroidism    Celiac disease    GERD (gastroesophageal reflux disease)    S/P abdominal surgery, follow-up exam    Arm DVT (deep venous thromboembolism), acute (HCC)    Anxiety and depression    Hyperlipemia    Environmental allergies    Nausea    Constipation    Decreased appetite    Weakness generalized    Urinary frequency    SBO (small bowel obstruction) (HCC)    Daytime somnolence    Candida albicans infection    Generalized abdominal pain    Change in bowel habits    Narcolepsy    Shoulder pain, right    Abnormal blood chemistry    Mesenteric mass    S/P exploratory laparotomy       Consults:     Surgery: Resection of right para-renal/mesenteric mass    Patient Instructions: Activity: no heavy lifting, pushing, pulling for 6 weeks, no driving while on analgesics. Diet: As tolerated  Follow-up with Dr. Winifred Bran in 1-2 week. Hospital course: Patient underwent Resection of right para-renal/mesenteric mass with no complications. Post-operatively she remained stable. Patient is tolerating diet, urinating adequately on her own, pain is minimal, ambulating well with assistance, having flatus and BM.       Time spent for discharge less than 30 minutes

## 2018-09-11 ENCOUNTER — APPOINTMENT (OUTPATIENT)
Dept: GENERAL RADIOLOGY | Age: 63
DRG: 394 | End: 2018-09-11
Payer: COMMERCIAL

## 2018-09-11 ENCOUNTER — TELEPHONE (OUTPATIENT)
Dept: FAMILY MEDICINE CLINIC | Age: 63
End: 2018-09-11

## 2018-09-11 PROCEDURE — C9113 INJ PANTOPRAZOLE SODIUM, VIA: HCPCS | Performed by: INTERNAL MEDICINE

## 2018-09-11 PROCEDURE — 6370000000 HC RX 637 (ALT 250 FOR IP): Performed by: NURSE PRACTITIONER

## 2018-09-11 PROCEDURE — 0DH64UZ INSERTION OF FEEDING DEVICE INTO STOMACH, PERCUTANEOUS ENDOSCOPIC APPROACH: ICD-10-PCS | Performed by: RADIOLOGY

## 2018-09-11 PROCEDURE — 99024 POSTOP FOLLOW-UP VISIT: CPT | Performed by: NURSE PRACTITIONER

## 2018-09-11 PROCEDURE — 2709999900 HC NON-CHARGEABLE SUPPLY

## 2018-09-11 PROCEDURE — 6360000002 HC RX W HCPCS: Performed by: INTERNAL MEDICINE

## 2018-09-11 PROCEDURE — 74018 RADEX ABDOMEN 1 VIEW: CPT

## 2018-09-11 PROCEDURE — 6370000000 HC RX 637 (ALT 250 FOR IP): Performed by: INTERNAL MEDICINE

## 2018-09-11 PROCEDURE — 1200000003 HC TELEMETRY R&B

## 2018-09-11 PROCEDURE — APPSS30 APP SPLIT SHARED TIME 16-30 MINUTES: Performed by: NURSE PRACTITIONER

## 2018-09-11 PROCEDURE — 99232 SBSQ HOSP IP/OBS MODERATE 35: CPT | Performed by: SURGERY

## 2018-09-11 RX ORDER — DOCUSATE SODIUM 100 MG/1
100 CAPSULE, LIQUID FILLED ORAL 2 TIMES DAILY
Status: DISCONTINUED | OUTPATIENT
Start: 2018-09-11 | End: 2018-09-21 | Stop reason: HOSPADM

## 2018-09-11 RX ORDER — HYDROCODONE BITARTRATE AND ACETAMINOPHEN 5; 325 MG/1; MG/1
1 TABLET ORAL EVERY 4 HOURS PRN
Status: DISCONTINUED | OUTPATIENT
Start: 2018-09-11 | End: 2018-09-21 | Stop reason: HOSPADM

## 2018-09-11 RX ORDER — POLYETHYLENE GLYCOL 3350 17 G/17G
17 POWDER, FOR SOLUTION ORAL DAILY
Status: DISCONTINUED | OUTPATIENT
Start: 2018-09-11 | End: 2018-09-21 | Stop reason: HOSPADM

## 2018-09-11 RX ORDER — HYDROCODONE BITARTRATE AND ACETAMINOPHEN 5; 325 MG/1; MG/1
2 TABLET ORAL EVERY 4 HOURS PRN
Status: DISCONTINUED | OUTPATIENT
Start: 2018-09-11 | End: 2018-09-21 | Stop reason: HOSPADM

## 2018-09-11 RX ADMIN — HYDROCODONE BITARTRATE AND ACETAMINOPHEN 1 TABLET: 5; 325 TABLET ORAL at 17:47

## 2018-09-11 RX ADMIN — ENOXAPARIN SODIUM 40 MG: 40 INJECTION SUBCUTANEOUS at 08:50

## 2018-09-11 RX ADMIN — DOCUSATE SODIUM 100 MG: 100 CAPSULE, LIQUID FILLED ORAL at 20:15

## 2018-09-11 RX ADMIN — DOCUSATE SODIUM 100 MG: 100 CAPSULE, LIQUID FILLED ORAL at 11:31

## 2018-09-11 RX ADMIN — NALOXEGOL OXALATE 12.5 MG: 12.5 TABLET, FILM COATED ORAL at 11:31

## 2018-09-11 RX ADMIN — ONDANSETRON 4 MG: 2 INJECTION INTRAMUSCULAR; INTRAVENOUS at 20:12

## 2018-09-11 RX ADMIN — DULOXETINE HYDROCHLORIDE 90 MG: 60 CAPSULE, DELAYED RELEASE ORAL at 08:42

## 2018-09-11 RX ADMIN — MORPHINE SULFATE 1 MG: 2 INJECTION, SOLUTION INTRAMUSCULAR; INTRAVENOUS at 08:51

## 2018-09-11 RX ADMIN — POLYETHYLENE GLYCOL (3350) 17 G: 17 POWDER, FOR SOLUTION ORAL at 11:31

## 2018-09-11 RX ADMIN — ONDANSETRON 4 MG: 2 INJECTION INTRAMUSCULAR; INTRAVENOUS at 03:12

## 2018-09-11 RX ADMIN — PANTOPRAZOLE SODIUM 40 MG: 40 INJECTION, POWDER, FOR SOLUTION INTRAVENOUS at 08:43

## 2018-09-11 RX ADMIN — LEVOTHYROXINE SODIUM 100 MCG: 100 TABLET ORAL at 08:42

## 2018-09-11 RX ADMIN — HYDROCODONE BITARTRATE AND ACETAMINOPHEN 1 TABLET: 5; 325 TABLET ORAL at 11:31

## 2018-09-11 RX ADMIN — MORPHINE SULFATE 1 MG: 2 INJECTION, SOLUTION INTRAMUSCULAR; INTRAVENOUS at 03:12

## 2018-09-11 RX ADMIN — ONDANSETRON 4 MG: 2 INJECTION INTRAMUSCULAR; INTRAVENOUS at 14:04

## 2018-09-11 RX ADMIN — MODAFINIL 200 MG: 100 TABLET ORAL at 08:50

## 2018-09-11 ASSESSMENT — PAIN SCALES - GENERAL
PAINLEVEL_OUTOF10: 6
PAINLEVEL_OUTOF10: 5
PAINLEVEL_OUTOF10: 5
PAINLEVEL_OUTOF10: 6
PAINLEVEL_OUTOF10: 7
PAINLEVEL_OUTOF10: 6
PAINLEVEL_OUTOF10: 5
PAINLEVEL_OUTOF10: 5

## 2018-09-11 ASSESSMENT — PAIN DESCRIPTION - ORIENTATION: ORIENTATION: LEFT

## 2018-09-11 ASSESSMENT — PAIN DESCRIPTION - LOCATION
LOCATION: ABDOMEN

## 2018-09-11 ASSESSMENT — PAIN DESCRIPTION - PAIN TYPE
TYPE: ACUTE PAIN

## 2018-09-11 NOTE — PROGRESS NOTES
BREAKFAST) 90 capsule 3    modafinil (PROVIGIL) 200 MG tablet Take 1 tablet by mouth 2 times daily for 90 days. . 180 tablet 0    Lysine 1000 MG TABS Take 1 tablet by mouth 3 times daily      Omega-3 Fatty Acids (FISH OIL) 1000 MG CAPS Take 2,000 mg by mouth 8 times daily       Magnesium Citrate 200 MG TABS Take 1 capsule by mouth 4 times daily       ascorbic acid (VITAMIN C) 1000 MG tablet Take 0.5 tablets by mouth 3 times daily (Patient taking differently: Take 1,000 mg by mouth 3 times daily ) 30 tablet 3    fexofenadine (ALLEGRA ALLERGY) 180 MG tablet Take 180 mg by mouth daily as needed       rosuvastatin (CRESTOR) 20 MG tablet Take 1 tablet by mouth daily 90 tablet 3    ranitidine (ZANTAC) 300 MG tablet Take 1 tablet by mouth every evening Taken at bedtime 90 tablet 3    vitamin D (CHOLECALCIFEROL) 5000 UNITS CAPS capsule Take 5,000 Units by mouth daily       calcium carbonate (OSCAL) 500 MG TABS tablet Take 1,000 mg by mouth 4 times daily       ferrous gluconate (FERGON) 225 (27 FE) MG tablet Take 1 tablet by mouth daily. 30 tablet 0    triamcinolone (NASACORT AQ) 55 MCG/ACT nasal inhaler 2 sprays by Nasal route daily       SYNTHROID 100 MCG tablet Take 1 tablet by mouth Daily 90 tablet 3    DULoxetine (CYMBALTA) 30 MG extended release capsule Take 1 capsule by mouth daily 90 capsule 1    DULoxetine (CYMBALTA) 60 MG extended release capsule Take 1 capsule by mouth daily 90 capsule 1    B Complex-Folic Acid (R-904 BALANCED TR PO) Take 100 mg by mouth 3 times daily (before meals) [facility] [place]          Allergies:  Dilaudid [hydromorphone hcl]; Hydromorphone; Iron; Percodan [oxycodone-aspirin]; Fenoprofen; Fenoprofen calcium; Gluten; Gluten meal; Oxycodone-acetaminophen; Oxycodone-aspirin; Percocet  [oxycodone-acetaminophen]; Reglan [metoclopramide]; and Sulfa antibiotics    Social History:    reports that she has never smoked.  She has never used smokeless tobacco. She reports that she     Lower chest: There is a small left pleural effusion. There is lingular and bilateral lower lobe atelectasis.       Liver: Unremarkable. There is no liver mass or intrahepatic biliary dilatation.       Gallbladder/Biliary tree: Patient is status post cholecystectomy with clips in the gallbladder fossa. There is no biliary dilatation.       Spleen: Unremarkable. No splenomegaly.       Pancreas: Unremarkable. No mass or pancreatic ductal dilatation.       Adrenal glands: Unremarkable. No mass.       Kidneys and ureters: Unremarkable. No hydroureteronephrosis, mass, or cyst. No renal or ureteral calculi.        Stomach, small bowel, and colon: There is thickening of the wall of the distal gastric antrum either due to peristalsis or possibly peptic ulcer disease/gastritis, correlate clinically. There are moderately dilated fluid filled small bowel loops in the distribution of the proximal medial treatment anomaly in the left abdomen worrisome for small bowel obstruction, less likely an ileus. There are postoperative changes of a small bowel    loop in the right lower quadrant with a circumferential staple line. No bowel wall thickening or bowel obstruction.       Appendix: The appendix is not visualized consistent with the history of appendectomy.        Omentum and mesentery: Unremarkable       Aorta, vascular: No aortic aneurysm or dissection. No significant vascular abnormality.       Reproductive: The uterus is absent consistent with hysterectomy. The adnexal regions are unremarkable.       Bladder: There is a small amount of air in the nondependent portion of the urinary bladder likely due to recent instrumentation. Correlate clinically. . No wall thickening or obvious mass. No calcified stones.       Intraperitoneal/retroperitoneal Space: There is a mild amount of ascites in the abdomen and pelvis.    The previously noted heterogeneous 4.1 x 4.2 cm mass anterior to the lower pole left kidney has been removed. There is no abscess, adenopathy, or mass. No pneumoperitoneum.       Abdominal and pelvic body wall soft tissues: There are postoperative changes of the anterior abdominal wall with a midline incision and a left parasagittal 8 by 1.6 x 3.1 cm fluid pocket, likely a seroma. Stranding and multiple small foci of air are seen    within the left and right lower quadrant abdominal wall subcutaneous fat most likely secondary to the recent surgery.            Musculoskeletal structures: Unremarkable. No significant bone or joint abnormality.           Impression   Probable small bowel obstruction in the distribution of the proximal/mid jejunum. Mild ascites. Interval resection of the previously noted 4.1 x 4.2 cm mass anterior to the lower pole left kidney. Postoperative changes of the anterior abdominal wall as discussed above. Small left pleural effusion. Bilateral lower lobe and lingular atelectasis. Additional findings as detailed above.           **This report has been created using voice recognition software. It may contain minor errors which are inherent in voice recognition technology. **       Final report electronically signed by Dr. Kai White on 9/8/2018 11:21 PM       Narrative   PROCEDURE: XR ABDOMEN G TUBE PLACEMENT       CLINICAL INFORMATION: OG/NG placement, .       COMPARISON: Abdominal series dated 8/2/2015       TECHNIQUE: An upright AP view of the abdomen was obtained.       FINDINGS:        Lines/tubes: NG tube tip lies in the region of the gastric fundus in satisfactory position   Bowel gas pattern: There is gas within mildly distended bowel in the left upper quadrant at worst representing an ileus. No findings to suggest bowel obstruction. Free air: There is no pneumoperitoneum.     Miscellaneous: There are no suspicious abdominal calcifications. Multiple surgical skin staples project over the left upper and lower quadrants. Skeleton: The regional skeleton is intact. Lower chest: There is a small left pleural effusion, likely subpulmonic in nature. There is mild left basilar atelectasis.         Impression   NG tube tip in the region of the gastric fundus in satisfactory position. Probable left upper quadrant small bowel ileus.    Small subpulmonic left pleural effusion with left basilar atelectasis.           **This report has been created using voice recognition software. It may contain minor errors which are inherent in voice recognition technology. **       Final report electronically signed by Dr. Honey Hamilton on 9/9/2018 2:22 AM     Narrative   PROCEDURE: XR ABDOMEN (KUB) (SINGLE AP VIEW)       CLINICAL INFORMATION: follow up ileus, . Abdominal pain. Nausea.       COMPARISON: 9/9/2018       TECHNIQUE: 2 supine images of the abdomen were obtained.       FINDINGS:    There is moderate gaseous distention of a single small bowel loop in left upper quadrant, consistent with focal postop ileus. Overall bowel distention has improved from prior study. . Multiple skin staples are present following recent surgery. Large    amount of fecal material throughout the colon, consistent with constipation. Kidneys are somewhat obscured. No definite renal or ureteral calculi are seen. An NG tube is present with its tip at the gastroesophageal junction. This has been pulled back    from prior study and would need to be advanced another 6 to 8 cm to be well within the stomach. There has been a prior cholecystectomy.               Impression   1. Constipation. NG tube tip has been pulled back with its tip now at the gastroesophageal junction. 2. Mild focal postop ileus left upper quadrant, improved from prior study.               **This report has been created using voice recognition software.  It may contain minor errors which are inherent in voice recognition technology. **       Final report electronically signed by Dr. Chet Hdez on 9/11/2018 8:56 AM         Assessment:  1. sbo vs ileus improving  2. NG tube to LIWS   3. Nausea is improving   4. Abdominal pain in LLQ  5. Needs encouragement to ambulate   6. + flatus  7. constipation       Active Problems:    SBO (small bowel obstruction) (HCC)  Resolved Problems:    * No resolved hospital problems. *      Plan:  1. Conservative treatment  2. NG tube removed  3. Advance to clear liquids  4. IV hydration  5. GI and DVT prophylaxis  6. Analgesia and antiemetics prn  7. I&O  8. Incisional care daily  9. Encourage ambulation minimally of 4x daily   10. No  CT enterography indicated at this time   11. Added stool regimen, movantik, colace and miralax  12. Discharge planning 3  days       Electronically signed by NEREYDA Padilla CNP on 9/11/2018 at 10:02 AM Patient seen and examined independently by me. Above discussed and I agree with CNP. Labs, cultures, and radiographs where available were reviewed. See orders for the updated patient care plan.     Ap Alvarez MD, NG tube was then removed and patient is taking clear liquids abdomen is soft bowel sounds are positive wound looks good we'll remove her staples tomorrow  9/11/2018   6:28 PM

## 2018-09-11 NOTE — PROGRESS NOTES
the last 72 hours. Invalid input(s): LDLCALCU  INR: No results for input(s): INR in the last 72 hours. Radiology    Objective:   Vitals: /72   Pulse 77   Temp 97.8 °F (36.6 °C) (Oral)   Resp 18   SpO2 97%   HEENT: Head:pupils react  Neck: supple  Lungs: clear to auscultation  Heart: regular rate and rhythm   Abdomen: soft BS are sluggish  Extremities: warm no edema  Neurologic:  Alert, oriented X3    Impression:   :   1.  Small bowel obstruction vs ileus  2. Recent robotic-assisted laparoscopic excision of left mesenteric periduodenal, perinephric tumor followed by abdominal exploration with lysis of adhesion and closure of a small enterotomy. 3.  History of narcolepsy. 4.  History of restless legs syndrome. 5.  Previous history of bowel resection for bowel obstruction. 6.  History of irritable bowel syndrome. 7.  History of celiac disease. 8.  History of Meniere's disease. 9.  History of mitral valve prolapse. 10.  Hypothyroidism. 11.  Hyperlipidemia. 12.  Previous history of DVT of the right upper extremity with PICC line placement. 13.  History of anxiety and depression. 14.  History of complex regional pain syndrome.     Plan:    Advance diet per surgery as tolerated   Possible removal of NGT   Stop IVF if diet tolerated    Nicole Valencia MD

## 2018-09-12 ENCOUNTER — APPOINTMENT (OUTPATIENT)
Dept: CT IMAGING | Age: 63
DRG: 394 | End: 2018-09-12
Payer: COMMERCIAL

## 2018-09-12 LAB
ANION GAP SERPL CALCULATED.3IONS-SCNC: 14 MEQ/L (ref 8–16)
BUN BLDV-MCNC: 9 MG/DL (ref 7–22)
CALCIUM SERPL-MCNC: 8.6 MG/DL (ref 8.5–10.5)
CHLORIDE BLD-SCNC: 97 MEQ/L (ref 98–111)
CO2: 24 MEQ/L (ref 23–33)
CREAT SERPL-MCNC: 0.5 MG/DL (ref 0.4–1.2)
ERYTHROCYTE [DISTWIDTH] IN BLOOD BY AUTOMATED COUNT: 14.7 % (ref 11.5–14.5)
ERYTHROCYTE [DISTWIDTH] IN BLOOD BY AUTOMATED COUNT: 49.5 FL (ref 35–45)
GFR SERPL CREATININE-BSD FRML MDRD: > 90 ML/MIN/1.73M2
GLUCOSE BLD-MCNC: 123 MG/DL (ref 70–108)
HCT VFR BLD CALC: 41 % (ref 37–47)
HEMOGLOBIN: 13.8 GM/DL (ref 12–16)
MCH RBC QN AUTO: 31.2 PG (ref 26–33)
MCHC RBC AUTO-ENTMCNC: 33.7 GM/DL (ref 32.2–35.5)
MCV RBC AUTO: 92.6 FL (ref 81–99)
PLATELET # BLD: 358 THOU/MM3 (ref 130–400)
PMV BLD AUTO: 9.2 FL (ref 9.4–12.4)
POTASSIUM SERPL-SCNC: 3.9 MEQ/L (ref 3.5–5.2)
PREALBUMIN: 20.6 MG/DL (ref 20–40)
RBC # BLD: 4.43 MILL/MM3 (ref 4.2–5.4)
SODIUM BLD-SCNC: 135 MEQ/L (ref 135–145)
WBC # BLD: 6.4 THOU/MM3 (ref 4.8–10.8)

## 2018-09-12 PROCEDURE — 2709999900 HC NON-CHARGEABLE SUPPLY

## 2018-09-12 PROCEDURE — 6360000002 HC RX W HCPCS: Performed by: INTERNAL MEDICINE

## 2018-09-12 PROCEDURE — 36415 COLL VENOUS BLD VENIPUNCTURE: CPT

## 2018-09-12 PROCEDURE — 80048 BASIC METABOLIC PNL TOTAL CA: CPT

## 2018-09-12 PROCEDURE — 6370000000 HC RX 637 (ALT 250 FOR IP): Performed by: NURSE PRACTITIONER

## 2018-09-12 PROCEDURE — 84134 ASSAY OF PREALBUMIN: CPT

## 2018-09-12 PROCEDURE — 99024 POSTOP FOLLOW-UP VISIT: CPT | Performed by: NURSE PRACTITIONER

## 2018-09-12 PROCEDURE — C9113 INJ PANTOPRAZOLE SODIUM, VIA: HCPCS | Performed by: INTERNAL MEDICINE

## 2018-09-12 PROCEDURE — 74177 CT ABD & PELVIS W/CONTRAST: CPT

## 2018-09-12 PROCEDURE — 6370000000 HC RX 637 (ALT 250 FOR IP): Performed by: INTERNAL MEDICINE

## 2018-09-12 PROCEDURE — 2580000003 HC RX 258: Performed by: INTERNAL MEDICINE

## 2018-09-12 PROCEDURE — 6360000004 HC RX CONTRAST MEDICATION: Performed by: INTERNAL MEDICINE

## 2018-09-12 PROCEDURE — 85027 COMPLETE CBC AUTOMATED: CPT

## 2018-09-12 PROCEDURE — 1200000003 HC TELEMETRY R&B

## 2018-09-12 PROCEDURE — 6360000002 HC RX W HCPCS: Performed by: NURSE PRACTITIONER

## 2018-09-12 PROCEDURE — APPSS30 APP SPLIT SHARED TIME 16-30 MINUTES: Performed by: NURSE PRACTITIONER

## 2018-09-12 RX ORDER — PROMETHAZINE HYDROCHLORIDE 25 MG/ML
12.5 INJECTION, SOLUTION INTRAMUSCULAR; INTRAVENOUS EVERY 6 HOURS PRN
Status: DISCONTINUED | OUTPATIENT
Start: 2018-09-12 | End: 2018-09-21 | Stop reason: HOSPADM

## 2018-09-12 RX ORDER — DEXTROSE AND SODIUM CHLORIDE 5; .9 G/100ML; G/100ML
INJECTION, SOLUTION INTRAVENOUS CONTINUOUS
Status: ACTIVE | OUTPATIENT
Start: 2018-09-12 | End: 2018-09-14

## 2018-09-12 RX ADMIN — DOCUSATE SODIUM 100 MG: 100 CAPSULE, LIQUID FILLED ORAL at 08:59

## 2018-09-12 RX ADMIN — MODAFINIL 200 MG: 100 TABLET ORAL at 09:00

## 2018-09-12 RX ADMIN — ONDANSETRON 4 MG: 2 INJECTION INTRAMUSCULAR; INTRAVENOUS at 18:00

## 2018-09-12 RX ADMIN — POLYETHYLENE GLYCOL (3350) 17 G: 17 POWDER, FOR SOLUTION ORAL at 09:00

## 2018-09-12 RX ADMIN — ONDANSETRON 4 MG: 2 INJECTION INTRAMUSCULAR; INTRAVENOUS at 11:48

## 2018-09-12 RX ADMIN — LEVOTHYROXINE SODIUM 100 MCG: 100 TABLET ORAL at 09:00

## 2018-09-12 RX ADMIN — HYDROCODONE BITARTRATE AND ACETAMINOPHEN 1 TABLET: 5; 325 TABLET ORAL at 08:59

## 2018-09-12 RX ADMIN — NALOXEGOL OXALATE 12.5 MG: 12.5 TABLET, FILM COATED ORAL at 09:00

## 2018-09-12 RX ADMIN — DEXTROSE AND SODIUM CHLORIDE: 5; 900 INJECTION, SOLUTION INTRAVENOUS at 10:27

## 2018-09-12 RX ADMIN — PROMETHAZINE HYDROCHLORIDE 12.5 MG: 25 INJECTION INTRAMUSCULAR; INTRAVENOUS at 17:43

## 2018-09-12 RX ADMIN — IOPAMIDOL 80 ML: 755 INJECTION, SOLUTION INTRAVENOUS at 19:16

## 2018-09-12 RX ADMIN — PANTOPRAZOLE SODIUM 40 MG: 40 INJECTION, POWDER, FOR SOLUTION INTRAVENOUS at 09:01

## 2018-09-12 RX ADMIN — DULOXETINE HYDROCHLORIDE 90 MG: 60 CAPSULE, DELAYED RELEASE ORAL at 08:59

## 2018-09-12 RX ADMIN — ENOXAPARIN SODIUM 40 MG: 40 INJECTION SUBCUTANEOUS at 08:59

## 2018-09-12 ASSESSMENT — PAIN DESCRIPTION - PAIN TYPE
TYPE: SURGICAL PAIN

## 2018-09-12 ASSESSMENT — PAIN DESCRIPTION - LOCATION
LOCATION: ABDOMEN

## 2018-09-12 ASSESSMENT — PAIN DESCRIPTION - ORIENTATION
ORIENTATION: LEFT;RIGHT
ORIENTATION: LEFT;RIGHT
ORIENTATION: RIGHT;LEFT
ORIENTATION: RIGHT;LEFT

## 2018-09-12 ASSESSMENT — PAIN SCALES - GENERAL
PAINLEVEL_OUTOF10: 5
PAINLEVEL_OUTOF10: 6
PAINLEVEL_OUTOF10: 6

## 2018-09-12 NOTE — PROGRESS NOTES
General Surgery  Daily Progress Note   Dr Abbie Keller        Patient:  Shawna Alex  YOB: 1955    MRN: [id number]     Acct: [de-identified]    Primary Care Physician: Arleen Toney MD    Chief Complaint:  Abdominal pain, vomiting     Subjective: patient resting in bed, NG tube to LIWS was reinserted last evening, moderate amount out. States she had jello and broth and became very nauseated and started vomiting and could not stop. The abdominal pain has not worsened. She has passed flatus and negative for bowel movements. She denies chest pain and SOB. Tearful. Chart has been reviewed and updated by nursing staff. 10 point  Review of systems are negative unless otherwise states     Past Medical History:        Diagnosis Date    Anemia 2000    malabsorption/Celiac disease    Anxiety 02/2018    Dr. Bree Palacios    Arm DVT (deep venous thromboembolism), acute ([facility] [address] 5/15/13    Broken shoulder 09/06/2016    right    Celiac disease     CRPS (complex regional pain syndrome type I) 2016    Dr. Zen Mujica    Depression 09/2016    Dr. Zen Mujica    GERD (gastroesophageal reflux disease) 2000    and celiac- Dr. Juan Carlos Vann- [facility] Headache(784.0)     migraines-Dr. Kiarra Wilson History of blood transfusion     Hx of reactive hypoglycemia     Dr. Ellison Proper Hyperlipidemia 2013    Dr. Kiarra Wilson Hypothyroidism 1993    Dr. Will Vann at [facility] MDRO (multiple drug resistant organisms) resistance     Meniere disease 2005    Dr. Gema Tate Mitral valve prolapse syndrome 2008    MRSA (methicillin resistant Staphylococcus aureus) 2013, 8/1/15    left forearm 2013, nasal screen pos Aug 2015   [employer] Narcolepsy 2000    Dr. Kendra Rod Partial bowel obstruction ([facility] [address]     Multile times.   S/P partial small bowel resection    Restless legs syndrome 1993    Dr. Frank Muir    RSD upper limb 2016    Right Shoulder- Dr. Truong Hoover  modafinil (PROVIGIL) 200 MG tablet Take 1 tablet by mouth 2 times daily for 90 days. . 180 tablet 0    Lysine 1000 MG TABS Take 1 tablet by mouth 3 times daily      Omega-3 Fatty Acids (FISH OIL) 1000 MG CAPS Take 2,000 mg by mouth 8 times daily       Magnesium Citrate 200 MG TABS Take 1 capsule by mouth 4 times daily       ascorbic acid (VITAMIN C) 1000 MG tablet Take 0.5 tablets by mouth 3 times daily (Patient taking differently: Take 1,000 mg by mouth 3 times daily ) 30 tablet 3    fexofenadine (ALLEGRA ALLERGY) 180 MG tablet Take 180 mg by mouth daily as needed       rosuvastatin (CRESTOR) 20 MG tablet Take 1 tablet by mouth daily 90 tablet 3    ranitidine (ZANTAC) 300 MG tablet Take 1 tablet by mouth every evening Taken at bedtime 90 tablet 3    vitamin D (CHOLECALCIFEROL) 5000 UNITS CAPS capsule Take 5,000 Units by mouth daily       calcium carbonate (OSCAL) 500 MG TABS tablet Take 1,000 mg by mouth 4 times daily       ferrous gluconate (FERGON) 225 (27 FE) MG tablet Take 1 tablet by mouth daily. 30 tablet 0    triamcinolone (NASACORT AQ) 55 MCG/ACT nasal inhaler 2 sprays by Nasal route daily       SYNTHROID 100 MCG tablet Take 1 tablet by mouth Daily 90 tablet 3    DULoxetine (CYMBALTA) 30 MG extended release capsule Take 1 capsule by mouth daily 90 capsule 1    DULoxetine (CYMBALTA) 60 MG extended release capsule Take 1 capsule by mouth daily 90 capsule 1    B Complex-Folic Acid (Y-034 BALANCED TR PO) Take 100 mg by mouth 3 times daily (before meals) [facility] [place]          Allergies:  Dilaudid [hydromorphone hcl]; Hydromorphone; Iron; Percodan [oxycodone-aspirin]; Fenoprofen; Fenoprofen calcium; Gluten; Gluten meal; Oxycodone-acetaminophen; Oxycodone-aspirin; Percocet  [oxycodone-acetaminophen]; Reglan [metoclopramide]; and Sulfa antibiotics    Social History:    reports that she has never smoked. She has never used smokeless tobacco. She reports that she drinks alcohol.  She reports

## 2018-09-13 PROCEDURE — 1200000003 HC TELEMETRY R&B

## 2018-09-13 PROCEDURE — 2709999900 HC NON-CHARGEABLE SUPPLY

## 2018-09-13 PROCEDURE — A4212 NON CORING NEEDLE OR STYLET: HCPCS

## 2018-09-13 PROCEDURE — 2580000003 HC RX 258: Performed by: INTERNAL MEDICINE

## 2018-09-13 PROCEDURE — 76937 US GUIDE VASCULAR ACCESS: CPT

## 2018-09-13 PROCEDURE — 99232 SBSQ HOSP IP/OBS MODERATE 35: CPT | Performed by: SURGERY

## 2018-09-13 PROCEDURE — 6360000002 HC RX W HCPCS: Performed by: INTERNAL MEDICINE

## 2018-09-13 PROCEDURE — 6370000000 HC RX 637 (ALT 250 FOR IP): Performed by: NURSE PRACTITIONER

## 2018-09-13 PROCEDURE — C1751 CATH, INF, PER/CENT/MIDLINE: HCPCS

## 2018-09-13 PROCEDURE — 36569 INSJ PICC 5 YR+ W/O IMAGING: CPT

## 2018-09-13 PROCEDURE — C9113 INJ PANTOPRAZOLE SODIUM, VIA: HCPCS | Performed by: INTERNAL MEDICINE

## 2018-09-13 PROCEDURE — 6370000000 HC RX 637 (ALT 250 FOR IP): Performed by: INTERNAL MEDICINE

## 2018-09-13 RX ADMIN — ENOXAPARIN SODIUM 40 MG: 40 INJECTION SUBCUTANEOUS at 10:45

## 2018-09-13 RX ADMIN — MODAFINIL 200 MG: 100 TABLET ORAL at 10:08

## 2018-09-13 RX ADMIN — HYDROCODONE BITARTRATE AND ACETAMINOPHEN 1 TABLET: 5; 325 TABLET ORAL at 10:10

## 2018-09-13 RX ADMIN — LEVOTHYROXINE SODIUM 100 MCG: 100 TABLET ORAL at 10:08

## 2018-09-13 RX ADMIN — ONDANSETRON 4 MG: 2 INJECTION INTRAMUSCULAR; INTRAVENOUS at 17:12

## 2018-09-13 RX ADMIN — NALOXEGOL OXALATE 12.5 MG: 12.5 TABLET, FILM COATED ORAL at 10:08

## 2018-09-13 RX ADMIN — DOCUSATE SODIUM 100 MG: 100 CAPSULE, LIQUID FILLED ORAL at 21:57

## 2018-09-13 RX ADMIN — ONDANSETRON 4 MG: 2 INJECTION INTRAMUSCULAR; INTRAVENOUS at 01:02

## 2018-09-13 RX ADMIN — POLYETHYLENE GLYCOL (3350) 17 G: 17 POWDER, FOR SOLUTION ORAL at 10:09

## 2018-09-13 RX ADMIN — DOCUSATE SODIUM 100 MG: 100 CAPSULE, LIQUID FILLED ORAL at 10:08

## 2018-09-13 RX ADMIN — DULOXETINE HYDROCHLORIDE 90 MG: 60 CAPSULE, DELAYED RELEASE ORAL at 10:09

## 2018-09-13 RX ADMIN — HYDROCODONE BITARTRATE AND ACETAMINOPHEN 2 TABLET: 5; 325 TABLET ORAL at 22:00

## 2018-09-13 RX ADMIN — DEXTROSE AND SODIUM CHLORIDE: 5; 900 INJECTION, SOLUTION INTRAVENOUS at 14:52

## 2018-09-13 RX ADMIN — PANTOPRAZOLE SODIUM 40 MG: 40 INJECTION, POWDER, FOR SOLUTION INTRAVENOUS at 10:45

## 2018-09-13 RX ADMIN — ONDANSETRON 4 MG: 2 INJECTION INTRAMUSCULAR; INTRAVENOUS at 10:52

## 2018-09-13 ASSESSMENT — PAIN DESCRIPTION - FREQUENCY
FREQUENCY: CONTINUOUS

## 2018-09-13 ASSESSMENT — PAIN DESCRIPTION - PAIN TYPE
TYPE: SURGICAL PAIN

## 2018-09-13 ASSESSMENT — PAIN DESCRIPTION - PROGRESSION: CLINICAL_PROGRESSION: GRADUALLY WORSENING

## 2018-09-13 ASSESSMENT — PAIN SCALES - GENERAL
PAINLEVEL_OUTOF10: 5
PAINLEVEL_OUTOF10: 6
PAINLEVEL_OUTOF10: 6
PAINLEVEL_OUTOF10: 5
PAINLEVEL_OUTOF10: 6

## 2018-09-13 ASSESSMENT — PAIN DESCRIPTION - LOCATION
LOCATION: ABDOMEN

## 2018-09-13 ASSESSMENT — PAIN DESCRIPTION - DESCRIPTORS
DESCRIPTORS: SHARP
DESCRIPTORS: SHARP
DESCRIPTORS: BURNING;SHARP;STABBING
DESCRIPTORS: SHARP

## 2018-09-13 ASSESSMENT — PAIN DESCRIPTION - ORIENTATION
ORIENTATION: MID;LOWER

## 2018-09-13 ASSESSMENT — PAIN DESCRIPTION - ONSET
ONSET: ON-GOING
ONSET: ON-GOING

## 2018-09-13 NOTE — PROGRESS NOTES
reduce radiation dose to as low as reasonably achievable. FINDINGS: Lung bases  Small left pleural effusion. Left basilar atelectasis versus consolidation. NG tube extends into the stomach. Abdomen pelvis  Stomach is fluid distended. Proximal small bowel loops are dilated with oral contrast and air similar to the previous exam. There is a transition in the left upper mid abdomen. Distal small bowel loops are decompressed. There remains gas and stool in the colon. There is no free peritoneal air identified. Liver, spleen, are normal. Pancreas is atrophic. Prior cholecystectomy. Adrenals are normal.  Kidneys enhance symmetrically and appear normal. Aorta is unremarkable. Urinary bladder contains a small amount of contrast tiny residual amount of air in the urinary bladder. Uterus is surgically absent. There is trace free fluid in the pelvis. This   actually decreased since the previous exam this is a most likely postsurgical.  Small amount of fluid in the abdominal incision actually slightly increased measuring 3.3 x 2.7 cm. Impression:     Findings are suggestive of either a partial or complete proximal small bowel obstruction similar to the prior exam. I would favor partial. There is no free air. No other acute abnormalities. **This report has been created using voice recognition software.  It may contain minor errors which are inherent in voice recognition technology. **    Final report electronically signed by Dr. Chirag Miller on 9/13/2018 9:39 AM       ASSESSMENT  1. CT scan as noted above patient has probable partial small bowel obstruction would not recommend surgery at this time for several reasons discussed with the patient will keep gut at rest, Place PICC line and begin TPN      PLAN  1.   Plan is to wait this out and see if it improves discussed rationale with patient and family present      Ольга Freed  Electronically signed 9/13/2018 at 5:19 PM

## 2018-09-14 ENCOUNTER — APPOINTMENT (OUTPATIENT)
Dept: INTERVENTIONAL RADIOLOGY/VASCULAR | Age: 63
DRG: 394 | End: 2018-09-14
Payer: COMMERCIAL

## 2018-09-14 PROBLEM — E44.0 MODERATE MALNUTRITION (HCC): Status: ACTIVE | Noted: 2018-09-14

## 2018-09-14 LAB
ANION GAP SERPL CALCULATED.3IONS-SCNC: 11 MEQ/L (ref 8–16)
BUN BLDV-MCNC: 6 MG/DL (ref 7–22)
CALCIUM IONIZED: 1.07 MMOL/L (ref 1.12–1.32)
CALCIUM SERPL-MCNC: 8.8 MG/DL (ref 8.5–10.5)
CHLORIDE BLD-SCNC: 103 MEQ/L (ref 98–111)
CO2: 26 MEQ/L (ref 23–33)
CREAT SERPL-MCNC: 0.4 MG/DL (ref 0.4–1.2)
GFR SERPL CREATININE-BSD FRML MDRD: > 90 ML/MIN/1.73M2
GLUCOSE BLD-MCNC: 106 MG/DL (ref 70–108)
MAGNESIUM: 2 MG/DL (ref 1.6–2.4)
PHOSPHORUS: 3.3 MG/DL (ref 2.4–4.7)
POTASSIUM SERPL-SCNC: 3.1 MEQ/L (ref 3.5–5.2)
SODIUM BLD-SCNC: 140 MEQ/L (ref 135–145)

## 2018-09-14 PROCEDURE — 6370000000 HC RX 637 (ALT 250 FOR IP): Performed by: NURSE PRACTITIONER

## 2018-09-14 PROCEDURE — C1769 GUIDE WIRE: HCPCS

## 2018-09-14 PROCEDURE — 99232 SBSQ HOSP IP/OBS MODERATE 35: CPT | Performed by: SURGERY

## 2018-09-14 PROCEDURE — 2500000003 HC RX 250 WO HCPCS: Performed by: PHARMACIST

## 2018-09-14 PROCEDURE — C9113 INJ PANTOPRAZOLE SODIUM, VIA: HCPCS | Performed by: INTERNAL MEDICINE

## 2018-09-14 PROCEDURE — 6360000002 HC RX W HCPCS: Performed by: INTERNAL MEDICINE

## 2018-09-14 PROCEDURE — 2709999900 HC NON-CHARGEABLE SUPPLY

## 2018-09-14 PROCEDURE — 6370000000 HC RX 637 (ALT 250 FOR IP): Performed by: INTERNAL MEDICINE

## 2018-09-14 PROCEDURE — 82330 ASSAY OF CALCIUM: CPT

## 2018-09-14 PROCEDURE — 83735 ASSAY OF MAGNESIUM: CPT

## 2018-09-14 PROCEDURE — 76937 US GUIDE VASCULAR ACCESS: CPT

## 2018-09-14 PROCEDURE — 36415 COLL VENOUS BLD VENIPUNCTURE: CPT

## 2018-09-14 PROCEDURE — 6360000002 HC RX W HCPCS: Performed by: PHARMACIST

## 2018-09-14 PROCEDURE — 02HV33Z INSERTION OF INFUSION DEVICE INTO SUPERIOR VENA CAVA, PERCUTANEOUS APPROACH: ICD-10-PCS | Performed by: RADIOLOGY

## 2018-09-14 PROCEDURE — 1200000003 HC TELEMETRY R&B

## 2018-09-14 PROCEDURE — 2500000003 HC RX 250 WO HCPCS

## 2018-09-14 PROCEDURE — C1751 CATH, INF, PER/CENT/MIDLINE: HCPCS

## 2018-09-14 PROCEDURE — 77001 FLUOROGUIDE FOR VEIN DEVICE: CPT

## 2018-09-14 PROCEDURE — 80048 BASIC METABOLIC PNL TOTAL CA: CPT

## 2018-09-14 PROCEDURE — 2580000003 HC RX 258: Performed by: PHARMACIST

## 2018-09-14 PROCEDURE — 36556 INSERT NON-TUNNEL CV CATH: CPT

## 2018-09-14 PROCEDURE — 84100 ASSAY OF PHOSPHORUS: CPT

## 2018-09-14 PROCEDURE — 6360000002 HC RX W HCPCS

## 2018-09-14 RX ORDER — POTASSIUM CHLORIDE 7.45 MG/ML
10 INJECTION INTRAVENOUS
Status: COMPLETED | OUTPATIENT
Start: 2018-09-14 | End: 2018-09-14

## 2018-09-14 RX ORDER — SODIUM CHLORIDE 9 MG/ML
INJECTION, SOLUTION INTRAVENOUS CONTINUOUS
Status: DISCONTINUED | OUTPATIENT
Start: 2018-09-14 | End: 2018-09-21

## 2018-09-14 RX ADMIN — NALOXEGOL OXALATE 12.5 MG: 12.5 TABLET, FILM COATED ORAL at 08:56

## 2018-09-14 RX ADMIN — ONDANSETRON 4 MG: 2 INJECTION INTRAMUSCULAR; INTRAVENOUS at 12:35

## 2018-09-14 RX ADMIN — CHLORASEPTIC 1 SPRAY: 1.5 LIQUID ORAL at 12:31

## 2018-09-14 RX ADMIN — CHLORASEPTIC 1 SPRAY: 1.5 LIQUID ORAL at 08:55

## 2018-09-14 RX ADMIN — MODAFINIL 200 MG: 100 TABLET ORAL at 16:58

## 2018-09-14 RX ADMIN — LEVOTHYROXINE SODIUM 100 MCG: 100 TABLET ORAL at 08:56

## 2018-09-14 RX ADMIN — HYDROCODONE BITARTRATE AND ACETAMINOPHEN 2 TABLET: 5; 325 TABLET ORAL at 16:37

## 2018-09-14 RX ADMIN — POTASSIUM CHLORIDE 10 MEQ: 10 INJECTION, SOLUTION INTRAVENOUS at 15:42

## 2018-09-14 RX ADMIN — HYDROCODONE BITARTRATE AND ACETAMINOPHEN 2 TABLET: 5; 325 TABLET ORAL at 09:36

## 2018-09-14 RX ADMIN — ENOXAPARIN SODIUM 40 MG: 40 INJECTION SUBCUTANEOUS at 08:55

## 2018-09-14 RX ADMIN — HYDROCODONE BITARTRATE AND ACETAMINOPHEN 2 TABLET: 5; 325 TABLET ORAL at 21:12

## 2018-09-14 RX ADMIN — CALCIUM GLUCONATE: 94 INJECTION, SOLUTION INTRAVENOUS at 18:05

## 2018-09-14 RX ADMIN — PANTOPRAZOLE SODIUM 40 MG: 40 INJECTION, POWDER, FOR SOLUTION INTRAVENOUS at 08:55

## 2018-09-14 RX ADMIN — DOCUSATE SODIUM 100 MG: 100 CAPSULE, LIQUID FILLED ORAL at 21:12

## 2018-09-14 RX ADMIN — POLYETHYLENE GLYCOL (3350) 17 G: 17 POWDER, FOR SOLUTION ORAL at 08:56

## 2018-09-14 RX ADMIN — POTASSIUM CHLORIDE 10 MEQ: 10 INJECTION, SOLUTION INTRAVENOUS at 11:25

## 2018-09-14 RX ADMIN — CHLORASEPTIC 1 SPRAY: 1.5 LIQUID ORAL at 03:12

## 2018-09-14 RX ADMIN — POTASSIUM CHLORIDE 10 MEQ: 10 INJECTION, SOLUTION INTRAVENOUS at 12:31

## 2018-09-14 RX ADMIN — SODIUM CHLORIDE: 9 INJECTION, SOLUTION INTRAVENOUS at 16:37

## 2018-09-14 RX ADMIN — DULOXETINE HYDROCHLORIDE 90 MG: 60 CAPSULE, DELAYED RELEASE ORAL at 08:55

## 2018-09-14 RX ADMIN — ONDANSETRON 4 MG: 2 INJECTION INTRAMUSCULAR; INTRAVENOUS at 22:19

## 2018-09-14 RX ADMIN — POTASSIUM CHLORIDE 10 MEQ: 10 INJECTION, SOLUTION INTRAVENOUS at 08:57

## 2018-09-14 RX ADMIN — MODAFINIL 200 MG: 100 TABLET ORAL at 09:02

## 2018-09-14 RX ADMIN — DOCUSATE SODIUM 100 MG: 100 CAPSULE, LIQUID FILLED ORAL at 08:54

## 2018-09-14 ASSESSMENT — PAIN DESCRIPTION - PROGRESSION
CLINICAL_PROGRESSION: NOT CHANGED

## 2018-09-14 ASSESSMENT — PAIN DESCRIPTION - PAIN TYPE
TYPE: ACUTE PAIN;SURGICAL PAIN
TYPE: ACUTE PAIN

## 2018-09-14 ASSESSMENT — PAIN SCALES - GENERAL
PAINLEVEL_OUTOF10: 5
PAINLEVEL_OUTOF10: 7
PAINLEVEL_OUTOF10: 5
PAINLEVEL_OUTOF10: 5

## 2018-09-14 ASSESSMENT — PAIN DESCRIPTION - FREQUENCY: FREQUENCY: CONTINUOUS

## 2018-09-14 ASSESSMENT — PAIN DESCRIPTION - ONSET: ONSET: ON-GOING

## 2018-09-14 ASSESSMENT — PAIN DESCRIPTION - DESCRIPTORS: DESCRIPTORS: ACHING;BURNING;CRAMPING

## 2018-09-14 ASSESSMENT — PAIN DESCRIPTION - ORIENTATION: ORIENTATION: RIGHT;MID

## 2018-09-14 NOTE — FLOWSHEET NOTE
Message left: IV therapy unable to start PICC line, is it OK for Interventional Radiology to start PICC?

## 2018-09-14 NOTE — PROGRESS NOTES
to wait this out and see if it improves discussed rationale with patient and family present      Ramana Espitia  Electronically signed 9/14/2018 at 6:21 PM

## 2018-09-15 ENCOUNTER — APPOINTMENT (OUTPATIENT)
Dept: GENERAL RADIOLOGY | Age: 63
DRG: 394 | End: 2018-09-15
Payer: COMMERCIAL

## 2018-09-15 LAB
ANION GAP SERPL CALCULATED.3IONS-SCNC: 13 MEQ/L (ref 8–16)
BASOPHILS # BLD: 0.7 %
BASOPHILS ABSOLUTE: 0 THOU/MM3 (ref 0–0.1)
BUN BLDV-MCNC: 6 MG/DL (ref 7–22)
CALCIUM IONIZED: 1.15 MMOL/L (ref 1.12–1.32)
CALCIUM SERPL-MCNC: 8.9 MG/DL (ref 8.5–10.5)
CHLORIDE BLD-SCNC: 101 MEQ/L (ref 98–111)
CO2: 28 MEQ/L (ref 23–33)
CREAT SERPL-MCNC: 0.4 MG/DL (ref 0.4–1.2)
EOSINOPHIL # BLD: 5.5 %
EOSINOPHILS ABSOLUTE: 0.4 THOU/MM3 (ref 0–0.4)
ERYTHROCYTE [DISTWIDTH] IN BLOOD BY AUTOMATED COUNT: 15.2 % (ref 11.5–14.5)
ERYTHROCYTE [DISTWIDTH] IN BLOOD BY AUTOMATED COUNT: 51.7 FL (ref 35–45)
GFR SERPL CREATININE-BSD FRML MDRD: > 90 ML/MIN/1.73M2
GLUCOSE BLD-MCNC: 104 MG/DL (ref 70–108)
GLUCOSE BLD-MCNC: 122 MG/DL (ref 70–108)
GLUCOSE BLD-MCNC: 125 MG/DL (ref 70–108)
GLUCOSE BLD-MCNC: 95 MG/DL (ref 70–108)
GLUCOSE BLD-MCNC: 97 MG/DL (ref 70–108)
HCT VFR BLD CALC: 42.4 % (ref 37–47)
HEMOGLOBIN: 14 GM/DL (ref 12–16)
IMMATURE GRANS (ABS): 0.02 THOU/MM3 (ref 0–0.07)
IMMATURE GRANULOCYTES: 0.3 %
LACTIC ACID: 0.7 MMOL/L (ref 0.5–2.2)
LYMPHOCYTES # BLD: 19.8 %
LYMPHOCYTES ABSOLUTE: 1.4 THOU/MM3 (ref 1–4.8)
MAGNESIUM: 1.9 MG/DL (ref 1.6–2.4)
MCH RBC QN AUTO: 31 PG (ref 26–33)
MCHC RBC AUTO-ENTMCNC: 33 GM/DL (ref 32.2–35.5)
MCV RBC AUTO: 94 FL (ref 81–99)
MONOCYTES # BLD: 8.3 %
MONOCYTES ABSOLUTE: 0.6 THOU/MM3 (ref 0.4–1.3)
NUCLEATED RED BLOOD CELLS: 0 /100 WBC
PHOSPHORUS: 3.4 MG/DL (ref 2.4–4.7)
PLATELET # BLD: 342 THOU/MM3 (ref 130–400)
PMV BLD AUTO: 9.8 FL (ref 9.4–12.4)
POTASSIUM SERPL-SCNC: 3.6 MEQ/L (ref 3.5–5.2)
RBC # BLD: 4.51 MILL/MM3 (ref 4.2–5.4)
SEG NEUTROPHILS: 65.4 %
SEGMENTED NEUTROPHILS ABSOLUTE COUNT: 4.6 THOU/MM3 (ref 1.8–7.7)
SODIUM BLD-SCNC: 142 MEQ/L (ref 135–145)
WBC # BLD: 7.1 THOU/MM3 (ref 4.8–10.8)

## 2018-09-15 PROCEDURE — 2500000003 HC RX 250 WO HCPCS: Performed by: PHARMACIST

## 2018-09-15 PROCEDURE — 82948 REAGENT STRIP/BLOOD GLUCOSE: CPT

## 2018-09-15 PROCEDURE — 1200000003 HC TELEMETRY R&B

## 2018-09-15 PROCEDURE — 82330 ASSAY OF CALCIUM: CPT

## 2018-09-15 PROCEDURE — 6370000000 HC RX 637 (ALT 250 FOR IP): Performed by: NURSE PRACTITIONER

## 2018-09-15 PROCEDURE — C9113 INJ PANTOPRAZOLE SODIUM, VIA: HCPCS | Performed by: INTERNAL MEDICINE

## 2018-09-15 PROCEDURE — 85025 COMPLETE CBC W/AUTO DIFF WBC: CPT

## 2018-09-15 PROCEDURE — 6370000000 HC RX 637 (ALT 250 FOR IP): Performed by: INTERNAL MEDICINE

## 2018-09-15 PROCEDURE — 6370000000 HC RX 637 (ALT 250 FOR IP): Performed by: PHARMACIST

## 2018-09-15 PROCEDURE — 2709999900 HC NON-CHARGEABLE SUPPLY

## 2018-09-15 PROCEDURE — 74018 RADEX ABDOMEN 1 VIEW: CPT

## 2018-09-15 PROCEDURE — 84100 ASSAY OF PHOSPHORUS: CPT

## 2018-09-15 PROCEDURE — 83735 ASSAY OF MAGNESIUM: CPT

## 2018-09-15 PROCEDURE — 36592 COLLECT BLOOD FROM PICC: CPT

## 2018-09-15 PROCEDURE — 83605 ASSAY OF LACTIC ACID: CPT

## 2018-09-15 PROCEDURE — 36415 COLL VENOUS BLD VENIPUNCTURE: CPT

## 2018-09-15 PROCEDURE — 6360000002 HC RX W HCPCS: Performed by: INTERNAL MEDICINE

## 2018-09-15 PROCEDURE — 80048 BASIC METABOLIC PNL TOTAL CA: CPT

## 2018-09-15 RX ADMIN — PANTOPRAZOLE SODIUM 40 MG: 40 INJECTION, POWDER, FOR SOLUTION INTRAVENOUS at 08:28

## 2018-09-15 RX ADMIN — CALCIUM GLUCONATE: 94 INJECTION, SOLUTION INTRAVENOUS at 18:25

## 2018-09-15 RX ADMIN — DOCUSATE SODIUM 100 MG: 100 CAPSULE, LIQUID FILLED ORAL at 08:34

## 2018-09-15 RX ADMIN — MODAFINIL 200 MG: 100 TABLET ORAL at 08:29

## 2018-09-15 RX ADMIN — DULOXETINE HYDROCHLORIDE 90 MG: 60 CAPSULE, DELAYED RELEASE ORAL at 08:28

## 2018-09-15 RX ADMIN — MODAFINIL 200 MG: 100 TABLET ORAL at 18:21

## 2018-09-15 RX ADMIN — POLYETHYLENE GLYCOL (3350) 17 G: 17 POWDER, FOR SOLUTION ORAL at 08:29

## 2018-09-15 RX ADMIN — NALOXEGOL OXALATE 12.5 MG: 12.5 TABLET, FILM COATED ORAL at 08:28

## 2018-09-15 RX ADMIN — HYDROCODONE BITARTRATE AND ACETAMINOPHEN 2 TABLET: 5; 325 TABLET ORAL at 08:29

## 2018-09-15 RX ADMIN — ONDANSETRON 4 MG: 2 INJECTION INTRAMUSCULAR; INTRAVENOUS at 08:31

## 2018-09-15 RX ADMIN — ENOXAPARIN SODIUM 40 MG: 40 INJECTION SUBCUTANEOUS at 08:29

## 2018-09-15 ASSESSMENT — PAIN SCALES - GENERAL
PAINLEVEL_OUTOF10: 5
PAINLEVEL_OUTOF10: 7
PAINLEVEL_OUTOF10: 7
PAINLEVEL_OUTOF10: 5

## 2018-09-15 ASSESSMENT — PAIN DESCRIPTION - PROGRESSION
CLINICAL_PROGRESSION: NOT CHANGED

## 2018-09-15 NOTE — PROGRESS NOTES
Gastroenterology  Progress Note    9/15/2018 11:09 AM  Subjective:   Admit Date: 9/8/2018    Interval History: nausea, no vomiting, tolerating NGT suction, less abdominal pain   Diet: Diet NPO Effective Now Exceptions are: Ice Chips  PN-Adult  3 IN 1 Central Line (Custom)  PN-Adult  3 IN 1 Central Line (Custom)    Medications:   Scheduled Meds:   potassium (CARDIAC) replacement protocol   Other RX Placeholder    insulin lispro  0-12 Units Subcutaneous 4 times per day    naloxegol  12.5 mg Oral QAM    docusate sodium  100 mg Oral BID    polyethylene glycol  17 g Oral Daily    modafinil  200 mg Oral BID    levothyroxine  100 mcg Oral Daily    pantoprazole  40 mg Intravenous Daily    DULoxetine  90 mg Oral Daily    enoxaparin  40 mg Subcutaneous Daily    scopolamine  1 patch Transdermal Q72H    promethazine  6.25 mg Intramuscular Once     Continuous Infusions:   PN-Adult  3 IN 1 Central Line (Custom)      PN-Adult  3 IN 1 Central Line (Custom) 80 mL/hr at 09/14/18 1805    sodium chloride 20 mL/hr at 09/14/18 1637     CBC:   Recent Labs      09/12/18   1216  09/15/18   0600   WBC  6.4  7.1   HGB  13.8  14.0   PLT  358  342     BMP:  Recent Labs      09/12/18   1216  09/14/18   0636  09/15/18   0600   NA  135  140  142   K  3.9  3.1*  3.6   CL  97*  103  101   CO2  24  26  28   BUN  9  6*  6*   CREATININE  0.5  0.4  0.4   GLUCOSE  123*  106  125*     Hepatic: No results for input(s): AST, ALT, ALB, BILITOT, ALKPHOS in the last 72 hours. INR: No results for input(s): INR in the last 72 hours. Xray:     FINDINGS: Lung bases  Small left pleural effusion. Left basilar atelectasis versus consolidation. NG tube extends into the stomach. Abdomen pelvis  Stomach is fluid distended. Proximal small bowel loops are dilated with oral contrast and air similar to the previous exam. There is a transition in the left upper mid abdomen. Distal small bowel loops are decompressed.  There remains gas and stool in the Problem List:     Anemia, iron deficiency     Hypothyroidism     Celiac disease     GERD (gastroesophageal reflux disease)     S/P abdominal surgery, follow-up exam     Arm DVT (deep venous thromboembolism), acute (HCC)     Anxiety and depression     Hyperlipemia     Environmental allergies     Nausea     Constipation     Decreased appetite     Weakness generalized     Urinary frequency     SBO (small bowel obstruction) (HCC)     Daytime somnolence     Candida albicans infection     Generalized abdominal pain     Change in bowel habits     Narcolepsy     Shoulder pain, right     Abnormal blood chemistry     Mesenteric mass     S/P exploratory laparotomy     Moderate malnutrition ([address]      Kamari Palma MD

## 2018-09-15 NOTE — PROGRESS NOTES
for input(s): TROPONINI in the last 72 hours. BNP: No results for input(s): BNP in the last 72 hours. Lipids: No results for input(s): CHOL, HDL in the last 72 hours. Invalid input(s): LDLCALCU  INR: No results for input(s): INR in the last 72 hours. Radiology    Objective:   Vitals: /73   Pulse 70   Temp 98.2 °F (36.8 °C) (Oral)   Resp 16   Ht 5' 4\" (1.626 m)   Wt 186 lb (84.4 kg)   SpO2 96%   BMI 31.93 kg/m²   HEENT: Head:pupils react  Neck: supple  Lungs: clear to auscultation  Heart: regular rate and rhythm   Abdomen: soft BS slow NG NT  Extremities: warm no edema  Neurologic:  Alert, oriented X3    Impression:   :   1.  Post op  Ileus vs partial SBO  2. Recent robotic-assisted laparoscopic excision of left mesenteric periduodenal, perinephric tumor followed by abdominal exploration with lysis of adhesion and closure of a small enterotomy. 3.  History of narcolepsy. 4.  History of restless legs syndrome. 5.  Previous history of bowel resection for bowel obstruction. 6.  History of irritable bowel syndrome. 7.  History of celiac disease. 8.  History of Meniere's disease. 9.  History of mitral valve prolapse. 10.  Hypothyroidism. 11.  Hyperlipidemia. 12.  Previous history of DVT of the right upper extremity with PICC line placement. 13.  History of anxiety and depression. 14.  History of complex regional pain syndrome.     Plan:    Cont conservative measures  Replace electrolyte  F/u labs      Gustavo Maier MD

## 2018-09-15 NOTE — CONSULTS
[address] [place] [address]                                   CONSULTATION    PATIENT NAME: Jaida Mckenzie                 :        1955  MED REC NO:   [id number]                           ROOM:       0009  ACCOUNT NO:   [de-identified]                           ADMIT DATE: 2018  PROVIDER:     PADMA Alcantar Peak DATE:  2018    HISTORY OF PRESENT ILLNESS:  The patient is known to the practice, seen for  evaluation of partial bowel obstruction versus complete bowel obstruction. The patient is known to the practice. Has multiple endoscopies done by me. The patient is known to have a lesion being followed closely by imaging  studies, which is left mesenteric mass, it is growing for a while. Plan by  Dr. Tera Beckman for resection by robotic-assisted laparoscopy. The lesion  was resected successfully with the presence of Dr. Razia Lester, her general  surgeon, who operated her multiple times in the past.  Noticed that the  patient had enterostomy during the procedure without bowel spillage with  adhesions, required a lot of effort in order to free them, especially  anterior abdominal wall. Also, the patient told me that a flat piece was  seen during the and that was fixed and managed by Dr. Razia Lester during   Surgery. However, postprocedure, the patient developed some discomfort. She was not able to eat a lot, started slowly, was discharged home, but  then she came back with partial bowel obstruction versus complete bowel  obstruction. Currently, the patient complained of nausea and vomiting as  well as some discomfort. She has an NG tube. She is not able to eat at  this time. She is passing very little gas. She has no bowel movements. She has no difficulty to urinate. Pain. She does have TPN put in her  tube.   I was asked to evaluate the patient's as I am her GI doctor and  Done multiple
Patient seen evaluated full consult to follow
dilatation.       Gallbladder/Biliary tree: Patient is status post cholecystectomy with clips in the gallbladder fossa. There is no biliary dilatation.       Spleen: Unremarkable. No splenomegaly.       Pancreas: Unremarkable. No mass or pancreatic ductal dilatation.       Adrenal glands: Unremarkable. No mass.       Kidneys and ureters: Unremarkable. No hydroureteronephrosis, mass, or cyst. No renal or ureteral calculi.        Stomach, small bowel, and colon: There is thickening of the wall of the distal gastric antrum either due to peristalsis or possibly peptic ulcer disease/gastritis, correlate clinically. There are moderately dilated fluid filled small bowel loops in the distribution of the proximal medial treatment anomaly in the left abdomen worrisome for small bowel obstruction, less likely an ileus. There are postoperative changes of a small bowel    loop in the right lower quadrant with a circumferential staple line. No bowel wall thickening or bowel obstruction.       Appendix: The appendix is not visualized consistent with the history of appendectomy.        Omentum and mesentery: Unremarkable       Aorta, vascular: No aortic aneurysm or dissection. No significant vascular abnormality.       Reproductive: The uterus is absent consistent with hysterectomy. The adnexal regions are unremarkable.       Bladder: There is a small amount of air in the nondependent portion of the urinary bladder likely due to recent instrumentation. Correlate clinically. . No wall thickening or obvious mass. No calcified stones.       Intraperitoneal/retroperitoneal Space: There is a mild amount of ascites in the abdomen and pelvis. The previously noted heterogeneous 4.1 x 4.2 cm mass anterior to the lower pole left kidney has been removed. There is no abscess, adenopathy, or mass. No pneumoperitoneum.       Abdominal and pelvic body wall soft tissues:  There are postoperative changes of the anterior abdominal wall with a

## 2018-09-16 LAB
ANION GAP SERPL CALCULATED.3IONS-SCNC: 12 MEQ/L (ref 8–16)
BUN BLDV-MCNC: 8 MG/DL (ref 7–22)
CALCIUM IONIZED: 1.15 MMOL/L (ref 1.12–1.32)
CALCIUM SERPL-MCNC: 8.8 MG/DL (ref 8.5–10.5)
CHLORIDE BLD-SCNC: 101 MEQ/L (ref 98–111)
CO2: 28 MEQ/L (ref 23–33)
CREAT SERPL-MCNC: 0.4 MG/DL (ref 0.4–1.2)
GFR SERPL CREATININE-BSD FRML MDRD: > 90 ML/MIN/1.73M2
GLUCOSE BLD-MCNC: 107 MG/DL (ref 70–108)
GLUCOSE BLD-MCNC: 114 MG/DL (ref 70–108)
GLUCOSE BLD-MCNC: 94 MG/DL (ref 70–108)
GLUCOSE BLD-MCNC: 95 MG/DL (ref 70–108)
GLUCOSE BLD-MCNC: 96 MG/DL (ref 70–108)
MAGNESIUM: 2 MG/DL (ref 1.6–2.4)
PHOSPHORUS: 2.8 MG/DL (ref 2.4–4.7)
POTASSIUM SERPL-SCNC: 3.4 MEQ/L (ref 3.5–5.2)
SODIUM BLD-SCNC: 141 MEQ/L (ref 135–145)

## 2018-09-16 PROCEDURE — 2580000003 HC RX 258: Performed by: PHARMACIST

## 2018-09-16 PROCEDURE — G8978 MOBILITY CURRENT STATUS: HCPCS

## 2018-09-16 PROCEDURE — 83735 ASSAY OF MAGNESIUM: CPT

## 2018-09-16 PROCEDURE — 82948 REAGENT STRIP/BLOOD GLUCOSE: CPT

## 2018-09-16 PROCEDURE — 99232 SBSQ HOSP IP/OBS MODERATE 35: CPT | Performed by: SURGERY

## 2018-09-16 PROCEDURE — 6370000000 HC RX 637 (ALT 250 FOR IP): Performed by: INTERNAL MEDICINE

## 2018-09-16 PROCEDURE — 36415 COLL VENOUS BLD VENIPUNCTURE: CPT

## 2018-09-16 PROCEDURE — 1200000003 HC TELEMETRY R&B

## 2018-09-16 PROCEDURE — 80048 BASIC METABOLIC PNL TOTAL CA: CPT

## 2018-09-16 PROCEDURE — 6360000002 HC RX W HCPCS: Performed by: INTERNAL MEDICINE

## 2018-09-16 PROCEDURE — 84100 ASSAY OF PHOSPHORUS: CPT

## 2018-09-16 PROCEDURE — 97162 PT EVAL MOD COMPLEX 30 MIN: CPT

## 2018-09-16 PROCEDURE — 36592 COLLECT BLOOD FROM PICC: CPT

## 2018-09-16 PROCEDURE — G8979 MOBILITY GOAL STATUS: HCPCS

## 2018-09-16 PROCEDURE — 6360000002 HC RX W HCPCS: Performed by: NURSE PRACTITIONER

## 2018-09-16 PROCEDURE — 6370000000 HC RX 637 (ALT 250 FOR IP): Performed by: NURSE PRACTITIONER

## 2018-09-16 PROCEDURE — 82330 ASSAY OF CALCIUM: CPT

## 2018-09-16 PROCEDURE — C9113 INJ PANTOPRAZOLE SODIUM, VIA: HCPCS | Performed by: INTERNAL MEDICINE

## 2018-09-16 PROCEDURE — 2709999900 HC NON-CHARGEABLE SUPPLY

## 2018-09-16 PROCEDURE — 2500000003 HC RX 250 WO HCPCS: Performed by: SURGERY

## 2018-09-16 RX ORDER — NICOTINE POLACRILEX 4 MG
15 LOZENGE BUCCAL PRN
Status: DISCONTINUED | OUTPATIENT
Start: 2018-09-16 | End: 2018-09-21 | Stop reason: HOSPADM

## 2018-09-16 RX ORDER — DEXTROSE MONOHYDRATE 25 G/50ML
12.5 INJECTION, SOLUTION INTRAVENOUS PRN
Status: DISCONTINUED | OUTPATIENT
Start: 2018-09-16 | End: 2018-09-21 | Stop reason: HOSPADM

## 2018-09-16 RX ORDER — POTASSIUM CHLORIDE 29.8 MG/ML
20 INJECTION INTRAVENOUS
Status: COMPLETED | OUTPATIENT
Start: 2018-09-16 | End: 2018-09-16

## 2018-09-16 RX ORDER — DEXTROSE MONOHYDRATE 50 MG/ML
100 INJECTION, SOLUTION INTRAVENOUS PRN
Status: DISCONTINUED | OUTPATIENT
Start: 2018-09-16 | End: 2018-09-21 | Stop reason: HOSPADM

## 2018-09-16 RX ADMIN — Medication: at 20:30

## 2018-09-16 RX ADMIN — ENOXAPARIN SODIUM 40 MG: 40 INJECTION SUBCUTANEOUS at 09:11

## 2018-09-16 RX ADMIN — MODAFINIL 200 MG: 100 TABLET ORAL at 09:11

## 2018-09-16 RX ADMIN — PROMETHAZINE HYDROCHLORIDE 12.5 MG: 25 INJECTION INTRAMUSCULAR; INTRAVENOUS at 22:27

## 2018-09-16 RX ADMIN — POTASSIUM CHLORIDE 20 MEQ: 400 INJECTION, SOLUTION INTRAVENOUS at 13:12

## 2018-09-16 RX ADMIN — NALOXEGOL OXALATE 12.5 MG: 12.5 TABLET, FILM COATED ORAL at 09:12

## 2018-09-16 RX ADMIN — ONDANSETRON 4 MG: 2 INJECTION INTRAMUSCULAR; INTRAVENOUS at 22:11

## 2018-09-16 RX ADMIN — POLYETHYLENE GLYCOL (3350) 17 G: 17 POWDER, FOR SOLUTION ORAL at 10:41

## 2018-09-16 RX ADMIN — SODIUM CHLORIDE: 9 INJECTION, SOLUTION INTRAVENOUS at 20:30

## 2018-09-16 RX ADMIN — LEVOTHYROXINE SODIUM 100 MCG: 100 TABLET ORAL at 09:12

## 2018-09-16 RX ADMIN — MODAFINIL 200 MG: 100 TABLET ORAL at 17:25

## 2018-09-16 RX ADMIN — DOCUSATE SODIUM 100 MG: 100 CAPSULE, LIQUID FILLED ORAL at 21:28

## 2018-09-16 RX ADMIN — POTASSIUM CHLORIDE 20 MEQ: 400 INJECTION, SOLUTION INTRAVENOUS at 10:41

## 2018-09-16 RX ADMIN — DULOXETINE HYDROCHLORIDE 90 MG: 60 CAPSULE, DELAYED RELEASE ORAL at 09:12

## 2018-09-16 RX ADMIN — PANTOPRAZOLE SODIUM 40 MG: 40 INJECTION, POWDER, FOR SOLUTION INTRAVENOUS at 09:12

## 2018-09-16 RX ADMIN — DOCUSATE SODIUM 100 MG: 100 CAPSULE, LIQUID FILLED ORAL at 09:11

## 2018-09-16 ASSESSMENT — PAIN DESCRIPTION - LOCATION
LOCATION: ABDOMEN;THROAT
LOCATION: ABDOMEN;THROAT

## 2018-09-16 ASSESSMENT — PAIN DESCRIPTION - DESCRIPTORS: DESCRIPTORS: DISCOMFORT

## 2018-09-16 ASSESSMENT — PAIN SCALES - GENERAL
PAINLEVEL_OUTOF10: 4
PAINLEVEL_OUTOF10: 4
PAINLEVEL_OUTOF10: 5

## 2018-09-16 ASSESSMENT — PAIN DESCRIPTION - ONSET: ONSET: ON-GOING

## 2018-09-16 ASSESSMENT — PAIN DESCRIPTION - PROGRESSION
CLINICAL_PROGRESSION: GRADUALLY WORSENING
CLINICAL_PROGRESSION: GRADUALLY WORSENING

## 2018-09-16 ASSESSMENT — PAIN DESCRIPTION - PAIN TYPE: TYPE: ACUTE PAIN;SURGICAL PAIN

## 2018-09-16 NOTE — PROGRESS NOTES
[address] [place] [address]   Dr. Elsi Merritt  Daily Progress Note  Pt Name: Jennifer Mcdaniels  Medical Record Number: [id number]  Date of Birth 1955   Today's Date: 9/16/2018        CHIEF COMPLAINT partial small bowel obstruction    SUBJECTIVE  Patient feels well. Except for sore throat secondary to NG    OBJECTIVE  CURRENT VITALS /77   Pulse 87   Temp 98.1 °F (36.7 °C) (Oral)   Resp 18   Ht 5' 4\" (1.626 m)   Wt 186 lb (84.4 kg)   SpO2 96%   BMI 31.93 kg/m²   LUNGS: Lungs clear   ABDOMEN: Soft. Bowel sounds no distention minimal discomfort  WOUNDS: Incision looks good  24 HR INTAKE/OUTPUT :   Intake/Output Summary (Last 24 hours) at 09/16/18 1030  Last data filed at 09/16/18 0653   Gross per 24 hour   Intake           2378.5 ml   Output              700 ml   Net           1678.5 ml   I/O last 3 completed shifts: In: 2378.5 [I.V.:491.5; NG/GT:1887]  Out: 700 [Emesis/NG output:700]  DRAIN/TUBE OUTPUT : [REMOVED] NG/OG Tube Nasogastric Left nostril-Output (mL): 150 ml  NG/OG Tube Nasogastric 14 fr Left nostril-Output (mL): 700 ml    LABS  CBC :   Lab Results   Component Value Date    WBC 7.1 09/15/2018    HGB 14.0 09/15/2018    HCT 42.4 09/15/2018     09/15/2018     BMP: Lab Results   Component Value Date     09/16/2018    K 3.4 09/16/2018    K 4.6 09/01/2018     09/16/2018    CO2 28 09/16/2018    BUN 8 09/16/2018    CREATININE 0.4 09/16/2018    MG 2.0 09/16/2018    PHOS 2.8 09/16/2018       ASSESSMENT  1. Patient did pass a little flatus yesterday and she still has fair amount for 24 hours-700 mL again discussed with patient I feel tender best interest to continue TPN and gut rest and see if this resolved white blood cell count was normal yesterday she is not going in reverse she very well may need repeat exploration given the problems she had with the recent surgery I feel it best to continue conservative treatment she is in agreement      PLAN  1.   Continue NG and monitor      Justyna Scherer  Electronically signed 9/16/2018 at 10:30 AM

## 2018-09-17 LAB
ANION GAP SERPL CALCULATED.3IONS-SCNC: 12 MEQ/L (ref 8–16)
BUN BLDV-MCNC: 10 MG/DL (ref 7–22)
CALCIUM IONIZED: 1.16 MMOL/L (ref 1.12–1.32)
CALCIUM SERPL-MCNC: 8.9 MG/DL (ref 8.5–10.5)
CHLORIDE BLD-SCNC: 100 MEQ/L (ref 98–111)
CO2: 27 MEQ/L (ref 23–33)
CREAT SERPL-MCNC: 0.4 MG/DL (ref 0.4–1.2)
GFR SERPL CREATININE-BSD FRML MDRD: > 90 ML/MIN/1.73M2
GLUCOSE BLD-MCNC: 103 MG/DL (ref 70–108)
GLUCOSE BLD-MCNC: 112 MG/DL (ref 70–108)
GLUCOSE BLD-MCNC: 123 MG/DL (ref 70–108)
GLUCOSE BLD-MCNC: 130 MG/DL (ref 70–108)
GLUCOSE BLD-MCNC: 141 MG/DL (ref 70–108)
GLUCOSE BLD-MCNC: 95 MG/DL (ref 70–108)
MAGNESIUM: 2.1 MG/DL (ref 1.6–2.4)
PHOSPHORUS: 4.9 MG/DL (ref 2.4–4.7)
POTASSIUM SERPL-SCNC: 4.6 MEQ/L (ref 3.5–5.2)
SODIUM BLD-SCNC: 139 MEQ/L (ref 135–145)

## 2018-09-17 PROCEDURE — 36592 COLLECT BLOOD FROM PICC: CPT

## 2018-09-17 PROCEDURE — 2709999900 HC NON-CHARGEABLE SUPPLY

## 2018-09-17 PROCEDURE — 6370000000 HC RX 637 (ALT 250 FOR IP): Performed by: NURSE PRACTITIONER

## 2018-09-17 PROCEDURE — 36415 COLL VENOUS BLD VENIPUNCTURE: CPT

## 2018-09-17 PROCEDURE — 82330 ASSAY OF CALCIUM: CPT

## 2018-09-17 PROCEDURE — APPSS30 APP SPLIT SHARED TIME 16-30 MINUTES: Performed by: NURSE PRACTITIONER

## 2018-09-17 PROCEDURE — 80048 BASIC METABOLIC PNL TOTAL CA: CPT

## 2018-09-17 PROCEDURE — 83735 ASSAY OF MAGNESIUM: CPT

## 2018-09-17 PROCEDURE — 97530 THERAPEUTIC ACTIVITIES: CPT

## 2018-09-17 PROCEDURE — 1200000003 HC TELEMETRY R&B

## 2018-09-17 PROCEDURE — 2580000003 HC RX 258: Performed by: PHARMACIST

## 2018-09-17 PROCEDURE — 2500000003 HC RX 250 WO HCPCS: Performed by: PHARMACIST

## 2018-09-17 PROCEDURE — 97166 OT EVAL MOD COMPLEX 45 MIN: CPT

## 2018-09-17 PROCEDURE — 84100 ASSAY OF PHOSPHORUS: CPT

## 2018-09-17 PROCEDURE — G8988 SELF CARE GOAL STATUS: HCPCS

## 2018-09-17 PROCEDURE — 97535 SELF CARE MNGMENT TRAINING: CPT

## 2018-09-17 PROCEDURE — 6360000002 HC RX W HCPCS: Performed by: INTERNAL MEDICINE

## 2018-09-17 PROCEDURE — 99232 SBSQ HOSP IP/OBS MODERATE 35: CPT | Performed by: SURGERY

## 2018-09-17 PROCEDURE — C9113 INJ PANTOPRAZOLE SODIUM, VIA: HCPCS | Performed by: INTERNAL MEDICINE

## 2018-09-17 PROCEDURE — 97116 GAIT TRAINING THERAPY: CPT

## 2018-09-17 PROCEDURE — 82948 REAGENT STRIP/BLOOD GLUCOSE: CPT

## 2018-09-17 PROCEDURE — 6370000000 HC RX 637 (ALT 250 FOR IP): Performed by: INTERNAL MEDICINE

## 2018-09-17 PROCEDURE — G8987 SELF CARE CURRENT STATUS: HCPCS

## 2018-09-17 RX ORDER — DEXTROSE MONOHYDRATE 100 MG/ML
INJECTION, SOLUTION INTRAVENOUS CONTINUOUS
Status: ACTIVE | OUTPATIENT
Start: 2018-09-17 | End: 2018-09-17

## 2018-09-17 RX ORDER — KETOROLAC TROMETHAMINE 30 MG/ML
15 INJECTION, SOLUTION INTRAMUSCULAR; INTRAVENOUS EVERY 6 HOURS PRN
Status: DISCONTINUED | OUTPATIENT
Start: 2018-09-17 | End: 2018-09-21 | Stop reason: HOSPADM

## 2018-09-17 RX ADMIN — NALOXEGOL OXALATE 12.5 MG: 12.5 TABLET, FILM COATED ORAL at 10:00

## 2018-09-17 RX ADMIN — PANTOPRAZOLE SODIUM 40 MG: 40 INJECTION, POWDER, FOR SOLUTION INTRAVENOUS at 10:30

## 2018-09-17 RX ADMIN — MODAFINIL 200 MG: 100 TABLET ORAL at 10:00

## 2018-09-17 RX ADMIN — ENOXAPARIN SODIUM 40 MG: 40 INJECTION SUBCUTANEOUS at 09:17

## 2018-09-17 RX ADMIN — DULOXETINE HYDROCHLORIDE 90 MG: 60 CAPSULE, DELAYED RELEASE ORAL at 10:00

## 2018-09-17 RX ADMIN — DOCUSATE SODIUM 100 MG: 100 CAPSULE, LIQUID FILLED ORAL at 10:00

## 2018-09-17 RX ADMIN — POLYETHYLENE GLYCOL (3350) 17 G: 17 POWDER, FOR SOLUTION ORAL at 10:00

## 2018-09-17 RX ADMIN — DEXTROSE MONOHYDRATE: 100 INJECTION, SOLUTION INTRAVENOUS at 12:31

## 2018-09-17 RX ADMIN — SODIUM CHLORIDE: 9 INJECTION, SOLUTION INTRAVENOUS at 18:05

## 2018-09-17 RX ADMIN — ONDANSETRON 4 MG: 2 INJECTION INTRAMUSCULAR; INTRAVENOUS at 21:43

## 2018-09-17 RX ADMIN — ONDANSETRON 4 MG: 2 INJECTION INTRAMUSCULAR; INTRAVENOUS at 09:11

## 2018-09-17 RX ADMIN — LEVOTHYROXINE SODIUM 100 MCG: 100 TABLET ORAL at 10:00

## 2018-09-17 RX ADMIN — CHLORASEPTIC 1 SPRAY: 1.5 LIQUID ORAL at 18:13

## 2018-09-17 RX ADMIN — MODAFINIL 200 MG: 100 TABLET ORAL at 16:59

## 2018-09-17 RX ADMIN — DOCUSATE SODIUM 100 MG: 100 CAPSULE, LIQUID FILLED ORAL at 21:43

## 2018-09-17 RX ADMIN — CHLORASEPTIC 1 SPRAY: 1.5 LIQUID ORAL at 21:43

## 2018-09-17 RX ADMIN — CALCIUM GLUCONATE: 94 INJECTION, SOLUTION INTRAVENOUS at 18:07

## 2018-09-17 ASSESSMENT — PAIN DESCRIPTION - PAIN TYPE
TYPE: ACUTE PAIN

## 2018-09-17 ASSESSMENT — PAIN DESCRIPTION - DESCRIPTORS
DESCRIPTORS: DISCOMFORT
DESCRIPTORS: DISCOMFORT

## 2018-09-17 ASSESSMENT — PAIN DESCRIPTION - PROGRESSION
CLINICAL_PROGRESSION: GRADUALLY WORSENING

## 2018-09-17 ASSESSMENT — PAIN SCALES - GENERAL
PAINLEVEL_OUTOF10: 4
PAINLEVEL_OUTOF10: 2
PAINLEVEL_OUTOF10: 3

## 2018-09-17 ASSESSMENT — PAIN DESCRIPTION - LOCATION
LOCATION: HIP
LOCATION: ABDOMEN
LOCATION: THROAT

## 2018-09-17 ASSESSMENT — PAIN DESCRIPTION - ORIENTATION: ORIENTATION: POSTERIOR

## 2018-09-17 NOTE — PROGRESS NOTES
ices)  · Oral Diet intake: Unable to assess (eating italian ices and ice chips)  · Oral Nutrition Supplement (ONS) Orders: None (Declines Ensure Clear at this time)  · ONS intake: Refused  · Parenteral Nutrition Orders:  · Type and Formula: 3-in-1 Custom (15 kcal/kg, 1.2 g/kg protein, and 30% lipid kcals based on dosing weight of 62 kg)   · Lipids: Daily  · Additives: per pharmacy  · Rate/Volume: as per pharmacy  · Duration: Continuous 24 hrs  · Current PN Order Provides: Current TPN provides 620 kcal, 62 grams protein, and 73 grams carbohydrate daily  · Goal PN Orders Provides: New 3-in-1 TPN bag to provide: 930 kcal, 74.4 g protein, 104 g dextrose/24 hours and 279 lipid kcals  · Anthropometric Measures:  · Ht: 5' 4\" (162.6 cm)   · Current Body Wt: 186 lb (84.4 kg) (9/13; no edema noted)  · Admission Body Wt: 186 lb (84.4 kg) (9/13/18, weight method not specified, no edema)  · Usual Body Wt:  (135-140# with 30# gain due to hypothyroidism per patient; per EHR: 8/25/17 174#, 6/20/18 177# 11.2oz)  · % Weight Change: +6.9% weight gain,  x1 year per EMR -possibly d/t hypothyroidism per pt report  · Ideal Body Wt: 120 lb (54.4 kg), % Ideal Body 155%  · Adjusted Body Wt: 137 lb (62.1 kg), body weight adjusted for Obesity  · BMI Classification: BMI 30.0 - 34.9 Obese Class I (32)  · Comparative Standards (Estimated Nutrition Needs):  · Estimated Daily Total Kcal: 9376-3500 kcals (28-30 kcal/kgm adjusted weight 62 kgm)  · Estimated Daily Protein (g): 74-93 grams (1.2-1.5 grams protein/kgm adjusted weight 62kgm)  · Estimated Daily Total Fluid (ml/day): 3665-8828 mL/day (25-30 mL/kg based on current weight of 84.4 kg)    Estimated Intake vs Estimated Needs: Intake Less Than Needs    Nutrition Risk Level: High    Nutrition Interventions:   Continue current diet, Modify current Parenteral Nutrition  Continued Inpatient Monitoring, Education Initiated, Coordination of Care (Discussed TPN plan with pt and pharmacy.  Encouraged sips

## 2018-09-17 NOTE — PROGRESS NOTES
TPN Follow Up Note    Elevated phosphorus level 4.9  Will stop current TPN and run D10W at 80ml/hr until new TPN this evening at 1800  Further TPN orders to come    Ermelinda Choe PharmD, BCPS 9/17/2018 9:32 AM

## 2018-09-17 NOTE — PROGRESS NOTES
IM Progress Note  Dr. Elly De Jesus  9/17/2018 10:46 AM      Patient name Fani Espinosa  [id number]  PCP: Elizabeth Yoder MD  Admit Date: 9/8/2018  Acct No. [de-identified]    Subjective: Interval History:   Had BM and + flatus  abd pain better          Diet: DIET CLEAR LIQUID;    I/O last 3 completed shifts: In: 629 [P.O.:200; I.V.:621]  Out: 800 [Emesis/NG output:800]  No intake/output data recorded. Admission weight: 186 lb (84.4 kg) as of 9/8/2018  7:42 PM  Wt Readings from Last 3 Encounters:   09/13/18 186 lb (84.4 kg)   08/31/18 175 lb (79.4 kg)   06/29/18 179 lb (81.2 kg)     Body mass index is 31.93 kg/m². Medications:   Scheduled Meds:   potassium replacement protocol   Other RX Placeholder    potassium (CARDIAC) replacement protocol   Other RX Placeholder    insulin lispro  0-12 Units Subcutaneous 4 times per day    naloxegol  12.5 mg Oral QAM    docusate sodium  100 mg Oral BID    polyethylene glycol  17 g Oral Daily    modafinil  200 mg Oral BID    levothyroxine  100 mcg Oral Daily    pantoprazole  40 mg Intravenous Daily    DULoxetine  90 mg Oral Daily    enoxaparin  40 mg Subcutaneous Daily    scopolamine  1 patch Transdermal Q72H    promethazine  6.25 mg Intramuscular Once     Continuous Infusions:   dextrose      dextrose      sodium chloride 20 mL/hr at 09/16/18 2030       Labs :     CBC:   Recent Labs      09/15/18   0600   WBC  7.1   HGB  14.0   PLT  342     BMP:    Recent Labs      09/15/18   0600  09/16/18   0215  09/17/18   0610   NA  142  141  139   K  3.6  3.4*  4.6   CL  101  101  100   CO2  28  28  27   BUN  6*  8  10   CREATININE  0.4  0.4  0.4   GLUCOSE  125*  114*  141*     Hepatic:   No results for input(s): AST, ALT, ALB, BILITOT, ALKPHOS in the last 72 hours. Troponin: No results for input(s): TROPONINI in the last 72 hours. BNP: No results for input(s): BNP in the last 72 hours.   Lipids: No results for input(s): CHOL, HDL in the last 72

## 2018-09-17 NOTE — PROGRESS NOTES
[address] [place] [address]   Dr. Geeta Driver  Daily Progress Note    Pt Name: Lenard Hartmann  Medical Record Number: [id number]  Date of Birth 1955   Today's Date: 9/17/2018        CHIEF COMPLAINT partial small bowel obstruction    SUBJECTIVE  Patient feels a little better. She states she had a small stool last evening. Complaints of left leg pain, deep achy and burning, no signs of pressure sore. Had some dry heaves lat evening. 800ml out in NG tube, 24 hours, having ice chips and flavored ice cups. States her pain is tolerable, avoids pain medication id able to. Has been up ambulating 3-4 times daily. OBJECTIVE  CURRENT VITALS /74   Pulse 82   Temp 98.3 °F (36.8 °C) (Oral)   Resp 20   Ht 5' 4\" (1.626 m)   Wt 186 lb (84.4 kg)   SpO2 96%   BMI 31.93 kg/m²    General appearance: no distress  Heart - regular rate and rhythm  LUNGS: Lungs clear   ABDOMEN: Soft. Bowel sounds aactive no distention minimal discomfort  Neurological- alert and oriented, good spirits today  WOUNDS: Incision looks good, staples intact  24 HR INTAKE/OUTPUT :     Intake/Output Summary (Last 24 hours) at 09/17/18 1011  Last data filed at 09/16/18 2243   Gross per 24 hour   Intake              821 ml   Output              800 ml   Net               21 ml   I/O last 3 completed shifts: In: 384 [P.O.:200; I.V.:621]  Out: 800 [Emesis/NG output:800]  DRAIN/TUBE OUTPUT : [REMOVED] NG/OG Tube Nasogastric Left nostril-Output (mL): 150 ml  NG/OG Tube Nasogastric 14 fr Left nostril-Output (mL): 300 ml    LABS  CBC :   Lab Results   Component Value Date    WBC 7.1 09/15/2018    HGB 14.0 09/15/2018    HCT 42.4 09/15/2018     09/15/2018     BMP:   Lab Results   Component Value Date     09/17/2018    K 4.6 09/17/2018    K 4.6 09/01/2018     09/17/2018    CO2 27 09/17/2018    BUN 10 09/17/2018    CREATININE 0.4 09/17/2018    MG 2.1 09/17/2018    PHOS 4.9 09/17/2018       ASSESSMENT  1. sbo vs ileus   2.  NG tube to LIWS continued  3. Nausea dry heaves  4. Abdominal pain in LLQ improving   5. ambulate 3-4 x daily   6. constipation   7. Pre albumin 20.6  8. + flatus, BM last evening       PLAN  1. Conservative treatment  2. Bowel rest   3. NPO - may have flavored ice   4. IV hydration and TPN/PICC  5. GI and DVT prophylaxis  6. Analgesia and antiemetics prn  7. I&O  8. Incisional care daily remove staples   9. Encourage ambulation minimally of 4x daily   10. stool regimen, movantik, colace and miralax  11. Plan is gut rest x 1 week, if no improvement abdominal exploration next week will be necessary    Electronically signed by NEREYDA Dia - CNP on 9/17/2018 at 10:11 AM Patient seen and examined independently by me. Above discussed and I agree with CNP. Labs, cultures, and radiographs where available were reviewed. See orders for the updated patient care plan.     Rene Juarez MD, pt did have BM  abd soft cont NG possibly try clamping in AM  9/17/2018   3:46 PM

## 2018-09-17 NOTE — PROGRESS NOTES
Hip       Social/Functional:  Lives With: Spouse  Type of Home: House  Home Layout: One level  Home Access: Stairs to enter without rails  Entrance Stairs - Number of Steps: 1  Home Equipment:  (no AD used PTA)     Objective:  Supine to Sit: Supervision  Sit to Supine: Unable to assess (Pt left sitting up in bedside chair. )    Transfers  Sit to Stand: Stand by assistance  Stand to sit: Stand by assistance       Ambulation 1  Surface: level tile  Device: No Device  Assistance: Stand by assistance  Quality of Gait: Slow jose luis. Occasional path deviations and min instability but no LOB. Distance: 600 feet x1       Exercises:  Exercises  Comments: Seated in bedside chair pt completed BLE ankle pumps, LAQ, marches, hip abd/add, glut set all x15 reps to increase strength for improved functional mobility. Activity Tolerance:  Activity Tolerance: Patient Tolerated treatment well    Assessment: Body structures, Functions, Activity limitations: Decreased endurance, Decreased functional mobility , Decreased strength, Decreased balance  Assessment: Pt tolerated session fairly well. Limited by decreased strength and decreased endurance. Would benefit form continued therapy at this time. Prognosis: Good     REQUIRES PT FOLLOW UP: Yes  Discharge Recommendations: Continue to assess pending progress, Home with assist PRN    Patient Education:  Patient Education: POC    Equipment Recommendations:  Equipment Needed: No    Safety:  Type of devices:  All fall risk precautions in place, Call light within reach, Nurse notified, Left in chair    Plan:  Times per week: 2-3X GM  Times per day: Daily  Specific instructions for Next Treatment: therex and mobility  Current Treatment Recommendations: Strengthening, Functional Mobility Training, Transfer Training, Balance Training, Endurance Training, Stair training, Gait Training, Pain Management, Patient/Caregiver Education & Training, Safety Education & Training, Home Exercise Program, Equipment Evaluation, Education, & procurement    Goals:  Patient goals : heal up    Short term goals  Time Frame for Short term goals: 2 weeks  Short term goal 1: bed mobility with MOD I to get in/out of bed  Short term goal 2: transfer with MOD I to get in/out of chairs  Short term goal 3: amb 150'x1 without AD and MOD I to walk safely in home  Short term goal 4: negotiate 1 step no HR and S to enter home safely    Long term goals  Time Frame for Long term goals : no LTGs set secondary to short ELOS

## 2018-09-18 LAB
ANION GAP SERPL CALCULATED.3IONS-SCNC: 11 MEQ/L (ref 8–16)
BUN BLDV-MCNC: 12 MG/DL (ref 7–22)
CALCIUM IONIZED: 1.17 MMOL/L (ref 1.12–1.32)
CALCIUM SERPL-MCNC: 9.1 MG/DL (ref 8.5–10.5)
CHLORIDE BLD-SCNC: 102 MEQ/L (ref 98–111)
CO2: 28 MEQ/L (ref 23–33)
CREAT SERPL-MCNC: 0.4 MG/DL (ref 0.4–1.2)
GFR SERPL CREATININE-BSD FRML MDRD: > 90 ML/MIN/1.73M2
GLUCOSE BLD-MCNC: 100 MG/DL (ref 70–108)
GLUCOSE BLD-MCNC: 102 MG/DL (ref 70–108)
GLUCOSE BLD-MCNC: 112 MG/DL (ref 70–108)
GLUCOSE BLD-MCNC: 119 MG/DL (ref 70–108)
GLUCOSE BLD-MCNC: 133 MG/DL (ref 70–108)
GLUCOSE BLD-MCNC: 97 MG/DL (ref 70–108)
MAGNESIUM: 2.1 MG/DL (ref 1.6–2.4)
PHOSPHORUS: 3.8 MG/DL (ref 2.4–4.7)
POTASSIUM SERPL-SCNC: 4.1 MEQ/L (ref 3.5–5.2)
SODIUM BLD-SCNC: 141 MEQ/L (ref 135–145)

## 2018-09-18 PROCEDURE — 84100 ASSAY OF PHOSPHORUS: CPT

## 2018-09-18 PROCEDURE — 82948 REAGENT STRIP/BLOOD GLUCOSE: CPT

## 2018-09-18 PROCEDURE — 6360000002 HC RX W HCPCS: Performed by: INTERNAL MEDICINE

## 2018-09-18 PROCEDURE — 97116 GAIT TRAINING THERAPY: CPT

## 2018-09-18 PROCEDURE — 6370000000 HC RX 637 (ALT 250 FOR IP): Performed by: NURSE PRACTITIONER

## 2018-09-18 PROCEDURE — C9113 INJ PANTOPRAZOLE SODIUM, VIA: HCPCS | Performed by: INTERNAL MEDICINE

## 2018-09-18 PROCEDURE — 2709999900 HC NON-CHARGEABLE SUPPLY

## 2018-09-18 PROCEDURE — 80048 BASIC METABOLIC PNL TOTAL CA: CPT

## 2018-09-18 PROCEDURE — 6360000002 HC RX W HCPCS: Performed by: NURSE PRACTITIONER

## 2018-09-18 PROCEDURE — 99024 POSTOP FOLLOW-UP VISIT: CPT | Performed by: NURSE PRACTITIONER

## 2018-09-18 PROCEDURE — 6370000000 HC RX 637 (ALT 250 FOR IP): Performed by: INTERNAL MEDICINE

## 2018-09-18 PROCEDURE — 82330 ASSAY OF CALCIUM: CPT

## 2018-09-18 PROCEDURE — 1200000003 HC TELEMETRY R&B

## 2018-09-18 PROCEDURE — 2500000003 HC RX 250 WO HCPCS

## 2018-09-18 PROCEDURE — 83735 ASSAY OF MAGNESIUM: CPT

## 2018-09-18 PROCEDURE — 97110 THERAPEUTIC EXERCISES: CPT

## 2018-09-18 PROCEDURE — 36415 COLL VENOUS BLD VENIPUNCTURE: CPT

## 2018-09-18 PROCEDURE — APPSS30 APP SPLIT SHARED TIME 16-30 MINUTES: Performed by: NURSE PRACTITIONER

## 2018-09-18 RX ORDER — ACETAMINOPHEN 325 MG/1
650 TABLET ORAL EVERY 4 HOURS PRN
Status: DISCONTINUED | OUTPATIENT
Start: 2018-09-18 | End: 2018-09-21 | Stop reason: HOSPADM

## 2018-09-18 RX ADMIN — POLYETHYLENE GLYCOL (3350) 17 G: 17 POWDER, FOR SOLUTION ORAL at 11:00

## 2018-09-18 RX ADMIN — CALCIUM GLUCONATE: 94 INJECTION, SOLUTION INTRAVENOUS at 18:22

## 2018-09-18 RX ADMIN — PROMETHAZINE HYDROCHLORIDE 12.5 MG: 25 INJECTION INTRAMUSCULAR; INTRAVENOUS at 23:06

## 2018-09-18 RX ADMIN — ENOXAPARIN SODIUM 40 MG: 40 INJECTION SUBCUTANEOUS at 11:00

## 2018-09-18 RX ADMIN — DULOXETINE HYDROCHLORIDE 90 MG: 60 CAPSULE, DELAYED RELEASE ORAL at 11:40

## 2018-09-18 RX ADMIN — ONDANSETRON 4 MG: 2 INJECTION INTRAMUSCULAR; INTRAVENOUS at 09:48

## 2018-09-18 RX ADMIN — PANTOPRAZOLE SODIUM 40 MG: 40 INJECTION, POWDER, FOR SOLUTION INTRAVENOUS at 09:51

## 2018-09-18 RX ADMIN — ONDANSETRON 4 MG: 2 INJECTION INTRAMUSCULAR; INTRAVENOUS at 21:02

## 2018-09-18 RX ADMIN — MODAFINIL 200 MG: 100 TABLET ORAL at 11:00

## 2018-09-18 RX ADMIN — CHLORASEPTIC 1 SPRAY: 1.5 LIQUID ORAL at 00:15

## 2018-09-18 RX ADMIN — MODAFINIL 200 MG: 100 TABLET ORAL at 18:15

## 2018-09-18 RX ADMIN — DOCUSATE SODIUM 100 MG: 100 CAPSULE, LIQUID FILLED ORAL at 11:00

## 2018-09-18 RX ADMIN — NALOXEGOL OXALATE 12.5 MG: 12.5 TABLET, FILM COATED ORAL at 11:00

## 2018-09-18 RX ADMIN — LEVOTHYROXINE SODIUM 100 MCG: 100 TABLET ORAL at 13:05

## 2018-09-18 ASSESSMENT — PAIN DESCRIPTION - LOCATION
LOCATION: ABDOMEN
LOCATION: THROAT

## 2018-09-18 ASSESSMENT — PAIN DESCRIPTION - PROGRESSION
CLINICAL_PROGRESSION: GRADUALLY WORSENING
CLINICAL_PROGRESSION: NOT CHANGED
CLINICAL_PROGRESSION: GRADUALLY WORSENING

## 2018-09-18 ASSESSMENT — PAIN DESCRIPTION - DESCRIPTORS
DESCRIPTORS: DISCOMFORT
DESCRIPTORS: CRAMPING

## 2018-09-18 ASSESSMENT — PAIN DESCRIPTION - PAIN TYPE
TYPE: ACUTE PAIN
TYPE: ACUTE PAIN

## 2018-09-18 ASSESSMENT — PAIN DESCRIPTION - FREQUENCY
FREQUENCY: CONTINUOUS
FREQUENCY: CONTINUOUS

## 2018-09-18 ASSESSMENT — PAIN SCALES - GENERAL
PAINLEVEL_OUTOF10: 7
PAINLEVEL_OUTOF10: 5
PAINLEVEL_OUTOF10: 5

## 2018-09-18 NOTE — PROGRESS NOTES
IM Progress Note  Dr. Darrian Perrin  9/18/2018 9:38 AM      Patient name Mateusz Watkins  KXD0/[id number]/2015  PCP: Joe Farrell MD  Admit Date: 9/8/2018  Acct No. [de-identified]    Subjective: Interval History:   Had 4 BM yesterday  Tolerating NGT clamping so far           Diet: DIET CLEAR LIQUID;  PN-Adult  3 IN 1 Central Line (Custom)    I/O last 3 completed shifts: In: 4604.5 [P.O.:610; I.V.:3994.5]  Out: 630 [Emesis/NG output:630]  No intake/output data recorded. Admission weight: 186 lb (84.4 kg) as of 9/8/2018  7:42 PM  Wt Readings from Last 3 Encounters:   09/18/18 165 lb 4.8 oz (75 kg)   08/31/18 175 lb (79.4 kg)   06/29/18 179 lb (81.2 kg)     Body mass index is 28.37 kg/m². Medications:   Scheduled Meds:   potassium replacement protocol   Other RX Placeholder    potassium (CARDIAC) replacement protocol   Other RX Placeholder    insulin lispro  0-12 Units Subcutaneous 4 times per day    naloxegol  12.5 mg Oral QAM    docusate sodium  100 mg Oral BID    polyethylene glycol  17 g Oral Daily    modafinil  200 mg Oral BID    levothyroxine  100 mcg Oral Daily    pantoprazole  40 mg Intravenous Daily    DULoxetine  90 mg Oral Daily    enoxaparin  40 mg Subcutaneous Daily    scopolamine  1 patch Transdermal Q72H    promethazine  6.25 mg Intramuscular Once     Continuous Infusions:   PN-Adult  3 IN 1 Central Line (Custom) 80 mL/hr at 09/17/18 1807    dextrose      sodium chloride 20 mL/hr at 09/17/18 1805       Labs :     CBC:   No results for input(s): WBC, HGB, PLT in the last 72 hours. BMP:    Recent Labs      09/16/18   0215  09/17/18   0610  09/18/18   0415   NA  141  139  141   K  3.4*  4.6  4.1   CL  101  100  102   CO2  28  27  28   BUN  8  10  12   CREATININE  0.4  0.4  0.4   GLUCOSE  114*  141*  119*     Hepatic:   No results for input(s): AST, ALT, ALB, BILITOT, ALKPHOS in the last 72 hours. Troponin: No results for input(s): TROPONINI in the last 72 hours.   BNP: No

## 2018-09-18 NOTE — PROGRESS NOTES
Electronically signed by Emilie Bryant RD, KVNG on 9/18/18 at 11:36 AM    Contact Number: [phone]

## 2018-09-18 NOTE — PROGRESS NOTES
procurement    Goals:  Patient goals : heal up    Short term goals  Time Frame for Short term goals: 2 weeks  Short term goal 1: bed mobility with MOD I to get in/out of bed  Short term goal 2: transfer with MOD I to get in/out of chairs  Short term goal 3: amb 150'x1 without AD and MOD I to walk safely in home  Short term goal 4: negotiate 1 step no HR and S to enter home safely    Long term goals  Time Frame for Long term goals : no LTGs set secondary to short ELOS

## 2018-09-19 LAB
ANION GAP SERPL CALCULATED.3IONS-SCNC: 10 MEQ/L (ref 8–16)
BUN BLDV-MCNC: 12 MG/DL (ref 7–22)
CALCIUM IONIZED: 1.16 MMOL/L (ref 1.12–1.32)
CALCIUM SERPL-MCNC: 8.9 MG/DL (ref 8.5–10.5)
CHLORIDE BLD-SCNC: 100 MEQ/L (ref 98–111)
CO2: 28 MEQ/L (ref 23–33)
CREAT SERPL-MCNC: 0.4 MG/DL (ref 0.4–1.2)
GFR SERPL CREATININE-BSD FRML MDRD: > 90 ML/MIN/1.73M2
GLUCOSE BLD-MCNC: 104 MG/DL (ref 70–108)
GLUCOSE BLD-MCNC: 97 MG/DL (ref 70–108)
GLUCOSE BLD-MCNC: 98 MG/DL (ref 70–108)
GLUCOSE BLD-MCNC: 99 MG/DL (ref 70–108)
MAGNESIUM: 2.4 MG/DL (ref 1.6–2.4)
PHOSPHORUS: 3.7 MG/DL (ref 2.4–4.7)
POTASSIUM SERPL-SCNC: 4.3 MEQ/L (ref 3.5–5.2)
SODIUM BLD-SCNC: 138 MEQ/L (ref 135–145)

## 2018-09-19 PROCEDURE — 6360000002 HC RX W HCPCS: Performed by: INTERNAL MEDICINE

## 2018-09-19 PROCEDURE — 36415 COLL VENOUS BLD VENIPUNCTURE: CPT

## 2018-09-19 PROCEDURE — 2709999900 HC NON-CHARGEABLE SUPPLY

## 2018-09-19 PROCEDURE — 99024 POSTOP FOLLOW-UP VISIT: CPT | Performed by: NURSE PRACTITIONER

## 2018-09-19 PROCEDURE — C9113 INJ PANTOPRAZOLE SODIUM, VIA: HCPCS | Performed by: INTERNAL MEDICINE

## 2018-09-19 PROCEDURE — 6370000000 HC RX 637 (ALT 250 FOR IP): Performed by: INTERNAL MEDICINE

## 2018-09-19 PROCEDURE — 84100 ASSAY OF PHOSPHORUS: CPT

## 2018-09-19 PROCEDURE — 82330 ASSAY OF CALCIUM: CPT

## 2018-09-19 PROCEDURE — 97535 SELF CARE MNGMENT TRAINING: CPT

## 2018-09-19 PROCEDURE — APPSS30 APP SPLIT SHARED TIME 16-30 MINUTES: Performed by: NURSE PRACTITIONER

## 2018-09-19 PROCEDURE — 82948 REAGENT STRIP/BLOOD GLUCOSE: CPT

## 2018-09-19 PROCEDURE — 1200000003 HC TELEMETRY R&B

## 2018-09-19 PROCEDURE — 99232 SBSQ HOSP IP/OBS MODERATE 35: CPT | Performed by: SURGERY

## 2018-09-19 PROCEDURE — 80048 BASIC METABOLIC PNL TOTAL CA: CPT

## 2018-09-19 PROCEDURE — 6370000000 HC RX 637 (ALT 250 FOR IP): Performed by: SURGERY

## 2018-09-19 PROCEDURE — 83735 ASSAY OF MAGNESIUM: CPT

## 2018-09-19 PROCEDURE — 6370000000 HC RX 637 (ALT 250 FOR IP): Performed by: NURSE PRACTITIONER

## 2018-09-19 PROCEDURE — 2500000003 HC RX 250 WO HCPCS

## 2018-09-19 PROCEDURE — 2580000003 HC RX 258: Performed by: PHARMACIST

## 2018-09-19 PROCEDURE — 97530 THERAPEUTIC ACTIVITIES: CPT

## 2018-09-19 RX ADMIN — CALCIUM GLUCONATE: 94 INJECTION, SOLUTION INTRAVENOUS at 19:06

## 2018-09-19 RX ADMIN — MODAFINIL 200 MG: 100 TABLET ORAL at 09:36

## 2018-09-19 RX ADMIN — DULOXETINE HYDROCHLORIDE 90 MG: 60 CAPSULE, DELAYED RELEASE ORAL at 09:38

## 2018-09-19 RX ADMIN — PANTOPRAZOLE SODIUM 40 MG: 40 INJECTION, POWDER, FOR SOLUTION INTRAVENOUS at 09:38

## 2018-09-19 RX ADMIN — DOCUSATE SODIUM 100 MG: 100 CAPSULE, LIQUID FILLED ORAL at 09:38

## 2018-09-19 RX ADMIN — ENOXAPARIN SODIUM 40 MG: 40 INJECTION SUBCUTANEOUS at 09:36

## 2018-09-19 RX ADMIN — SODIUM CHLORIDE: 9 INJECTION, SOLUTION INTRAVENOUS at 22:05

## 2018-09-19 RX ADMIN — LEVOTHYROXINE SODIUM 100 MCG: 100 TABLET ORAL at 09:38

## 2018-09-19 RX ADMIN — POLYETHYLENE GLYCOL (3350) 17 G: 17 POWDER, FOR SOLUTION ORAL at 09:58

## 2018-09-19 RX ADMIN — NALOXEGOL OXALATE 12.5 MG: 12.5 TABLET, FILM COATED ORAL at 09:38

## 2018-09-19 RX ADMIN — ACETAMINOPHEN 650 MG: 325 TABLET ORAL at 22:33

## 2018-09-19 ASSESSMENT — PAIN SCALES - GENERAL
PAINLEVEL_OUTOF10: 0
PAINLEVEL_OUTOF10: 4
PAINLEVEL_OUTOF10: 0
PAINLEVEL_OUTOF10: 5
PAINLEVEL_OUTOF10: 5

## 2018-09-19 ASSESSMENT — PAIN DESCRIPTION - LOCATION
LOCATION: THROAT
LOCATION: ABDOMEN

## 2018-09-19 ASSESSMENT — PAIN DESCRIPTION - ORIENTATION
ORIENTATION: POSTERIOR
ORIENTATION: RIGHT;LEFT;ANTERIOR

## 2018-09-19 ASSESSMENT — PAIN DESCRIPTION - PAIN TYPE
TYPE: ACUTE PAIN;SURGICAL PAIN
TYPE: ACUTE PAIN

## 2018-09-19 NOTE — PROGRESS NOTES
Dr. Susie Rodgers    RSD upper limb 2016    Right Shoulder- Dr. Tati Perla    Squamous cell skin cancer 2003    Dr. Franki Duenas     Past Surgical History:   Procedure Laterality Date    ABDOMEN SURGERY  04/23/2013    ABD Exploration, removal of ommentum, lysis of adhesions-Dr. Ray Samayoa ABDOMINAL ADHESION SURGERY  10/2010    Resection-Dr. Maxx Arroyo Left 2006   Néstor Rucker  2003     Dr. Erna Cardenas  [id number], 2015    ENDOSCOPY, COLON, DIAGNOSTIC      FRACTURE SURGERY      HYSTERECTOMY  1985    Dulce Klinefelter Dr. Lillard Floras KNEE SURGERY Right 2006    LAPAROTOMY EXPLORATORY N/A 8/31/2018    LAPAROTOMY EXPLORATORY, lysis of adhesions, closure innerotomoy performed by Olga Moore MD at [address] [facility]  2006    capsule endoscopy    CO EGD TRANSORAL BIOPSY SINGLE/MULTIPLE Left 2/8/2018    EGD BIOPSY performed by Priyanka Conklin MD at [facility] OFFICE/OUTPT [facility] Left 8/31/2018    ROBOTIC EXCISION OF LEFT PARARENAL MASS performed by Olga Moore MD at [address]  1/14/97    SINUS SURGERY  x4 1982, 1990, 1997, 2005     UPPER GASTROINTESTINAL ENDOSCOPY  1998,2009,2010,2011,2012, 2015, 2/2018       Restrictions/Precautions:  General Precautions                            Prior Level of Function:  ADL Assistance: Independent  Homemaking Assistance: Independent  Ambulation Assistance: Independent  Transfer Assistance: Independent  Additional Comments: pt goes to [facility] for occupational therapy 2x week for RSD in R shoulder    Subjective       Subjective: Pt seated at EOB upon arrival, requesting to use bathroom and agreeable to OT session. Comments: RN ok'd session.      Overall Orientation Status: Within Functional Limits (question orientation to time as pt reported has been in hospital 20 days (currently on day 10 of stay))

## 2018-09-19 NOTE — PLAN OF CARE
Problem: Nutrition  Goal: Optimal nutrition therapy  Outcome: Ongoing  Nutrition Problem: Moderate malnutrition, in context of acute illness or injury  Intervention: Food and/or Nutrient Delivery: Continue NPO, Start Parenteral Nutrition  Nutritional Goals: Patient will tolerate adequate TPN to meet 75% or more of estimated nutrition needs until appropriate transition to PO feeds during LOS.
Problem: Nutrition  Goal: Optimal nutrition therapy  Outcome: Ongoing  Nutrition Problem: Moderate malnutrition, in context of acute illness or injury  Intervention: Food and/or Nutrient Delivery: Continue current diet, Continue Parenteral Nutrition  Nutritional Goals: Patient will tolerate adequate TPN to meet 75% or more of estimated nutrition needs until appropriate transition to PO feeds during LOS.
Problem: Risk for Impaired Skin Integrity  Goal: Tissue integrity - skin and mucous membranes  Structural intactness and normal physiological function of skin and  mucous membranes. Outcome: Ongoing  No new skin issues noted this shift. Abdomen incisions/staples clean, dry and intact. Patient turns self and gets up with 1 Assist.     Problem: Pain:  Goal: Pain level will decrease  Pain level will decrease   Outcome: Ongoing  Patient taking PRN Morphine for pain Q4 Hours. Problem: Cardiovascular  Goal: No DVT, peripheral vascular complications  Outcome: Ongoing  Patient denies calf pain tenderness. No DVT/peripheral vascular complications noted. Problem:   Goal: Adequate urinary output  Outcome: Ongoing  Patient voiding adequately. Comments: Care plan reviewed with patient. Patient verbalizes understanding of the plan of care and contribute to goal setting.
Problem: Risk for Impaired Skin Integrity  Goal: Tissue integrity - skin and mucous membranes  Structural intactness and normal physiological function of skin and  mucous membranes. Outcome: Ongoing  No new skin issues noted this shift. Patient up with 1 assist. Ambulated in the kwong x1 with RN. Turns self. Abd incisions clean, dry and intact. NG in place with skin around it clean dry and intact. Going at low interm suction. Problem: Pain:  Goal: Pain level will decrease  Pain level will decrease   Outcome: Ongoing  Patient taking PRN Norco for pain. Problem: Cardiovascular  Goal: No DVT, peripheral vascular complications  Outcome: Ongoing  No s/s of DVT this shift. SCD's on and Lovenox daily. Problem:   Goal: Adequate urinary output  Outcome: Ongoing  Patient voiding adequate amounts. Problem: Nutrition  Goal: Optimal nutrition therapy  Outcome: Ongoing  Patient NPO. Problem: DISCHARGE BARRIERS  Goal: Patient's continuum of care needs are met  Outcome: Ongoing  No discharge plans at this time. Problem: Falls - Risk of:  Goal: Will remain free from falls  Will remain free from falls   Outcome: Ongoing  Patient remains free from falls this shift. Call light and bedside table within reach. Bed in lowest position with alarm on. Rounding hourly. Comments: Care plan reviewed with patient. Patient verbalizes understanding of the plan of care and contribute to goal setting.
Problem: Risk for Impaired Skin Integrity  Goal: Tissue integrity - skin and mucous membranes  Structural intactness and normal physiological function of skin and  mucous membranes. Outcome: Ongoing  Patient able to turn and reposition self. No skin breakdown. Surgical incision healing with staples intact. Problem: Pain:  Goal: Pain level will decrease  Pain level will decrease   Outcome: Ongoing  Norco given once for c/o pain. Patient able to rest throughout the shift. Problem: Cardiovascular  Goal: No DVT, peripheral vascular complications  Outcome: Ongoing  Negative Homans sign. SCDs maintained throughout shift. Problem: GI  Goal: No bowel complications  Outcome: Ongoing  Abdomen soft, occasional to hypoactive bowel sounds, passing flatus, no BM. NG tube remains intact to low intermittent wall suction draining green liquid. Zofran given for nausea with relief. Problem:   Goal: Adequate urinary output  Outcome: Ongoing  Voiding without difficulty. Problem: Nutrition  Goal: Optimal nutrition therapy  Outcome: Ongoing  Plan TPN once PICC line is placed. Problem: Discharge Planning:  Goal: Patients continuum of care needs are met  Patients continuum of care needs are met   Outcome: Ongoing  Plan home with  on discharge. May need home health services. Problem: Falls - Risk of:  Goal: Will remain free from falls  Will remain free from falls   Outcome: Ongoing  Up with assistance, gait slow and steady. Comments: Care plan reviewed with patient and . Patient and  verbalize understanding of the plan of care and contribute to goal setting.
Problem: Risk for Impaired Skin Integrity  Goal: Tissue integrity - skin and mucous membranes  Structural intactness and normal physiological function of skin and  mucous membranes. Outcome: Ongoing  Patient's abdominal incisions clean, dry and intact. No new skin issues noted this shift. Patient turns self. Will continue to monitor. Problem: Pain:  Goal: Pain level will decrease  Pain level will decrease   Outcome: Ongoing  Patient has Prn Norco for pain. Has not wanted any pain medication so far this shift. Will continue to monitor. Problem: Cardiovascular  Goal: No DVT, peripheral vascular complications  Outcome: Ongoing  Patient on Lovenox and SCD's. No s/s of DVT at this time. Problem: Nutrition  Goal: Optimal nutrition therapy  Outcome: Ongoing  Patient NPO at this time. Problem: SAFETY  Goal: Free from accidental physical injury  Outcome: Ongoing  Patient remains free from falls this shift. Call light and bedside table within reach. Bed in lowest position with alarm on. Rounding hourly. Problem: Discharge Planning:  Goal: Patients continuum of care needs are met  Patients continuum of care needs are met   Outcome: Ongoing  No plans for discharge at this time. Comments: Care plan reviewed with patient. Patient verbalizes understanding of the plan of care and contribute to goal setting.
Problem: SAFETY  Goal: Free from accidental physical injury  Outcome: Ongoing  Patient remains free from accidental injury/falls this shift. Call light and bedside table within reach. Bed in lowest position with alarm on. Rounding hourly. Comments: Care plan reviewed with patient. Patient verbalizes understanding of the plan of care and contribute to goal setting.
at this time. Will continue to monitor. Problem: Skin Integrity/Risk  Goal: Wound healing  Outcome: Ongoing  Pt's midline incision continues to be dry/intact with edges well approximated at this time. Will continue to monitor. Problem: Discharge Planning:  Goal: Patients continuum of care needs are met  Patients continuum of care needs are met   Outcome: Ongoing  Pt continues to plan d/c back to home with children to help monitor care/safety at home. Pt verbalized being very independent and disliking being in hospital.  Attempts to perform as much of ADL's as possible. Problem: Infection - Central Venous Catheter-Associated Bloodstream Infection:  Goal: Will show no infection signs and symptoms  Will show no infection signs and symptoms   Outcome: Ongoing  PT continues to have right jugular line with 2 access ports. Dressing is clean/dry/intact with no redness. Both sides draw/flush well and pt reports no pain at site. Comments: Care plan reviewed with patient this shift. Patient verbalizes understanding of the plan of care and is contributing to goal setting.

## 2018-09-19 NOTE — PROGRESS NOTES
Nutrition Assessment    Type and Reason for Visit: Reassess    Nutrition Recommendations:   Recommend no TPN macronutrient changes for tonight's bag. Continue ONS and diet. Diet advancement as able per MD.     Malnutrition Assessment:  · Malnutrition Status: Meets the criteria for moderate malnutrition  · Context: Acute illness or injury  · Findings of the 6 clinical characteristics of malnutrition (Minimum of 2 out of 6 clinical characteristics is required to make the diagnosis of moderate or severe Protein Calorie Malnutrition based on AND/ASPEN Guidelines):  1. Energy Intake-Less than or equal to 50%, greater than 7 days    2. Muscle Loss-Mild muscle mass loss, Temples (temporalis muscle)    Nutrition Diagnosis:   · Problem: Moderate malnutrition, in context of acute illness or injury  · Etiology: related to Insufficient energy/nutrient consumption, Alteration in GI function     Signs and symptoms:  as evidenced by Diet history of poor intake (pt report of consuming less than 50% enrgy intake over the past 7 days; Mild Muscle Loss)    Nutrition Assessment:  · Subjective Assessment: S/P excision of left mesenteric tumor due to SBO 8/31/18.  5 stools in the last 24 hours. NGT removed. Full liquid diet started today. Pt reports she feels better today, had some nausea last night but improved with medications. Hasn't ate much yet today. Agreed to trial Ensure Enlive (gluten free). Pt has a history of celiac sprue and needs gluten free diet, was added to current full liquid diet. Family/friends brought in some gluten free products for her. Note per Surgery group note, if tolerates diet plan decrease TPN to 1/2 rate tommorrow night and then possibly stop on Friday 9/21/18. Glucose 104, Sodium 138, Potassium 4.3, Phosphorus 3.7, Magnesium 2.4, BUN 12, Creatinine 0.4. Medications: humalog, movantik, scopolamine, zofran, colace, glycolax.    · Wound Type: Surgical Wound (midline surgical incision 8/31/18: excision of left mesenteric tumor and exploratory laparotomy with lysis of adhesions)  · Current Nutrition Therapies:  · Oral Diet Orders: Full Liquid, Gluten Free   · Oral Diet intake: 1-25%  · Oral Nutrition Supplement (ONS) Orders: Standard High Calorie Oral Supplement (Ensure Enlive TID)  · ONS intake: Unable to assess (just initiated)  · Parenteral Nutrition Orders:  · Type and Formula: 3-in-1 Custom (15 kcal/kg, 1.2 g/kg protein, and 30% lipid kcals based on dosing weight of 62 kg)   · Lipids: Daily  · Additives: per pharmacy  · Rate/Volume: 80 ml/hr via PICC line  · Duration: Continuous 24 hrs  · Current PN Order Provides: 930 kcals, 74 gm protein and 104 gm CHO/day  · Anthropometric Measures:  · Ht: 5' 4\" (162.6 cm)   · Current Body Wt: 170 lb 8 oz (77.3 kg) (9/19/18 no edema)  · Admission Body Wt: 186 lb (84.4 kg) (9/13/18, weight method not specified, no edema)  · Usual Body Wt:  (135-140# with 30# gain due to hypothyroidism per patient; per EHR: 8/25/17 174#, 6/20/18 177# 11.2oz)  · Ideal Body Wt: 120 lb (54.4 kg),   · Adjusted Body Wt: 137 lb (62.1 kg), body weight adjusted for Obesity  · BMI Classification: BMI 25.0 - 29.9 Overweight  · Comparative Standards (Estimated Nutrition Needs):  · Estimated Daily Total Kcal: 6563-9591 kcals (28-30 kcal/kgm adjusted weight 62 kgm)  · Estimated Daily Protein (g): 74-93 grams (1.2-1.5 grams protein/kgm adjusted weight 62kgm)  · Estimated Daily Total Fluid (ml/day): 6589-3096 mL/day (25-30 mL/kg based on current weight of 84.4 kg)    Estimated Intake vs Estimated Needs: Intake Less Than Needs    Nutrition Risk Level: High    Nutrition Interventions:   Continue current diet, Start ONS, Continue Parenteral Nutrition  Continued Inpatient Monitoring, Education Initiated, Coordination of Care (Discussed diet/ONS and TPN plans with pt and her nurse.)    Nutrition Evaluation:   · Evaluation: Progressing toward goals   · Goals: Patient will tolerate adequate TPN to meet

## 2018-09-19 NOTE — PROGRESS NOTES
movements   7. Pre albumin 20.6        PLAN  1. Conservative treatment  2. Advance to full liquids   3. Discontinue NG tube  4. IV hydration and TPN/PICC plan to 1/2 rate with tomorrows bag if eating well, then DC on friday  5. GI and DVT prophylaxis  6. Analgesia and antiemetics prn  7. I&O  8. Incisional care daily remove all staples   9. Encourage ambulation minimally of 4x daily   10. stool regimen, movantik, colace and miralax continued   11. Discharge planning 48-72 hours     Electronically signed by NEREYDA Verde CNP on 9/19/2018 at 10:03 AM Patient seen and examined independently by me. Above discussed and I agree with CNP. Labs, cultures, and radiographs where available were reviewed. See orders for the updated patient care plan.     Shonda Ann MD, abd soft daren NG clamped  daren cl liq  Remove NG  Full liq  9/19/2018   1:42 PM

## 2018-09-20 LAB
ANION GAP SERPL CALCULATED.3IONS-SCNC: 11 MEQ/L (ref 8–16)
BUN BLDV-MCNC: 13 MG/DL (ref 7–22)
CALCIUM IONIZED: 1.04 MMOL/L (ref 1.12–1.32)
CALCIUM SERPL-MCNC: 8.9 MG/DL (ref 8.5–10.5)
CHLORIDE BLD-SCNC: 103 MEQ/L (ref 98–111)
CO2: 25 MEQ/L (ref 23–33)
CREAT SERPL-MCNC: 0.5 MG/DL (ref 0.4–1.2)
GFR SERPL CREATININE-BSD FRML MDRD: > 90 ML/MIN/1.73M2
GLUCOSE BLD-MCNC: 113 MG/DL (ref 70–108)
GLUCOSE BLD-MCNC: 114 MG/DL (ref 70–108)
GLUCOSE BLD-MCNC: 98 MG/DL (ref 70–108)
MAGNESIUM: 2.1 MG/DL (ref 1.6–2.4)
PHOSPHORUS: 3.6 MG/DL (ref 2.4–4.7)
POTASSIUM SERPL-SCNC: 4.3 MEQ/L (ref 3.5–5.2)
SODIUM BLD-SCNC: 139 MEQ/L (ref 135–145)

## 2018-09-20 PROCEDURE — 2709999900 HC NON-CHARGEABLE SUPPLY

## 2018-09-20 PROCEDURE — 99024 POSTOP FOLLOW-UP VISIT: CPT | Performed by: NURSE PRACTITIONER

## 2018-09-20 PROCEDURE — 80048 BASIC METABOLIC PNL TOTAL CA: CPT

## 2018-09-20 PROCEDURE — 6360000002 HC RX W HCPCS: Performed by: INTERNAL MEDICINE

## 2018-09-20 PROCEDURE — 6370000000 HC RX 637 (ALT 250 FOR IP): Performed by: INTERNAL MEDICINE

## 2018-09-20 PROCEDURE — 2500000003 HC RX 250 WO HCPCS: Performed by: SURGERY

## 2018-09-20 PROCEDURE — 83735 ASSAY OF MAGNESIUM: CPT

## 2018-09-20 PROCEDURE — 1200000003 HC TELEMETRY R&B

## 2018-09-20 PROCEDURE — 97535 SELF CARE MNGMENT TRAINING: CPT

## 2018-09-20 PROCEDURE — 97110 THERAPEUTIC EXERCISES: CPT

## 2018-09-20 PROCEDURE — 82330 ASSAY OF CALCIUM: CPT

## 2018-09-20 PROCEDURE — APPSS30 APP SPLIT SHARED TIME 16-30 MINUTES: Performed by: NURSE PRACTITIONER

## 2018-09-20 PROCEDURE — 82948 REAGENT STRIP/BLOOD GLUCOSE: CPT

## 2018-09-20 PROCEDURE — 84100 ASSAY OF PHOSPHORUS: CPT

## 2018-09-20 PROCEDURE — 6370000000 HC RX 637 (ALT 250 FOR IP): Performed by: NURSE PRACTITIONER

## 2018-09-20 PROCEDURE — C9113 INJ PANTOPRAZOLE SODIUM, VIA: HCPCS | Performed by: INTERNAL MEDICINE

## 2018-09-20 PROCEDURE — 36415 COLL VENOUS BLD VENIPUNCTURE: CPT

## 2018-09-20 RX ADMIN — ALTEPLASE 1 MG: 2.2 INJECTION, POWDER, LYOPHILIZED, FOR SOLUTION INTRAVENOUS at 15:23

## 2018-09-20 RX ADMIN — PANTOPRAZOLE SODIUM 40 MG: 40 INJECTION, POWDER, FOR SOLUTION INTRAVENOUS at 10:14

## 2018-09-20 RX ADMIN — DULOXETINE HYDROCHLORIDE 90 MG: 60 CAPSULE, DELAYED RELEASE ORAL at 10:14

## 2018-09-20 RX ADMIN — LEVOTHYROXINE SODIUM 100 MCG: 100 TABLET ORAL at 10:14

## 2018-09-20 RX ADMIN — ENOXAPARIN SODIUM 40 MG: 40 INJECTION SUBCUTANEOUS at 10:14

## 2018-09-20 RX ADMIN — NALOXEGOL OXALATE 12.5 MG: 12.5 TABLET, FILM COATED ORAL at 10:14

## 2018-09-20 RX ADMIN — MODAFINIL 200 MG: 100 TABLET ORAL at 10:13

## 2018-09-20 RX ADMIN — CALCIUM GLUCONATE: 94 INJECTION, SOLUTION INTRAVENOUS at 18:28

## 2018-09-20 RX ADMIN — POLYETHYLENE GLYCOL (3350) 17 G: 17 POWDER, FOR SOLUTION ORAL at 10:14

## 2018-09-20 RX ADMIN — MODAFINIL 200 MG: 100 TABLET ORAL at 15:50

## 2018-09-20 RX ADMIN — ALTEPLASE 1 MG: 2.2 INJECTION, POWDER, LYOPHILIZED, FOR SOLUTION INTRAVENOUS at 15:24

## 2018-09-20 ASSESSMENT — PAIN DESCRIPTION - PROGRESSION
CLINICAL_PROGRESSION: NOT CHANGED
CLINICAL_PROGRESSION: NOT CHANGED

## 2018-09-20 ASSESSMENT — PAIN DESCRIPTION - ORIENTATION
ORIENTATION: RIGHT

## 2018-09-20 ASSESSMENT — PAIN DESCRIPTION - LOCATION
LOCATION: ABDOMEN

## 2018-09-20 ASSESSMENT — PAIN DESCRIPTION - DESCRIPTORS
DESCRIPTORS: ACHING

## 2018-09-20 ASSESSMENT — PAIN DESCRIPTION - ONSET
ONSET: ON-GOING
ONSET: ON-GOING

## 2018-09-20 ASSESSMENT — PAIN DESCRIPTION - FREQUENCY
FREQUENCY: CONTINUOUS
FREQUENCY: INTERMITTENT
FREQUENCY: INTERMITTENT

## 2018-09-20 ASSESSMENT — PAIN DESCRIPTION - PAIN TYPE
TYPE: ACUTE PAIN

## 2018-09-20 ASSESSMENT — PAIN SCALES - GENERAL
PAINLEVEL_OUTOF10: 5
PAINLEVEL_OUTOF10: 3
PAINLEVEL_OUTOF10: 3
PAINLEVEL_OUTOF10: 5

## 2018-09-20 NOTE — PROGRESS NOTES
[facility] 60  INPATIENT OCCUPATIONAL THERAPY  STR ONC MED 5K  DAILY NOTE    Time:  Time In: 8670  Time Out: 8575  Timed Code Treatment Minutes: 23 Minutes  Minutes: 23          Date: 2018  Patient Name: Robbi Abraham,   Gender: female      Room: [facility]009-A  MRN: [id number]  : 1955  (58 y.o.)  Referring Practitioner: Radha Porras MD  Diagnosis: SBO  Additional Pertinent Hx: per H&P:  This is a 59-year woman who underwent robotic-assisted laparoscopic excision of the left mesenteric, periduodenal, perinephric tumor on 2018. She also had abdominal exploration with lysis of adhesions and closure for small enterotomy the same day. Postop, she continued to improve steadily. She did have bowel movement and was discharged. admit with above diagnosis     Past Medical History:   Diagnosis Date    Anemia     malabsorption/Celiac disease    Anxiety 2018    Dr. Erasmo Hurd    Arm DVT (deep venous thromboembolism), acute ([facility] [address] 5/15/13    Broken shoulder 2016    right    Celiac disease     CRPS (complex regional pain syndrome type I)     Dr. Robert Daley    Depression 2016    Dr. Robert Daley    GERD (gastroesophageal reflux disease)     and celiac[facility]     migraines-Dr. Shahid Baldwin History of blood transfusion     Hx of reactive hypoglycemia     Dr. Vinayak Esposito Hyperlipidemia     Dr. Shahid Baldwin Hypothyroidism     [facility]. Evita [facility]tz [facility] MDRO (multiple drug resistant organisms) resistance     Meniere disease     Dr. Raiza Mariee Mitral valve prolapse syndrome     MRSA (methicillin resistant Staphylococcus aureus) , 8/1/15    left forearm 2013, nasal screen pos Aug 2015   Lindajo Bence Narcolepsy     Dr. Marcell Alamo Partial bowel obstruction ([facility] [address]     Multile times.   S/P partial small bowel resection    Restless legs syndrome  Activity Tolerance:  Activity Tolerance: Patient Tolerated treatment well  Activity Tolerance: Pt reporting she felt groggy this am with 1 small LOB when coming into standing initially. Assessment:     Performance deficits / Impairments: Decreased functional mobility , Decreased ADL status, Decreased endurance, Decreased balance, Decreased strength, Decreased high-level IADLs  Prognosis: Good  Discharge Recommendations: Continue to assess pending progress, Home with Home health OT, Home with assist PRN    Patient Education:  Patient Education: continuing to complete mobility and UE ex to for increased activity tolerance     Equipment Recommendations:  Equipment Needed: Yes  Mobility Devices: ADL Assistive Devices  ADL Assistive Devices: Shower Chair with back    Safety:  Safety Devices in place: Yes  Type of devices: Left in bed, All fall risk precautions in place, Bed alarm in place, Call light within reach, Gait belt    Plan:  Times per week: 3-5X  Current Treatment Recommendations: Strengthening, Balance Training, Functional Mobility Training, Endurance Training, Safety Education & Training, Self-Care / ADL, Patient/Caregiver Education & Training    Goals:  Patient goals : to go home    Short term goals  Time Frame for Short term goals: 2 weeks  Short term goal 1: Pt will tolerate LUE strengthning and RUE gentle stretching ROM to increase strength and ROM needed for ease with ADL routine  Short term goal 2: Pt will complete IADL tasks with MI, using ECT prn, to increase ability to care for home.    Short term goal 3: Pt will complete BADL routine Mod I with any adaptive technique needed  Short term

## 2018-09-20 NOTE — PROGRESS NOTES
Diet intake: 1-25% (note not recorded 9/19/18)  · Oral Nutrition Supplement (ONS) Orders: Standard High Calorie Oral Supplement (Ensure Enlive TID)  · ONS intake: Unable to assess (hasn't tried yet)  · Parenteral Nutrition Orders:  · Type and Formula: 3-in-1 Custom (15 kcal/kg, 1.2 g/kg protein, and 30% lipid kcals based on dosing weight of 62 kg)   · Rate/Volume: 80 ml/hr via PICC line  · Duration: Continuous 24 hrs  · Current PN Order Provides: 930 kcals, 74 gm protein and 104 gm CHO/day at 80 ml/hr. · When TPN decreased tonight to 1/2 rate (40 ml/hr) will provide~ 465 kcals, 37 grams protein, 52 grams CHO per 24 hours.     · Anthropometric Measures:  · Ht: 5' 4\" (162.6 cm)   · Current Body Wt: 168 lb 8 oz (76.4 kg) (9/20/18 no edema)  · Admission Body Wt: 186 lb (84.4 kg) (9/13/18, weight method not specified, no edema)  · Usual Body Wt:  (135-140# with 30# gain due to hypothyroidism per patient; per EHR: 8/25/17 174#, 6/20/18 177# 11.2oz)  · Ideal Body Wt: 120 lb (54.4 kg),   · Adjusted Body Wt: 137 lb (62.1 kg), body weight adjusted for Obesity  · BMI Classification: BMI 25.0 - 29.9 Overweight  · Comparative Standards (Estimated Nutrition Needs):  · Estimated Daily Total Kcal: 5323-4288 kcals (28-30 kcal/kgm adjusted weight 62 kgm)  · Estimated Daily Protein (g): 74-93 grams (1.2-1.5 grams protein/kgm adjusted weight 62kgm)  · Estimated Daily Total Fluid (ml/day): 9008-1367 mL/day (25-30 mL/kg based on current weight of 84.4 kg)    Estimated Intake vs Estimated Needs: Intake Less Than Needs    Nutrition Risk Level: High    Nutrition Interventions:   Continue current diet, Continue current ONS, Continue Parenteral Nutrition (Plan decrease TPN to 1/2 rate (40 ml/hr) tonight, to start weaning process.)  Continued Inpatient Monitoring, Education Initiated, Coordination of Care (Discussed TPN plans with pt and pharmacist.  Encouraged small frequent meals, using ONS in between meals.)    Nutrition Evaluation:

## 2018-09-20 NOTE — PROGRESS NOTES
IM Progress Note  Dr. Mariano Thorne  9/20/2018 1:22 PM      Patient name Miesha Keller  [id number]  PCP: Yon Walker MD  Admit Date: 9/8/2018  Acct No. [de-identified]    Subjective: Interval History:   Feels much better  Tolerating diet  Had BM today          Diet: Dietary Nutrition Supplements: Standard High Calorie Oral Supplement  PN-Adult  3 IN 1 Central Line (Custom)  DIET CARB CONTROL; Gluten Free  PN-Adult  3 IN 1 Central Line (Custom)    I/O last 3 completed shifts: In: 2901.4 [P.O.:340; I.V.:2561.4]  Out: -   I/O this shift:  In: 10 [I.V.:10]  Out: -         Admission weight: 186 lb (84.4 kg) as of 9/8/2018  7:42 PM  Wt Readings from Last 3 Encounters:   09/20/18 168 lb 8 oz (76.4 kg)   08/31/18 175 lb (79.4 kg)   06/29/18 179 lb (81.2 kg)     Body mass index is 28.92 kg/m². Medications:   Scheduled Meds:   alteplase  1 mg Intracatheter Once    alteplase  1 mg Intracatheter Once    insulin lispro  0-12 Units Subcutaneous BID WC    potassium replacement protocol   Other RX Placeholder    potassium (CARDIAC) replacement protocol   Other RX Placeholder    naloxegol  12.5 mg Oral QAM    docusate sodium  100 mg Oral BID    polyethylene glycol  17 g Oral Daily    modafinil  200 mg Oral BID    levothyroxine  100 mcg Oral Daily    pantoprazole  40 mg Intravenous Daily    DULoxetine  90 mg Oral Daily    enoxaparin  40 mg Subcutaneous Daily    scopolamine  1 patch Transdermal Q72H    promethazine  6.25 mg Intramuscular Once     Continuous Infusions:   PN-Adult  3 IN 1 Central Line (Custom)      PN-Adult  3 IN 1 Central Line (Custom) 80 mL/hr at 09/19/18 1906    dextrose      sodium chloride 20 mL/hr at 09/19/18 2205       Labs :     CBC:   No results for input(s): WBC, HGB, PLT in the last 72 hours.   BMP:    Recent Labs      09/18/18   0415  09/19/18   0356  09/20/18   1206   NA  141  138  139   K  4.1  4.3  4.3   CL  102  100  103   CO2  28  28  25   BUN  12  12  13

## 2018-09-20 NOTE — PROGRESS NOTES
Reported off to primary nurse Gabby Sun RN  Electronically signed by Soraya Ridley on 9/20/2018 at 1:51 PM

## 2018-09-20 NOTE — PROGRESS NOTES
TPN Follow Up Note    Assessment: Weaning patient off of TPN. On full liquid diet. Electrolyte Replacement: none    TPN changes for (today) at 1800:   1. Will decrease rate in half per dietary  2.   Will increase calcium to 9.3 mEq    Re-check BMP, Mg, PO4, iCa AM    Deepti Short, PharmD  9/20/2018  12:37 PM

## 2018-09-21 VITALS
HEIGHT: 64 IN | SYSTOLIC BLOOD PRESSURE: 109 MMHG | HEART RATE: 80 BPM | WEIGHT: 168.5 LBS | OXYGEN SATURATION: 97 % | RESPIRATION RATE: 16 BRPM | TEMPERATURE: 97.4 F | BODY MASS INDEX: 28.77 KG/M2 | DIASTOLIC BLOOD PRESSURE: 66 MMHG

## 2018-09-21 LAB
ANION GAP SERPL CALCULATED.3IONS-SCNC: 13 MEQ/L (ref 8–16)
BUN BLDV-MCNC: 14 MG/DL (ref 7–22)
CALCIUM IONIZED: 1.2 MMOL/L (ref 1.12–1.32)
CALCIUM SERPL-MCNC: 9.2 MG/DL (ref 8.5–10.5)
CHLORIDE BLD-SCNC: 102 MEQ/L (ref 98–111)
CO2: 27 MEQ/L (ref 23–33)
CREAT SERPL-MCNC: 0.4 MG/DL (ref 0.4–1.2)
GFR SERPL CREATININE-BSD FRML MDRD: > 90 ML/MIN/1.73M2
GLUCOSE BLD-MCNC: 154 MG/DL (ref 70–108)
GLUCOSE BLD-MCNC: 86 MG/DL (ref 70–108)
GLUCOSE BLD-MCNC: 92 MG/DL (ref 70–108)
MAGNESIUM: 2.1 MG/DL (ref 1.6–2.4)
PHOSPHORUS: 4.4 MG/DL (ref 2.4–4.7)
POTASSIUM SERPL-SCNC: 4.6 MEQ/L (ref 3.5–5.2)
SODIUM BLD-SCNC: 142 MEQ/L (ref 135–145)

## 2018-09-21 PROCEDURE — 2709999900 HC NON-CHARGEABLE SUPPLY

## 2018-09-21 PROCEDURE — 80048 BASIC METABOLIC PNL TOTAL CA: CPT

## 2018-09-21 PROCEDURE — 6370000000 HC RX 637 (ALT 250 FOR IP): Performed by: NURSE PRACTITIONER

## 2018-09-21 PROCEDURE — 82330 ASSAY OF CALCIUM: CPT

## 2018-09-21 PROCEDURE — 84100 ASSAY OF PHOSPHORUS: CPT

## 2018-09-21 PROCEDURE — 36415 COLL VENOUS BLD VENIPUNCTURE: CPT

## 2018-09-21 PROCEDURE — 6370000000 HC RX 637 (ALT 250 FOR IP): Performed by: INTERNAL MEDICINE

## 2018-09-21 PROCEDURE — 99024 POSTOP FOLLOW-UP VISIT: CPT | Performed by: NURSE PRACTITIONER

## 2018-09-21 PROCEDURE — APPSS30 APP SPLIT SHARED TIME 16-30 MINUTES: Performed by: NURSE PRACTITIONER

## 2018-09-21 PROCEDURE — 83735 ASSAY OF MAGNESIUM: CPT

## 2018-09-21 PROCEDURE — 6360000002 HC RX W HCPCS: Performed by: INTERNAL MEDICINE

## 2018-09-21 PROCEDURE — 82948 REAGENT STRIP/BLOOD GLUCOSE: CPT

## 2018-09-21 PROCEDURE — 99024 POSTOP FOLLOW-UP VISIT: CPT | Performed by: SURGERY

## 2018-09-21 RX ORDER — ACETAMINOPHEN 325 MG/1
650 TABLET ORAL EVERY 4 HOURS PRN
Qty: 120 TABLET | Refills: 3 | COMMUNITY
Start: 2018-09-21 | End: 2020-06-09

## 2018-09-21 RX ORDER — ROSUVASTATIN CALCIUM 20 MG/1
20 TABLET, COATED ORAL DAILY
Qty: 90 TABLET | Refills: 3 | OUTPATIENT
Start: 2018-09-21

## 2018-09-21 RX ORDER — PSEUDOEPHEDRINE HCL 30 MG
100 TABLET ORAL 2 TIMES DAILY
COMMUNITY
Start: 2018-09-21 | End: 2020-06-02

## 2018-09-21 RX ORDER — PANTOPRAZOLE SODIUM 40 MG/1
40 TABLET, DELAYED RELEASE ORAL
Status: DISCONTINUED | OUTPATIENT
Start: 2018-09-21 | End: 2018-09-21 | Stop reason: HOSPADM

## 2018-09-21 RX ORDER — POLYETHYLENE GLYCOL 3350 17 G/17G
17 POWDER, FOR SOLUTION ORAL DAILY
Qty: 527 G | Refills: 1 | COMMUNITY
Start: 2018-09-22 | End: 2018-10-22

## 2018-09-21 RX ADMIN — ENOXAPARIN SODIUM 40 MG: 40 INJECTION SUBCUTANEOUS at 08:39

## 2018-09-21 RX ADMIN — LEVOTHYROXINE SODIUM 100 MCG: 100 TABLET ORAL at 08:39

## 2018-09-21 RX ADMIN — ACETAMINOPHEN 650 MG: 325 TABLET ORAL at 12:06

## 2018-09-21 RX ADMIN — MODAFINIL 200 MG: 100 TABLET ORAL at 08:39

## 2018-09-21 RX ADMIN — NALOXEGOL OXALATE 12.5 MG: 12.5 TABLET, FILM COATED ORAL at 08:39

## 2018-09-21 RX ADMIN — DOCUSATE SODIUM 100 MG: 100 CAPSULE, LIQUID FILLED ORAL at 08:39

## 2018-09-21 RX ADMIN — PANTOPRAZOLE SODIUM 40 MG: 40 TABLET, DELAYED RELEASE ORAL at 08:39

## 2018-09-21 RX ADMIN — DULOXETINE HYDROCHLORIDE 90 MG: 60 CAPSULE, DELAYED RELEASE ORAL at 08:39

## 2018-09-21 ASSESSMENT — PAIN SCALES - GENERAL
PAINLEVEL_OUTOF10: 0
PAINLEVEL_OUTOF10: 3

## 2018-09-21 NOTE — PROGRESS NOTES
20. 6        PLAN  1. Conservative treatment  2. general diet  3. IV hydration and TPN/PICC stopped, remove central line   4. GI and DVT prophylaxis  5. Analgesia and antiemetics prn  6. I&O  7. Incisional care daily   8. Encourage ambulation minimally of 4x daily   9. stool regimen colace and miralax discontinued   10.  Okay for discharge today     Electronically signed by NEREYDA Craft CNP on 9/21/2018 at 11:00 AM   Patient seen independently by me  Discussed with [facility] nurse practitioner  Planning discharge today

## 2018-09-21 NOTE — DISCHARGE INSTR - DIET
 Good nutrition is important when healing from an illness, injury, or surgery. Follow any nutrition recommendations given to you during your hospital stay.  If you were given an oral nutrition supplement while in the hospital, continue to take this supplement at home. You can take it with meals, in-between meals, and/or before bedtime. These supplements can be purchased at most local grocery stores, pharmacies, and chain super-stores.  If you have any questions about your diet or nutrition, call the hospital and ask for the dietitian. Carb control diet. Increase fiber and water intake to promote good bowel health and prevent constipation.

## 2018-09-21 NOTE — FLOWSHEET NOTE
09/21/18 1235   Encounter Summary   Services provided to: Patient   Referral/Consult From: [employer] Spouse; Children   Place of [address]   Continue Visiting Yes  (9/21/18)   Complexity of Encounter Moderate   Length of Encounter 15 minutes   Spiritual/Rastafari   Type Spiritual support   Assessment Calm; Approachable   Intervention Active listening;Nurtured hope   S: During my contact with the patient I wanted to assist with the patients by assessing their spiritual needs. O: The patient was receptive and was comfortable with my presence. There   were no family members present at the time. We discussed the patients current condition and if the patient had any current spiritual needs or concerns. The pt. was polite and respectful. The pt stated that they hoped to be discharged and has support from [facility]. A: I offered emotional support, nurtured hope, provided words of encouragement and a prayer of healing/comfort to the pt. The patient stated that they were thankful for the spiritual support and would still like continual emotional and spiritual care. P:          Continued spiritual support would be helpful for healing of the patient.

## 2018-09-21 NOTE — CARE COORDINATION
9/21/18  10:37 AM  Clinical update: NG removed yesterday. TPN weaned off. Tolerating diet at this time. Last dose zofran and phenergan on 9/18/18. Also has not used ordered scopalamine patch. Elecrolytes wnl. Dietician following. SW following. Plan home with spouse and [facility].

## 2018-09-21 NOTE — TELEPHONE ENCOUNTER
Last visit- 6/14/2018  Next visit- 12/12/2018    Requested Prescriptions     Pending Prescriptions Disp Refills    rosuvastatin (CRESTOR) 20 MG tablet [Pharmacy Med Name: ROSUVASTATIN CALCIUM 20 MG TAB] 90 tablet 3     Sig: TAKE 1 TABLET BY MOUTH DAILY

## 2018-09-22 NOTE — DISCHARGE SUMMARY
[address] [place] [address]                                 DISCHARGE SUMMARY    PATIENT NAME: Carmel Sanabria                 :        1955  MED REC NO:   [id number]                           ROOM:       [id number]  ACCOUNT NO:   [de-identified]                           ADMIT DATE: 2018  PROVIDER:     PADMA Palafox: 2018    DATE OF ADMISSION:  2018    HOSPITAL COURSE:  This is a 70-year-old woman who recently underwent  robotic assisted excision of left mesenteric, periduodenal, perinephric  tumor followed by abdominal exploration with lysis of adhesion, closure of  a small enterotomy, presenting back with nausea, vomiting, and abdominal  pain. She was admitted for possible postoperative ileus  with partial  small-bowel obstruction. The patient initially did well with conservative  management and overnight she started to have significant nausea and  vomiting and had significant output from her NG tube. She had to be  progressed very very slowly and meticulously. She did improve with  conservative measures. She was followed by Surgery also. Eventually, she  was tolerating diet. Her NG tube was out. She did receive TPN for her  nutritional support while she was n.p.o. The patient did have bowel  movements. No abdominal pain. She has not had taken any narcotics or pain  medicines in the last few days. Overall has been improved. FINAL DIAGNOSES:  1. Partial small-bowel obstruction versus ileus, clinically improved  conservatively. 2.  Recently underwent robotic-assisted laparoscopic excision of left  mesenteric periduodenal mass followed by exploration with lysis of  adhesions and closure of a small enterotomy. 3.  Restless leg syndrome. 4.  Narcolepsy. 5.  Irritable bowel syndrome. 6.  Previous history of bowel resection for bowel obstruction.   9.  History of

## 2018-09-24 ENCOUNTER — TELEPHONE (OUTPATIENT)
Dept: FAMILY MEDICINE CLINIC | Age: 63
End: 2018-09-24

## 2018-09-24 NOTE — TELEPHONE ENCOUNTER
Call placed to Cynthia Null. No answer, left message asking for a return call. Will try again later.

## 2018-09-24 NOTE — TELEPHONE ENCOUNTER
Nurse from [facility] called, wanted to know if we could send her the patients medication list, and know what the plans are for her flu vaccine? I called back, got voice mail. Advised Dr. Conner Velarde is not her pcp and she should contact Dr. Melly Collazo's office. If any questions she can call us back.

## 2018-09-24 NOTE — TELEPHONE ENCOUNTER
[facility] Transitions Initial Follow Up Call    Call within 2 business days of discharge: Yes      Patient: Precious Bello Patient : 1955 MRN: [id number]    [unfilled]    RARS: Readmission Risk Score: 13     Spoke with: Lin Mackay     Discharge department/facility: [facility]    Non-face-to-face services provided:Spoke with Lin Mackay. Introduced self/role. Lin Mackay was recently discharged from [facility] after being admitted for SBO (small bowel obstruction) . Lin Mackay was tearful  when talking with CC. She voiced she felt nauseated today and doesn't know why. Therapeutic listening uses as CC encouraged Lin Mackay to express her feelings. Lin Mackay voiced she had a good day yesterday but today not so well. CC asked what changed from yesterday to today, she voiced she didn't do a lot yesterday she rested a lot, and today she is trying to do more. CC encouraged Lin Mackay to try not to over do things and its okay to take breaks and rest.  After CC talked with Lin Mackay she wasn't so tearful. Reminded Lin Mackay of her follow up appointment and to call the office for any questions or concerns. Medication reconciliation with discharge medications completed.     Follow Up  Future Appointments  Date Time Provider Lizz Alatorre   2018 11:00 AM Lacho Gimenez MD [facility]   10/2/2018 11:30 AM NEREYDA Glover - CNP Adv Surg [facility]   10/8/2018 3:00 PM MD EDILIA ChangGASL [facility]   10/23/2018 2:00 PM Hermelinda Gallardo PA-C Pulm Med [address]   2018 2:30 PM Alexey Fraser MD [address] [place] [address] BEN Rivera RN

## 2018-09-27 ENCOUNTER — OFFICE VISIT (OUTPATIENT)
Dept: FAMILY MEDICINE CLINIC | Age: 63
End: 2018-09-27

## 2018-09-27 VITALS
SYSTOLIC BLOOD PRESSURE: 128 MMHG | WEIGHT: 165.13 LBS | HEART RATE: 88 BPM | BODY MASS INDEX: 28.19 KG/M2 | DIASTOLIC BLOOD PRESSURE: 74 MMHG | RESPIRATION RATE: 14 BRPM | HEIGHT: 64 IN

## 2018-09-27 DIAGNOSIS — E03.9 HYPOTHYROIDISM, UNSPECIFIED TYPE: ICD-10-CM

## 2018-09-27 DIAGNOSIS — R39.15 URINARY URGENCY: ICD-10-CM

## 2018-09-27 DIAGNOSIS — K56.609 SBO (SMALL BOWEL OBSTRUCTION) (HCC): Primary | ICD-10-CM

## 2018-09-27 LAB
BACTERIA URINE, POC: ABNORMAL
BILIRUBIN URINE: 0 MG/DL
BLOOD, URINE: POSITIVE
CASTS URINE, POC: ABNORMAL
CLARITY: CLEAR
COLOR: YELLOW
CRYSTALS URINE, POC: ABNORMAL
EPI CELLS URINE, POC: ABNORMAL
GLUCOSE URINE: NEGATIVE
KETONES, URINE: NEGATIVE
LEUKOCYTE EST, POC: POSITIVE
NITRITE, URINE: NEGATIVE
PH UA: 8 (ref 4.5–8)
PROTEIN UA: POSITIVE
RBC URINE, POC: ABNORMAL
SPECIFIC GRAVITY UA: 1.01 (ref 1–1.03)
UROBILINOGEN, URINE: NORMAL
WBC URINE, POC: ABNORMAL
YEAST URINE, POC: ABNORMAL

## 2018-09-27 PROCEDURE — 81000 URINALYSIS NONAUTO W/SCOPE: CPT | Performed by: FAMILY MEDICINE

## 2018-09-27 PROCEDURE — 1111F DSCHRG MED/CURRENT MED MERGE: CPT | Performed by: FAMILY MEDICINE

## 2018-09-27 PROCEDURE — 99214 OFFICE O/P EST MOD 30 MIN: CPT | Performed by: FAMILY MEDICINE

## 2018-10-01 ENCOUNTER — APPOINTMENT (OUTPATIENT)
Dept: CT IMAGING | Age: 63
End: 2018-10-01
Payer: COMMERCIAL

## 2018-10-01 ENCOUNTER — APPOINTMENT (OUTPATIENT)
Dept: GENERAL RADIOLOGY | Age: 63
End: 2018-10-01
Payer: COMMERCIAL

## 2018-10-01 ENCOUNTER — HOSPITAL ENCOUNTER (OUTPATIENT)
Age: 63
Setting detail: OBSERVATION
Discharge: HOME | End: 2018-10-03
Attending: INTERNAL MEDICINE | Admitting: INTERNAL MEDICINE
Payer: COMMERCIAL

## 2018-10-01 DIAGNOSIS — R07.9 CHEST PAIN, UNSPECIFIED TYPE: Primary | ICD-10-CM

## 2018-10-01 DIAGNOSIS — R06.09 DYSPNEA ON EXERTION: ICD-10-CM

## 2018-10-01 LAB
ALBUMIN SERPL-MCNC: 4.3 G/DL (ref 3.5–5.1)
ALP BLD-CCNC: 130 U/L (ref 38–126)
ALT SERPL-CCNC: 28 U/L (ref 11–66)
ANION GAP SERPL CALCULATED.3IONS-SCNC: 16 MEQ/L (ref 8–16)
APTT: 29.3 SECONDS (ref 22–38)
AST SERPL-CCNC: 25 U/L (ref 5–40)
BACTERIA: NORMAL /HPF
BASOPHILS # BLD: 0.6 %
BASOPHILS ABSOLUTE: 0 THOU/MM3 (ref 0–0.1)
BILIRUB SERPL-MCNC: 0.3 MG/DL (ref 0.3–1.2)
BILIRUBIN DIRECT: < 0.2 MG/DL (ref 0–0.3)
BILIRUBIN URINE: NEGATIVE
BLOOD, URINE: NEGATIVE
BUN BLDV-MCNC: 12 MG/DL (ref 7–22)
CALCIUM SERPL-MCNC: 9.5 MG/DL (ref 8.5–10.5)
CASTS 2: NORMAL /LPF
CASTS UA: NORMAL /LPF
CHARACTER, URINE: CLEAR
CHLORIDE BLD-SCNC: 100 MEQ/L (ref 98–111)
CO2: 24 MEQ/L (ref 23–33)
COLOR: YELLOW
CREAT SERPL-MCNC: 0.5 MG/DL (ref 0.4–1.2)
CRYSTALS, UA: NORMAL
EOSINOPHIL # BLD: 3.6 %
EOSINOPHILS ABSOLUTE: 0.3 THOU/MM3 (ref 0–0.4)
EPITHELIAL CELLS, UA: NORMAL /HPF
ERYTHROCYTE [DISTWIDTH] IN BLOOD BY AUTOMATED COUNT: 15.1 % (ref 11.5–14.5)
ERYTHROCYTE [DISTWIDTH] IN BLOOD BY AUTOMATED COUNT: 52.9 FL (ref 35–45)
GFR SERPL CREATININE-BSD FRML MDRD: > 90 ML/MIN/1.73M2
GLUCOSE BLD-MCNC: 123 MG/DL (ref 70–108)
GLUCOSE URINE: NEGATIVE MG/DL
HCT VFR BLD CALC: 45.5 % (ref 37–47)
HEMOGLOBIN: 14.7 GM/DL (ref 12–16)
IMMATURE GRANS (ABS): 0.01 THOU/MM3 (ref 0–0.07)
IMMATURE GRANULOCYTES: 0.1 %
INR BLD: 0.98 (ref 0.85–1.13)
KETONES, URINE: NEGATIVE
LEUKOCYTE ESTERASE, URINE: NEGATIVE
LIPASE: 64.3 U/L (ref 5.6–51.3)
LYMPHOCYTES # BLD: 19.6 %
LYMPHOCYTES ABSOLUTE: 1.4 THOU/MM3 (ref 1–4.8)
MCH RBC QN AUTO: 30.7 PG (ref 26–33)
MCHC RBC AUTO-ENTMCNC: 32.3 GM/DL (ref 32.2–35.5)
MCV RBC AUTO: 95 FL (ref 81–99)
MISCELLANEOUS 2: NORMAL
MONOCYTES # BLD: 5.5 %
MONOCYTES ABSOLUTE: 0.4 THOU/MM3 (ref 0.4–1.3)
NITRITE, URINE: NEGATIVE
NUCLEATED RED BLOOD CELLS: 0 /100 WBC
OSMOLALITY CALCULATION: 280.5 MOSMOL/KG (ref 275–300)
PH UA: 8
PLATELET # BLD: 296 THOU/MM3 (ref 130–400)
PMV BLD AUTO: 9.3 FL (ref 9.4–12.4)
POTASSIUM SERPL-SCNC: 4.1 MEQ/L (ref 3.5–5.2)
PRO-BNP: 85.6 PG/ML (ref 0–900)
PROTEIN UA: NEGATIVE
RBC # BLD: 4.79 MILL/MM3 (ref 4.2–5.4)
RBC URINE: NORMAL /HPF
RENAL EPITHELIAL, UA: NORMAL
SEG NEUTROPHILS: 70.6 %
SEGMENTED NEUTROPHILS ABSOLUTE COUNT: 5 THOU/MM3 (ref 1.8–7.7)
SODIUM BLD-SCNC: 140 MEQ/L (ref 135–145)
SPECIFIC GRAVITY, URINE: 1.02 (ref 1–1.03)
T4 FREE: 1.09 NG/DL (ref 0.93–1.76)
TOTAL CK: 37 U/L (ref 30–135)
TOTAL PROTEIN: 7.6 G/DL (ref 6.1–8)
TROPONIN T: < 0.01 NG/ML
TROPONIN T: < 0.01 NG/ML
TSH SERPL DL<=0.05 MIU/L-ACNC: 5.75 UIU/ML (ref 0.4–4.2)
UROBILINOGEN, URINE: 0.2 EU/DL
WBC # BLD: 7.1 THOU/MM3 (ref 4.8–10.8)
WBC UA: NORMAL /HPF
YEAST: NORMAL

## 2018-10-01 PROCEDURE — 82550 ASSAY OF CK (CPK): CPT

## 2018-10-01 PROCEDURE — 93005 ELECTROCARDIOGRAM TRACING: CPT | Performed by: STUDENT IN AN ORGANIZED HEALTH CARE EDUCATION/TRAINING PROGRAM

## 2018-10-01 PROCEDURE — G0378 HOSPITAL OBSERVATION PER HR: HCPCS

## 2018-10-01 PROCEDURE — 80053 COMPREHEN METABOLIC PANEL: CPT

## 2018-10-01 PROCEDURE — 81001 URINALYSIS AUTO W/SCOPE: CPT

## 2018-10-01 PROCEDURE — 87186 SC STD MICRODIL/AGAR DIL: CPT

## 2018-10-01 PROCEDURE — 36415 COLL VENOUS BLD VENIPUNCTURE: CPT

## 2018-10-01 PROCEDURE — 84484 ASSAY OF TROPONIN QUANT: CPT

## 2018-10-01 PROCEDURE — 83880 ASSAY OF NATRIURETIC PEPTIDE: CPT

## 2018-10-01 PROCEDURE — 71046 X-RAY EXAM CHEST 2 VIEWS: CPT

## 2018-10-01 PROCEDURE — 84439 ASSAY OF FREE THYROXINE: CPT

## 2018-10-01 PROCEDURE — 99285 EMERGENCY DEPT VISIT HI MDM: CPT

## 2018-10-01 PROCEDURE — 2580000003 HC RX 258: Performed by: PHYSICIAN ASSISTANT

## 2018-10-01 PROCEDURE — 71275 CT ANGIOGRAPHY CHEST: CPT

## 2018-10-01 PROCEDURE — 85025 COMPLETE CBC W/AUTO DIFF WBC: CPT

## 2018-10-01 PROCEDURE — 6370000000 HC RX 637 (ALT 250 FOR IP): Performed by: INTERNAL MEDICINE

## 2018-10-01 PROCEDURE — 83690 ASSAY OF LIPASE: CPT

## 2018-10-01 PROCEDURE — 6360000004 HC RX CONTRAST MEDICATION: Performed by: PHYSICIAN ASSISTANT

## 2018-10-01 PROCEDURE — 85730 THROMBOPLASTIN TIME PARTIAL: CPT

## 2018-10-01 PROCEDURE — 82248 BILIRUBIN DIRECT: CPT

## 2018-10-01 PROCEDURE — 87086 URINE CULTURE/COLONY COUNT: CPT

## 2018-10-01 PROCEDURE — 84443 ASSAY THYROID STIM HORMONE: CPT

## 2018-10-01 PROCEDURE — 87184 SC STD DISK METHOD PER PLATE: CPT

## 2018-10-01 PROCEDURE — 85610 PROTHROMBIN TIME: CPT

## 2018-10-01 PROCEDURE — 87077 CULTURE AEROBIC IDENTIFY: CPT

## 2018-10-01 RX ORDER — DULOXETIN HYDROCHLORIDE 60 MG/1
60 CAPSULE, DELAYED RELEASE ORAL DAILY
Status: DISCONTINUED | OUTPATIENT
Start: 2018-10-02 | End: 2018-10-03 | Stop reason: HOSPADM

## 2018-10-01 RX ORDER — CALCIUM CARBONATE 500(1250)
1000 TABLET ORAL 4 TIMES DAILY
Status: DISCONTINUED | OUTPATIENT
Start: 2018-10-01 | End: 2018-10-03 | Stop reason: HOSPADM

## 2018-10-01 RX ORDER — POLYETHYLENE GLYCOL 3350 17 G/17G
17 POWDER, FOR SOLUTION ORAL DAILY
Status: DISCONTINUED | OUTPATIENT
Start: 2018-10-02 | End: 2018-10-03 | Stop reason: HOSPADM

## 2018-10-01 RX ORDER — ACETAMINOPHEN 325 MG/1
650 TABLET ORAL EVERY 4 HOURS PRN
Status: DISCONTINUED | OUTPATIENT
Start: 2018-10-01 | End: 2018-10-03 | Stop reason: HOSPADM

## 2018-10-01 RX ORDER — OMEGA-3-ACID ETHYL ESTERS 1 G/1
1 CAPSULE, LIQUID FILLED ORAL DAILY
Status: DISCONTINUED | OUTPATIENT
Start: 2018-10-02 | End: 2018-10-03 | Stop reason: HOSPADM

## 2018-10-01 RX ORDER — DOCUSATE SODIUM 100 MG/1
100 CAPSULE, LIQUID FILLED ORAL 2 TIMES DAILY
Status: DISCONTINUED | OUTPATIENT
Start: 2018-10-01 | End: 2018-10-03 | Stop reason: HOSPADM

## 2018-10-01 RX ORDER — ATORVASTATIN CALCIUM 80 MG/1
80 TABLET, FILM COATED ORAL NIGHTLY
Status: DISCONTINUED | OUTPATIENT
Start: 2018-10-01 | End: 2018-10-02

## 2018-10-01 RX ORDER — PANTOPRAZOLE SODIUM 40 MG/1
40 TABLET, DELAYED RELEASE ORAL
Status: DISCONTINUED | OUTPATIENT
Start: 2018-10-02 | End: 2018-10-03 | Stop reason: HOSPADM

## 2018-10-01 RX ORDER — GUAIFENESIN 600 MG/1
1200 TABLET, EXTENDED RELEASE ORAL DAILY PRN
Status: ON HOLD | COMMUNITY
End: 2018-10-03 | Stop reason: HOSPADM

## 2018-10-01 RX ORDER — LEVOTHYROXINE SODIUM 0.1 MG/1
100 TABLET ORAL DAILY
Status: DISCONTINUED | OUTPATIENT
Start: 2018-10-02 | End: 2018-10-02

## 2018-10-01 RX ORDER — CHLORAL HYDRATE 500 MG
2000 CAPSULE ORAL
Status: DISCONTINUED | OUTPATIENT
Start: 2018-10-01 | End: 2018-10-01 | Stop reason: CLARIF

## 2018-10-01 RX ORDER — DULOXETIN HYDROCHLORIDE 30 MG/1
30 CAPSULE, DELAYED RELEASE ORAL DAILY
Status: DISCONTINUED | OUTPATIENT
Start: 2018-10-02 | End: 2018-10-03 | Stop reason: HOSPADM

## 2018-10-01 RX ORDER — 0.9 % SODIUM CHLORIDE 0.9 %
1000 INTRAVENOUS SOLUTION INTRAVENOUS ONCE
Status: COMPLETED | OUTPATIENT
Start: 2018-10-01 | End: 2018-10-01

## 2018-10-01 RX ORDER — FAMOTIDINE 20 MG/1
20 TABLET, FILM COATED ORAL 2 TIMES DAILY
Status: DISCONTINUED | OUTPATIENT
Start: 2018-10-01 | End: 2018-10-03 | Stop reason: HOSPADM

## 2018-10-01 RX ADMIN — SODIUM CHLORIDE 1000 ML: 9 INJECTION, SOLUTION INTRAVENOUS at 16:35

## 2018-10-01 RX ADMIN — ACETAMINOPHEN 650 MG: 325 TABLET ORAL at 23:37

## 2018-10-01 RX ADMIN — IOPAMIDOL 80 ML: 755 INJECTION, SOLUTION INTRAVENOUS at 17:30

## 2018-10-01 ASSESSMENT — ENCOUNTER SYMPTOMS
DIARRHEA: 0
EYE DISCHARGE: 0
NAUSEA: 0
COUGH: 0
BACK PAIN: 1
VOMITING: 0
WHEEZING: 0
EYE PAIN: 0
SORE THROAT: 0
SHORTNESS OF BREATH: 1
ABDOMINAL PAIN: 0
RHINORRHEA: 0

## 2018-10-01 ASSESSMENT — PAIN DESCRIPTION - PAIN TYPE
TYPE: ACUTE PAIN

## 2018-10-01 ASSESSMENT — PAIN DESCRIPTION - FREQUENCY
FREQUENCY: CONTINUOUS

## 2018-10-01 ASSESSMENT — PAIN DESCRIPTION - LOCATION
LOCATION: ARM;CHEST;BACK
LOCATION: CHEST;BACK
LOCATION: CHEST;ARM;BACK
LOCATION: ARM;BACK;CHEST
LOCATION: CHEST

## 2018-10-01 ASSESSMENT — PAIN DESCRIPTION - ORIENTATION
ORIENTATION: MID
ORIENTATION: RIGHT

## 2018-10-01 ASSESSMENT — PAIN SCALES - GENERAL
PAINLEVEL_OUTOF10: 4
PAINLEVEL_OUTOF10: 4
PAINLEVEL_OUTOF10: 6
PAINLEVEL_OUTOF10: 4
PAINLEVEL_OUTOF10: 5

## 2018-10-01 ASSESSMENT — PAIN DESCRIPTION - DESCRIPTORS
DESCRIPTORS: ACHING
DESCRIPTORS: ACHING;HEAVINESS

## 2018-10-01 ASSESSMENT — HEART SCORE: ECG: 0

## 2018-10-01 NOTE — ED PROVIDER NOTES
[address] [facility]       Chief Complaint   Patient presents with    Shortness of Breath    Chest Pain       Nurses Notes reviewed and I agree except as noted in the HPI. HISTORY OF PRESENT ILLNESS    Diana Jimenez is a 61 y.o. female who presents for the evaluation of shortness of breath and chest pain. The patient states that her shortness of breath started 9/28/2018 and continued into Saturday. The patient reports a midsternal chest pain into the back. She describes her pain as a continuous aching which she rates as a 6/10 in severity. The patient states that the patient was here in August for the excision of a suprarenal mass. The patient states that the surgeon nicked her bowel and then had to surgically repair the bowel. The patient then had a bowel obstruction. She states that she was in the hospital for 22 days in the past month. The patient reports that she is not bouncing back in the way she thinks she should. The patient admits intermittent lightheadedness, a headache, and diaphoresis. The patient denies any nausea, vomiting, new abdominal pain, fever, cough, or any other signs or symptoms at this time. The patient states that she has a history of a DVT which was treated with coumadin and heparin 3 years ago. She also states that she has a history of PVC and has been experiencing them today. The patient denies any history of PE or a colostomy. She is not currently on any daily anticoagulants. Location/Symptom: chest pain, dyspnea  Timing/Onset: 3 days ago  Context/Setting: at home  Quality: aching  Duration: continuous  Modifying Factors: worse with exertion  Severity: 6/10    REVIEW OF SYSTEMS     Review of Systems   Constitutional: Negative for appetite change, chills, fatigue and fever. HENT: Negative for congestion, ear pain, rhinorrhea and sore throat. Eyes: Negative for pain, discharge and visual disturbance. Respiratory: Positive for shortness of breath. appropriate labs, including 2 troponins and imaging including a CTA of the chest. Results were negative for acute abnormality. Laboratory and imaging results were reviewed and discussed with the patient. Within the department, the patient was observed. She has remained stable. I'm concerned due to her chest pain and dyspnea; she has a heart score of 4, therefore I consulted her PCP for admission. Dr. Riki Pallas advises to consult Dr. Zully Mar accepted admission. Patient was admitted to the hospital in fair condition. CRITICAL CARE:   None    CONSULTS:  Dr. Garcia Friday (family medicine)   Dr. Rayo Linton (Internal Medicine)     PROCEDURES:  None    FINAL IMPRESSION      1. Chest pain, unspecified type    2. Dyspnea on exertion          DISPOSITION/PLAN     1. Chest pain, unspecified type    2. Dyspnea on exertion    admission      (Please note that portions of this note were completed with a voice recognition program.  Efforts were made to edit the dictations but occasionally words are mis-transcribed.)    Scribe:  Cristal Herr0/1/18 4:12 PM Scribing for and in the presence of ROSA Vázquez. Signed by: Zach Hall, [address] [facility] 10/01/18 9:02 PM    Provider:  I personally performed the services described in the documentation, reviewed and edited the documentation which was dictated to the scribe in my presence, and it accurately records my words and actions.     ROSA Vázquez 10/01/18 9:02 PM    ROSA Vázquez [place]  10/01/18 2100

## 2018-10-02 LAB
CHOLESTEROL, TOTAL: 187 MG/DL (ref 100–199)
EKG ATRIAL RATE: 126 BPM
EKG ATRIAL RATE: 74 BPM
EKG P AXIS: 70 DEGREES
EKG P AXIS: 79 DEGREES
EKG P-R INTERVAL: 134 MS
EKG P-R INTERVAL: 158 MS
EKG Q-T INTERVAL: 308 MS
EKG Q-T INTERVAL: 426 MS
EKG QRS DURATION: 70 MS
EKG QRS DURATION: 86 MS
EKG QTC CALCULATION (BAZETT): 446 MS
EKG QTC CALCULATION (BAZETT): 472 MS
EKG R AXIS: -31 DEGREES
EKG R AXIS: 14 DEGREES
EKG T AXIS: 56 DEGREES
EKG T AXIS: 73 DEGREES
EKG VENTRICULAR RATE: 126 BPM
EKG VENTRICULAR RATE: 74 BPM
HDLC SERPL-MCNC: 43 MG/DL
LDL CHOLESTEROL CALCULATED: 121 MG/DL
T4 FREE: 0.77 NG/DL (ref 0.93–1.76)
TOTAL CK: 34 U/L (ref 30–135)
TOTAL CK: 38 U/L (ref 30–135)
TRIGL SERPL-MCNC: 114 MG/DL (ref 0–199)
TROPONIN T: < 0.01 NG/ML
TROPONIN T: < 0.01 NG/ML
TSH SERPL DL<=0.05 MIU/L-ACNC: 5.61 UIU/ML (ref 0.4–4.2)

## 2018-10-02 PROCEDURE — 2709999900 HC NON-CHARGEABLE SUPPLY

## 2018-10-02 PROCEDURE — 84484 ASSAY OF TROPONIN QUANT: CPT

## 2018-10-02 PROCEDURE — 82550 ASSAY OF CK (CPK): CPT

## 2018-10-02 PROCEDURE — 6360000002 HC RX W HCPCS: Performed by: INTERNAL MEDICINE

## 2018-10-02 PROCEDURE — G0378 HOSPITAL OBSERVATION PER HR: HCPCS

## 2018-10-02 PROCEDURE — 84443 ASSAY THYROID STIM HORMONE: CPT

## 2018-10-02 PROCEDURE — 99244 OFF/OP CNSLTJ NEW/EST MOD 40: CPT | Performed by: INTERNAL MEDICINE

## 2018-10-02 PROCEDURE — 80061 LIPID PANEL: CPT

## 2018-10-02 PROCEDURE — 6370000000 HC RX 637 (ALT 250 FOR IP): Performed by: INTERNAL MEDICINE

## 2018-10-02 PROCEDURE — 84439 ASSAY OF FREE THYROXINE: CPT

## 2018-10-02 PROCEDURE — 93010 ELECTROCARDIOGRAM REPORT: CPT | Performed by: INTERNAL MEDICINE

## 2018-10-02 PROCEDURE — 96372 THER/PROPH/DIAG INJ SC/IM: CPT

## 2018-10-02 PROCEDURE — 93005 ELECTROCARDIOGRAM TRACING: CPT | Performed by: INTERNAL MEDICINE

## 2018-10-02 PROCEDURE — 36415 COLL VENOUS BLD VENIPUNCTURE: CPT

## 2018-10-02 RX ORDER — LEVOTHYROXINE SODIUM 0.12 MG/1
125 TABLET ORAL DAILY
Status: DISCONTINUED | OUTPATIENT
Start: 2018-10-03 | End: 2018-10-03 | Stop reason: HOSPADM

## 2018-10-02 RX ORDER — ROSUVASTATIN CALCIUM 20 MG/1
20 TABLET, COATED ORAL NIGHTLY
Status: DISCONTINUED | OUTPATIENT
Start: 2018-10-02 | End: 2018-10-03 | Stop reason: HOSPADM

## 2018-10-02 RX ADMIN — ROSUVASTATIN CALCIUM 20 MG: 20 TABLET, COATED ORAL at 22:19

## 2018-10-02 RX ADMIN — LEVOTHYROXINE SODIUM 100 MCG: 100 TABLET ORAL at 09:45

## 2018-10-02 RX ADMIN — PANTOPRAZOLE SODIUM 40 MG: 40 TABLET, DELAYED RELEASE ORAL at 10:14

## 2018-10-02 RX ADMIN — DOCUSATE SODIUM 100 MG: 100 CAPSULE, LIQUID FILLED ORAL at 09:44

## 2018-10-02 RX ADMIN — DOCUSATE SODIUM 100 MG: 100 CAPSULE, LIQUID FILLED ORAL at 23:00

## 2018-10-02 RX ADMIN — CALCIUM 1000 MG: 500 TABLET ORAL at 22:18

## 2018-10-02 RX ADMIN — OMEGA-3-ACID ETHYL ESTERS 1 G: 1 CAPSULE, LIQUID FILLED ORAL at 09:44

## 2018-10-02 RX ADMIN — DULOXETINE HYDROCHLORIDE 30 MG: 30 CAPSULE, DELAYED RELEASE ORAL at 09:49

## 2018-10-02 RX ADMIN — CALCIUM 1000 MG: 500 TABLET ORAL at 17:38

## 2018-10-02 RX ADMIN — FAMOTIDINE 20 MG: 20 TABLET ORAL at 09:44

## 2018-10-02 RX ADMIN — FAMOTIDINE 20 MG: 20 TABLET ORAL at 23:01

## 2018-10-02 RX ADMIN — DULOXETINE HYDROCHLORIDE 60 MG: 60 CAPSULE, DELAYED RELEASE ORAL at 09:46

## 2018-10-02 RX ADMIN — ENOXAPARIN SODIUM 40 MG: 40 INJECTION SUBCUTANEOUS at 22:18

## 2018-10-02 RX ADMIN — CALCIUM 1000 MG: 500 TABLET ORAL at 12:38

## 2018-10-02 ASSESSMENT — PAIN SCALES - GENERAL
PAINLEVEL_OUTOF10: 3
PAINLEVEL_OUTOF10: 0
PAINLEVEL_OUTOF10: 4
PAINLEVEL_OUTOF10: 0

## 2018-10-02 ASSESSMENT — PAIN DESCRIPTION - PAIN TYPE
TYPE: ACUTE PAIN
TYPE: ACUTE PAIN

## 2018-10-02 ASSESSMENT — PAIN DESCRIPTION - FREQUENCY: FREQUENCY: CONTINUOUS

## 2018-10-02 ASSESSMENT — PAIN DESCRIPTION - ORIENTATION
ORIENTATION: MID
ORIENTATION: MID;UPPER

## 2018-10-02 ASSESSMENT — PAIN DESCRIPTION - ONSET: ONSET: ON-GOING

## 2018-10-02 ASSESSMENT — PAIN DESCRIPTION - DESCRIPTORS
DESCRIPTORS: DISCOMFORT
DESCRIPTORS: HEAVINESS;ACHING

## 2018-10-02 ASSESSMENT — PAIN DESCRIPTION - LOCATION
LOCATION: BACK;CHEST
LOCATION: LEG

## 2018-10-02 NOTE — CARE COORDINATION
CASE MANAGEMENT OBSERVATION NOTE      Nida Low       Admitted from: ER 10/1/2018/ 1322   Location: [facility]29/029-A Reason for admit: Chest pain [R07.9]   Admit order signed?: no    Procedure: No    Pertinent Info/Orders/Treatment Plan:  VS. I&O. Telemetry. Follow labs, Trops negative. Cardiology consulted. PCP: Grace Gaston MD    Discharge Plan: Met with pt today. From home with spouse and no current services or DME's. She has been taking shoulder therapy at [facility] as outpt. She has a PCP, transportation and no issues obtaining her meds. She is reminded that CM is here to Help with discharge needs and she understands this. Patient has been readmitted within 10 days. Patient went to f/u appointment? Yes with Dr. Emma Flaherty. If yes, was it within 7 days? Yes  Patient was able to fill prescriptions? Yes  Patient is taking medications as prescribed? Yes  Cause for readmission? Chest pain.

## 2018-10-02 NOTE — CONSULTS
[facility]    Patient's Name/Date of Birth: Lashonda Kraft / 1955 (10 y.o.)    Date: October 2, 2018     Referring Provider: Florence Bustamante MD    CHIEF COMPLAINT:  CP      HPI: This is a pleasant 61 y.o. female presents with chest pain and sob x 3 days. 4/10. SSCP. No cough. Lasts up to hours. No other associated symptoms. Has hx of arm DVT, anxiety. ECG shows NSR. Hx of Celiac disease as well. Trop negative. TSH 5.61. PE negative. CXR without acute findings. BP and HR well controlled. No pleurisy. Currently, no chest pain, angina, CANTRELL, orthopnea, PND, sob at rest, palpitations, LE edema, or syncope. No premature CAD, no smoking. Echo: No results found for this or any previous visit.      All labs, EKG's, diagnostic testing and images as well as cardiac cath, stress testing were reviewed during this encounter    Past Medical History:   Diagnosis Date    Anemia 2000    malabsorption/Celiac disease    Anxiety 02/2018    Dr. Yessica Dial    Arm DVT (deep venous thromboembolism), acute ([address] 5/15/13    Broken shoulder 09/06/2016    right    Celiac disease     CRPS (complex regional pain syndrome type I) 2016    Dr. Brant Sherman    Depression 09/2016    Dr. Brant Sherman    GERD (gastroesophageal reflux disease) 2000    and celiac- Dr. Lisa Chavez- [facility] [place] Headache(784.0)     migraines-Dr. Sara Johnson History of blood transfusion     Hx of reactive hypoglycemia     Dr. Maynor Boateng Hyperlipidemia 2013    Dr. Sara Johnson Hypothyroidism 1993    Dr. Bianca Chavez at [facility] MDRO (multiple drug resistant organisms) resistance     Meniere disease 2005    Dr. Rosey Cox Mitral valve prolapse syndrome 2008    MRSA (methicillin resistant Staphylococcus aureus) 2013, 8/1/15    left forearm 2013, nasal screen pos Aug 2015   Patsi Meo Narcolepsy 2000    Dr. Ayo Solano Partial bowel obstruction mg  100 mg Oral BID Raul Hyde MD   100 mg at 10/02/18 0944    DULoxetine (CYMBALTA) extended release capsule 30 mg  30 mg Oral Daily Raul Hyde MD   30 mg at 10/02/18 0949    DULoxetine (CYMBALTA) extended release capsule 60 mg  60 mg Oral Daily Raul Hyde MD   60 mg at 10/02/18 0946    pantoprazole (PROTONIX) tablet 40 mg  40 mg Oral QAM AC Raul Hyde MD   40 mg at 10/02/18 1014    famotidine (PEPCID) tablet 20 mg  20 mg Oral BID Raul Hyde MD   20 mg at 10/02/18 0944    polyethylene glycol (GLYCOLAX) packet 17 g  17 g Oral Daily Raul Hyde MD        atorvastatin (LIPITOR) tablet 80 mg  80 mg Oral Nightly Raul Hyde MD        vitamin D (CHOLECALCIFEROL) tablet 5,000 Units  5,000 Units Oral Daily Raul Hyde MD        enoxaparin (LOVENOX) injection 40 mg  40 mg Subcutaneous Q24H Raul Hyde MD        omega-3 acid ethyl esters (LOVAZA) capsule 1 g  1 g Oral Daily Raul Hyde MD   1 g at 10/02/18 0944     Prior to Admission medications    Medication Sig Start Date End Date Taking?  Authorizing Provider   guaiFENesin (MUCINEX) 600 MG extended release tablet Take 1,200 mg by mouth daily as needed for Congestion   Yes Historical Provider, MD   Triamcinolone Acetonide (NASACORT AQ NA) 2 sprays by Nasal route daily 2 sprays both nares daily   Yes Historical Provider, MD   acetaminophen (TYLENOL) 325 MG tablet Take 2 tablets by mouth every 4 hours as needed for Pain 9/21/18  Yes Raul Hyde MD   docusate sodium (COLACE, DULCOLAX) 100 MG CAPS Take 100 mg by mouth 2 times daily  Patient taking differently: Take 100 mg by mouth 2 times daily as needed  9/21/18  Yes Raul Hyde MD   polyethylene glycol (GLYCOLAX) packet Take 17 g by mouth daily  Patient taking differently: Take 17 g by mouth daily as needed  9/22/18 10/22/18 Yes Raul Hyde MD   famotidine (PEPCID) 20 MG tablet Take 1 Alejos Goodell, MD on 10/2/2018 at 1:45 PM    Interventional Cardiology - [facility]

## 2018-10-02 NOTE — CARE COORDINATION
10/2/18, 10:54 AM    DISCHARGE BARRIERS      Patient is a readmit, discharged on 9/24 to home with spouse, full assessment deferred. Patient states all information remain the same from her last admission. Patient was discharged with [facility], patient has discontinued [facility] as she wants to continue her OT at [facility]. Patient declines [facility] at discharge, will continue at [facility] with OT as an outpatient. Patient has some concerns regarding her hospital past and current bills. SW will find out who she can talk with, discussed filling out the back part of the bill.

## 2018-10-02 NOTE — PLAN OF CARE
Problem: DISCHARGE BARRIERS  Goal: Patient's continuum of care needs are met  Outcome: Ongoing  Patient plans to return home with , declines [facility] service, will continue with outpatient OT at [facility], see sw note dated 10/2.

## 2018-10-02 NOTE — FLOWSHEET NOTE
10/02/18 0414   Provider Notification   Reason for Communication Evaluate  (consult)   Provider Name Dr. Lulu Heller   Provider Notification Physician   Method of Communication Secure Message   Response No new orders     Notified Dr. Lulu Heller of consult for CP palpitation, had stress test and ECHO in August. Added to list.

## 2018-10-03 VITALS
WEIGHT: 167.8 LBS | DIASTOLIC BLOOD PRESSURE: 72 MMHG | RESPIRATION RATE: 18 BRPM | OXYGEN SATURATION: 94 % | SYSTOLIC BLOOD PRESSURE: 126 MMHG | HEIGHT: 64 IN | HEART RATE: 88 BPM | BODY MASS INDEX: 28.65 KG/M2 | TEMPERATURE: 98.9 F

## 2018-10-03 LAB
ORGANISM: ABNORMAL
URINE CULTURE REFLEX: ABNORMAL

## 2018-10-03 PROCEDURE — 6370000000 HC RX 637 (ALT 250 FOR IP): Performed by: INTERNAL MEDICINE

## 2018-10-03 PROCEDURE — 99212 OFFICE O/P EST SF 10 MIN: CPT | Performed by: NURSE PRACTITIONER

## 2018-10-03 PROCEDURE — G0378 HOSPITAL OBSERVATION PER HR: HCPCS

## 2018-10-03 RX ORDER — LEVOTHYROXINE SODIUM 0.12 MG/1
125 TABLET ORAL DAILY
Qty: 30 TABLET | Refills: 0 | Status: SHIPPED | OUTPATIENT
Start: 2018-10-04 | End: 2018-10-05

## 2018-10-03 RX ADMIN — PANTOPRAZOLE SODIUM 40 MG: 40 TABLET, DELAYED RELEASE ORAL at 09:01

## 2018-10-03 RX ADMIN — DULOXETINE HYDROCHLORIDE 60 MG: 60 CAPSULE, DELAYED RELEASE ORAL at 09:01

## 2018-10-03 RX ADMIN — CALCIUM 1000 MG: 500 TABLET ORAL at 14:30

## 2018-10-03 RX ADMIN — OMEGA-3-ACID ETHYL ESTERS 1 G: 1 CAPSULE, LIQUID FILLED ORAL at 10:55

## 2018-10-03 RX ADMIN — FAMOTIDINE 20 MG: 20 TABLET ORAL at 09:01

## 2018-10-03 RX ADMIN — Medication 5000 UNITS: at 09:01

## 2018-10-03 RX ADMIN — DULOXETINE HYDROCHLORIDE 30 MG: 30 CAPSULE, DELAYED RELEASE ORAL at 09:01

## 2018-10-03 RX ADMIN — CALCIUM 1000 MG: 500 TABLET ORAL at 09:01

## 2018-10-03 RX ADMIN — LEVOTHYROXINE SODIUM 125 MCG: 0.12 TABLET ORAL at 09:01

## 2018-10-03 ASSESSMENT — PAIN SCALES - GENERAL
PAINLEVEL_OUTOF10: 0

## 2018-10-04 ENCOUNTER — TELEPHONE (OUTPATIENT)
Dept: FAMILY MEDICINE CLINIC | Age: 63
End: 2018-10-04

## 2018-10-04 DIAGNOSIS — G90.511 COMPLEX REGIONAL PAIN SYNDROME TYPE 1 OF RIGHT UPPER EXTREMITY: Primary | ICD-10-CM

## 2018-10-04 DIAGNOSIS — E03.9 HYPOTHYROIDISM, UNSPECIFIED TYPE: ICD-10-CM

## 2018-10-05 RX ORDER — LEVOTHYROXINE SODIUM 125 MCG
125 TABLET ORAL DAILY
Qty: 90 TABLET | Refills: 3 | Status: SHIPPED | OUTPATIENT
Start: 2018-10-05 | End: 2019-05-09 | Stop reason: SDUPTHER

## 2018-10-08 ENCOUNTER — TELEPHONE (OUTPATIENT)
Dept: FAMILY MEDICINE CLINIC | Age: 63
End: 2018-10-08

## 2018-10-23 ENCOUNTER — OFFICE VISIT (OUTPATIENT)
Dept: PULMONOLOGY | Age: 63
End: 2018-10-23
Payer: COMMERCIAL

## 2018-10-23 VITALS
OXYGEN SATURATION: 97 % | WEIGHT: 170.8 LBS | HEIGHT: 64 IN | DIASTOLIC BLOOD PRESSURE: 76 MMHG | BODY MASS INDEX: 29.16 KG/M2 | HEART RATE: 101 BPM | SYSTOLIC BLOOD PRESSURE: 120 MMHG

## 2018-10-23 DIAGNOSIS — F41.9 ANXIETY AND DEPRESSION: ICD-10-CM

## 2018-10-23 DIAGNOSIS — F32.A ANXIETY AND DEPRESSION: ICD-10-CM

## 2018-10-23 DIAGNOSIS — R40.0 DAYTIME SOMNOLENCE: ICD-10-CM

## 2018-10-23 DIAGNOSIS — G47.419 PRIMARY NARCOLEPSY WITHOUT CATAPLEXY: Primary | ICD-10-CM

## 2018-10-23 PROCEDURE — 99213 OFFICE O/P EST LOW 20 MIN: CPT | Performed by: PHYSICIAN ASSISTANT

## 2018-10-23 PROCEDURE — G8417 CALC BMI ABV UP PARAM F/U: HCPCS | Performed by: PHYSICIAN ASSISTANT

## 2018-10-23 PROCEDURE — 1036F TOBACCO NON-USER: CPT | Performed by: PHYSICIAN ASSISTANT

## 2018-10-23 PROCEDURE — 3017F COLORECTAL CA SCREEN DOC REV: CPT | Performed by: PHYSICIAN ASSISTANT

## 2018-10-23 PROCEDURE — G8484 FLU IMMUNIZE NO ADMIN: HCPCS | Performed by: PHYSICIAN ASSISTANT

## 2018-10-23 PROCEDURE — G8427 DOCREV CUR MEDS BY ELIG CLIN: HCPCS | Performed by: PHYSICIAN ASSISTANT

## 2018-10-23 RX ORDER — METHYLPHENIDATE HYDROCHLORIDE 20 MG/1
20 TABLET ORAL DAILY
Qty: 31 TABLET | Refills: 0 | Status: SHIPPED | OUTPATIENT
Start: 2018-10-23 | End: 2018-12-18 | Stop reason: SDUPTHER

## 2018-10-23 RX ORDER — RANITIDINE 300 MG/1
300 TABLET ORAL NIGHTLY
Qty: 90 TABLET | Refills: 3 | Status: SHIPPED | OUTPATIENT
Start: 2018-10-23 | End: 2019-10-18 | Stop reason: SDUPTHER

## 2018-10-23 RX ORDER — METHYLPHENIDATE HYDROCHLORIDE 20 MG/1
20 TABLET ORAL 2 TIMES DAILY
COMMUNITY
End: 2018-10-23 | Stop reason: SDUPTHER

## 2018-10-23 RX ORDER — MODAFINIL 200 MG/1
200 TABLET ORAL 2 TIMES DAILY
Qty: 60 TABLET | Refills: 0 | Status: SHIPPED | OUTPATIENT
Start: 2018-10-23 | End: 2019-02-26 | Stop reason: SDUPTHER

## 2018-10-23 RX ORDER — MODAFINIL 200 MG/1
200 TABLET ORAL DAILY
COMMUNITY
End: 2018-10-23 | Stop reason: SDUPTHER

## 2018-10-23 NOTE — PROGRESS NOTES
Scot Hough Partial bowel obstruction ([address]     Multile times.   S/P partial small bowel resection    Restless legs syndrome 1993    Dr. Ailyn Mantilla RSD upper limb 2016    Right Shoulder- Dr. Cecily Simpson RSD upper limb     right shoulder , arm, hand    Squamous cell skin cancer 2003    Dr. Diaz Green       Past Surgical History:   Procedure Laterality Date    ABDOMEN SURGERY  04/23/2013    ABD Exploration, removal of ommentum, lysis of adhesions-Dr. [facility] SURGERY  10/2010    Resection-Dr. Anjelica Escalante [facility] Left 2006   Bobby Qualia  2003     Dr. Caroline Iglesias  [id number], 2015    ENDOSCOPY, COLON, DIAGNOSTIC      FRACTURE SURGERY      HYSTERECTOMY  1985    [place] KNEE SURGERY Right 2006    LAPAROTOMY EXPLORATORY N/A 8/31/2018    LAPAROTOMY EXPLORATORY, lysis of adhesions, closure innerotomoy performed by Estrella Soni MD at [address]  2006    capsule endoscopy    OK EGD TRANSORAL BIOPSY SINGLE/MULTIPLE Left 2/8/2018    EGD BIOPSY performed by Kamari Palma MD at [address] [place] [address] Sw OFFICE/OUTPT [facility] Left 8/31/2018    ROBOTIC EXCISION OF LEFT PARARENAL MASS performed by Estrella Soni MD at [address]  1/14/97    SINUS SURGERY  x4 1982, 1990, 1997, 2005     UPPER GASTROINTESTINAL ENDOSCOPY  1998,2009,2010,2011,2012, 2015, 2/2018       Social History   Substance Use Topics    Smoking status: Never Smoker    Smokeless tobacco: Never Used    Alcohol use Yes      Comment: occassionally       Allergies   Allergen Reactions    Dilaudid [Hydromorphone Hcl]      Respiratory failure    Hydromorphone Shortness Of Breath    Iron Anaphylaxis    Percodan [Oxycodone-Aspirin]      Anxiety     Fenoprofen Calcium Hives    Gluten Diarrhea    Gluten Meal Other (See kg/m²       Physical Exam   Constitutional: She is oriented to person, place, and time. She appears well-developed and well-nourished. HENT:   Head: Normocephalic and atraumatic. Right Ear: External ear normal.   Left Ear: External ear normal.   Mouth/Throat: Oropharynx is clear and moist.   Eyes: Pupils are equal, round, and reactive to light. Conjunctivae and EOM are normal.   Neck: Normal range of motion. Neck supple. Cardiovascular: Normal rate, regular rhythm and normal heart sounds. Pulmonary/Chest: Effort normal and breath sounds normal.   Abdominal: Soft. Musculoskeletal: Normal range of motion. Neurological: She is alert and oriented to person, place, and time. Skin: Skin is warm and dry. Psychiatric: She has a normal mood and affect. Her behavior is normal. Judgment and thought content normal.          Assessment      Diagnosis Orders   1. Primary narcolepsy without cataplexy     2. Daytime somnolence     3. Anxiety and depression            Plan   - Will continue Provigil and Ritalin  - Continue to recover from abd surgery  - She call my officefor earlier appointment if needed for worsening of sleep symptoms.   - She was instructed on weight loss  - Gilbert Coffey was educated about my impression and plan. Patient verbalizes understanding.   We will see Veronica Lei back in: 1 year     Radha Hare  10/23/2018

## 2018-10-26 ENCOUNTER — TELEPHONE (OUTPATIENT)
Dept: FAMILY MEDICINE CLINIC | Age: 63
End: 2018-10-26

## 2018-10-30 ENCOUNTER — OFFICE VISIT (OUTPATIENT)
Dept: SURGERY | Age: 63
End: 2018-10-30

## 2018-10-30 VITALS
WEIGHT: 170 LBS | HEART RATE: 91 BPM | HEIGHT: 65 IN | DIASTOLIC BLOOD PRESSURE: 84 MMHG | TEMPERATURE: 97.8 F | BODY MASS INDEX: 28.32 KG/M2 | RESPIRATION RATE: 18 BRPM | OXYGEN SATURATION: 98 % | SYSTOLIC BLOOD PRESSURE: 130 MMHG

## 2018-10-30 DIAGNOSIS — Z98.890 S/P EXPLORATORY LAPAROTOMY: Primary | ICD-10-CM

## 2018-10-30 PROCEDURE — 99024 POSTOP FOLLOW-UP VISIT: CPT | Performed by: NURSE PRACTITIONER

## 2018-10-30 ASSESSMENT — ENCOUNTER SYMPTOMS
SORE THROAT: 0
APNEA: 0
SINUS PRESSURE: 0
CHOKING: 0
COLOR CHANGE: 0
SHORTNESS OF BREATH: 0
VOICE CHANGE: 0
NAUSEA: 0
ABDOMINAL DISTENTION: 0
EYE PAIN: 0
VOMITING: 0
CONSTIPATION: 0
BLOOD IN STOOL: 0
CHEST TIGHTNESS: 0
DIARRHEA: 0
RECTAL PAIN: 0
EYE REDNESS: 0
BACK PAIN: 0
EYE DISCHARGE: 0
ANAL BLEEDING: 0
COUGH: 0
EYE ITCHING: 0
RHINORRHEA: 0
FACIAL SWELLING: 0
ABDOMINAL PAIN: 1
STRIDOR: 0
TROUBLE SWALLOWING: 0
PHOTOPHOBIA: 0
WHEEZING: 0

## 2018-10-30 NOTE — PROGRESS NOTES
syndrome 1993    Dr. Ning Mayen RSD upper limb 2016    Right Shoulder- Dr. Truong Hoover RSD upper limb     right shoulder , arm, hand    Squamous cell skin cancer 2003    Dr. Beka George      Past Surgical History:   Procedure Laterality Date    ABDOMEN SURGERY  04/23/2013    ABD Exploration, removal of ommentum, lysis of adhesions-Dr. Hugo Astorga ABDOMINAL ADHESION SURGERY  10/2010    Resection-Dr. Carli Clements Left 2006   [place]  2003     Dr. Ruff Everrodger  [phone], 2015    ENDOSCOPY, COLON, DIAGNOSTIC      FRACTURE SURGERY      HYSTERECTOMY  1985    [facility]    Dr. Allison Adame Right 2006    LAPAROTOMY EXPLORATORY N/A 8/31/2018    LAPAROTOMY EXPLORATORY, lysis of adhesions, closure innerotomoy performed by Em Godwin MD at [address] [place] [address]  2006    capsule endoscopy    MD EGD TRANSORAL BIOPSY SINGLE/MULTIPLE Left 2/8/2018    EGD BIOPSY performed by Christopher Thompson MD at [facility] OFFICE/OUTPT [facility] Left 8/31/2018    ROBOTIC EXCISION OF LEFT PARARENAL MASS performed by Em Godwin MD at [address]  1/14/97    SINUS SURGERY  x4 1982, 1990, 1997, 2005     UPPER GASTROINTESTINAL ENDOSCOPY  1998,2009,2010,2011,2012, 2015, 2/2018     Family History   Problem Relation Age of Onset    Diabetes Mother         type II    Stroke Mother     High Blood Pressure Mother     Kidney Disease Mother     Heart Disease Mother     Heart Attack Mother 66        2/28/15    Colon Cancer Father 79        jejunum    Cancer Father         Lyphoma    Irritable Bowel Syndrome Sister     High Blood Pressure Brother     Kidney Disease Brother     Celiac Disease Brother     Diabetes Maternal Grandmother         type II    Blindness Maternal Grandmother     Emphysema Maternal Grandfather     Stroke Paternal tablet Take 180 mg by mouth daily as needed       rosuvastatin (CRESTOR) 20 MG tablet Take 1 tablet by mouth daily 90 tablet 3    B Complex-Folic Acid (S-313 BALANCED TR PO) Take 100 mg by mouth 3 times daily (before meals) [facility]       vitamin D (CHOLECALCIFEROL) 5000 UNITS CAPS capsule Take 5,000 Units by mouth daily       calcium carbonate (OSCAL) 500 MG TABS tablet Take 1,000 mg by mouth 4 times daily        No current facility-administered medications for this visit. Allergies   Allergen Reactions    Dilaudid [Hydromorphone Hcl]      Respiratory failure    Hydromorphone Shortness Of Breath    Iron Anaphylaxis    Percodan [Oxycodone-Aspirin]      Anxiety     Fenoprofen Calcium Hives    Gluten Diarrhea    Gluten Meal Other (See Comments)     Other reaction(s): Intolerance, Other: See Comments  Celiac disease  Celiac disease    Oxycodone-Acetaminophen     Oxycodone-Aspirin     Percocet  [Oxycodone-Acetaminophen]     Reglan [Metoclopramide]     Fenoprofen Hives    Sulfa Antibiotics Hives       Subjective:      Review of Systems   Constitutional: Positive for fatigue. Negative for activity change, appetite change, chills, diaphoresis, fever and unexpected weight change. HENT: Negative for congestion, dental problem, drooling, ear discharge, ear pain, facial swelling, hearing loss, mouth sores, nosebleeds, postnasal drip, rhinorrhea, sinus pressure, sneezing, sore throat, tinnitus, trouble swallowing and voice change. Eyes: Negative for photophobia, pain, discharge, redness, itching and visual disturbance. Respiratory: Negative for apnea, cough, choking, chest tightness, shortness of breath, wheezing and stridor. Cardiovascular: Negative for chest pain, palpitations and leg swelling. Gastrointestinal: Positive for abdominal pain. Negative for abdominal distention, anal bleeding, blood in stool, constipation, diarrhea, nausea, rectal pain and vomiting.         Sore at questionsanswered. Pt voiced understanding.      Electronically signed by NEREYDA Coley CNP on 10/30/2018 at 1:57 PM

## 2018-12-04 ENCOUNTER — OFFICE VISIT (OUTPATIENT)
Dept: CARDIOLOGY CLINIC | Age: 63
End: 2018-12-04
Payer: COMMERCIAL

## 2018-12-04 VITALS
WEIGHT: 170 LBS | HEIGHT: 64 IN | DIASTOLIC BLOOD PRESSURE: 66 MMHG | SYSTOLIC BLOOD PRESSURE: 121 MMHG | HEART RATE: 90 BPM | BODY MASS INDEX: 29.02 KG/M2

## 2018-12-04 DIAGNOSIS — K90.0 CELIAC DISEASE: ICD-10-CM

## 2018-12-04 DIAGNOSIS — E78.5 HYPERLIPIDEMIA, UNSPECIFIED HYPERLIPIDEMIA TYPE: ICD-10-CM

## 2018-12-04 DIAGNOSIS — R07.89 ATYPICAL CHEST PAIN: ICD-10-CM

## 2018-12-04 DIAGNOSIS — E03.9 ACQUIRED HYPOTHYROIDISM: ICD-10-CM

## 2018-12-04 DIAGNOSIS — Z87.898 HISTORY OF TACHYCARDIA: Primary | ICD-10-CM

## 2018-12-04 DIAGNOSIS — Z98.890 S/P EXPLORATORY LAPAROTOMY: ICD-10-CM

## 2018-12-04 DIAGNOSIS — K56.609 SBO (SMALL BOWEL OBSTRUCTION) (HCC): ICD-10-CM

## 2018-12-04 DIAGNOSIS — F32.A ANXIETY AND DEPRESSION: ICD-10-CM

## 2018-12-04 DIAGNOSIS — F41.9 ANXIETY AND DEPRESSION: ICD-10-CM

## 2018-12-04 PROCEDURE — 3017F COLORECTAL CA SCREEN DOC REV: CPT | Performed by: NURSE PRACTITIONER

## 2018-12-04 PROCEDURE — G8427 DOCREV CUR MEDS BY ELIG CLIN: HCPCS | Performed by: NURSE PRACTITIONER

## 2018-12-04 PROCEDURE — 99213 OFFICE O/P EST LOW 20 MIN: CPT | Performed by: NURSE PRACTITIONER

## 2018-12-04 PROCEDURE — G8484 FLU IMMUNIZE NO ADMIN: HCPCS | Performed by: NURSE PRACTITIONER

## 2018-12-04 PROCEDURE — G8417 CALC BMI ABV UP PARAM F/U: HCPCS | Performed by: NURSE PRACTITIONER

## 2018-12-04 PROCEDURE — 1036F TOBACCO NON-USER: CPT | Performed by: NURSE PRACTITIONER

## 2018-12-04 NOTE — PROGRESS NOTES
medical management. Patient agreed with treatment plan. Follow up as directed.     Continue Dr Sotomayor Aguilar current treatment plan  Follow up with Dr Markus Del Toro as scheduled or sooner if needed

## 2018-12-18 DIAGNOSIS — G47.419 PRIMARY NARCOLEPSY WITHOUT CATAPLEXY: ICD-10-CM

## 2018-12-18 RX ORDER — METHYLPHENIDATE HYDROCHLORIDE 20 MG/1
20 TABLET ORAL DAILY
Qty: 31 TABLET | Refills: 0 | Status: SHIPPED | OUTPATIENT
Start: 2018-12-18 | End: 2019-01-17 | Stop reason: SDUPTHER

## 2018-12-26 ENCOUNTER — TELEPHONE (OUTPATIENT)
Dept: FAMILY MEDICINE CLINIC | Age: 63
End: 2018-12-26

## 2018-12-26 RX ORDER — FLUCONAZOLE 150 MG/1
150 TABLET ORAL ONCE
Qty: 1 TABLET | Refills: 1 | Status: SHIPPED | OUTPATIENT
Start: 2018-12-26 | End: 2018-12-26

## 2019-01-17 DIAGNOSIS — G47.419 PRIMARY NARCOLEPSY WITHOUT CATAPLEXY: ICD-10-CM

## 2019-01-17 RX ORDER — METHYLPHENIDATE HYDROCHLORIDE 20 MG/1
20 TABLET ORAL DAILY
Qty: 31 TABLET | Refills: 0 | Status: SHIPPED | OUTPATIENT
Start: 2019-01-17 | End: 2019-02-26 | Stop reason: SDUPTHER

## 2019-02-26 DIAGNOSIS — G47.419 PRIMARY NARCOLEPSY WITHOUT CATAPLEXY: ICD-10-CM

## 2019-02-26 RX ORDER — METHYLPHENIDATE HYDROCHLORIDE 20 MG/1
20 TABLET ORAL DAILY
Qty: 30 TABLET | Refills: 0 | Status: SHIPPED | OUTPATIENT
Start: 2019-02-26 | End: 2019-04-01 | Stop reason: SDUPTHER

## 2019-02-26 RX ORDER — MODAFINIL 200 MG/1
200 TABLET ORAL 2 TIMES DAILY
Qty: 60 TABLET | Refills: 0 | Status: SHIPPED | OUTPATIENT
Start: 2019-02-26 | End: 2019-03-13 | Stop reason: SDUPTHER

## 2019-03-13 ENCOUNTER — TELEPHONE (OUTPATIENT)
Dept: PULMONOLOGY | Age: 64
End: 2019-03-13

## 2019-03-13 ENCOUNTER — TELEPHONE (OUTPATIENT)
Dept: CARE COORDINATION | Age: 64
End: 2019-03-13

## 2019-03-13 DIAGNOSIS — G47.419 PRIMARY NARCOLEPSY WITHOUT CATAPLEXY: ICD-10-CM

## 2019-03-13 RX ORDER — MODAFINIL 200 MG/1
200 TABLET ORAL 2 TIMES DAILY
Qty: 60 TABLET | Refills: 0 | Status: SHIPPED | OUTPATIENT
Start: 2019-03-13 | End: 2019-05-10 | Stop reason: SDUPTHER

## 2019-03-29 DIAGNOSIS — G47.419 PRIMARY NARCOLEPSY WITHOUT CATAPLEXY: ICD-10-CM

## 2019-03-29 NOTE — TELEPHONE ENCOUNTER
Norma Glass called requesting a refill on the following medications:  Requested Prescriptions     Pending Prescriptions Disp Refills    methylphenidate (RITALIN) 20 MG tablet 30 tablet 0     Sig: Take 1 tablet by mouth daily for 30 days.    took her last pill this am     [facility]       Date of last visit: 10-23-18  Date of next visit (if applicable): 06-96-86

## 2019-04-01 RX ORDER — METHYLPHENIDATE HYDROCHLORIDE 20 MG/1
20 TABLET ORAL DAILY
Qty: 30 TABLET | Refills: 0 | Status: SHIPPED | OUTPATIENT
Start: 2019-04-01 | End: 2019-04-25 | Stop reason: SDUPTHER

## 2019-04-25 DIAGNOSIS — G47.419 PRIMARY NARCOLEPSY WITHOUT CATAPLEXY: ICD-10-CM

## 2019-04-25 RX ORDER — METHYLPHENIDATE HYDROCHLORIDE 20 MG/1
20 TABLET ORAL DAILY
Qty: 30 TABLET | Refills: 0 | Status: SHIPPED | OUTPATIENT
Start: 2019-04-25 | End: 2019-05-29 | Stop reason: SDUPTHER

## 2019-04-25 NOTE — TELEPHONE ENCOUNTER
4/25/19   Linda Dow called requesting a refill on the following medications:  Requested Prescriptions     Pending Prescriptions Disp Refills    methylphenidate (RITALIN) 20 MG tablet 30 tablet 0     Sig: Take 1 tablet by mouth daily for 30 days. Pharm[facility]  . pv      Date of last visit:  10/23/18  Date of next visit (if applicable):10/23/19  blm

## 2019-05-01 ENCOUNTER — TELEPHONE (OUTPATIENT)
Dept: FAMILY MEDICINE CLINIC | Age: 64
End: 2019-05-01

## 2019-05-01 DIAGNOSIS — D50.8 OTHER IRON DEFICIENCY ANEMIA: ICD-10-CM

## 2019-05-01 DIAGNOSIS — K21.00 GASTROESOPHAGEAL REFLUX DISEASE WITH ESOPHAGITIS: Primary | ICD-10-CM

## 2019-05-01 DIAGNOSIS — E03.9 HYPOTHYROIDISM, UNSPECIFIED TYPE: ICD-10-CM

## 2019-05-01 RX ORDER — ESOMEPRAZOLE MAGNESIUM 40 MG/1
40 CAPSULE, DELAYED RELEASE ORAL
Qty: 90 CAPSULE | Refills: 1 | Status: SHIPPED | OUTPATIENT
Start: 2019-05-01 | End: 2019-08-30 | Stop reason: SDUPTHER

## 2019-05-01 NOTE — TELEPHONE ENCOUNTER
Amor Moore called requesting a refill of the below medication which has been pended for you:     Requested Prescriptions     Pending Prescriptions Disp Refills    esomeprazole (NEXIUM) 40 MG delayed release capsule 90 capsule 3     Sig: Take 1 capsule by mouth every morning (before breakfast)       Last Appointment Date: 9/27/2018  Next Appointment Date: Visit date not found    Allergies   Allergen Reactions    Dilaudid [Hydromorphone Hcl]      Respiratory failure    Hydromorphone Shortness Of Breath    Iron Anaphylaxis    Percodan [Oxycodone-Aspirin]      Anxiety     Fenoprofen Calcium Hives    Gluten Diarrhea    Gluten Meal Other (See Comments)     Other reaction(s):  Intolerance, Other: See Comments  Celiac disease  Celiac disease    Oxycodone-Acetaminophen     Oxycodone-Aspirin     Percocet  [Oxycodone-Acetaminophen]     Reglan [Metoclopramide]     Fenoprofen Hives    Sulfa Antibiotics Hives

## 2019-05-01 NOTE — TELEPHONE ENCOUNTER
I sent the RF for the Nexium. If by the thyriod lab she means the TSH that was ordered in October 18 to do in November 18 the lab may not accept it because of it being so old . I will order a TSH and a soluble transferrin receptor test because it is a more accurate indicator of the iron level.

## 2019-05-08 LAB — TSH SERPL DL<=0.05 MIU/L-ACNC: 2.15 UIU/ML (ref 0.49–4.67)

## 2019-05-08 NOTE — TELEPHONE ENCOUNTER
Patient called stating she had her TSH done yesterday, advised her we do not have the result yet. She asked that we call [facility] and get the result because she will be taking her last pill today. Called [facility] and the TSH is done but the Soluble Transferrin Receptor is not so that is why we have not received results. They are faxing the TSH. Please send Synthroid to [facility].

## 2019-05-09 ENCOUNTER — TELEPHONE (OUTPATIENT)
Dept: FAMILY MEDICINE CLINIC | Age: 64
End: 2019-05-09

## 2019-05-09 DIAGNOSIS — G47.419 PRIMARY NARCOLEPSY WITHOUT CATAPLEXY: ICD-10-CM

## 2019-05-09 LAB — SOLUBLE TRANSFERRIN RECEPT: 2.3 MG/L (ref 1.9–4.4)

## 2019-05-09 RX ORDER — LEVOTHYROXINE SODIUM 125 MCG
125 TABLET ORAL DAILY
Qty: 90 TABLET | Refills: 3 | Status: SHIPPED | OUTPATIENT
Start: 2019-05-09 | End: 2019-08-30 | Stop reason: SDUPTHER

## 2019-05-09 NOTE — TELEPHONE ENCOUNTER
----- Message from Rick Acevedo MD sent at 5/9/2019  5:00 PM EDT -----  Let her know the labs showed the thyroid to be fine and the iron level to be now normal. I will send the thyroid Rx

## 2019-05-09 NOTE — TELEPHONE ENCOUNTER
José Antonio Draper called requesting a refill on the following medications:  Requested Prescriptions     Pending Prescriptions Disp Refills    modafinil (PROVIGIL) 200 MG tablet 60 tablet 0     Sig: Take 1 tablet by mouth 2 times daily for 30 days.      Ph[facility]      Date of last visit: 10-23-18   Date of next visit (if applicable): 68/76/2233

## 2019-05-10 RX ORDER — MODAFINIL 200 MG/1
200 TABLET ORAL 2 TIMES DAILY
Qty: 60 TABLET | Refills: 0 | Status: SHIPPED | OUTPATIENT
Start: 2019-05-10 | End: 2019-05-29 | Stop reason: SDUPTHER

## 2019-05-29 ENCOUNTER — TELEPHONE (OUTPATIENT)
Dept: PULMONOLOGY | Age: 64
End: 2019-05-29

## 2019-05-29 DIAGNOSIS — G47.419 PRIMARY NARCOLEPSY WITHOUT CATAPLEXY: ICD-10-CM

## 2019-05-29 RX ORDER — MODAFINIL 200 MG/1
200 TABLET ORAL 2 TIMES DAILY
Qty: 180 TABLET | Refills: 0 | Status: SHIPPED | OUTPATIENT
Start: 2019-05-29 | End: 2019-08-26 | Stop reason: SDUPTHER

## 2019-05-29 RX ORDER — METHYLPHENIDATE HYDROCHLORIDE 20 MG/1
20 TABLET ORAL DAILY
Qty: 30 TABLET | Refills: 0 | Status: SHIPPED | OUTPATIENT
Start: 2019-05-29 | End: 2019-06-27 | Stop reason: SDUPTHER

## 2019-06-27 ENCOUNTER — CLINICAL DOCUMENTATION (OUTPATIENT)
Dept: PULMONOLOGY | Age: 64
End: 2019-06-27

## 2019-06-27 DIAGNOSIS — G47.419 PRIMARY NARCOLEPSY WITHOUT CATAPLEXY: ICD-10-CM

## 2019-06-27 RX ORDER — METHYLPHENIDATE HYDROCHLORIDE 20 MG/1
20 TABLET ORAL DAILY
Qty: 30 TABLET | Refills: 0 | Status: SHIPPED | OUTPATIENT
Start: 2019-06-27 | End: 2019-07-30 | Stop reason: SDUPTHER

## 2019-06-27 NOTE — TELEPHONE ENCOUNTER
Raffaele Lyles called requesting a refill on the following medications:  Requested Prescriptions     Pending Prescriptions Disp Refills    methylphenidate (RITALIN) 20 MG tablet 30 tablet 0     Sig: Take 1 tablet by mouth daily for 30 days.    [facility]       Date of last visit: 10-23-18  Date of next visit (if applicable): 94/49/4900

## 2019-07-26 DIAGNOSIS — G47.419 PRIMARY NARCOLEPSY WITHOUT CATAPLEXY: ICD-10-CM

## 2019-07-30 RX ORDER — METHYLPHENIDATE HYDROCHLORIDE 20 MG/1
20 TABLET ORAL DAILY
Qty: 30 TABLET | Refills: 0 | Status: SHIPPED | OUTPATIENT
Start: 2019-07-30 | End: 2019-08-26 | Stop reason: SDUPTHER

## 2019-08-26 DIAGNOSIS — G47.419 PRIMARY NARCOLEPSY WITHOUT CATAPLEXY: ICD-10-CM

## 2019-08-26 RX ORDER — METHYLPHENIDATE HYDROCHLORIDE 20 MG/1
20 TABLET ORAL DAILY
Qty: 30 TABLET | Refills: 0 | Status: SHIPPED | OUTPATIENT
Start: 2019-08-26 | End: 2019-09-30 | Stop reason: SDUPTHER

## 2019-08-26 RX ORDER — MODAFINIL 200 MG/1
200 TABLET ORAL 2 TIMES DAILY
Qty: 180 TABLET | Refills: 0 | Status: SHIPPED | OUTPATIENT
Start: 2019-08-26 | End: 2019-09-25

## 2019-08-26 NOTE — TELEPHONE ENCOUNTER
Olga Muir called requesting a refill on the following medications:  Requested Prescriptions     Pending Prescriptions Disp Refills    methylphenidate (RITALIN) 20 MG tablet 30 tablet 0     Sig: Take 1 tablet by mouth daily for 30 days.  modafinil (PROVIGIL) 200 MG tablet 180 tablet 0     Sig: Take 1 tablet by mouth 2 times daily for 30 days.      Pharm[facility]      Date of last visit: 10-23-18  Date of next visit (if applicable): 40/31/2706

## 2019-08-27 NOTE — TELEPHONE ENCOUNTER
Patient stated that this was prescribed in the past by [facility]note 2[facility] and she doesn't go there anymore. She's asking if Cindy can prescribe? Please let her know if unable to.

## 2019-08-28 RX ORDER — DULOXETIN HYDROCHLORIDE 60 MG/1
60 CAPSULE, DELAYED RELEASE ORAL DAILY
Qty: 90 CAPSULE | Refills: 1 | OUTPATIENT
Start: 2019-08-28

## 2019-08-30 ENCOUNTER — OFFICE VISIT (OUTPATIENT)
Dept: FAMILY MEDICINE CLINIC | Age: 64
End: 2019-08-30

## 2019-08-30 VITALS
WEIGHT: 179.13 LBS | DIASTOLIC BLOOD PRESSURE: 62 MMHG | SYSTOLIC BLOOD PRESSURE: 122 MMHG | BODY MASS INDEX: 30.58 KG/M2 | HEART RATE: 90 BPM | HEIGHT: 64 IN | RESPIRATION RATE: 16 BRPM

## 2019-08-30 DIAGNOSIS — J32.9 CHRONIC SINUSITIS, UNSPECIFIED LOCATION: ICD-10-CM

## 2019-08-30 DIAGNOSIS — I87.2 VENOUS INSUFFICIENCY: ICD-10-CM

## 2019-08-30 DIAGNOSIS — E78.5 HYPERLIPIDEMIA, UNSPECIFIED HYPERLIPIDEMIA TYPE: ICD-10-CM

## 2019-08-30 DIAGNOSIS — G90.511 COMPLEX REGIONAL PAIN SYNDROME TYPE 1 OF RIGHT UPPER EXTREMITY: Primary | ICD-10-CM

## 2019-08-30 DIAGNOSIS — F32.9 REACTIVE DEPRESSION: ICD-10-CM

## 2019-08-30 DIAGNOSIS — E03.9 HYPOTHYROIDISM, UNSPECIFIED TYPE: ICD-10-CM

## 2019-08-30 DIAGNOSIS — K21.00 GASTROESOPHAGEAL REFLUX DISEASE WITH ESOPHAGITIS: ICD-10-CM

## 2019-08-30 PROCEDURE — 99214 OFFICE O/P EST MOD 30 MIN: CPT | Performed by: FAMILY MEDICINE

## 2019-08-30 RX ORDER — ESOMEPRAZOLE MAGNESIUM 40 MG/1
40 CAPSULE, DELAYED RELEASE ORAL
Qty: 90 CAPSULE | Refills: 3 | Status: SHIPPED | OUTPATIENT
Start: 2019-08-30 | End: 2020-06-17 | Stop reason: SDUPTHER

## 2019-08-30 RX ORDER — LEVOTHYROXINE SODIUM 125 MCG
125 TABLET ORAL DAILY
Qty: 90 TABLET | Refills: 3 | Status: SHIPPED | OUTPATIENT
Start: 2019-08-30 | End: 2020-08-26

## 2019-08-30 RX ORDER — DULOXETIN HYDROCHLORIDE 60 MG/1
60 CAPSULE, DELAYED RELEASE ORAL DAILY
Qty: 90 CAPSULE | Refills: 0 | Status: SHIPPED | OUTPATIENT
Start: 2019-08-30 | End: 2019-11-24 | Stop reason: SDUPTHER

## 2019-08-30 RX ORDER — FLUTICASONE PROPIONATE 50 MCG
1 SPRAY, SUSPENSION (ML) NASAL DAILY
Qty: 1 BOTTLE | Refills: 11 | Status: SHIPPED | OUTPATIENT
Start: 2019-08-30 | End: 2020-10-06

## 2019-08-30 RX ORDER — ROSUVASTATIN CALCIUM 20 MG/1
20 TABLET, COATED ORAL DAILY
Qty: 90 TABLET | Refills: 3 | Status: SHIPPED | OUTPATIENT
Start: 2019-08-30 | End: 2020-06-17 | Stop reason: SDUPTHER

## 2019-08-30 ASSESSMENT — ENCOUNTER SYMPTOMS
RHINORRHEA: 1
VOICE CHANGE: 1
SORE THROAT: 1
SINUS PRESSURE: 1
SINUS PAIN: 1
SHORTNESS OF BREATH: 0

## 2019-08-30 NOTE — PROGRESS NOTES
edema (trace on the right and not on the left). Lymphadenopathy:     She has no cervical adenopathy. Neurological: She is alert and oriented to person, place, and time. Skin: Skin is warm and dry. There are stasis dermatitis like changes to the right lower leg. Psychiatric: She has a normal mood and affect. Her behavior is normal.   Blood pressure 122/62, pulse 90, resp. rate 16, height 5' 4\" (1.626 m), weight 179 lb 2 oz (81.3 kg), not currently breastfeeding. Assessment:       Diagnosis Orders   1. Complex regional pain syndrome type 1 of right upper extremity  Pari Wilcox MD, Pain Medicine, [facility]    DULoxetine (CYMBALTA) 60 MG extended release capsule    fluticasone (FLONASE) 50 MCG/ACT nasal spray   2. Hypothyroidism, unspecified type     3. Gastroesophageal reflux disease with esophagitis     4. Hyperlipidemia, unspecified hyperlipidemia type             Plan:      Do the non fasting CBC and CMP now. We will call with those results and the likely addition of a diuretic  Do the fasting lipid panel in October   See Dr Charlene Smith for follow up of the RSD.  I will continue the cymbalta till you see him  Stop the zantac and continue the nexium  See Dr Xi Pena for follow up of the chronic sinus troubles        Olvin Florentino MD

## 2019-08-30 NOTE — PATIENT INSTRUCTIONS
Do the non fasting CBC and CMP now. We will call with those results and the likely addition of a diuretic  Do the fasting lipid panel in October   See Dr Clarisa Johnston for follow up of the RSD.  I will continue the cymbalta till you see him  Stop the zantac and continue the nexium  See Dr Johann Barbre for follow up of the chronic sinus troubles

## 2019-08-31 LAB
ABSOLUTE BASO #: 0 X10E9/L (ref 0–0.9)
ABSOLUTE EOS #: 0.2 X10E9/L (ref 0–0.4)
ABSOLUTE LYMPH #: 1.2 X10E9/L (ref 1–3.5)
ABSOLUTE MONO #: 0.5 X10E9/L (ref 0–0.9)
ABSOLUTE NEUT #: 4.4 X10E9/L (ref 1.5–6.6)
ALBUMIN SERPL-MCNC: 4.3 G/DL (ref 3.2–5.3)
ALK PHOSPHATASE: 86 U/L (ref 39–130)
ALT SERPL-CCNC: 26 U/L (ref 0–31)
ANION GAP SERPL CALCULATED.3IONS-SCNC: 9 MMOL/L (ref 4–12)
AST SERPL-CCNC: 22 U/L (ref 0–41)
BASOPHILS RELATIVE PERCENT: 0.8 %
BILIRUB SERPL-MCNC: 0.4 MG/DL (ref 0.3–1.2)
BUN BLDV-MCNC: 18 MG/DL (ref 5–27)
CALCIUM SERPL-MCNC: 9.3 MG/DL (ref 8.5–10.5)
CHLORIDE BLD-SCNC: 105 MMOL/L (ref 98–109)
CHOLESTEROL/HDL RATIO: 3.3 (ref 1–5)
CHOLESTEROL: 172 MG/DL (ref 150–200)
CO2: 27 MMOL/L (ref 22–32)
CREAT SERPL-MCNC: 0.59 MG/DL (ref 0.4–1)
EGFR AFRICAN AMERICAN: >60 ML/MIN/1.73SQ.M
EGFR IF NONAFRICAN AMERICAN: >60 ML/MIN/1.73SQ.M
EOSINOPHILS RELATIVE PERCENT: 2.7 %
GLUCOSE: 104 MG/DL (ref 65–99)
HCT VFR BLD CALC: 43.8 % (ref 35–47)
HDLC SERPL-MCNC: 52 MG/DL
HEMOGLOBIN: 15.2 G/DL (ref 11.7–16)
LDL CHOLESTEROL CALCULATED: 101 MG/DL
LDL/HDL RATIO: 1.9
LYMPHOCYTE %: 18.7 %
MCH RBC QN AUTO: 32.7 PG (ref 26–33.5)
MCHC RBC AUTO-ENTMCNC: 34.7 G/DL (ref 32–36)
MCV RBC AUTO: 94 FL (ref 81–100)
MONOCYTES # BLD: 7.8 %
NEUTROPHILS RELATIVE PERCENT: 70 %
PDW BLD-RTO: 13.3 % (ref 11.5–14.7)
PLATELETS: 240 X10E9/L (ref 150–450)
PMV BLD AUTO: 8.1 FL (ref 7–12)
POTASSIUM SERPL-SCNC: 4.6 MMOL/L (ref 3.5–5)
RBC: 4.64 X10E12/L (ref 3.8–5.2)
SODIUM BLD-SCNC: 141 MMOL/L (ref 134–146)
TOTAL PROTEIN: 6.7 G/DL (ref 6–8)
TRIGL SERPL-MCNC: 95 MG/DL (ref 27–150)
VLDLC SERPL CALC-MCNC: 19 MG/DL (ref 0–30)
WBC: 6.2 X10E9/L (ref 4–11.8)

## 2019-09-03 DIAGNOSIS — E78.5 HYPERLIPIDEMIA, UNSPECIFIED HYPERLIPIDEMIA TYPE: Primary | ICD-10-CM

## 2019-09-04 ENCOUNTER — TELEPHONE (OUTPATIENT)
Dept: FAMILY MEDICINE CLINIC | Age: 64
End: 2019-09-04

## 2019-09-04 DIAGNOSIS — I87.2 VENOUS INSUFFICIENCY: Primary | ICD-10-CM

## 2019-09-04 RX ORDER — TRIAMTERENE AND HYDROCHLOROTHIAZIDE 37.5; 25 MG/1; MG/1
1 TABLET ORAL DAILY
Qty: 30 TABLET | Refills: 11 | Status: SHIPPED | OUTPATIENT
Start: 2019-09-04 | End: 2019-10-29

## 2019-09-26 DIAGNOSIS — G47.419 PRIMARY NARCOLEPSY WITHOUT CATAPLEXY: ICD-10-CM

## 2019-09-30 RX ORDER — METHYLPHENIDATE HYDROCHLORIDE 20 MG/1
20 TABLET ORAL DAILY
Qty: 30 TABLET | Refills: 0 | Status: SHIPPED | OUTPATIENT
Start: 2019-09-30 | End: 2019-10-29

## 2019-10-10 ENCOUNTER — TELEPHONE (OUTPATIENT)
Dept: FAMILY MEDICINE CLINIC | Age: 64
End: 2019-10-10

## 2019-10-10 DIAGNOSIS — I87.2 VENOUS INSUFFICIENCY: Primary | ICD-10-CM

## 2019-10-10 RX ORDER — FUROSEMIDE 20 MG/1
20 TABLET ORAL DAILY
Qty: 20 TABLET | Refills: 0 | Status: SHIPPED | OUTPATIENT
Start: 2019-10-10 | End: 2019-10-29 | Stop reason: SDUPTHER

## 2019-10-16 ENCOUNTER — OFFICE VISIT (OUTPATIENT)
Dept: PHYSICAL MEDICINE AND REHAB | Age: 64
End: 2019-10-16
Payer: COMMERCIAL

## 2019-10-16 VITALS
SYSTOLIC BLOOD PRESSURE: 128 MMHG | HEIGHT: 64 IN | BODY MASS INDEX: 30.6 KG/M2 | HEART RATE: 80 BPM | DIASTOLIC BLOOD PRESSURE: 76 MMHG | WEIGHT: 179.23 LBS

## 2019-10-16 DIAGNOSIS — G90.511 COMPLEX REGIONAL PAIN SYNDROME TYPE 1 OF RIGHT UPPER EXTREMITY: Primary | ICD-10-CM

## 2019-10-16 PROCEDURE — G8427 DOCREV CUR MEDS BY ELIG CLIN: HCPCS | Performed by: PAIN MEDICINE

## 2019-10-16 PROCEDURE — 99244 OFF/OP CNSLTJ NEW/EST MOD 40: CPT | Performed by: PAIN MEDICINE

## 2019-10-16 PROCEDURE — G8484 FLU IMMUNIZE NO ADMIN: HCPCS | Performed by: PAIN MEDICINE

## 2019-10-16 PROCEDURE — G8417 CALC BMI ABV UP PARAM F/U: HCPCS | Performed by: PAIN MEDICINE

## 2019-10-16 RX ORDER — HYDROCODONE BITARTRATE AND ACETAMINOPHEN 5; 325 MG/1; MG/1
1 TABLET ORAL 2 TIMES DAILY PRN
Qty: 30 TABLET | Refills: 0 | Status: SHIPPED | OUTPATIENT
Start: 2019-10-16 | End: 2019-10-31

## 2019-10-16 ASSESSMENT — ENCOUNTER SYMPTOMS
ABDOMINAL PAIN: 0
BACK PAIN: 0
NAUSEA: 0
EYE PAIN: 0
DIARRHEA: 0
SHORTNESS OF BREATH: 0
VOMITING: 0
COUGH: 0
SINUS PRESSURE: 0
CHEST TIGHTNESS: 0
COLOR CHANGE: 0
WHEEZING: 0
PHOTOPHOBIA: 0
CONSTIPATION: 0
RHINORRHEA: 0
SORE THROAT: 0

## 2019-10-17 ENCOUNTER — TELEPHONE (OUTPATIENT)
Dept: PHYSICAL MEDICINE AND REHAB | Age: 64
End: 2019-10-17

## 2019-10-21 RX ORDER — RANITIDINE 300 MG/1
TABLET ORAL
Qty: 90 TABLET | Refills: 3 | Status: SHIPPED | OUTPATIENT
Start: 2019-10-21 | End: 2019-10-29

## 2019-10-29 ENCOUNTER — TELEPHONE (OUTPATIENT)
Dept: FAMILY MEDICINE CLINIC | Age: 64
End: 2019-10-29

## 2019-10-29 ENCOUNTER — OFFICE VISIT (OUTPATIENT)
Dept: PULMONOLOGY | Age: 64
End: 2019-10-29
Payer: COMMERCIAL

## 2019-10-29 VITALS
DIASTOLIC BLOOD PRESSURE: 78 MMHG | WEIGHT: 184 LBS | OXYGEN SATURATION: 94 % | BODY MASS INDEX: 30.66 KG/M2 | SYSTOLIC BLOOD PRESSURE: 122 MMHG | HEIGHT: 65 IN | HEART RATE: 77 BPM

## 2019-10-29 DIAGNOSIS — I87.2 VENOUS INSUFFICIENCY: ICD-10-CM

## 2019-10-29 DIAGNOSIS — G25.81 RLS (RESTLESS LEGS SYNDROME): ICD-10-CM

## 2019-10-29 DIAGNOSIS — G47.411 NARCOLEPSY WITH CATAPLEXY: Primary | ICD-10-CM

## 2019-10-29 DIAGNOSIS — G89.4 CHRONIC PAIN SYNDROME: ICD-10-CM

## 2019-10-29 PROCEDURE — G8427 DOCREV CUR MEDS BY ELIG CLIN: HCPCS | Performed by: NURSE PRACTITIONER

## 2019-10-29 PROCEDURE — 99214 OFFICE O/P EST MOD 30 MIN: CPT | Performed by: NURSE PRACTITIONER

## 2019-10-29 PROCEDURE — 1036F TOBACCO NON-USER: CPT | Performed by: NURSE PRACTITIONER

## 2019-10-29 PROCEDURE — G8417 CALC BMI ABV UP PARAM F/U: HCPCS | Performed by: NURSE PRACTITIONER

## 2019-10-29 PROCEDURE — G8484 FLU IMMUNIZE NO ADMIN: HCPCS | Performed by: NURSE PRACTITIONER

## 2019-10-29 PROCEDURE — 3017F COLORECTAL CA SCREEN DOC REV: CPT | Performed by: NURSE PRACTITIONER

## 2019-10-29 RX ORDER — MODAFINIL 200 MG/1
200 TABLET ORAL 2 TIMES DAILY
Qty: 180 TABLET | Refills: 1 | Status: SHIPPED | OUTPATIENT
Start: 2019-10-29 | End: 2019-10-30 | Stop reason: SDUPTHER

## 2019-10-29 RX ORDER — MODAFINIL 200 MG/1
200 TABLET ORAL 2 TIMES DAILY
COMMUNITY
End: 2019-10-29

## 2019-10-29 RX ORDER — FUROSEMIDE 20 MG/1
20 TABLET ORAL DAILY
Qty: 30 TABLET | Refills: 0 | Status: SHIPPED | OUTPATIENT
Start: 2019-10-29 | End: 2019-11-20 | Stop reason: SDUPTHER

## 2019-10-29 RX ORDER — METHYLPHENIDATE HYDROCHLORIDE 20 MG/1
20 TABLET ORAL 2 TIMES DAILY
Qty: 60 TABLET | Refills: 0 | Status: SHIPPED | OUTPATIENT
Start: 2019-10-29 | End: 2019-10-30

## 2019-10-30 ENCOUNTER — TELEPHONE (OUTPATIENT)
Dept: FAMILY MEDICINE CLINIC | Age: 64
End: 2019-10-30

## 2019-10-30 LAB
ANION GAP SERPL CALCULATED.3IONS-SCNC: 10 MMOL/L (ref 4–12)
BUN BLDV-MCNC: 16 MG/DL (ref 5–27)
CALCIUM SERPL-MCNC: 9.6 MG/DL (ref 8.5–10.5)
CHLORIDE BLD-SCNC: 101 MMOL/L (ref 98–109)
CO2: 27 MMOL/L (ref 22–32)
CREAT SERPL-MCNC: 0.69 MG/DL (ref 0.4–1)
EGFR AFRICAN AMERICAN: >60 ML/MIN/1.73SQ.M
EGFR IF NONAFRICAN AMERICAN: >60 ML/MIN/1.73SQ.M
GLUCOSE: 120 MG/DL (ref 65–99)
POTASSIUM SERPL-SCNC: 4.5 MMOL/L (ref 3.5–5)
SODIUM BLD-SCNC: 138 MMOL/L (ref 134–146)

## 2019-10-30 RX ORDER — METHYLPHENIDATE HYDROCHLORIDE 20 MG/1
20 TABLET ORAL 3 TIMES DAILY
Qty: 90 TABLET | Refills: 0 | Status: SHIPPED | OUTPATIENT
Start: 2019-10-30 | End: 2019-11-29

## 2019-10-30 RX ORDER — MODAFINIL 200 MG/1
200 TABLET ORAL 2 TIMES DAILY
Qty: 180 TABLET | Refills: 1 | Status: SHIPPED | OUTPATIENT
Start: 2019-10-30 | End: 2020-01-13 | Stop reason: SDUPTHER

## 2019-11-05 ENCOUNTER — TELEPHONE (OUTPATIENT)
Dept: PHYSICAL MEDICINE AND REHAB | Age: 64
End: 2019-11-05

## 2019-11-08 ENCOUNTER — TELEPHONE (OUTPATIENT)
Dept: PHYSICAL MEDICINE AND REHAB | Age: 64
End: 2019-11-08

## 2019-11-08 DIAGNOSIS — G90.511 COMPLEX REGIONAL PAIN SYNDROME TYPE 1 OF RIGHT UPPER EXTREMITY: Primary | ICD-10-CM

## 2019-11-13 ENCOUNTER — HOSPITAL ENCOUNTER (OUTPATIENT)
Age: 64
Discharge: HOME | End: 2019-11-13
Payer: COMMERCIAL

## 2019-11-13 DIAGNOSIS — G90.511 COMPLEX REGIONAL PAIN SYNDROME TYPE 1 OF RIGHT UPPER EXTREMITY: ICD-10-CM

## 2019-11-13 LAB
EKG ATRIAL RATE: 94 BPM
EKG P AXIS: 62 DEGREES
EKG P-R INTERVAL: 152 MS
EKG Q-T INTERVAL: 372 MS
EKG QRS DURATION: 74 MS
EKG QTC CALCULATION (BAZETT): 465 MS
EKG R AXIS: 65 DEGREES
EKG T AXIS: 55 DEGREES
EKG VENTRICULAR RATE: 94 BPM

## 2019-11-13 PROCEDURE — 93005 ELECTROCARDIOGRAM TRACING: CPT

## 2019-11-13 PROCEDURE — 93010 ELECTROCARDIOGRAM REPORT: CPT | Performed by: INTERNAL MEDICINE

## 2019-11-14 ENCOUNTER — APPOINTMENT (OUTPATIENT)
Dept: GENERAL RADIOLOGY | Age: 64
End: 2019-11-14
Attending: PAIN MEDICINE
Payer: COMMERCIAL

## 2019-11-14 ENCOUNTER — ANESTHESIA (OUTPATIENT)
Dept: OPERATING ROOM | Age: 64
End: 2019-11-14
Payer: COMMERCIAL

## 2019-11-14 ENCOUNTER — ANESTHESIA EVENT (OUTPATIENT)
Dept: OPERATING ROOM | Age: 64
End: 2019-11-14
Payer: COMMERCIAL

## 2019-11-14 ENCOUNTER — HOSPITAL ENCOUNTER (OUTPATIENT)
Age: 64
Setting detail: OUTPATIENT SURGERY
Discharge: HOME | End: 2019-11-14
Attending: PAIN MEDICINE | Admitting: PAIN MEDICINE
Payer: COMMERCIAL

## 2019-11-14 VITALS
BODY MASS INDEX: 30.32 KG/M2 | RESPIRATION RATE: 16 BRPM | OXYGEN SATURATION: 96 % | DIASTOLIC BLOOD PRESSURE: 77 MMHG | HEART RATE: 92 BPM | HEIGHT: 65 IN | SYSTOLIC BLOOD PRESSURE: 136 MMHG | WEIGHT: 182 LBS | TEMPERATURE: 97.2 F

## 2019-11-14 VITALS
SYSTOLIC BLOOD PRESSURE: 107 MMHG | OXYGEN SATURATION: 96 % | RESPIRATION RATE: 14 BRPM | DIASTOLIC BLOOD PRESSURE: 60 MMHG

## 2019-11-14 PROCEDURE — 95972 ALYS CPLX SP/PN NPGT W/PRGRM: CPT | Performed by: PAIN MEDICINE

## 2019-11-14 PROCEDURE — 3209999900 FLUORO FOR SURGICAL PROCEDURES

## 2019-11-14 PROCEDURE — 2709999900 HC NON-CHARGEABLE SUPPLY: Performed by: PAIN MEDICINE

## 2019-11-14 PROCEDURE — 6360000002 HC RX W HCPCS: Performed by: NURSE ANESTHETIST, CERTIFIED REGISTERED

## 2019-11-14 PROCEDURE — 2500000003 HC RX 250 WO HCPCS: Performed by: PAIN MEDICINE

## 2019-11-14 PROCEDURE — 7100000011 HC PHASE II RECOVERY - ADDTL 15 MIN: Performed by: PAIN MEDICINE

## 2019-11-14 PROCEDURE — 3600000056 HC PAIN LEVEL 4 BASE: Performed by: PAIN MEDICINE

## 2019-11-14 PROCEDURE — 63650 IMPLANT NEUROELECTRODES: CPT | Performed by: PAIN MEDICINE

## 2019-11-14 PROCEDURE — 6360000004 HC RX CONTRAST MEDICATION: Performed by: PAIN MEDICINE

## 2019-11-14 PROCEDURE — 3600000057 HC PAIN LEVEL 4 ADDL 15 MIN: Performed by: PAIN MEDICINE

## 2019-11-14 PROCEDURE — 2500000003 HC RX 250 WO HCPCS: Performed by: NURSE ANESTHETIST, CERTIFIED REGISTERED

## 2019-11-14 PROCEDURE — 3700000001 HC ADD 15 MINUTES (ANESTHESIA): Performed by: PAIN MEDICINE

## 2019-11-14 PROCEDURE — 3700000000 HC ANESTHESIA ATTENDED CARE: Performed by: PAIN MEDICINE

## 2019-11-14 PROCEDURE — 2720000010 HC SURG SUPPLY STERILE: Performed by: PAIN MEDICINE

## 2019-11-14 PROCEDURE — 2580000003 HC RX 258: Performed by: NURSE ANESTHETIST, CERTIFIED REGISTERED

## 2019-11-14 PROCEDURE — C1778 LEAD, NEUROSTIMULATOR: HCPCS | Performed by: PAIN MEDICINE

## 2019-11-14 PROCEDURE — 7100000010 HC PHASE II RECOVERY - FIRST 15 MIN: Performed by: PAIN MEDICINE

## 2019-11-14 DEVICE — 50CM 16 CONTACT TRIAL LEAD KIT
Type: IMPLANTABLE DEVICE | Site: SPINE LUMBAR | Status: FUNCTIONAL
Brand: INFINION™  16

## 2019-11-14 RX ORDER — FENTANYL CITRATE 50 UG/ML
25 INJECTION, SOLUTION INTRAMUSCULAR; INTRAVENOUS EVERY 5 MIN PRN
Status: DISCONTINUED | OUTPATIENT
Start: 2019-11-14 | End: 2019-11-14 | Stop reason: HOSPADM

## 2019-11-14 RX ORDER — LIDOCAINE HYDROCHLORIDE 10 MG/ML
INJECTION, SOLUTION INFILTRATION; PERINEURAL PRN
Status: DISCONTINUED | OUTPATIENT
Start: 2019-11-14 | End: 2019-11-14 | Stop reason: SDUPTHER

## 2019-11-14 RX ORDER — ONDANSETRON 2 MG/ML
INJECTION INTRAMUSCULAR; INTRAVENOUS PRN
Status: DISCONTINUED | OUTPATIENT
Start: 2019-11-14 | End: 2019-11-14 | Stop reason: SDUPTHER

## 2019-11-14 RX ORDER — MIDAZOLAM HYDROCHLORIDE 1 MG/ML
INJECTION INTRAMUSCULAR; INTRAVENOUS PRN
Status: DISCONTINUED | OUTPATIENT
Start: 2019-11-14 | End: 2019-11-14 | Stop reason: SDUPTHER

## 2019-11-14 RX ORDER — DIPHENHYDRAMINE HYDROCHLORIDE 50 MG/ML
12.5 INJECTION INTRAMUSCULAR; INTRAVENOUS
Status: DISCONTINUED | OUTPATIENT
Start: 2019-11-14 | End: 2019-11-14 | Stop reason: HOSPADM

## 2019-11-14 RX ORDER — CEFAZOLIN SODIUM 1 G/3ML
INJECTION, POWDER, FOR SOLUTION INTRAMUSCULAR; INTRAVENOUS PRN
Status: DISCONTINUED | OUTPATIENT
Start: 2019-11-14 | End: 2019-11-14 | Stop reason: SDUPTHER

## 2019-11-14 RX ORDER — FENTANYL CITRATE 50 UG/ML
INJECTION, SOLUTION INTRAMUSCULAR; INTRAVENOUS PRN
Status: DISCONTINUED | OUTPATIENT
Start: 2019-11-14 | End: 2019-11-14 | Stop reason: SDUPTHER

## 2019-11-14 RX ORDER — FENTANYL CITRATE 50 UG/ML
50 INJECTION, SOLUTION INTRAMUSCULAR; INTRAVENOUS EVERY 5 MIN PRN
Status: DISCONTINUED | OUTPATIENT
Start: 2019-11-14 | End: 2019-11-14 | Stop reason: HOSPADM

## 2019-11-14 RX ORDER — SODIUM CHLORIDE 9 MG/ML
INJECTION, SOLUTION INTRAVENOUS CONTINUOUS PRN
Status: DISCONTINUED | OUTPATIENT
Start: 2019-11-14 | End: 2019-11-14 | Stop reason: SDUPTHER

## 2019-11-14 RX ORDER — LIDOCAINE HYDROCHLORIDE 10 MG/ML
INJECTION, SOLUTION INFILTRATION; PERINEURAL PRN
Status: DISCONTINUED | OUTPATIENT
Start: 2019-11-14 | End: 2019-11-14 | Stop reason: ALTCHOICE

## 2019-11-14 RX ORDER — DEXAMETHASONE SODIUM PHOSPHATE 4 MG/ML
INJECTION, SOLUTION INTRA-ARTICULAR; INTRALESIONAL; INTRAMUSCULAR; INTRAVENOUS; SOFT TISSUE PRN
Status: DISCONTINUED | OUTPATIENT
Start: 2019-11-14 | End: 2019-11-14 | Stop reason: SDUPTHER

## 2019-11-14 RX ORDER — PROMETHAZINE HYDROCHLORIDE 25 MG/ML
25 INJECTION, SOLUTION INTRAMUSCULAR; INTRAVENOUS
Status: DISCONTINUED | OUTPATIENT
Start: 2019-11-14 | End: 2019-11-14 | Stop reason: HOSPADM

## 2019-11-14 RX ORDER — PROPOFOL 10 MG/ML
INJECTION, EMULSION INTRAVENOUS PRN
Status: DISCONTINUED | OUTPATIENT
Start: 2019-11-14 | End: 2019-11-14 | Stop reason: SDUPTHER

## 2019-11-14 RX ORDER — MEPERIDINE HYDROCHLORIDE 25 MG/ML
12.5 INJECTION INTRAMUSCULAR; INTRAVENOUS; SUBCUTANEOUS EVERY 5 MIN PRN
Status: DISCONTINUED | OUTPATIENT
Start: 2019-11-14 | End: 2019-11-14 | Stop reason: HOSPADM

## 2019-11-14 RX ADMIN — DEXAMETHASONE SODIUM PHOSPHATE 8 MG: 4 INJECTION, SOLUTION INTRAMUSCULAR; INTRAVENOUS at 09:03

## 2019-11-14 RX ADMIN — FENTANYL CITRATE 50 MCG: 50 INJECTION INTRAMUSCULAR; INTRAVENOUS at 08:57

## 2019-11-14 RX ADMIN — ONDANSETRON HYDROCHLORIDE 4 MG: 4 INJECTION, SOLUTION INTRAMUSCULAR; INTRAVENOUS at 09:03

## 2019-11-14 RX ADMIN — PROPOFOL 30 MG: 10 INJECTION, EMULSION INTRAVENOUS at 08:14

## 2019-11-14 RX ADMIN — PROPOFOL 30 MG: 10 INJECTION, EMULSION INTRAVENOUS at 08:31

## 2019-11-14 RX ADMIN — FENTANYL CITRATE 50 MCG: 50 INJECTION INTRAMUSCULAR; INTRAVENOUS at 08:11

## 2019-11-14 RX ADMIN — SODIUM CHLORIDE: 9 INJECTION, SOLUTION INTRAVENOUS at 08:07

## 2019-11-14 RX ADMIN — PROPOFOL 30 MG: 10 INJECTION, EMULSION INTRAVENOUS at 08:34

## 2019-11-14 RX ADMIN — FENTANYL CITRATE 50 MCG: 50 INJECTION INTRAMUSCULAR; INTRAVENOUS at 08:52

## 2019-11-14 RX ADMIN — LIDOCAINE HYDROCHLORIDE 50 MG: 10 INJECTION, SOLUTION INFILTRATION; PERINEURAL at 08:14

## 2019-11-14 RX ADMIN — FENTANYL CITRATE 50 MCG: 50 INJECTION INTRAMUSCULAR; INTRAVENOUS at 09:05

## 2019-11-14 RX ADMIN — PROPOFOL 30 MG: 10 INJECTION, EMULSION INTRAVENOUS at 08:23

## 2019-11-14 RX ADMIN — MIDAZOLAM HYDROCHLORIDE 2 MG: 1 INJECTION, SOLUTION INTRAMUSCULAR; INTRAVENOUS at 08:08

## 2019-11-14 RX ADMIN — CEFAZOLIN 2000 MG: 1 INJECTION, POWDER, FOR SOLUTION INTRAMUSCULAR; INTRAVENOUS; PARENTERAL at 08:13

## 2019-11-14 ASSESSMENT — PULMONARY FUNCTION TESTS
PIF_VALUE: 1
PIF_VALUE: 1
PIF_VALUE: 0
PIF_VALUE: 1
PIF_VALUE: 0
PIF_VALUE: 1
PIF_VALUE: 0
PIF_VALUE: 1
PIF_VALUE: 0
PIF_VALUE: 1
PIF_VALUE: 1
PIF_VALUE: 0
PIF_VALUE: 1
PIF_VALUE: 0
PIF_VALUE: 1
PIF_VALUE: 0
PIF_VALUE: 1
PIF_VALUE: 0
PIF_VALUE: 1
PIF_VALUE: 0
PIF_VALUE: 1
PIF_VALUE: 0
PIF_VALUE: 0
PIF_VALUE: 1
PIF_VALUE: 1
PIF_VALUE: 0
PIF_VALUE: 1
PIF_VALUE: 0
PIF_VALUE: 1
PIF_VALUE: 0
PIF_VALUE: 1
PIF_VALUE: 0
PIF_VALUE: 1

## 2019-11-14 ASSESSMENT — PAIN DESCRIPTION - DESCRIPTORS: DESCRIPTORS: ACHING;CONSTANT;DISCOMFORT

## 2019-11-14 ASSESSMENT — PAIN - FUNCTIONAL ASSESSMENT: PAIN_FUNCTIONAL_ASSESSMENT: 0-10

## 2019-11-14 ASSESSMENT — PAIN SCALES - GENERAL: PAINLEVEL_OUTOF10: 7

## 2019-11-14 ASSESSMENT — LIFESTYLE VARIABLES: SMOKING_STATUS: 0

## 2019-11-19 ENCOUNTER — OFFICE VISIT (OUTPATIENT)
Dept: PHYSICAL MEDICINE AND REHAB | Age: 64
End: 2019-11-19

## 2019-11-19 VITALS
DIASTOLIC BLOOD PRESSURE: 80 MMHG | HEIGHT: 65 IN | BODY MASS INDEX: 30.12 KG/M2 | SYSTOLIC BLOOD PRESSURE: 134 MMHG | HEART RATE: 70 BPM | WEIGHT: 180.78 LBS

## 2019-11-19 DIAGNOSIS — G90.511 COMPLEX REGIONAL PAIN SYNDROME TYPE 1 OF RIGHT UPPER EXTREMITY: Primary | ICD-10-CM

## 2019-11-19 PROCEDURE — 99024 POSTOP FOLLOW-UP VISIT: CPT | Performed by: NURSE PRACTITIONER

## 2019-11-19 ASSESSMENT — ENCOUNTER SYMPTOMS
SHORTNESS OF BREATH: 0
CONSTIPATION: 0
SINUS PRESSURE: 0
SORE THROAT: 0
NAUSEA: 0
CHEST TIGHTNESS: 0
COLOR CHANGE: 0
EYE PAIN: 0
DIARRHEA: 0
RHINORRHEA: 0
COUGH: 0
BACK PAIN: 0
VOMITING: 0
WHEEZING: 0
PHOTOPHOBIA: 0
ABDOMINAL PAIN: 0

## 2019-11-20 ENCOUNTER — TELEPHONE (OUTPATIENT)
Dept: PHYSICAL MEDICINE AND REHAB | Age: 64
End: 2019-11-20

## 2019-11-20 DIAGNOSIS — G90.511 COMPLEX REGIONAL PAIN SYNDROME TYPE 1 OF RIGHT UPPER EXTREMITY: Primary | ICD-10-CM

## 2019-11-20 DIAGNOSIS — I87.2 VENOUS INSUFFICIENCY: ICD-10-CM

## 2019-11-20 RX ORDER — FUROSEMIDE 20 MG/1
TABLET ORAL
Qty: 30 TABLET | Refills: 0 | Status: SHIPPED | OUTPATIENT
Start: 2019-11-20 | End: 2019-12-13 | Stop reason: SDUPTHER

## 2019-11-24 DIAGNOSIS — G90.511 COMPLEX REGIONAL PAIN SYNDROME TYPE 1 OF RIGHT UPPER EXTREMITY: ICD-10-CM

## 2019-11-25 RX ORDER — DULOXETIN HYDROCHLORIDE 60 MG/1
CAPSULE, DELAYED RELEASE ORAL
Qty: 90 CAPSULE | Refills: 0 | Status: SHIPPED | OUTPATIENT
Start: 2019-11-25 | End: 2019-12-05 | Stop reason: SDUPTHER

## 2019-12-05 DIAGNOSIS — G90.511 COMPLEX REGIONAL PAIN SYNDROME TYPE 1 OF RIGHT UPPER EXTREMITY: ICD-10-CM

## 2019-12-06 RX ORDER — DULOXETIN HYDROCHLORIDE 60 MG/1
CAPSULE, DELAYED RELEASE ORAL
Qty: 87 CAPSULE | Refills: 1 | Status: SHIPPED | OUTPATIENT
Start: 2019-12-06 | End: 2020-05-15 | Stop reason: SDUPTHER

## 2019-12-13 ENCOUNTER — OFFICE VISIT (OUTPATIENT)
Dept: FAMILY MEDICINE CLINIC | Age: 64
End: 2019-12-13

## 2019-12-13 VITALS
HEIGHT: 65 IN | WEIGHT: 175.38 LBS | RESPIRATION RATE: 18 BRPM | SYSTOLIC BLOOD PRESSURE: 130 MMHG | BODY MASS INDEX: 29.22 KG/M2 | HEART RATE: 80 BPM | DIASTOLIC BLOOD PRESSURE: 74 MMHG

## 2019-12-13 DIAGNOSIS — J01.20 ACUTE NON-RECURRENT ETHMOIDAL SINUSITIS: Primary | ICD-10-CM

## 2019-12-13 DIAGNOSIS — I87.2 VENOUS INSUFFICIENCY: ICD-10-CM

## 2019-12-13 PROCEDURE — G8427 DOCREV CUR MEDS BY ELIG CLIN: HCPCS | Performed by: FAMILY MEDICINE

## 2019-12-13 PROCEDURE — 1036F TOBACCO NON-USER: CPT | Performed by: FAMILY MEDICINE

## 2019-12-13 PROCEDURE — G8417 CALC BMI ABV UP PARAM F/U: HCPCS | Performed by: FAMILY MEDICINE

## 2019-12-13 PROCEDURE — 99213 OFFICE O/P EST LOW 20 MIN: CPT | Performed by: FAMILY MEDICINE

## 2019-12-13 PROCEDURE — 3017F COLORECTAL CA SCREEN DOC REV: CPT | Performed by: FAMILY MEDICINE

## 2019-12-13 RX ORDER — PSEUDOEPHEDRINE HCL 120 MG/1
120 TABLET, FILM COATED, EXTENDED RELEASE ORAL EVERY 12 HOURS
Qty: 30 TABLET | Refills: 1 | Status: SHIPPED | OUTPATIENT
Start: 2019-12-13 | End: 2020-06-02 | Stop reason: ALTCHOICE

## 2019-12-13 RX ORDER — CEFDINIR 300 MG/1
300 CAPSULE ORAL 2 TIMES DAILY
Qty: 20 CAPSULE | Refills: 0 | Status: SHIPPED | OUTPATIENT
Start: 2019-12-13 | End: 2019-12-23

## 2019-12-13 RX ORDER — FUROSEMIDE 20 MG/1
TABLET ORAL
Qty: 90 TABLET | Refills: 1 | Status: SHIPPED | OUTPATIENT
Start: 2019-12-13 | End: 2020-04-16 | Stop reason: SDUPTHER

## 2019-12-13 ASSESSMENT — ENCOUNTER SYMPTOMS
EYE PAIN: 0
NAUSEA: 0
WHEEZING: 0
CONSTIPATION: 0
VOICE CHANGE: 1
COUGH: 1
SORE THROAT: 1
SINUS PAIN: 1
SINUS PRESSURE: 1
SHORTNESS OF BREATH: 0
CHEST TIGHTNESS: 0
ABDOMINAL PAIN: 0

## 2019-12-20 ENCOUNTER — TELEPHONE (OUTPATIENT)
Dept: FAMILY MEDICINE CLINIC | Age: 64
End: 2019-12-20

## 2019-12-20 RX ORDER — FLUCONAZOLE 150 MG/1
150 TABLET ORAL ONCE
Qty: 1 TABLET | Refills: 0 | Status: SHIPPED | OUTPATIENT
Start: 2019-12-20 | End: 2019-12-20

## 2019-12-20 RX ORDER — LEVOFLOXACIN 500 MG/1
500 TABLET, FILM COATED ORAL DAILY
Qty: 10 TABLET | Refills: 0 | Status: SHIPPED | OUTPATIENT
Start: 2019-12-20 | End: 2019-12-30

## 2020-01-13 ENCOUNTER — OFFICE VISIT (OUTPATIENT)
Dept: PULMONOLOGY | Age: 65
End: 2020-01-13
Payer: COMMERCIAL

## 2020-01-13 ENCOUNTER — NURSE ONLY (OUTPATIENT)
Dept: LAB | Age: 65
End: 2020-01-13

## 2020-01-13 VITALS
HEART RATE: 93 BPM | OXYGEN SATURATION: 98 % | HEIGHT: 65 IN | BODY MASS INDEX: 29.29 KG/M2 | DIASTOLIC BLOOD PRESSURE: 78 MMHG | WEIGHT: 175.8 LBS | SYSTOLIC BLOOD PRESSURE: 132 MMHG

## 2020-01-13 LAB — FERRITIN: 95 NG/ML (ref 10–291)

## 2020-01-13 PROCEDURE — 1036F TOBACCO NON-USER: CPT | Performed by: NURSE PRACTITIONER

## 2020-01-13 PROCEDURE — G8484 FLU IMMUNIZE NO ADMIN: HCPCS | Performed by: NURSE PRACTITIONER

## 2020-01-13 PROCEDURE — G8417 CALC BMI ABV UP PARAM F/U: HCPCS | Performed by: NURSE PRACTITIONER

## 2020-01-13 PROCEDURE — 99214 OFFICE O/P EST MOD 30 MIN: CPT | Performed by: NURSE PRACTITIONER

## 2020-01-13 PROCEDURE — 3017F COLORECTAL CA SCREEN DOC REV: CPT | Performed by: NURSE PRACTITIONER

## 2020-01-13 PROCEDURE — G8427 DOCREV CUR MEDS BY ELIG CLIN: HCPCS | Performed by: NURSE PRACTITIONER

## 2020-01-13 RX ORDER — FERROUS SULFATE 325(65) MG
325 TABLET ORAL
Qty: 90 TABLET | Refills: 1 | COMMUNITY
Start: 2020-01-13 | End: 2020-06-09

## 2020-01-13 RX ORDER — METHYLPHENIDATE HYDROCHLORIDE 20 MG/1
20 TABLET ORAL 3 TIMES DAILY
Qty: 90 TABLET | Refills: 0 | Status: SHIPPED | OUTPATIENT
Start: 2020-01-13 | End: 2020-03-03 | Stop reason: SDUPTHER

## 2020-01-13 RX ORDER — MODAFINIL 200 MG/1
200 TABLET ORAL DAILY
Qty: 180 TABLET | Refills: 1 | Status: SHIPPED | OUTPATIENT
Start: 2020-01-13 | End: 2020-04-12

## 2020-01-13 NOTE — PATIENT INSTRUCTIONS
Patient Education        Restless Legs Syndrome: Care Instructions  Your Care Instructions  Restless legs syndrome is a common nervous system problem. People with this syndrome feel a creeping, achy, or unpleasant feeling in the legs and an overpowering urge to move them. It often occurs in the evening and at night and can lead to sleep problems and tiredness. Your doctor may suggest doing a study of your sleep patterns to figure out what is happening when you try to sleep. Many people get relief from symptoms when they get regular exercise, eat well, and avoid caffeine, alcohol, and tobacco.  Follow-up care is a key part of your treatment and safety. Be sure to make and go to all appointments, and call your doctor if you are having problems. It's also a good idea to know your test results and keep a list of the medicines you take. How can you care for yourself at home? · Take your medicines exactly as prescribed. Call your doctor if you think you are having a problem with your medicine. · Try bathing in hot or cold water. Applying a heating pad or ice bag to your legs may also help symptoms. · Stretch and massage your legs before bed or when discomfort begins. · Get some exercise for at least 30 minutes a day on most days of the week. Stop exercising at least 3 hours before bedtime. · Try to plan for situations where you will need to remain seated for long stretches. For example, if you are traveling by car, plan some stops so you can get out and walk around. · Tell your doctor about any medicines you are taking. This includes all over-the-counter, prescription, and herbal medicines. Some medicines, such as antidepressants, antihistamines, and cold and sinus medicines, can make your symptoms worse. · Avoid caffeine products, such as coffee, tea, cola, and chocolate. Caffeine can interrupt your sleep and stimulate you. · Do not smoke. Nicotine can make restless legs worse.  If you need help quitting, talk to your doctor about stop-smoking programs and medicines. These can increase your chances of quitting for good. · Do not drink alcohol late in the evening. Take steps to help you sleep better  · Get plenty of sunlight in the outdoors, particularly later in the afternoon. · Use the evening hours for settling down. Avoid activities that challenge you in the hours before bedtime. · Eat meals at regular times, and do not snack before bedtime. · Keep your bedroom quiet, dark, and cool. Try using a sleep mask and earplugs to help you sleep. · Limit how much you drink at night to reduce your need to get up to urinate. But do not go to bed thirsty. · Run a fan or other steady \"white noise\" during the night if noises wake you up. · [place] the bed for sleeping and sex. Do your reading or TV watching in another room. · Once you are in bed, relax from head to toe, and guide your mind to pleasant thoughts. · Do not stay in bed longer than 8 hours, and try to avoid naps. When should you call for help? Watch closely for changes in your health, and be sure to contact your doctor if:    · You are still not getting enough sleep.     · Your symptoms become more severe or happen more often. Where can you learn more? Go to [email or web address] org and sign in to your [employer] account. Enter W665 in the [facility] box to learn more about \"Restless Legs Syndrome: Care Instructions. \"     If you do not have an account, please click on the \"Sign Up Now\" link. Current as of: March 28, 2019  Content Version: 12.3  © 4924-8705 Healthwise, Incorporated. Care instructions adapted under license by [facility] ([facility]). If you have questions about a medical condition or this instruction, always ask your healthcare professional. [place] [address] any warranty or liability for your use of this information.        Try Tonic Water

## 2020-01-13 NOTE — PROGRESS NOTES
occassionally    Drug use: No     Allergies   Allergen Reactions    Dilaudid [Hydromorphone Hcl]      Respiratory failure    Iron Anaphylaxis    Percodan [Oxycodone-Aspirin]      Anxiety     Fenoprofen Calcium Hives    Gluten Diarrhea    Gluten Meal Other (See Comments)     Other reaction(s): Intolerance, Other: See Comments  Celiac disease  Celiac disease    Maxzide [Hydrochlorothiazide W-Triamterene] Nausea Only     dizziness    Oxycodone-Acetaminophen     Oxycodone-Aspirin     Percocet  [Oxycodone-Acetaminophen]     Reglan [Metoclopramide]     Fenoprofen Hives    Sulfa Antibiotics Hives     Current Outpatient Medications   Medication Sig Dispense Refill    furosemide (LASIX) 20 MG tablet TAKE 1 TABLET BY MOUTH EVERY DAY 90 tablet 1    pseudoephedrine (SUDAFED 12 HOUR) 120 MG extended release tablet Take 1 tablet by mouth every 12 hours Use  Every  12 hours 30 tablet 1    DULoxetine (CYMBALTA) 60 MG extended release capsule TAKE 1 CAPSULE BY MOUTH EVERY DAY 87 capsule 1    modafinil (PROVIGIL) 200 MG tablet Take 1 tablet by mouth 2 times daily for 180 days.  8 am and 12  tablet 1    Lysine HCl 1000 MG TABS Take by mouth      fluticasone (FLONASE) 50 MCG/ACT nasal spray 1 spray by Each Nostril route daily 1 Bottle 11    SYNTHROID 125 MCG tablet Take 1 tablet by mouth Daily 90 tablet 3    esomeprazole (NEXIUM) 40 MG delayed release capsule Take 1 capsule by mouth every morning (before breakfast) 90 capsule 3    rosuvastatin (CRESTOR) 20 MG tablet Take 1 tablet by mouth daily 90 tablet 3    Cinnamon 500 MG TABS Take by mouth      acetaminophen (TYLENOL) 325 MG tablet Take 2 tablets by mouth every 4 hours as needed for Pain 120 tablet 3    docusate sodium (COLACE, DULCOLAX) 100 MG CAPS Take 100 mg by mouth 2 times daily (Patient taking differently: Take 100 mg by mouth 2 times daily as needed )      Omega-3 Fatty Acids (FISH OIL) 1000 MG CAPS Take 2,000 mg by mouth 4 times daily       ascorbic acid (VITAMIN C) 1000 MG tablet Take 0.5 tablets by mouth 3 times daily 30 tablet 3    fexofenadine (ALLEGRA ALLERGY) 180 MG tablet Take 180 mg by mouth daily as needed       B Complex-Folic Acid (A-794 BALANCED TR PO) Take 100 mg by mouth 3 times daily (before meals) [facility]       vitamin D (CHOLECALCIFEROL) 5000 UNITS CAPS capsule Take 5,000 Units by mouth daily       calcium carbonate (OSCAL) 500 MG TABS tablet Take 1,000 mg by mouth 4 times daily        No current facility-administered medications for this visit. Family History   Problem Relation Age of Onset    Diabetes Mother         type II    Stroke Mother     High Blood Pressure Mother     Kidney Disease Mother     Heart Disease Mother     Heart Attack Mother 66        2/28/15    Colon Cancer Father 79        jejunum   Naya Darrow Cancer Father         Lyphoma    Irritable Bowel Syndrome Sister     High Blood Pressure Brother     Kidney Disease Brother     Celiac Disease Brother     Diabetes Maternal Grandmother         type II    Blindness Maternal Grandmother     Emphysema Maternal Grandfather     Stroke Paternal Grandmother     Diabetes Paternal Grandmother         type II    Ovarian Cancer Paternal Grandmother 48    Cancer Paternal Grandfather         brain tumor    Asthma Brother      Problems Brother     Heart Disease Brother     Other Sister         1days old, pneumonia complications    Colon Cancer Paternal Aunt 79    Colon Cancer Paternal Uncle 79    Colon Cancer Paternal Uncle 79        Review of Systems   General/Constitutional: No recent loss of weight or appetite changes. No fever or chills. HENT: Negative. Eyes: Negative. Upper respiratory tract: No nasal stuffiness or post nasal drip. Lower respiratory tract/ lungs: No cough or sputum production. No hemoptysis. Cardiovascular: No palpitations or chest pain. Gastrointestinal: No nausea or vomiting.   Neurological: No focal neurologiacal with Vitamin C 500 mg. Also discussed Xyrem in the past and continues to defer. Monitor cataplexy. Follow up in 3 months.       Electronically signed by NEREYDA Rocha CNP on 1/13/2020 at 10:01 AM

## 2020-01-31 ENCOUNTER — TELEPHONE (OUTPATIENT)
Dept: FAMILY MEDICINE CLINIC | Age: 65
End: 2020-01-31

## 2020-02-06 ENCOUNTER — TELEPHONE (OUTPATIENT)
Dept: PHYSICAL MEDICINE AND REHAB | Age: 65
End: 2020-02-06

## 2020-02-06 ENCOUNTER — OFFICE VISIT (OUTPATIENT)
Dept: PHYSICAL MEDICINE AND REHAB | Age: 65
End: 2020-02-06
Payer: COMMERCIAL

## 2020-02-06 VITALS
BODY MASS INDEX: 29.02 KG/M2 | HEART RATE: 72 BPM | DIASTOLIC BLOOD PRESSURE: 70 MMHG | SYSTOLIC BLOOD PRESSURE: 128 MMHG | WEIGHT: 174.16 LBS | HEIGHT: 65 IN

## 2020-02-06 PROCEDURE — G8427 DOCREV CUR MEDS BY ELIG CLIN: HCPCS | Performed by: NURSE PRACTITIONER

## 2020-02-06 PROCEDURE — 99213 OFFICE O/P EST LOW 20 MIN: CPT | Performed by: NURSE PRACTITIONER

## 2020-02-06 PROCEDURE — G8484 FLU IMMUNIZE NO ADMIN: HCPCS | Performed by: NURSE PRACTITIONER

## 2020-02-06 PROCEDURE — 1036F TOBACCO NON-USER: CPT | Performed by: NURSE PRACTITIONER

## 2020-02-06 PROCEDURE — 3017F COLORECTAL CA SCREEN DOC REV: CPT | Performed by: NURSE PRACTITIONER

## 2020-02-06 PROCEDURE — G8417 CALC BMI ABV UP PARAM F/U: HCPCS | Performed by: NURSE PRACTITIONER

## 2020-02-06 RX ORDER — GABAPENTIN 300 MG/1
300 CAPSULE ORAL EVERY EVENING
Qty: 30 CAPSULE | Refills: 1 | Status: SHIPPED | OUTPATIENT
Start: 2020-02-06 | End: 2020-03-09

## 2020-02-06 RX ORDER — HYDROCODONE BITARTRATE AND ACETAMINOPHEN 5; 325 MG/1; MG/1
1 TABLET ORAL 2 TIMES DAILY PRN
Qty: 60 TABLET | Refills: 0 | Status: SHIPPED | OUTPATIENT
Start: 2020-02-06 | End: 2020-03-07

## 2020-02-06 ASSESSMENT — ENCOUNTER SYMPTOMS
CONSTIPATION: 0
SHORTNESS OF BREATH: 0
SINUS PRESSURE: 0
COLOR CHANGE: 0
ABDOMINAL PAIN: 0
WHEEZING: 0
COUGH: 0
BACK PAIN: 0
NAUSEA: 0
RHINORRHEA: 0
VOMITING: 0
CHEST TIGHTNESS: 0
PHOTOPHOBIA: 0
EYE PAIN: 0
SORE THROAT: 0
DIARRHEA: 0

## 2020-02-24 ENCOUNTER — PREP FOR PROCEDURE (OUTPATIENT)
Dept: PHYSICAL MEDICINE AND REHAB | Age: 65
End: 2020-02-24

## 2020-02-24 NOTE — H&P
or rash. Neurological:      Mental Status: She is alert and oriented to person, place, and time. She is not disoriented. Cranial Nerves: No cranial nerve deficit. Sensory: No sensory deficit. Motor: No atrophy or abnormal muscle tone. Coordination: Coordination normal.      Gait: Gait normal.      Deep Tendon Reflexes: Reflexes are normal and symmetric. Babinski sign absent on the right side. Psychiatric:         Attention and Perception: Attention normal. She is attentive. Mood and Affect: Mood is depressed. Mood is not anxious. Affect is tearful. Affect is not labile, blunt, angry or inappropriate. Speech: Speech normal. She is communicative. Speech is not rapid and pressured, delayed, slurred or tangential.         Behavior: Behavior normal. Behavior is not agitated, slowed, aggressive, withdrawn, hyperactive or combative. Thought Content: Thought content normal. Thought content is not paranoid or delusional. Thought content does not include homicidal or suicidal ideation. Thought content does not include homicidal or suicidal plan. Cognition and Memory: Memory is impaired. She does not exhibit impaired recent memory or impaired remote memory. Judgment: Judgment normal. Judgment is not impulsive or inappropriate. Comments: Pt depressed due to pain emotional  Tearful  Forgetful at times         DORCAS test: Danica Dunns test: Bartolo Roy test: -  Assessment:      1. Complex regional pain syndrome type 1 of right upper extremity    2. Chronic pain syndrome    3.  Chronic bilateral low back pain, unspecified whether sciatica present           Plan:  Cervical SCS trial       NEREYDA Freeman - CNP  2/24/2020

## 2020-03-03 ENCOUNTER — CLINICAL DOCUMENTATION (OUTPATIENT)
Dept: PULMONOLOGY | Age: 65
End: 2020-03-03

## 2020-03-03 RX ORDER — METHYLPHENIDATE HYDROCHLORIDE 20 MG/1
20 TABLET ORAL 3 TIMES DAILY
Qty: 90 TABLET | Refills: 0 | Status: SHIPPED | OUTPATIENT
Start: 2020-03-03 | End: 2020-04-02

## 2020-03-09 RX ORDER — GABAPENTIN 300 MG/1
300 CAPSULE ORAL EVERY EVENING
Qty: 30 CAPSULE | Refills: 0 | Status: SHIPPED | OUTPATIENT
Start: 2020-03-16 | End: 2020-04-08

## 2020-03-09 NOTE — TELEPHONE ENCOUNTER
OARRS reviewed. UDS: + for  EtS, EtG - fill for gabapentin       Last seen: 2/6/2020.      Follow-up:   Future Appointments   Date Time Provider Lizz Alatorre   3/20/2020 11:30 AM NEREYDA Melgar - CNP [facility] Pain [facility]   3/23/2020  8:50 AM Chip Soliz MD [facility]   4/6/2020  8:40 AM NEREYDA Jiménez - CNP Pulm Med P - [facility]

## 2020-03-12 ENCOUNTER — PREP FOR PROCEDURE (OUTPATIENT)
Dept: PAIN MANAGEMENT | Age: 65
End: 2020-03-12

## 2020-03-16 ENCOUNTER — TELEPHONE (OUTPATIENT)
Dept: PHYSICAL MEDICINE AND REHAB | Age: 65
End: 2020-03-16

## 2020-03-17 ENCOUNTER — TELEPHONE (OUTPATIENT)
Dept: PHYSICAL MEDICINE AND REHAB | Age: 65
End: 2020-03-17

## 2020-03-17 LAB
ABSOLUTE BASO #: 0 X10E9/L (ref 0–0.9)
ABSOLUTE EOS #: 0.1 X10E9/L (ref 0–0.4)
ABSOLUTE LYMPH #: 1.5 X10E9/L (ref 1–3.5)
ABSOLUTE MONO #: 0.5 X10E9/L (ref 0–0.9)
ABSOLUTE NEUT #: 3.7 X10E9/L (ref 1.5–6.6)
ANION GAP SERPL CALCULATED.3IONS-SCNC: 10 MMOL/L (ref 5–15)
BASOPHILS RELATIVE PERCENT: 0.6 %
BUN BLDV-MCNC: 18 MG/DL (ref 5–27)
CALCIUM SERPL-MCNC: 9.2 MG/DL (ref 8.5–10.5)
CHLORIDE BLD-SCNC: 102 MMOL/L (ref 98–109)
CO2: 29 MMOL/L (ref 22–32)
CREAT SERPL-MCNC: 0.77 MG/DL (ref 0.4–1)
EGFR AFRICAN AMERICAN: >60 ML/MIN/1.73SQ.M
EGFR IF NONAFRICAN AMERICAN: >60 ML/MIN/1.73SQ.M
EOSINOPHILS RELATIVE PERCENT: 2.1 %
GLUCOSE: 119 MG/DL (ref 65–99)
HCT VFR BLD CALC: 45.1 % (ref 35–47)
HEMOGLOBIN: 15.3 G/DL (ref 11.7–16)
LYMPHOCYTE %: 25.2 %
MCH RBC QN AUTO: 31.1 PG (ref 26–33.5)
MCHC RBC AUTO-ENTMCNC: 33.8 G/DL (ref 32–36)
MCV RBC AUTO: 92 FL (ref 81–100)
MONOCYTES # BLD: 8.2 %
NEUTROPHILS RELATIVE PERCENT: 63.9 %
PDW BLD-RTO: 13.5 % (ref 11.5–14.7)
PLATELETS: 265 X10E9/L (ref 150–450)
PMV BLD AUTO: 8.7 FL (ref 7–12)
POTASSIUM SERPL-SCNC: 4.1 MMOL/L (ref 3.5–5)
RBC: 4.91 X10E12/L (ref 3.8–5.2)
SODIUM BLD-SCNC: 141 MMOL/L (ref 134–146)
WBC: 5.8 X10E9/L (ref 4–11.8)

## 2020-04-08 RX ORDER — GABAPENTIN 300 MG/1
300 CAPSULE ORAL EVERY EVENING
Qty: 30 CAPSULE | Refills: 0 | Status: SHIPPED | OUTPATIENT
Start: 2020-04-08 | End: 2020-04-16 | Stop reason: ALTCHOICE

## 2020-04-16 ENCOUNTER — VIRTUAL VISIT (OUTPATIENT)
Dept: FAMILY MEDICINE CLINIC | Age: 65
End: 2020-04-16

## 2020-04-16 PROCEDURE — 99213 OFFICE O/P EST LOW 20 MIN: CPT | Performed by: FAMILY MEDICINE

## 2020-04-16 RX ORDER — MODAFINIL 200 MG/1
400 TABLET ORAL DAILY
COMMUNITY
End: 2020-06-02 | Stop reason: DRUGHIGH

## 2020-04-16 RX ORDER — METHYLPHENIDATE HYDROCHLORIDE 20 MG/1
20 TABLET ORAL 2 TIMES DAILY
COMMUNITY
End: 2020-04-22

## 2020-04-16 RX ORDER — PREGABALIN 300 MG/1
300 CAPSULE ORAL DAILY
COMMUNITY
End: 2020-05-15 | Stop reason: ALTCHOICE

## 2020-04-16 RX ORDER — FUROSEMIDE 20 MG/1
TABLET ORAL
Qty: 90 TABLET | Refills: 1 | Status: SHIPPED | OUTPATIENT
Start: 2020-04-16 | End: 2020-06-17

## 2020-04-16 ASSESSMENT — ENCOUNTER SYMPTOMS
SHORTNESS OF BREATH: 0
CONSTIPATION: 0
SORE THROAT: 1
SINUS PRESSURE: 0
COUGH: 1

## 2020-04-16 ASSESSMENT — PATIENT HEALTH QUESTIONNAIRE - PHQ9
1. LITTLE INTEREST OR PLEASURE IN DOING THINGS: 1
SUM OF ALL RESPONSES TO PHQ QUESTIONS 1-9: 2
2. FEELING DOWN, DEPRESSED OR HOPELESS: 1
SUM OF ALL RESPONSES TO PHQ9 QUESTIONS 1 & 2: 2
SUM OF ALL RESPONSES TO PHQ QUESTIONS 1-9: 2

## 2020-04-16 NOTE — PROGRESS NOTES
Lili Rock agreed to Video Chat/Exam in presence of Dr Dominique Shaikh and myself. Verified who was present in room with Lili Rock. Lili Rock informed the e-mail address used to [employer] cannot be used to contact the Provider, if they are any questions or concerns they need to call the office directly. Lili Rock stated understanding.      Patient has agreed to participate in the video visit
tablet by mouth every 12 hours Use  Every  12 hours  Juan Veras MD   DULoxetine (CYMBALTA) 60 MG extended release capsule TAKE 1 CAPSULE BY MOUTH EVERY DAY  Juan Veras MD   Lysine HCl 1000 MG TABS Take by mouth  Historical Provider, MD   fluticasone (FLONASE) 50 MCG/ACT nasal spray 1 spray by Each Nostril route daily  Rima Toney MD   SYNTHROID 125 MCG tablet Take 1 tablet by mouth Daily  Rima Toney MD   esomeprazole (NEXIUM) 40 MG delayed release capsule Take 1 capsule by mouth every morning (before breakfast)  Rima Toney MD   rosuvastatin (CRESTOR) 20 MG tablet Take 1 tablet by mouth daily  Rima Toney MD   Cinnamon 500 MG TABS Take by mouth  Historical Provider, MD   acetaminophen (TYLENOL) 325 MG tablet Take 2 tablets by mouth every 4 hours as needed for Pain  Ceasar Martinez MD   docusate sodium (COLACE, DULCOLAX) 100 MG CAPS Take 100 mg by mouth 2 times daily  Patient taking differently: Take 100 mg by mouth 2 times daily as needed   Ceasar Martinez MD   Omega-3 Fatty Acids (FISH OIL) 1000 MG CAPS Take 2,000 mg by mouth 4 times daily   Historical Provider, MD   ascorbic acid (VITAMIN C) 1000 MG tablet Take 0.5 tablets by mouth 3 times daily  Ceasar Martinez MD   fexofenadine (ALLEGRA ALLERGY) 180 MG tablet Take 180 mg by mouth daily as needed   Historical Provider, MD   B Complex-Folic Acid (B-355 BALANCED TR PO) Take 100 mg by mouth 3 times daily (before meals) [facility]   Historical Provider, MD   vitamin D (CHOLECALCIFEROL) 5000 UNITS CAPS capsule Take 5,000 Units by mouth daily   Historical Provider, MD   calcium carbonate (OSCAL) 500 MG TABS tablet Take 1,000 mg by mouth 4 times daily   Historical Provider, MD       Social History     Tobacco Use    Smoking status: Never Smoker    Smokeless tobacco: Never Used   Substance Use Topics    Alcohol use: Yes     Comment: occassionally    Drug use:  No            PHYSICAL

## 2020-04-21 ENCOUNTER — TELEPHONE (OUTPATIENT)
Dept: PULMONOLOGY | Age: 65
End: 2020-04-21

## 2020-04-22 ENCOUNTER — CLINICAL DOCUMENTATION (OUTPATIENT)
Dept: PULMONOLOGY | Age: 65
End: 2020-04-22

## 2020-04-22 RX ORDER — METHYLPHENIDATE HYDROCHLORIDE 20 MG/1
20 TABLET ORAL 3 TIMES DAILY PRN
Qty: 90 TABLET | Refills: 0 | Status: SHIPPED | OUTPATIENT
Start: 2020-04-22 | End: 2020-04-22 | Stop reason: SDUPTHER

## 2020-04-22 RX ORDER — METHYLPHENIDATE HYDROCHLORIDE 20 MG/1
20 TABLET ORAL 3 TIMES DAILY PRN
Qty: 90 TABLET | Refills: 0 | Status: SHIPPED | OUTPATIENT
Start: 2020-04-22 | End: 2020-05-22

## 2020-05-15 ENCOUNTER — VIRTUAL VISIT (OUTPATIENT)
Dept: FAMILY MEDICINE CLINIC | Age: 65
End: 2020-05-15

## 2020-05-15 PROBLEM — G25.81 RLS (RESTLESS LEGS SYNDROME): Status: ACTIVE | Noted: 2020-05-15

## 2020-05-15 PROBLEM — G90.511 COMPLEX REGIONAL PAIN SYNDROME TYPE 1 OF RIGHT UPPER EXTREMITY: Status: ACTIVE | Noted: 2017-04-05

## 2020-05-15 RX ORDER — GABAPENTIN 300 MG/1
CAPSULE ORAL
COMMUNITY
Start: 2020-04-17 | End: 2020-06-02 | Stop reason: SINTOL

## 2020-05-15 RX ORDER — FLUCONAZOLE 150 MG/1
150 TABLET ORAL ONCE
Qty: 1 TABLET | Refills: 0 | Status: SHIPPED | OUTPATIENT
Start: 2020-05-15 | End: 2020-05-15

## 2020-05-15 RX ORDER — AZITHROMYCIN 250 MG/1
TABLET, FILM COATED ORAL
Qty: 1 PACKET | Refills: 0 | Status: SHIPPED | OUTPATIENT
Start: 2020-05-15 | End: 2020-05-25

## 2020-05-15 RX ORDER — DULOXETIN HYDROCHLORIDE 60 MG/1
CAPSULE, DELAYED RELEASE ORAL
Qty: 90 CAPSULE | Refills: 1 | Status: SHIPPED | OUTPATIENT
Start: 2020-05-15 | End: 2020-06-17 | Stop reason: SDUPTHER

## 2020-05-15 ASSESSMENT — ENCOUNTER SYMPTOMS
DIARRHEA: 0
SORE THROAT: 1
NAUSEA: 1
SHORTNESS OF BREATH: 0
SINUS PRESSURE: 0
CONSTIPATION: 0
COUGH: 1
SINUS PAIN: 1

## 2020-05-15 NOTE — PROGRESS NOTES
Jose Carlos Michaels MD   SYNTHROID 125 MCG tablet Take 1 tablet by mouth Daily Yes Jose Carlos Michaels MD   esomeprazole (NEXIUM) 40 MG delayed release capsule Take 1 capsule by mouth every morning (before breakfast) Yes Jose Carlos Michaels MD   rosuvastatin (CRESTOR) 20 MG tablet Take 1 tablet by mouth daily Yes Jose Carlos Michaels MD   Cinnamon 500 MG TABS Take by mouth Yes Historical Provider, MD   acetaminophen (TYLENOL) 325 MG tablet Take 2 tablets by mouth every 4 hours as needed for Pain Yes Moses Trejo MD   Omega-3 Fatty Acids (FISH OIL) 1000 MG CAPS Take 2,000 mg by mouth 4 times daily  Yes Historical Provider, MD   ascorbic acid (VITAMIN C) 1000 MG tablet Take 0.5 tablets by mouth 3 times daily Yes Moses Trejo MD   fexofenadine (ALLEGRA ALLERGY) 180 MG tablet Take 180 mg by mouth daily as needed  Yes Historical Provider, MD   B Complex-Folic Acid (F-001 BALANCED TR PO) Take 100 mg by mouth 3 times daily (before meals) [facility]  Yes Historical Provider, MD   vitamin D (CHOLECALCIFEROL) 5000 UNITS CAPS capsule Take 5,000 Units by mouth daily  Yes Historical Provider, MD   calcium carbonate (OSCAL) 500 MG TABS tablet Take 1,000 mg by mouth 4 times daily  Yes Historical Provider, MD   pseudoephedrine (SUDAFED 12 HOUR) 120 MG extended release tablet Take 1 tablet by mouth every 12 hours Use  Every  12 hours  Patient not taking: Reported on 4/16/2020  Ana Cristina Powers MD   docusate sodium (COLACE, DULCOLAX) 100 MG CAPS Take 100 mg by mouth 2 times daily  Patient not taking: Reported on 4/16/2020  Moses Trejo MD       Social History     Tobacco Use    Smoking status: Never Smoker    Smokeless tobacco: Never Used   Substance Use Topics    Alcohol use: Yes     Comment: occassionally    Drug use: No       PHYSICAL EXAMINATION:  [ INSTRUCTIONS:  \"[x]\" Indicates a positive item  \"[]\" Indicates a negative item  -- DELETE ALL ITEMS NOT EXAMINED]  Vital Signs: (As obtained by

## 2020-06-02 ENCOUNTER — VIRTUAL VISIT (OUTPATIENT)
Dept: PULMONOLOGY | Age: 65
End: 2020-06-02
Payer: COMMERCIAL

## 2020-06-02 PROCEDURE — 99214 OFFICE O/P EST MOD 30 MIN: CPT | Performed by: NURSE PRACTITIONER

## 2020-06-02 PROCEDURE — 3017F COLORECTAL CA SCREEN DOC REV: CPT | Performed by: NURSE PRACTITIONER

## 2020-06-02 PROCEDURE — G8428 CUR MEDS NOT DOCUMENT: HCPCS | Performed by: NURSE PRACTITIONER

## 2020-06-02 RX ORDER — METHYLPHENIDATE HYDROCHLORIDE 20 MG/1
20 TABLET ORAL 3 TIMES DAILY
Qty: 90 TABLET | Refills: 0 | Status: SHIPPED | OUTPATIENT
Start: 2020-06-02 | End: 2020-07-10 | Stop reason: SDUPTHER

## 2020-06-02 RX ORDER — MODAFINIL 200 MG/1
400 TABLET ORAL DAILY
Qty: 180 TABLET | Refills: 1 | Status: SHIPPED | OUTPATIENT
Start: 2020-06-02 | End: 2020-06-03 | Stop reason: SDUPTHER

## 2020-06-02 NOTE — PROGRESS NOTES
 Depression 09/2016    Dr. Malka Dumont    GERD (gastroesophageal reflux disease) 2000    and celiac- Dr. Aleyda Mcdaniel- Brian Busman Headache(784.0)     migraines-Dr. Elle Amaya History of blood transfusion     Hx of reactive hypoglycemia     Dr. Jaclyn Medina Hyperlipidemia 2013    Dr. Elle Amaya Hypothyroidism 1993    Dr. Tiff Lowe Irritable bowel syndrome 1997    Dr. [facility]da [facility]cdaniel at [facility] MDRO (multiple drug resistant organisms) resistance     Meniere disease 2005    Dr. Nata Randolph Mitral valve prolapse syndrome 2008    MRSA (methicillin resistant Staphylococcus aureus) 2013, 8/1/15    left forearm 2013, nasal screen pos Aug 2015   Rosey Bass Narcolepsy 2000    Dr. Caron Baig    Narcolepsy and cataplexy     Partial bowel obstruction ([address]     Multile times.   S/P partial small bowel resection    Restless legs syndrome 1993    Dr. Cecy Soriano RSD upper limb 2016    Right Shoulder- Dr. Kaity Lyn RSD upper limb     right shoulder , arm, hand    Squamous cell skin cancer 2003    Dr. Johanna Alex     Past Surgical History:   Procedure Laterality Date    ABDOMEN SURGERY  04/23/2013    ABD Exploration, removal of ommentum, lysis of adhesions-Dr. Davey Jackson ABDOMINAL ADHESION SURGERY  10/2010    Resection-Dr. Noam Santiago Left 2006   Shasha Barnes  2003     Dr. Gisela Rodriguez  [id number], 2015    ENDOSCOPY, COLON, DIAGNOSTIC      FRACTURE SURGERY      HYSTERECTOMY  1985    Yony Wellington Right 2006    LAPAROTOMY EXPLORATORY N/A 8/31/2018    LAPAROTOMY EXPLORATORY, lysis of adhesions, closure innerotomoy performed by Sade Cartagena MD at [address] [place] [address]  2006    capsule endoscopy    [facility] SINGLE[facility]MULTIPLE Left 2/8/2018    EGD BIOPSY performed by Jaime White MD at [facility] OFFICE/OUTPT esomeprazole (NEXIUM) 40 MG delayed release capsule Take 1 capsule by mouth every morning (before breakfast) 90 capsule 3    rosuvastatin (CRESTOR) 20 MG tablet Take 1 tablet by mouth daily 90 tablet 3    Cinnamon 500 MG TABS Take by mouth      acetaminophen (TYLENOL) 325 MG tablet Take 2 tablets by mouth every 4 hours as needed for Pain 120 tablet 3    Omega-3 Fatty Acids (FISH OIL) 1000 MG CAPS Take 2,000 mg by mouth 4 times daily       ascorbic acid (VITAMIN C) 1000 MG tablet Take 0.5 tablets by mouth 3 times daily 30 tablet 3    fexofenadine (ALLEGRA ALLERGY) 180 MG tablet Take 180 mg by mouth daily as needed       B Complex-Folic Acid (L-227 BALANCED TR PO) Take 100 mg by mouth 3 times daily (before meals) [facility]       vitamin D (CHOLECALCIFEROL) 5000 UNITS CAPS capsule Take 5,000 Units by mouth daily       calcium carbonate (OSCAL) 500 MG TABS tablet Take 1,000 mg by mouth 4 times daily        No current facility-administered medications for this visit.       Family History   Problem Relation Age of Onset    Diabetes Mother         type II    Stroke Mother     High Blood Pressure Mother     Kidney Disease Mother     Heart Disease Mother     Heart Attack Mother 66        2/28/15    Colon Cancer Father 79        jejunum   Milinda Broom Cancer Father         Lyphoma    Irritable Bowel Syndrome Sister     High Blood Pressure Brother     Kidney Disease Brother     Celiac Disease Brother     Diabetes Maternal Grandmother         type II    Blindness Maternal Grandmother     Emphysema Maternal Grandfather     Stroke Paternal Grandmother     Diabetes Paternal Grandmother         type II    Ovarian Cancer Paternal Grandmother 48    Cancer Paternal Grandfather         brain tumor    Asthma Brother      Problems Brother     Heart Disease Brother     Other Sister         1days old, pneumonia complications    Colon Cancer Paternal Aunt 79    Colon Cancer Paternal Uncle 79    Colon

## 2020-06-03 ENCOUNTER — TELEPHONE (OUTPATIENT)
Dept: PHYSICAL MEDICINE AND REHAB | Age: 65
End: 2020-06-03

## 2020-06-03 ENCOUNTER — TELEPHONE (OUTPATIENT)
Dept: PULMONOLOGY | Age: 65
End: 2020-06-03

## 2020-06-03 RX ORDER — MODAFINIL 200 MG/1
400 TABLET ORAL DAILY
Qty: 180 TABLET | Refills: 1 | Status: SHIPPED | OUTPATIENT
Start: 2020-06-03 | End: 2020-09-01

## 2020-06-08 ENCOUNTER — HOSPITAL ENCOUNTER (OUTPATIENT)
Age: 65
Discharge: HOME | End: 2020-06-08
Payer: COMMERCIAL

## 2020-06-08 PROCEDURE — U0002 COVID-19 LAB TEST NON-CDC: HCPCS

## 2020-06-09 LAB
PERFORMING LAB: NORMAL
REPORT: NORMAL
SARS-COV-2: NOT DETECTED

## 2020-06-10 ENCOUNTER — TELEPHONE (OUTPATIENT)
Dept: PHYSICAL MEDICINE AND REHAB | Age: 65
End: 2020-06-10

## 2020-06-12 ENCOUNTER — APPOINTMENT (OUTPATIENT)
Dept: GENERAL RADIOLOGY | Age: 65
End: 2020-06-12
Attending: PAIN MEDICINE
Payer: COMMERCIAL

## 2020-06-12 ENCOUNTER — HOSPITAL ENCOUNTER (OUTPATIENT)
Age: 65
Setting detail: OUTPATIENT SURGERY
Discharge: HOME | End: 2020-06-12
Attending: PAIN MEDICINE | Admitting: PAIN MEDICINE
Payer: COMMERCIAL

## 2020-06-12 ENCOUNTER — ANESTHESIA (OUTPATIENT)
Dept: OPERATING ROOM | Age: 65
End: 2020-06-12
Payer: COMMERCIAL

## 2020-06-12 ENCOUNTER — ANESTHESIA EVENT (OUTPATIENT)
Dept: OPERATING ROOM | Age: 65
End: 2020-06-12
Payer: COMMERCIAL

## 2020-06-12 VITALS
BODY MASS INDEX: 28.89 KG/M2 | HEIGHT: 65 IN | OXYGEN SATURATION: 97 % | DIASTOLIC BLOOD PRESSURE: 75 MMHG | HEART RATE: 80 BPM | RESPIRATION RATE: 16 BRPM | TEMPERATURE: 96.7 F | WEIGHT: 173.4 LBS | SYSTOLIC BLOOD PRESSURE: 134 MMHG

## 2020-06-12 VITALS
OXYGEN SATURATION: 99 % | TEMPERATURE: 96.8 F | SYSTOLIC BLOOD PRESSURE: 124 MMHG | RESPIRATION RATE: 11 BRPM | DIASTOLIC BLOOD PRESSURE: 79 MMHG

## 2020-06-12 PROCEDURE — C1713 ANCHOR/SCREW BN/BN,TIS/BN: HCPCS | Performed by: PAIN MEDICINE

## 2020-06-12 PROCEDURE — 3209999900 FLUORO FOR SURGICAL PROCEDURES

## 2020-06-12 PROCEDURE — 2580000003 HC RX 258: Performed by: NURSE ANESTHETIST, CERTIFIED REGISTERED

## 2020-06-12 PROCEDURE — 63650 IMPLANT NEUROELECTRODES: CPT | Performed by: PAIN MEDICINE

## 2020-06-12 PROCEDURE — 6360000002 HC RX W HCPCS: Performed by: NURSE ANESTHETIST, CERTIFIED REGISTERED

## 2020-06-12 PROCEDURE — 2709999900 HC NON-CHARGEABLE SUPPLY: Performed by: PAIN MEDICINE

## 2020-06-12 PROCEDURE — 3600000057 HC PAIN LEVEL 4 ADDL 15 MIN: Performed by: PAIN MEDICINE

## 2020-06-12 PROCEDURE — 3700000000 HC ANESTHESIA ATTENDED CARE: Performed by: PAIN MEDICINE

## 2020-06-12 PROCEDURE — 3600000056 HC PAIN LEVEL 4 BASE: Performed by: PAIN MEDICINE

## 2020-06-12 PROCEDURE — 3700000001 HC ADD 15 MINUTES (ANESTHESIA): Performed by: PAIN MEDICINE

## 2020-06-12 PROCEDURE — 7100000011 HC PHASE II RECOVERY - ADDTL 15 MIN: Performed by: PAIN MEDICINE

## 2020-06-12 PROCEDURE — 7100000010 HC PHASE II RECOVERY - FIRST 15 MIN: Performed by: PAIN MEDICINE

## 2020-06-12 PROCEDURE — 95972 ALYS CPLX SP/PN NPGT W/PRGRM: CPT | Performed by: PAIN MEDICINE

## 2020-06-12 PROCEDURE — 6360000004 HC RX CONTRAST MEDICATION: Performed by: PAIN MEDICINE

## 2020-06-12 PROCEDURE — 2500000003 HC RX 250 WO HCPCS: Performed by: PAIN MEDICINE

## 2020-06-12 DEVICE — 70CM 16 CONTACT LEAD KIT
Type: IMPLANTABLE DEVICE | Site: SPINE CERVICAL | Status: FUNCTIONAL
Brand: INFINION™  16

## 2020-06-12 RX ORDER — LIDOCAINE HYDROCHLORIDE 10 MG/ML
INJECTION, SOLUTION INFILTRATION; PERINEURAL PRN
Status: DISCONTINUED | OUTPATIENT
Start: 2020-06-12 | End: 2020-06-12 | Stop reason: ALTCHOICE

## 2020-06-12 RX ORDER — FENTANYL CITRATE 50 UG/ML
INJECTION, SOLUTION INTRAMUSCULAR; INTRAVENOUS PRN
Status: DISCONTINUED | OUTPATIENT
Start: 2020-06-12 | End: 2020-06-12 | Stop reason: SDUPTHER

## 2020-06-12 RX ORDER — PROPOFOL 10 MG/ML
INJECTION, EMULSION INTRAVENOUS PRN
Status: DISCONTINUED | OUTPATIENT
Start: 2020-06-12 | End: 2020-06-12 | Stop reason: SDUPTHER

## 2020-06-12 RX ORDER — SODIUM CHLORIDE 9 MG/ML
INJECTION, SOLUTION INTRAVENOUS CONTINUOUS PRN
Status: DISCONTINUED | OUTPATIENT
Start: 2020-06-12 | End: 2020-06-12 | Stop reason: SDUPTHER

## 2020-06-12 RX ADMIN — PROPOFOL 30 MG: 10 INJECTION, EMULSION INTRAVENOUS at 09:20

## 2020-06-12 RX ADMIN — PROPOFOL 50 MG: 10 INJECTION, EMULSION INTRAVENOUS at 09:14

## 2020-06-12 RX ADMIN — FENTANYL CITRATE 50 MCG: 50 INJECTION, SOLUTION INTRAMUSCULAR; INTRAVENOUS at 09:20

## 2020-06-12 RX ADMIN — FENTANYL CITRATE 50 MCG: 50 INJECTION, SOLUTION INTRAMUSCULAR; INTRAVENOUS at 09:10

## 2020-06-12 RX ADMIN — PROPOFOL 80 MG: 10 INJECTION, EMULSION INTRAVENOUS at 09:10

## 2020-06-12 RX ADMIN — CEFAZOLIN 2 G: 10 INJECTION, POWDER, FOR SOLUTION INTRAVENOUS at 09:13

## 2020-06-12 RX ADMIN — SODIUM CHLORIDE: 9 INJECTION, SOLUTION INTRAVENOUS at 09:08

## 2020-06-12 RX ADMIN — PROPOFOL 30 MG: 10 INJECTION, EMULSION INTRAVENOUS at 09:17

## 2020-06-12 ASSESSMENT — PULMONARY FUNCTION TESTS
PIF_VALUE: 0

## 2020-06-12 ASSESSMENT — PAIN SCALES - GENERAL: PAINLEVEL_OUTOF10: 9

## 2020-06-12 ASSESSMENT — PAIN - FUNCTIONAL ASSESSMENT: PAIN_FUNCTIONAL_ASSESSMENT: 0-10

## 2020-06-12 NOTE — ANESTHESIA POSTPROCEDURE EVALUATION
Department of Anesthesiology  Postprocedure Note    Patient: Amairani Moss  MRN: [id number]  YOB: 1955  Date of evaluation: 6/12/2020  Time:  10:57 AM     Procedure Summary     Date:  06/12/20 Room / Location:  [address] [place] [address] 03 / [place]    Anesthesia Start:  9061 Anesthesia Stop:  7992    Procedure:  retrial due to lead migration Cervical SCS trial entrance T1 to C 2 (N/A Spine Cervical) Diagnosis:  (Complex regional pain syndrom type 1 of upper rt. extremity)    Surgeon:  Talat Castelan MD Responsible Provider:  Mini Brown DO    Anesthesia Type:  MAC ASA Status:  2          Anesthesia Type: MAC    Sue Phase I: Sue Score: 10    Sue Phase II: Sue Score: 10    Last vitals: Reviewed and per EMR flowsheets. Anesthesia Post Evaluation    Comments: John Arguelles  POST-ANESTHESIA NOTE       Name:  Amairani Moss                                         Age:  59 y.o. MRN:  [id number]      Last Vitals:  /75   Pulse 80   Temp 96.7 °F (35.9 °C) (Temporal)   Resp 16   Ht 5' 5\" (1.651 m)   Wt 173 lb 6.4 oz (78.7 kg)   SpO2 97%   BMI 28.86 kg/m²   Patient Vitals in the past 4 hrs:  06/12/20 1020, BP:134/75  06/12/20 1015, BP:(!) 142/79, Pulse:80, Resp:16, SpO2:97 %  06/12/20 0816, BP:122/71, Temp:96.7 °F (35.9 °C), Temp src:Temporal, Pulse:76, Resp:15, SpO2:97 %, Height:5' 5\" (1.651 m), Weight:173 lb 6.4 oz (78.7 kg)    Level of Consciousness:  Awake    Respiratory:  Stable    Oxygen Saturation:  Stable    Cardiovascular:  Stable    Hydration:  Adequate    PONV:  Stable    Post-op Pain:  Adequate analgesia    Post-op Assessment:  No apparent anesthetic complications    Additional Follow-Up / Treatment / Comment:  None    Toan Ribera DO  June 12, 2020   10:57 AM

## 2020-06-12 NOTE — OP NOTE
Operative Note      Pre-Procedure Note    Patient Name: Elvis Burgess   YOB: 1955  Medical Record Number: [id number]  Date: 6/12/20    Indication:  CRPS RUE  Consent: On file. Vital Signs:   Vitals:    06/12/20 0816   BP: 122/71   Pulse: 76   Resp: 15   Temp: 96.7 °F (35.9 °C)   SpO2: 97%       Past Medical History:   has a past medical history of Anemia, Anxiety, Arm DVT (deep venous thromboembolism), acute ([address] Broken shoulder, Celiac disease, Celiac disease, Complex regional pain syndrome of right upper extremity, CRPS (complex regional pain syndrome type I), Depression, Depression, GERD (gastroesophageal reflux disease), Headache(784.0), Heart murmur, History of blood transfusion, Hx of reactive hypoglycemia, Hyperlipidemia, Hypothyroidism, IBS (irritable bowel syndrome), Irritable bowel syndrome, MDRO (multiple drug resistant organisms) resistance, Meniere disease, Mitral valve prolapse syndrome, MRSA (methicillin resistant Staphylococcus aureus), Narcolepsy, Narcolepsy and cataplexy, Partial bowel obstruction (HCC), Restless legs syndrome, RSD upper limb, RSD upper limb, Sinus infection, 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 Past Surgical History:   has a past surgical history that includes Cholecystectomy (2003 ); Abdominal adhesion surgery (10/2010); knee surgery (Right, 2006); sinus surgery (x4 1982, 1990, 1997, 2005 ); Abdomen surgery (04/23/2013); Hysterectomy (1985); Carpal tunnel release (Left, 2006); sigmoidoscopy (1/14/97); other surgical history (2006); Colonoscopy ([id number],1556,8601, 2015); Upper gastrointestinal endoscopy ([id number],[id number],1943,7607, 2015, 2/2016); Endoscopy, colon, diagnostic; fracture surgery; pr egd transoral biopsy single/multiple (Left, 2/8/2018); Appendectomy (1985);  Hysterectomy, total abdominal (1985); pr office/outpt visit,procedure only (Left, 8/31/2018); LAPAROTOMY EXPLORATORY (N/A, 8/31/2018); Spinal Cord Stimulator Surgery (N/A, 11/14/2019); Colonoscopy (2016); Abdomen surgery; Small intestine surgery (2018); Cholecystectomy; and Hysterectomy, total abdominal (1985). Pre-Sedation Documentation and Exam:   Vital signs have been reviewed (see flow sheet for vitals). Sedation/ Anesthesia Plan:   MAC    Patient is an appropriate candidate for plan of sedation: yes         Preoperative Diagnosis:   Complex Regional Pain Syndrome type 1 of right upper extremity.     Post-Op Dx: as above      Procedure Done:     PROCEDURE PERFORMED: ONE LEAD TRIAL SCS     1- AP Film or Thoracolumbar Spine w/ fluoroscopy guidance and interpretation  2- Placement of specialized epidural needle to the-T1 interspace  3- Placement of  lead withSingle Chamber 16  electrodes to the C-1.  4- Intra-operative  programming of lead taking 30 minutes. 5- Complex programming in recovery taking 30 minutes.           Indication for the Procedure:       The patient is an established patient with the above known diagnosis.  The patient understands the reason for the procedure along with the risks, benefits and alternatives available.  The patient has had the opportunity to ask questions prior to the procedure, and the patient has given written, informed, consent to proceed with the procedure.    An informed consent was obtained from the patient for the procedure.        Medical Necessity:  This patient has chronic pain that has failed to respond to conservative measures as outlined in the original history and physical exam on the patients symptoms include low back pain and disability of a moderate to severe degree with intermittent leg pain.  The goals of treatment are to 1) achieve optimal pain control, recognizing that a pain-free state may not be achievable; 2) minimize adverse outcomes; 3) enhance functional abilities, and physical and psychological well-being; and 4) enhance the quality of life for patients with chronic pain.

## 2020-06-12 NOTE — H&P
H & P      RUE shoulder neck scapula right hand pain from old broken shoulder and developed CRPS RUE September 2016. The RUE has severe pain burnings hypersensitivity radiates upward into neck scapula shoulder. She has electrical shocks hand and fingers sweat. She has muscle aching limited ROM spasticity. She continues OT 2 a week. She feels like there is a tight BP cuff around her arm and she has mottling of skin. She has limited ROM to RUE hand. She takes Cymbalta and rarely takes Norco. Tylenol. 1 lead Cervical SCS placed 11/14/2019 with Dr Dwayne Jay for CRPS RUE. She had broken shoulder and developed CRPS. She did not receive any relief from SCS to RUE, she went home and laid down and the SCS affected the LUE the entire time. She had it reprogrammed, no relief. 1 lead migrated so we will try to get another trial approved.      She also has been having increased low back pain and left foot numbness mostly plantar area.     Medications reviewed. Patient denies side effects with medications. Patient states she is taking medications as prescribed. Shedenies receiving pain medications from other sources. She denies any ER visits since last visit.     Pain scale with out pain medications or at its worst is 8/10. Pain scale with pain medications or at its best is 4/10. Last dose of Norco was  1 week ago  Drug screen reviewed from 10/16/2019 and was appropriate  Pill count was not completed today and patient was educated on bringing in medications  Patient does not have naloxone available at home.  Patient has not required use of naloxone at home since last office visit.         The patientis allergic to dilaudid [hydromorphone hcl]; iron; percodan [oxycodone-aspirin]; fenoprofen calcium; gluten; gluten meal; maxzide [hydrochlorothiazide w-triamterene]; oxycodone-acetaminophen; oxycodone-aspirin; percocet  [oxycodone-acetaminophen]; reglan [metoclopramide]; fenoprofen; and sulfa antibiotics.     Past Medical History  Jose Alfredo Yarbrough  has a past medical history of Anemia, Anxiety, Arm DVT (deep venous thromboembolism), acute ([address] Broken shoulder, Celiac disease, CRPS (complex regional pain syndrome type I), Depression, GERD (gastroesophageal reflux disease), Headache(784.0), History of blood transfusion, Hx of reactive hypoglycemia, Hyperlipidemia, Hypothyroidism, Irritable bowel syndrome, MDRO (multiple drug resistant organisms) resistance, Meniere disease, Mitral valve prolapse syndrome, MRSA (methicillin resistant Staphylococcus aureus), Narcolepsy, Partial bowel obstruction (HCC), Restless legs syndrome, RSD upper limb, RSD upper limb, and Squamous cell skin cancer.     Past Surgical History  The patient  has a past surgical history that includes Cholecystectomy (2003 ); Abdominal adhesion surgery (10/2010); knee surgery (Right, 2006); sinus surgery (x4 1982, 1990, 1997, 2005 ); Abdomen surgery (04/23/2013); Hysterectomy (1985); Carpal tunnel release (Left, 2006); sigmoidoscopy (1/14/97); other surgical history (2006); Colonoscopy ([id number], 2015); Upper gastrointestinal endoscopy ([id number],5063,2096,0150,5094, 2015, 2/2018); Endoscopy, colon, diagnostic; fracture surgery; pr egd transoral biopsy single/multiple (Left, 2/8/2018); Appendectomy (1985); Hysterectomy, total abdominal (1985); pr office/outpt visit,procedure only (Left, 8/31/2018); LAPAROTOMY EXPLORATORY (N/A, 8/31/2018); and Spinal Cord Stimulator Surgery (N/A, 11/14/2019).      Family History  This patient's family history includes Asthma in her brother; Blindness in her maternal grandmother; Cancer in her father and paternal grandfather; Celiac Disease in her brother; Colon Cancer (age of onset: 79) in her father, paternal aunt, paternal uncle, and paternal uncle; Diabetes in her maternal grandmother, mother, and paternal grandmother; Emphysema in her maternal grandfather;  Problems in her brother; Heart Attack (age of onset: 66) in her mother; Heart Disease in her brother and mother; High Blood Pressure in her brother and mother; Irritable Bowel Syndrome in her sister; Kidney Disease in her brother and mother; Other in her sister; Ovarian Cancer (age of onset: 48) in her paternal grandmother; Stroke in her mother and paternal grandmother.     Social History  Augie White  reports that she has never smoked. She has never used smokeless tobacco. She reports current alcohol use. She reports that she does not use drugs.     Medications    Current Medication      Current Outpatient Medications:     gabapentin (NEURONTIN) 300 MG capsule, Take 1 capsule by mouth every evening for 30 days. , Disp: 30 capsule, Rfl: 1    HYDROcodone-acetaminophen (NORCO) 5-325 MG per tablet, Take 1 tablet by mouth 2 times daily as needed for Pain for up to 30 days. , Disp: 60 tablet, Rfl: 0    modafinil (PROVIGIL) 200 MG tablet, Take 1 tablet by mouth daily for 90 days. 8 am and 12 PM, Disp: 180 tablet, Rfl: 1    methylphenidate (RITALIN) 20 MG tablet, Take 1 tablet by mouth 3 times daily for 30 days. , Disp: 90 tablet, Rfl: 0    ferrous sulfate 325 (65 Fe) MG tablet, Take 1 tablet by mouth daily (with breakfast), Disp: 90 tablet, Rfl: 1    furosemide (LASIX) 20 MG tablet, TAKE 1 TABLET BY MOUTH EVERY DAY, Disp: 90 tablet, Rfl: 1    pseudoephedrine (SUDAFED 12 HOUR) 120 MG extended release tablet, Take 1 tablet by mouth every 12 hours Use  Every  12 hours, Disp: 30 tablet, Rfl: 1    DULoxetine (CYMBALTA) 60 MG extended release capsule, TAKE 1 CAPSULE BY MOUTH EVERY DAY, Disp: 87 capsule, Rfl: 1    Lysine HCl 1000 MG TABS, Take by mouth, Disp: , Rfl:     fluticasone (FLONASE) 50 MCG/ACT nasal spray, 1 spray by Each Nostril route daily, Disp: 1 Bottle, Rfl: 11    SYNTHROID 125 MCG tablet, Take 1 tablet by mouth Daily, Disp: 90 tablet, Rfl: 3    esomeprazole (NEXIUM) 40 MG delayed release capsule, Take 1 capsule by mouth every morning (before breakfast), Disp: 90 capsule, Rfl: 3   friction rub. No gallop. Pulmonary:      Effort: Pulmonary effort is normal. No respiratory distress. Breath sounds: Normal breath sounds. No wheezing or rales. Chest:      Chest wall: No tenderness. Abdominal:      General: Bowel sounds are normal. There is no distension. Palpations: Abdomen is soft. Tenderness: There is no abdominal tenderness. There is no guarding or rebound. Musculoskeletal:         General: Tenderness present. Right shoulder: She exhibits decreased range of motion, tenderness, bony tenderness, pain, spasm and decreased strength. Right elbow: She exhibits decreased range of motion. Tenderness found. Right wrist: She exhibits decreased range of motion, tenderness and bony tenderness. Cervical back: She exhibits decreased range of motion, tenderness, bony tenderness, pain and spasm. Thoracic back: She exhibits decreased range of motion, tenderness, bony tenderness, pain and spasm. Lumbar back: She exhibits tenderness, bony tenderness, pain and spasm. Back:       Right upper arm: She exhibits tenderness and bony tenderness. Right forearm: She exhibits tenderness and bony tenderness. Right hand: She exhibits decreased range of motion, tenderness and bony tenderness. Decreased sensation noted. Decreased strength noted. Left foot: Tenderness present. Comments: RUE CRPS limited ROM hypersensitivity neuropathy   Bottom of left foot plantar area feels numb and like she is standing on a marble. Skin:     General: Skin is warm. Coloration: Skin is not pale. Findings: No erythema or rash. Neurological:      Mental Status: She is alert and oriented to person, place, and time. She is not disoriented. Cranial Nerves: No cranial nerve deficit. Sensory: No sensory deficit. Motor: No atrophy or abnormal muscle tone.       Coordination: Coordination normal.      Gait: Gait normal.      Deep Tendon

## 2020-06-12 NOTE — ANESTHESIA PRE PROCEDURE
There were no vitals filed for this visit. BP Readings from Last 3 Encounters:   02/06/20 128/70   01/13/20 132/78   12/13/19 130/74       NPO Status:                                                                                 BMI:   Wt Readings from Last 3 Encounters:   02/06/20 174 lb 2.6 oz (79 kg)   01/13/20 175 lb 12.8 oz (79.7 kg)   12/13/19 175 lb 6 oz (79.5 kg)     There is no height or weight on file to calculate BMI.    CBC:   Lab Results   Component Value Date    WBC 5.8 03/16/2020    WBC 7.1 10/01/2018    RBC 4.91 03/16/2020    HGB 15.3 03/16/2020    HCT 45.1 03/16/2020    MCV 92 03/16/2020    RDW 13.5 03/16/2020     03/16/2020     10/01/2018       CMP:   Lab Results   Component Value Date     03/16/2020    K 4.1 03/16/2020    K 4.6 09/01/2018     03/16/2020    CO2 29 03/16/2020    BUN 18 03/16/2020    CREATININE 0.77 03/16/2020    LABGLOM >90 10/01/2018    GLUCOSE 119 03/16/2020    PROT 6.7 08/30/2019    CALCIUM 9.2 03/16/2020    BILITOT 0.4 08/30/2019    BILITOT NEGATIVE 08/28/2018    ALKPHOS 86 08/30/2019    ALKPHOS 130 10/01/2018    AST 22 08/30/2019    ALT 26 08/30/2019       POC Tests: No results for input(s): POCGLU, POCNA, POCK, POCCL, POCBUN, POCHEMO, POCHCT in the last 72 hours.     Coags:   Lab Results   Component Value Date    INR 0.98 10/01/2018    APTT 29.3 10/01/2018       HCG (If Applicable): No results found for: PREGTESTUR, PREGSERUM, HCG, HCGQUANT     ABGs: No results found for: PHART, PO2ART, YEO0PRO, TEX2PVT, BEART, [id number]     Type & Screen (If Applicable):  Lab Results   Component Value Date    LABRH POS 08/31/2018       Drug/Infectious Status (If Applicable):  Lab Results   Component Value Date    HEPCAB Negative 10/13/2016       COVID-19 Screening (If Applicable):   Lab Results   Component Value Date    COVID19 NOT DETECTED 06/08/2020         Anesthesia Evaluation  Patient summary reviewed  Airway:

## 2020-06-15 ENCOUNTER — VIRTUAL VISIT (OUTPATIENT)
Dept: FAMILY MEDICINE CLINIC | Age: 65
End: 2020-06-15
Payer: COMMERCIAL

## 2020-06-15 PROCEDURE — G8427 DOCREV CUR MEDS BY ELIG CLIN: HCPCS | Performed by: FAMILY MEDICINE

## 2020-06-15 PROCEDURE — 3017F COLORECTAL CA SCREEN DOC REV: CPT | Performed by: FAMILY MEDICINE

## 2020-06-15 PROCEDURE — 99214 OFFICE O/P EST MOD 30 MIN: CPT | Performed by: FAMILY MEDICINE

## 2020-06-15 NOTE — PROGRESS NOTES
Syndrome in her sister; Kidney Disease in her brother and mother; Other in her sister; Ovarian Cancer (age of onset: 48) in her paternal grandmother; Prostate Cancer (age of onset: 61) in an other family member; Stroke in her mother and paternal grandmother. She  reports that she has never smoked. She has never used smokeless tobacco. She reports current alcohol use. She reports that she does not use drugs. Medications/Allergies/Immunizations:     Her current medication(s) include   Current Outpatient Medications:     Omega-3 Fatty Acids (FISH OIL) 1000 MG CAPS, Take 6,000 mg by mouth 3 times daily, Disp: , Rfl:     magnesium oxide (MAG-OX) 400 MG tablet, Take 1,200 mg by mouth daily, Disp: , Rfl:     Lysine 500 MG TABS, Take 1,500 mg by mouth daily, Disp: , Rfl:     ferrous sulfate (IRON 325) 325 (65 Fe) MG tablet, Take 325 mg by mouth daily (with breakfast), Disp: , Rfl:     modafinil (PROVIGIL) 200 MG tablet, Take 2 tablets by mouth daily for 90 days. , Disp: 180 tablet, Rfl: 1    methylphenidate (RITALIN) 20 MG tablet, Take 1 tablet by mouth 3 times daily for 30 days.  (Patient taking differently: Take 20 mg by mouth 2 times daily. ), Disp: 90 tablet, Rfl: 0    DULoxetine (CYMBALTA) 60 MG extended release capsule, TAKE 1 CAPSULE BY MOUTH EVERY DAY, Disp: 90 capsule, Rfl: 1    furosemide (LASIX) 20 MG tablet, TAKE 1 TABLET BY MOUTH EVERY DAY, Disp: 90 tablet, Rfl: 1    Lysine HCl 1000 MG TABS, Take 1.5 tablets by mouth daily , Disp: , Rfl:     fluticasone (FLONASE) 50 MCG/ACT nasal spray, 1 spray by Each Nostril route daily, Disp: 1 Bottle, Rfl: 11    SYNTHROID 125 MCG tablet, Take 1 tablet by mouth Daily, Disp: 90 tablet, Rfl: 3    esomeprazole (NEXIUM) 40 MG delayed release capsule, Take 1 capsule by mouth every morning (before breakfast), Disp: 90 capsule, Rfl: 3    rosuvastatin (CRESTOR) 20 MG tablet, Take 1 tablet by mouth daily (Patient taking differently: Take 30 mg by mouth daily ), Disp: 90 tablet, Rfl: 3    Cinnamon 500 MG TABS, Take 6 tablets by mouth daily , Disp: , Rfl:     ascorbic acid (VITAMIN C) 1000 MG tablet, Take 0.5 tablets by mouth 3 times daily, Disp: 30 tablet, Rfl: 3    B Complex-Folic Acid (S-175 BALANCED TR PO), Take 100 mg by mouth 3 times daily (before meals) [facility] , Disp: , Rfl:     vitamin D (CHOLECALCIFEROL) 5000 UNITS CAPS capsule, Take 5,000 Units by mouth daily , Disp: , Rfl:   Allergies: Dilaudid [hydromorphone hcl]; Iron; Percodan [oxycodone-aspirin]; Gluten; Gluten meal; Maxzide [hydrochlorothiazide w-triamterene]; Nalfon [fenoprofen calcium]; Oxycodone-acetaminophen; Oxycodone-aspirin; Percocet  [oxycodone-acetaminophen]; Reglan [metoclopramide]; Fenoprofen; and Sulfa antibiotics,  Immunizations: There is no immunization history for the selected administration types on file for this patient. History of PresentIllness:     Ankita's had concerns including Discuss Labs; Fatigue; and Muscle Pain (CRPS x 4 years). Olena Brooks  presents to the [address] [place] [address] today for;   Chief Complaint   Patient presents with    Discuss Labs    Fatigue    Muscle Pain     CRPS x 4 years   , ,  abnormal labs follow up and these conditions as she  Is looking today for:     1. Complex regional pain syndrome type 1 of right upper extremity    2. Acute non-recurrent maxillary sinusitis    3. Venous insufficiency    4. Acute non-recurrent ethmoidal sinusitis    5. Hyperlipidemia, unspecified hyperlipidemia type    6. Hypothyroidism, unspecified type    7. Gastroesophageal reflux disease with esophagitis    8. Chronic sinusitis, unspecified location    9. Reactive depression      HPI    Subjective:     Review of Systems   Constitutional: Positive for fatigue. Musculoskeletal: Positive for arthralgias, joint swelling, myalgias and neck pain. Right shoulder arm hand and neck   All other systems reviewed and are negative.       Objective: patients, or  - Centrum plain look-a-like if need iron    Localpharmacies or chains such as [facility], [facility], [facility], have;  - Triple Strength Fish Oil (enteric coated ifavailable) or    If not enteric coated, can take from freezer for less burps  - B-50 or B-100 released balanced B complex tabs  - Cinnamon bark 500 mg (without Chromium or extracts)   some brands list 1000 mg / serving of 2 capsules,    some brands have 1000 mg caps with the undesireable chromium / extract  - Calcium carbonate/citrate, magnesium oxide/citrate, Vit D 3  as 3-4 tabs/caps/serving     Some Local Brands may contain Zincwhich is acceptable for the first bottle or two  - Magnesium oxide 250 mg tabs for those having < 2 bowel movements daily  - Magnesium citrate 200 mg if having > 2bowel movement/day  - Centrum Silver or look-a-like for most patients, Centrum plain or look-a-like with iron  - Vitamin D-3 comes as 1,000 IU or 2,000 IU or 5,000 IU gel caps or Liquid drops      Some brands containing or derived from soy oil or corn oil are OK if not allergic to soy  - Elemental Iron 65 mg tabsat bedtime is available over the counter if need more iron     Usually turns bowel movements grey, green or black but not a concern  - Apricot Kernel Oil (by Now) for dry skin sensitive perineal or perianal area skin    Nutrients for ongoing use by Mail order for less expense from www. [employer] ;  - Strength Fish Oil , 240 Softgels Item [id number]  -B-100 time released balanced B complex Item #[id number]  - Cinnamon bark 500 mg without Chromium or extract Item #[id number]  - Calcium carbonate 1000 mg, Magnesium oxide 500 mg, Vit D 3  400 IU Item #[id number]  - Magnesium oxide 500 mg tabs Item #[id number] if less than 2 bowel movements daily  - [employer] Item #[id number] for mostpatients, One Daily Item #571303 with iron  - Vit D 3  1,000 Item #[id number]      2,000 IU Item #[id number]  ,000 IU Item #[id number]     Some brands containing orderived from soy oil or corn oil are OK if not experience allergic reactions after eating bananas ripenedartificially with ethylene. In the [facility], food distribution centers treat unripe bananas and other produce with ethylene to ripen; not commonly done in [facility] since fruit is tree-ripened there. Does treatmentof food with ethylene induce banana proteins that cross-react with latex? (Ernestina et al.    References:   Latex in Foods Allergy, http://ehp.niehs.nih.gov/members/2003/5811/5811.html    Search web for \" Whats in Season \" for whereyou live or are at the time you food shop  www.nutritioncouncil.org/pdf/healthy/SeasonalProduce. pdf ,   Management of Latex, ://[facility]. [employer].edu/  search for latex

## 2020-06-15 NOTE — LETTER
[address] [place] [address],[address] [facility] [address]  Phone: [phone]  Fax: [phone]    Gilberto Velazquez MD        Pat 15, 2020    [address]  [address] [address]      Dear Jose Alfredo Yarbrough:    Here are the lab orders we discussed and the video visit notes    If you have any questions or concerns, please don't hesitate to call.     Sincerely,        Gilberto Velazquez MD

## 2020-06-16 ENCOUNTER — OFFICE VISIT (OUTPATIENT)
Dept: PHYSICAL MEDICINE AND REHAB | Age: 65
End: 2020-06-16
Payer: COMMERCIAL

## 2020-06-16 VITALS
HEIGHT: 65 IN | TEMPERATURE: 96.9 F | SYSTOLIC BLOOD PRESSURE: 126 MMHG | BODY MASS INDEX: 28.82 KG/M2 | WEIGHT: 173 LBS | DIASTOLIC BLOOD PRESSURE: 84 MMHG

## 2020-06-16 PROCEDURE — G8417 CALC BMI ABV UP PARAM F/U: HCPCS | Performed by: NURSE PRACTITIONER

## 2020-06-16 PROCEDURE — 1036F TOBACCO NON-USER: CPT | Performed by: NURSE PRACTITIONER

## 2020-06-16 PROCEDURE — 3017F COLORECTAL CA SCREEN DOC REV: CPT | Performed by: NURSE PRACTITIONER

## 2020-06-16 PROCEDURE — 99213 OFFICE O/P EST LOW 20 MIN: CPT | Performed by: NURSE PRACTITIONER

## 2020-06-16 PROCEDURE — G8427 DOCREV CUR MEDS BY ELIG CLIN: HCPCS | Performed by: NURSE PRACTITIONER

## 2020-06-16 RX ORDER — AMITRIPTYLINE HYDROCHLORIDE 25 MG/1
25 TABLET, FILM COATED ORAL NIGHTLY
Qty: 30 TABLET | Refills: 3 | Status: SHIPPED | OUTPATIENT
Start: 2020-06-16 | End: 2020-10-13

## 2020-06-16 RX ORDER — HYDROCODONE BITARTRATE AND ACETAMINOPHEN 5; 325 MG/1; MG/1
1 TABLET ORAL EVERY 8 HOURS PRN
Qty: 90 TABLET | Refills: 0 | Status: SHIPPED | OUTPATIENT
Start: 2020-06-16 | End: 2020-07-16

## 2020-06-16 ASSESSMENT — ENCOUNTER SYMPTOMS
PHOTOPHOBIA: 0
SORE THROAT: 0
EYE PAIN: 0
DIARRHEA: 0
CONSTIPATION: 0
RHINORRHEA: 0
ABDOMINAL PAIN: 0
COLOR CHANGE: 0
COUGH: 0
NAUSEA: 0
SINUS PRESSURE: 0
VOMITING: 0
SHORTNESS OF BREATH: 0
WHEEZING: 0
CHEST TIGHTNESS: 0
BACK PAIN: 0

## 2020-06-16 NOTE — PROGRESS NOTES
hypersensitive. She has had broken shoulder September 2016 and developed CRPS afterward. She is currently in OT. She is unable to sleep and has to sleep on left side. She has some depression also. She continues to take Norco  Cymbalta   Medications reviewed. Patient denies side effects with medications. Patient states she is taking medications as prescribed. Shedenies receiving pain medications from other sources. She denies any ER visits since last visit. Pain scale with out pain medications or at its worst is 9/10. Pain scale with pain medications or at its best is 6/10. Has not took pain ills in months. Has tried Norco and Tylenol #3    The patientis allergic to dilaudid [hydromorphone hcl]; iron; percodan [oxycodone-aspirin]; gluten; gluten meal; maxzide [hydrochlorothiazide w-triamterene]; nalfon [fenoprofen calcium]; oxycodone-acetaminophen; oxycodone-aspirin; percocet  [oxycodone-acetaminophen]; reglan [metoclopramide]; fenoprofen; and sulfa antibiotics. Past Medical History  Silvino Michaels  has a past medical history of Anemia, Anxiety, Arm DVT (deep venous thromboembolism), acute ([facility] [address] Broken shoulder, Celiac disease, Celiac disease, Complex regional pain syndrome of right upper extremity, CRPS (complex regional pain syndrome type I), Depression, Depression, GERD (gastroesophageal reflux disease), Headache(784.0), Heart murmur, History of blood transfusion, Hx of reactive hypoglycemia, Hyperlipidemia, Hypothyroidism, IBS (irritable bowel syndrome), Irritable bowel syndrome, MDRO (multiple drug resistant organisms) resistance, Meniere disease, Mitral valve prolapse syndrome, MRSA (methicillin resistant Staphylococcus aureus), Narcolepsy, Narcolepsy and cataplexy, Partial bowel obstruction (HCC), Restless legs syndrome, RSD upper limb, RSD upper limb, Sinus infection, 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     Past Surgical History  The patient  has a past surgical history that includes Cholecystectomy (2003 ); Abdominal adhesion surgery (10/2010); knee surgery (Right, 2006); sinus surgery (x4 1982, 1990, 1997, 2005 ); Abdomen surgery (04/23/2013); Hysterectomy (1985); Carpal tunnel release (Left, 2006); sigmoidoscopy (1/14/97); other surgical history (2006); Colonoscopy (7941,9257,6696, 2015); Upper gastrointestinal endoscopy (6931,6950,6792,2865,2728, 2015, 2/2016); Endoscopy, colon, diagnostic; fracture surgery; pr egd transoral biopsy single/multiple (Left, 2/8/2018); Appendectomy (1985); Hysterectomy, total abdominal (1985); pr office/outpt visit,procedure only (Left, 8/31/2018); LAPAROTOMY EXPLORATORY (N/A, 8/31/2018); Spinal Cord Stimulator Surgery (N/A, 11/14/2019); Colonoscopy (2016); Abdomen surgery; Small intestine surgery (2018); Cholecystectomy; Hysterectomy, total abdominal (1985); and Spinal Cord Stimulator Surgery (N/A, 6/12/2020). Family History  This patient's family history includes Asthma in her brother; Blindness in her maternal grandmother; Brain Cancer (age of onset: 80) in her paternal grandfather; Cancer in her father; Cancer (age of onset: 79) in her paternal aunt and paternal uncle; Celiac Disease in her brother; Colon Cancer (age of onset: 79) in her father, paternal aunt, paternal uncle, and paternal uncle; Diabetes in her maternal grandmother, mother, and paternal grandmother; Emphysema in her maternal grandfather;  Problems in her brother; Heart Attack (age of onset: 66) in her mother; Heart Disease in her brother and mother; High Blood Pressure in her brother and mother; Irritable Bowel Syndrome in her sister; Kidney Disease in her brother and mother; Other in her sister; Ovarian Cancer (age of onset: 48) in her paternal grandmother; Prostate Cancer (age of onset: 61) in an other family member; Stroke in her mother and paternal grandmother. Social History  Gavin Garibay  reports that she has never smoked.  She has never used spasm.      Thoracic back: She exhibits decreased range of motion, tenderness, bony tenderness, pain and spasm. Lumbar back: She exhibits tenderness, bony tenderness, pain and spasm. Back:       Right upper arm: She exhibits tenderness and bony tenderness. Right forearm: She exhibits tenderness and bony tenderness. Right hand: She exhibits decreased range of motion, tenderness and bony tenderness. Decreased sensation noted. Decreased strength noted. Left foot: Tenderness present. Comments: RUE CRPS limited ROM hypersensitivity neuropathy   Bottom of left foot plantar area feels numb and like she is standing on a marble. Skin:     General: Skin is warm. Coloration: Skin is not pale. Findings: No erythema or rash. Neurological:      Mental Status: She is alert and oriented to person, place, and time. She is not disoriented. Cranial Nerves: No cranial nerve deficit. Sensory: No sensory deficit. Motor: No atrophy or abnormal muscle tone. Coordination: Coordination normal.      Gait: Gait normal.      Deep Tendon Reflexes: Reflexes are normal and symmetric. Babinski sign absent on the right side. Psychiatric:         Attention and Perception: Attention normal. She is attentive. Mood and Affect: Mood is depressed. Mood is not anxious. Affect is tearful. Affect is not labile, blunt, angry or inappropriate. Speech: Speech normal. She is communicative. Speech is not rapid and pressured, delayed, slurred or tangential.         Behavior: Behavior normal. Behavior is not agitated, slowed, aggressive, withdrawn, hyperactive or combative. Thought Content: Thought content normal. Thought content is not paranoid or delusional. Thought content does not include homicidal or suicidal ideation. Thought content does not include homicidal or suicidal plan. Cognition and Memory: Memory is impaired.  She does not exhibit impaired recent memory or impaired remote memory. Judgment: Judgment normal. Judgment is not impulsive or inappropriate. Comments: Pt depressed due to pain emotional  Tearful  Forgetful at times            Assessment:     1. Complex regional pain syndrome type 1 of right upper extremity    2. Chronic bilateral low back pain, unspecified whether sciatica present    3. Chronic pain syndrome            Plan:      · OARRS reviewed. Current MED:0  · Patient was not offered naloxone for home. · Discussed long term side effects of medications, tolerance, dependency and addiction. · Previous UDS reviewed  · UDS preformed today for compliance. · Patient told can not receive any pain medications from any other source. · No evidence of abuse, diversion or aberrant behavior.  Medications and/or procedures to improve function and quality of life- patient understanding with this and that may not be pain free   Discussed with patient about safe storage of medications at home   Discussed possible weaning of medication dosing dependent on treatment/procedure results.  Testing: none   Procedures: Dr Esvin Finn referral for second opinion on Cervical SCS trial possible different leads and positioning for bettter coverage   Discussed with patient about risks with procedure including infection, reaction to medication, increased pain, or bleeding.  Medications:Norco, Trial Elavil 25 mg HS, told to start with half tab for now, medication reviewed  With interactions of Cymbalta and Elavil told to stop taking if has side effects discussed Cymbalta      Meds. Prescribed:   Orders Placed This Encounter   Medications    HYDROcodone-acetaminophen (NORCO) 5-325 MG per tablet     Sig: Take 1 tablet by mouth every 8 hours as needed for Pain for up to 30 days.      Dispense:  90 tablet     Refill:  0     Reduce doses taken as pain becomes manageable    amitriptyline (ELAVIL) 25 MG tablet     Sig: Take 1 tablet by mouth nightly Dispense:  30 tablet     Refill:  3       Return in about 2 months (around 8/16/2020) for Follow up pain medications. Time spent with patient was 25 minutes, more than 50% was spent Counseling/coordinated the patient'scare.       Electronically signed by NEREYDA David CNP on6/16/2020 at 10:28 AM

## 2020-06-17 ENCOUNTER — HOSPITAL ENCOUNTER (OUTPATIENT)
Dept: WOMENS IMAGING | Age: 65
Discharge: HOME | End: 2020-06-17
Payer: COMMERCIAL

## 2020-06-17 ENCOUNTER — OFFICE VISIT (OUTPATIENT)
Dept: FAMILY MEDICINE CLINIC | Age: 65
End: 2020-06-17

## 2020-06-17 VITALS
WEIGHT: 177 LBS | HEIGHT: 65 IN | HEART RATE: 76 BPM | SYSTOLIC BLOOD PRESSURE: 132 MMHG | RESPIRATION RATE: 12 BRPM | TEMPERATURE: 97.8 F | DIASTOLIC BLOOD PRESSURE: 80 MMHG | BODY MASS INDEX: 29.49 KG/M2

## 2020-06-17 PROBLEM — R19.00 ABDOMINAL MASS: Status: ACTIVE | Noted: 2018-08-31

## 2020-06-17 PROCEDURE — 90715 TDAP VACCINE 7 YRS/> IM: CPT | Performed by: FAMILY MEDICINE

## 2020-06-17 PROCEDURE — 77063 BREAST TOMOSYNTHESIS BI: CPT

## 2020-06-17 PROCEDURE — 90471 IMMUNIZATION ADMIN: CPT | Performed by: FAMILY MEDICINE

## 2020-06-17 PROCEDURE — 99396 PREV VISIT EST AGE 40-64: CPT | Performed by: FAMILY MEDICINE

## 2020-06-17 RX ORDER — DULOXETIN HYDROCHLORIDE 60 MG/1
CAPSULE, DELAYED RELEASE ORAL
Qty: 90 CAPSULE | Refills: 3 | Status: SHIPPED | OUTPATIENT
Start: 2020-06-17 | End: 2021-03-02 | Stop reason: DRUGHIGH

## 2020-06-17 RX ORDER — FUROSEMIDE 20 MG/1
TABLET ORAL
Qty: 90 TABLET | Refills: 3 | Status: CANCELLED | OUTPATIENT
Start: 2020-06-17

## 2020-06-17 RX ORDER — FUROSEMIDE 20 MG/1
40 TABLET ORAL DAILY
COMMUNITY
Start: 2020-06-17 | End: 2020-07-10 | Stop reason: SDUPTHER

## 2020-06-17 RX ORDER — ESOMEPRAZOLE MAGNESIUM 40 MG/1
40 CAPSULE, DELAYED RELEASE ORAL
Qty: 90 CAPSULE | Refills: 3 | Status: SHIPPED | OUTPATIENT
Start: 2020-06-17 | End: 2021-07-15

## 2020-06-17 RX ORDER — ROSUVASTATIN CALCIUM 20 MG/1
20 TABLET, COATED ORAL DAILY
Qty: 90 TABLET | Refills: 3 | Status: SHIPPED | OUTPATIENT
Start: 2020-06-17 | End: 2021-08-23 | Stop reason: SDUPTHER

## 2020-06-17 RX ORDER — LEVOTHYROXINE SODIUM 125 MCG
125 TABLET ORAL DAILY
Qty: 90 TABLET | Refills: 3 | Status: CANCELLED | OUTPATIENT
Start: 2020-06-17

## 2020-06-17 ASSESSMENT — ENCOUNTER SYMPTOMS
SINUS PRESSURE: 0
ABDOMINAL PAIN: 1
CONSTIPATION: 0
DIARRHEA: 1
SHORTNESS OF BREATH: 0

## 2020-06-17 NOTE — PROGRESS NOTES
Immunizations Administered     Name Date Dose Route    Tdap (Boostrix, Adacel) 6/17/2020 0.5 mL Intramuscular    Site: Deltoid- Left    Lot: [id number]    NDC: 78150-216-81

## 2020-06-17 NOTE — PATIENT INSTRUCTIONS
Let me know how the lasix at 40 mg daily works  Do the non fasting lab after being on the lasix for 10 day  See me in a year

## 2020-06-24 ENCOUNTER — TELEPHONE (OUTPATIENT)
Dept: PHYSICAL MEDICINE AND REHAB | Age: 65
End: 2020-06-24

## 2020-06-24 NOTE — TELEPHONE ENCOUNTER
Dr. Satinder Negron office called stating they have tried multiple times to contact patient to schedule appointment. Patient has not answered and her voice mailbox is full. They will not be calling anymore.

## 2020-06-30 LAB
ABSOLUTE BASO #: 0 X10E9/L (ref 0–0.9)
ABSOLUTE EOS #: 0.2 X10E9/L (ref 0–0.4)
ABSOLUTE LYMPH #: 1.9 X10E9/L (ref 1–3.5)
ABSOLUTE MONO #: 0.5 X10E9/L (ref 0–0.9)
ABSOLUTE NEUT #: 4.4 X10E9/L (ref 1.5–6.6)
ANTI-NUCLEAR ANTIBODY (ANA): NEGATIVE
AVERAGE GLUCOSE: 108 MG/DL
BASOPHILS RELATIVE PERCENT: 0.6 %
CALCIUM SERPL-MCNC: 9.6 MG/DL (ref 8.5–10.5)
CHOLESTEROL/HDL RATIO: 2.5 (ref 1–5)
CHOLESTEROL: 142 MG/DL (ref 150–200)
DEHYDROEPIANDROSTERONE: 4.5 NG/ML (ref 1.3–9.8)
DHEAS (DHEA SULFATE): 148 UG/DL (ref 12–133)
EOSINOPHILS RELATIVE PERCENT: 2.8 %
ESTRADIOL LEVEL: <15 PG/ML
GLIADIN ANTIBODIES IGA: 2 U/ML
GLIADIN ANTIBODIES IGG: <1 U/ML
HBA1C MFR BLD: 5.4 % (ref 4.4–6.4)
HCT VFR BLD CALC: 45.6 % (ref 35–47)
HDLC SERPL-MCNC: 56 MG/DL
HEMOGLOBIN: 15.3 G/DL (ref 11.7–16)
HIGH SENSITIVE C-REACTIVE PROTEIN: 0.42 MG/DL (ref 0–0.74)
HOMOCYSTINE, SERUM: 8.85 MCMOL/L (ref 3.36–20.44)
IGE: 30 IU/ML (ref 0–165)
IRON SATURATION: 19 % SATURATION (ref 15–50)
IRON, SERUM: 78 UG/DL (ref 50–170)
LDL CHOLESTEROL CALCULATED: 66 MG/DL
LDL/HDL RATIO: 1.2
LYMPHOCYTE %: 27 %
MAGNESIUM: 2.1 MG/DL (ref 1.8–2.6)
MCH RBC QN AUTO: 31.6 PG (ref 26–33.5)
MCHC RBC AUTO-ENTMCNC: 33.6 G/DL (ref 32–36)
MCV RBC AUTO: 94 FL (ref 81–100)
MONOCYTES # BLD: 7.4 %
NEUTROPHILS RELATIVE PERCENT: 62.2 %
PARATHYROID HORMONE INTACT: 62 PG/ML (ref 12–88)
PDW BLD-RTO: 13.7 % (ref 11.5–14.7)
PLATELETS: 298 X10E9/L (ref 150–450)
PMV BLD AUTO: 8.9 FL (ref 7–12)
PROGESTERONE LEVEL: 0.5 NG/ML
RBC: 4.85 X10E12/L (ref 3.8–5.2)
RHEUMATOID FACTOR: <10 IU/ML
SEDIMENTATION RATE, ERYTHROCYTE: 20 MM/H (ref 0–30)
T3 TOTAL: 113 NG/DL (ref 87–178)
T4 TOTAL: 12.4 UG/DL (ref 6.1–12.2)
TESTOSTERONE, LCMS: 13 NG/DL (ref 5–32)
THYROID PEROXIDASE ANTIBODY: 1 IU/ML
TOTAL IRON BINDING CAPACITY: 407 UG/DL (ref 250–425)
TRIGL SERPL-MCNC: 100 MG/DL (ref 27–150)
TSH SERPL DL<=0.05 MIU/L-ACNC: 1.28 UIU/ML (ref 0.49–4.67)
URIC ACID: 4.8 MG/DL (ref 2.6–7.2)
VITAMIN D 25-HYDROXY: 49.3 NG/ML (ref 30–100)
VLDLC SERPL CALC-MCNC: 20 MG/DL (ref 0–30)
WBC: 7 X10E9/L (ref 4–11.8)

## 2020-07-07 ENCOUNTER — TELEPHONE (OUTPATIENT)
Dept: PHYSICAL MEDICINE AND REHAB | Age: 65
End: 2020-07-07

## 2020-07-10 RX ORDER — FUROSEMIDE 20 MG/1
40 TABLET ORAL DAILY
Qty: 180 TABLET | Refills: 0 | Status: SHIPPED | OUTPATIENT
Start: 2020-07-10 | End: 2020-12-30 | Stop reason: SDUPTHER

## 2020-07-10 RX ORDER — METHYLPHENIDATE HYDROCHLORIDE 20 MG/1
20 TABLET ORAL 3 TIMES DAILY
Qty: 90 TABLET | Refills: 0 | Status: SHIPPED | OUTPATIENT
Start: 2020-07-10 | End: 2020-08-21 | Stop reason: SDUPTHER

## 2020-07-10 NOTE — TELEPHONE ENCOUNTER
Date of last visit:  6/17/2020  Date of next visit:  Visit date not found    Requested Prescriptions     Pending Prescriptions Disp Refills    furosemide (LASIX) 20 MG tablet 90 tablet 2     Sig: Take 2 tablets by mouth daily

## 2020-07-10 NOTE — TELEPHONE ENCOUNTER
Norma Low called requesting a refill on the following medications:  Requested Prescriptions     Pending Prescriptions Disp Refills    methylphenidate (RITALIN) 20 MG tablet 90 tablet 0     Sig: Take 1 tablet by mouth 3 times daily for 30 days.      Pharm[facility]: [facility] [email or web address]      date of last visit: 6/2/20  Date of next visit (if applicable): 0/5/8069

## 2020-07-10 NOTE — TELEPHONE ENCOUNTER
Pt called req a #90 refills for  Lasix 20 mg.      Pt stated she is only taking the RX QD.    [facility]

## 2020-08-18 ENCOUNTER — OFFICE VISIT (OUTPATIENT)
Dept: PHYSICAL MEDICINE AND REHAB | Age: 65
End: 2020-08-18
Payer: COMMERCIAL

## 2020-08-18 VITALS
TEMPERATURE: 97.1 F | WEIGHT: 175 LBS | BODY MASS INDEX: 29.88 KG/M2 | HEIGHT: 64 IN | SYSTOLIC BLOOD PRESSURE: 110 MMHG | DIASTOLIC BLOOD PRESSURE: 60 MMHG

## 2020-08-18 PROCEDURE — 3017F COLORECTAL CA SCREEN DOC REV: CPT | Performed by: NURSE PRACTITIONER

## 2020-08-18 PROCEDURE — G8417 CALC BMI ABV UP PARAM F/U: HCPCS | Performed by: NURSE PRACTITIONER

## 2020-08-18 PROCEDURE — 99213 OFFICE O/P EST LOW 20 MIN: CPT | Performed by: NURSE PRACTITIONER

## 2020-08-18 PROCEDURE — 1036F TOBACCO NON-USER: CPT | Performed by: NURSE PRACTITIONER

## 2020-08-18 PROCEDURE — G8427 DOCREV CUR MEDS BY ELIG CLIN: HCPCS | Performed by: NURSE PRACTITIONER

## 2020-08-18 ASSESSMENT — ENCOUNTER SYMPTOMS
NAUSEA: 0
RHINORRHEA: 0
DIARRHEA: 0
BACK PAIN: 0
SORE THROAT: 0
VOMITING: 0
SINUS PRESSURE: 0
PHOTOPHOBIA: 0
EYE PAIN: 0
CHEST TIGHTNESS: 0
COUGH: 0
ABDOMINAL PAIN: 0
WHEEZING: 0
CONSTIPATION: 0
SHORTNESS OF BREATH: 0
COLOR CHANGE: 0

## 2020-08-18 NOTE — PROGRESS NOTES
history (2006); Colonoscopy (6954,4231,3621, 2015); Upper gastrointestinal endoscopy (4732,7272,[id number],0082, 2015, 2/2016); Endoscopy, colon, diagnostic; fracture surgery; pr egd transoral biopsy single/multiple (Left, 2/8/2018); Appendectomy (1985); Hysterectomy, total abdominal (1985); pr office/outpt visit,procedure only (Left, 8/31/2018); LAPAROTOMY EXPLORATORY (N/A, 8/31/2018); Spinal Cord Stimulator Surgery (N/A, 11/14/2019); Colonoscopy (2016); Abdomen surgery; Small intestine surgery (2018); Cholecystectomy; Hysterectomy, total abdominal (1985); and Spinal Cord Stimulator Surgery (N/A, 6/12/2020). Family History  This patient's family history includes Asthma in her brother; Blindness in her maternal grandmother; Brain Cancer (age of onset: 80) in her paternal grandfather; Cancer in her father; Cancer (age of onset: 79) in her paternal aunt and paternal uncle; Celiac Disease in her brother; Colon Cancer (age of onset: 79) in her father, paternal aunt, paternal uncle, and paternal uncle; Diabetes in her maternal grandmother, mother, and paternal grandmother; Emphysema in her maternal grandfather;  Problems in her brother; Heart Attack (age of onset: 66) in her mother; Heart Disease in her brother and mother; High Blood Pressure in her brother and mother; Irritable Bowel Syndrome in her sister; Kidney Disease in her brother and mother; Other in her sister; Ovarian Cancer (age of onset: 48) in her paternal grandmother; Prostate Cancer (age of onset: 61) in an other family member; Stroke in her mother and paternal grandmother. Social History  Owen Yancey  reports that she has never smoked. She has never used smokeless tobacco. She reports current alcohol use. She reports that she does not use drugs.     Medications    Current Outpatient Medications:     busPIRone HCl (BUSPAR PO), Take by mouth, Disp: , Rfl:     omeprazole (PRILOSEC) 20 MG delayed release capsule, Take 20 mg by mouth nightly, Disp: , Rfl:   famotidine (PEPCID) 20 MG tablet, Take 1 tablet by mouth nightly, Disp: 30 tablet, Rfl: 6    furosemide (LASIX) 20 MG tablet, Take 2 tablets by mouth daily, Disp: 180 tablet, Rfl: 0    DULoxetine (CYMBALTA) 60 MG extended release capsule, TAKE 1 CAPSULE BY MOUTH EVERY DAY, Disp: 90 capsule, Rfl: 3    rosuvastatin (CRESTOR) 20 MG tablet, Take 1 tablet by mouth daily, Disp: 90 tablet, Rfl: 3    esomeprazole (NEXIUM) 40 MG delayed release capsule, Take 1 capsule by mouth every morning (before breakfast), Disp: 90 capsule, Rfl: 3    amitriptyline (ELAVIL) 25 MG tablet, Take 1 tablet by mouth nightly, Disp: 30 tablet, Rfl: 3    Omega-3 Fatty Acids (FISH OIL) 1000 MG CAPS, Take 6,000 mg by mouth 3 times daily, Disp: , Rfl:     magnesium oxide (MAG-OX) 400 MG tablet, Take 1,200 mg by mouth daily, Disp: , Rfl:     Lysine 500 MG TABS, Take 1,500 mg by mouth daily, Disp: , Rfl:     ferrous sulfate (IRON 325) 325 (65 Fe) MG tablet, Take 325 mg by mouth daily (with breakfast), Disp: , Rfl:     modafinil (PROVIGIL) 200 MG tablet, Take 2 tablets by mouth daily for 90 days. , Disp: 180 tablet, Rfl: 1    Lysine HCl 1000 MG TABS, Take 1.5 tablets by mouth daily , Disp: , Rfl:     fluticasone (FLONASE) 50 MCG/ACT nasal spray, 1 spray by Each Nostril route daily, Disp: 1 Bottle, Rfl: 11    SYNTHROID 125 MCG tablet, Take 1 tablet by mouth Daily, Disp: 90 tablet, Rfl: 3    Cinnamon 500 MG TABS, Take 6 tablets by mouth daily , Disp: , Rfl:     ascorbic acid (VITAMIN C) 1000 MG tablet, Take 0.5 tablets by mouth 3 times daily, Disp: 30 tablet, Rfl: 3    B Complex-Folic Acid (P-605 BALANCED TR PO), Take 100 mg by mouth 3 times daily (before meals) [facility] , Disp: , Rfl:     vitamin D (CHOLECALCIFEROL) 5000 UNITS CAPS capsule, Take 5,000 Units by mouth daily , Disp: , Rfl:     Subjective:      Review of Systems   Constitutional: Negative for activity change, appetite change, chills, diaphoresis, fatigue, Nose normal.      Mouth/Throat:      Pharynx: No oropharyngeal exudate. Eyes:      General: No scleral icterus. Right eye: No discharge. Left eye: No discharge. Conjunctiva/sclera: Conjunctivae normal.      Pupils: Pupils are equal, round, and reactive to light. Neck:      Musculoskeletal: Full passive range of motion without pain, normal range of motion and neck supple. Normal range of motion. No edema, erythema, neck rigidity or muscular tenderness. Thyroid: No thyromegaly. Cardiovascular:      Rate and Rhythm: Normal rate and regular rhythm. Heart sounds: Normal heart sounds. No murmur. No friction rub. No gallop. Pulmonary:      Effort: Pulmonary effort is normal. No respiratory distress. Breath sounds: Normal breath sounds. No wheezing or rales. Chest:      Chest wall: No tenderness. Abdominal:      General: Bowel sounds are normal. There is no distension. Palpations: Abdomen is soft. Tenderness: There is no abdominal tenderness. There is no guarding or rebound. Musculoskeletal:         General: Tenderness present. Right shoulder: She exhibits decreased range of motion, tenderness, bony tenderness, pain, spasm and decreased strength. Right elbow: She exhibits decreased range of motion. Tenderness found. Right wrist: She exhibits decreased range of motion, tenderness and bony tenderness. Cervical back: She exhibits decreased range of motion, tenderness, bony tenderness, pain and spasm. Thoracic back: She exhibits decreased range of motion, tenderness, bony tenderness, pain and spasm. Lumbar back: She exhibits tenderness, bony tenderness, pain and spasm. Back:       Right upper arm: She exhibits tenderness and bony tenderness. Right forearm: She exhibits tenderness and bony tenderness. Right hand: She exhibits decreased range of motion, tenderness and bony tenderness. Decreased sensation noted.  Decreased strength noted. Left foot: Tenderness present. Comments: RUE CRPS limited ROM hypersensitivity neuropathy   Bottom of left foot plantar area feels numb and like she is standing on a marble. Skin:     General: Skin is warm. Coloration: Skin is not pale. Findings: No erythema or rash. Neurological:      Mental Status: She is alert and oriented to person, place, and time. She is not disoriented. Cranial Nerves: No cranial nerve deficit. Sensory: Sensation is intact. No sensory deficit. Motor: Weakness present. No atrophy or abnormal muscle tone. Coordination: Coordination is intact. Coordination normal.      Gait: Gait is intact. Gait normal.      Deep Tendon Reflexes: Babinski sign absent on the right side. Reflex Scores:       Tricep reflexes are 1+ on the right side and 2+ on the left side. Bicep reflexes are 1+ on the right side and 2+ on the left side. Brachioradialis reflexes are 1+ on the right side and 2+ on the left side. Patellar reflexes are 1+ on the right side and 2+ on the left side. Achilles reflexes are 1+ on the right side and 2+ on the left side. Psychiatric:         Attention and Perception: Attention and perception normal. She is attentive. Mood and Affect: Mood is depressed. Mood is not anxious. Affect is tearful. Affect is not labile, blunt, angry or inappropriate. Speech: Speech normal. She is communicative. Speech is not rapid and pressured, delayed, slurred or tangential.         Behavior: Behavior normal. Behavior is not agitated, slowed, aggressive, withdrawn, hyperactive or combative. Behavior is cooperative. Thought Content: Thought content normal. Thought content is not paranoid or delusional. Thought content does not include homicidal or suicidal ideation. Thought content does not include homicidal or suicidal plan. Cognition and Memory: Cognition normal. Memory is impaired.  She does not exhibit impaired recent memory or impaired remote memory. Judgment: Judgment normal. Judgment is not impulsive or inappropriate. Comments: Pt depressed due to pain emotional  Tearful  Forgetful at times          Assessment:     1. Complex regional pain syndrome type 1 of right upper extremity    2. Chronic pain syndrome            Plan:      · OARRS reviewed. Current MED: 15  · Patient was not offered naloxone for home. She refused  · Discussed long term side effects of medications, tolerance, dependency and addiction. · Previous UDS reviewed  · UDS preformed today for compliance. · Patient told can not receive any pain medications from any other source. · No evidence of abuse, diversion or aberrant behavior.  Medications and/or procedures to improve function and quality of life- patient understanding with this and that may not be pain free   Discussed with patient about safe storage of medications at home   Discussed possible weaning of medication dosing dependent on treatment/procedure results.  Testing: none   Procedures: Has had 2 cervical SCS trials without relief leads migrated    Discussed with patient about risks with procedure including infection, reaction to medication, increased pain, or bleeding.  Medications:Norco  Elavil, Cymbalta per PCP for mood   Has consult with Dr Ebony Egan soon, she has rescheduled many times, daughter was ill. Meds. Prescribed:   No orders of the defined types were placed in this encounter. Return in about 2 months (around 10/18/2020) for Follow up pain medications. Time spent with patient was 15 minutes, more than 50% was spent Counseling/coordinated the patient'scare.       Electronically signed by Rogerio Boast, APRN - CNP on8/18/2020 at 11:26 AM

## 2020-08-21 ENCOUNTER — CLINICAL DOCUMENTATION (OUTPATIENT)
Dept: PULMONOLOGY | Age: 65
End: 2020-08-21

## 2020-08-21 RX ORDER — METHYLPHENIDATE HYDROCHLORIDE 20 MG/1
20 TABLET ORAL 3 TIMES DAILY
Qty: 90 TABLET | Refills: 0 | Status: SHIPPED | OUTPATIENT
Start: 2020-08-21 | End: 2020-10-20 | Stop reason: SDUPTHER

## 2020-08-21 NOTE — TELEPHONE ENCOUNTER
Anat Villalobos called requesting a refill on the following medications:  Requested Prescriptions     Pending Prescriptions Disp Refills    methylphenidate (RITALIN) 20 MG tablet 90 tablet 0     Sig: Take 1 tablet by mouth 3 times daily for 30 days. Pharmacy verified:[facility]  . pv      Date of last visit: 6/21/20  Date of next visit (if applicable): 1/1/4161

## 2020-09-10 ENCOUNTER — TELEPHONE (OUTPATIENT)
Dept: PHYSICAL MEDICINE AND REHAB | Age: 65
End: 2020-09-10

## 2020-09-16 ENCOUNTER — TELEPHONE (OUTPATIENT)
Dept: PHYSICAL MEDICINE AND REHAB | Age: 65
End: 2020-09-16

## 2020-10-05 ENCOUNTER — TELEPHONE (OUTPATIENT)
Dept: FAMILY MEDICINE CLINIC | Age: 65
End: 2020-10-05

## 2020-10-05 RX ORDER — FUROSEMIDE 20 MG/1
TABLET ORAL
Qty: 180 TABLET | Refills: 0 | OUTPATIENT
Start: 2020-10-05

## 2020-10-05 NOTE — TELEPHONE ENCOUNTER
Patient called and requested a renewal for her Handicap Placcard. Please call her once it is ready to be picked up.

## 2020-10-05 NOTE — TELEPHONE ENCOUNTER
Date of last visit:  6/17/2020  Date of next visit:  Visit date not found    Requested Prescriptions     Pending Prescriptions Disp Refills    furosemide (LASIX) 20 MG tablet [Pharmacy Med Name: FUROSEMIDE 20 MG TABLET] 180 tablet 0     Sig: TAKE 2 TABLETS BY MOUTH EVERY DAY

## 2020-10-05 NOTE — TELEPHONE ENCOUNTER
She had an order for a BMP to be done in June that I see no result for. She needs to do that lab before I renew the lasix.

## 2020-10-06 RX ORDER — FLUTICASONE PROPIONATE 50 MCG
SPRAY, SUSPENSION (ML) NASAL
Qty: 1 BOTTLE | Refills: 0 | Status: SHIPPED | OUTPATIENT
Start: 2020-10-06

## 2020-10-06 NOTE — TELEPHONE ENCOUNTER
Date of last visit:  6/17/2020  Date of next visit:  Visit date not found    Requested Prescriptions     Pending Prescriptions Disp Refills    fluticasone (FLONASE) 50 MCG/ACT nasal spray [Pharmacy Med Name: FLUTICASONE PROP 50 MCG SPRAY] 1 Bottle 0     Sig: SPRAY 1 SPRAY INTO EACH NOSTRIL EVERY DAY

## 2020-10-06 NOTE — TELEPHONE ENCOUNTER
Date of last visit:  6/17/2020  Date of next visit:  Visit date not found    Requested Prescriptions     Signed Prescriptions Disp Refills    fluticasone (FLONASE) 50 MCG/ACT nasal spray 1 Bottle 0     Sig: SPRAY 1 SPRAY INTO EACH NOSTRIL EVERY DAY     Authorizing Provider: Garry Nguyen     Ordering User: FERMIN BENNETT         Sent over script per verbal order from Dr Lonell Simmonds.

## 2020-10-13 RX ORDER — AMITRIPTYLINE HYDROCHLORIDE 25 MG/1
25 TABLET, FILM COATED ORAL NIGHTLY
Qty: 90 TABLET | Refills: 0 | Status: SHIPPED | OUTPATIENT
Start: 2020-10-13 | End: 2020-10-23 | Stop reason: SINTOL

## 2020-10-13 NOTE — TELEPHONE ENCOUNTER
OARRS reviewed. UDS: + for  EtG inconsistent, hydrocodone not detected 9/16/2020. Last seen: 8/18/2020.  Follow-up:   Future Appointments   Date Time Provider Lizz Alatorre   10/20/2020 11:00 AM NEREYDA Riley - CNP [facility]

## 2020-10-20 ENCOUNTER — TELEPHONE (OUTPATIENT)
Dept: PHYSICAL MEDICINE AND REHAB | Age: 65
End: 2020-10-20

## 2020-10-20 ENCOUNTER — VIRTUAL VISIT (OUTPATIENT)
Dept: PHYSICAL MEDICINE AND REHAB | Age: 65
End: 2020-10-20
Payer: MEDICARE

## 2020-10-20 ENCOUNTER — CLINICAL DOCUMENTATION (OUTPATIENT)
Dept: PULMONOLOGY | Age: 65
End: 2020-10-20

## 2020-10-20 PROCEDURE — G8400 PT W/DXA NO RESULTS DOC: HCPCS | Performed by: NURSE PRACTITIONER

## 2020-10-20 PROCEDURE — 99213 OFFICE O/P EST LOW 20 MIN: CPT | Performed by: NURSE PRACTITIONER

## 2020-10-20 PROCEDURE — 4040F PNEUMOC VAC/ADMIN/RCVD: CPT | Performed by: NURSE PRACTITIONER

## 2020-10-20 PROCEDURE — 1123F ACP DISCUSS/DSCN MKR DOCD: CPT | Performed by: NURSE PRACTITIONER

## 2020-10-20 PROCEDURE — 1090F PRES/ABSN URINE INCON ASSESS: CPT | Performed by: NURSE PRACTITIONER

## 2020-10-20 PROCEDURE — 3017F COLORECTAL CA SCREEN DOC REV: CPT | Performed by: NURSE PRACTITIONER

## 2020-10-20 PROCEDURE — G8428 CUR MEDS NOT DOCUMENT: HCPCS | Performed by: NURSE PRACTITIONER

## 2020-10-20 RX ORDER — METHYLPHENIDATE HYDROCHLORIDE 20 MG/1
20 TABLET ORAL 3 TIMES DAILY
Qty: 90 TABLET | Refills: 0 | Status: SHIPPED | OUTPATIENT
Start: 2020-10-20 | End: 2020-11-28 | Stop reason: SDUPTHER

## 2020-10-20 ASSESSMENT — ENCOUNTER SYMPTOMS
VOMITING: 0
COLOR CHANGE: 0
CONSTIPATION: 0
SORE THROAT: 0
RHINORRHEA: 0
PHOTOPHOBIA: 0
BACK PAIN: 0
WHEEZING: 0
CHEST TIGHTNESS: 0
SINUS PRESSURE: 0
EYE PAIN: 0
NAUSEA: 0
DIARRHEA: 0
SHORTNESS OF BREATH: 0
COUGH: 0
ABDOMINAL PAIN: 0

## 2020-10-20 NOTE — PROGRESS NOTES
care.  This Telehealth audio/visual visit was conducted using Doxy. me. Patient identification was verified at the start of this visit: yes      HPI   RUE CRPS since 2016 from a shoulder injury. She has had 2 Cervical SCS trials without relief leads migrated and the other one vibrated too much in her ear. She has been referred to Dr Mikayla Lugo for a 2 lead SCS trial. She continues to take Norco rarely and Tylenol prn  from pain and  Cymbalta per PCP. She is not feeling well and cancelled PT OT today. She had UDS + ETOH  Discussed needs UDS - ETOH. She stopped Elavil made her too groggy. She has been having increased neck pain headaches and shoulder pain. Her pain increases with  Increased arm movements reaching rasing arm turning head looking up and down side to side. Her right index pointer and ring finger are tight with increased numbness tingling and limited ROM with wrist and fingers. She continues PT OT  for her RUE   ROM pain and contractures. She is very depressed due to her injury changing her life and her attitude. She is tearful talking about it and her grand kids. She denies any ER visits since last visit. Pain scale with out pain medications or at its worst is 8/10. Pain scale with pain medications or at its best is 6/10. The patientis allergic to dilaudid [hydromorphone hcl]; iron; percodan [oxycodone-aspirin]; gluten; gluten meal; maxzide [hydrochlorothiazide w-triamterene]; nalfon [fenoprofen calcium]; oxycodone-acetaminophen; oxycodone-aspirin; percocet  [oxycodone-acetaminophen]; reglan [metoclopramide]; fenoprofen; and sulfa antibiotics.     Past Medical History  Allyson Pascual  has a past medical history of Anemia, Anxiety, Arm DVT (deep venous thromboembolism), acute ([address] Broken shoulder, Celiac disease, Celiac disease, Complex regional pain syndrome of right upper extremity, CRPS (complex regional pain syndrome type I), Depression, Depression, GERD (gastroesophageal reflux disease), Headache(784.0), Heart murmur, History of blood transfusion, Hx of reactive hypoglycemia, Hyperlipidemia, Hypothyroidism, IBS (irritable bowel syndrome), Irritable bowel syndrome, MDRO (multiple drug resistant organisms) resistance, Meniere disease, Mitral valve prolapse syndrome, MRSA (methicillin resistant Staphylococcus aureus), Narcolepsy, Narcolepsy and cataplexy, Partial bowel obstruction (HCC), Restless legs syndrome, RSD upper limb, RSD upper limb, Sinus infection, 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 Past Surgical History  The patient  has a past surgical history that includes Cholecystectomy (2003 ); Abdominal adhesion surgery (10/2010); knee surgery (Right, 2006); sinus surgery (x4 1982, 1990, 1997, 2005 ); Abdomen surgery (04/23/2013); Hysterectomy (1985); Carpal tunnel release (Left, 2006); sigmoidoscopy (1/14/97); other surgical history (2006); Colonoscopy (1977,7402,1199, 2015); Upper gastrointestinal endoscopy (0455,1267,7590,8215,6262, 2015, 2/2016); Endoscopy, colon, diagnostic; fracture surgery; pr egd transoral biopsy single/multiple (Left, 2/8/2018); Appendectomy (1985); Hysterectomy, total abdominal (1985); pr office/outpt visit,procedure only (Left, 8/31/2018); LAPAROTOMY EXPLORATORY (N/A, 8/31/2018); Spinal Cord Stimulator Surgery (N/A, 11/14/2019); Colonoscopy (2016); Abdomen surgery; Small intestine surgery (2018); Cholecystectomy; Hysterectomy, total abdominal (1985); and Spinal Cord Stimulator Surgery (N/A, 6/12/2020).     Family History  This patient's family history includes Asthma in her brother; Blindness in her maternal grandmother; Brain Cancer (age of onset: 80) in her paternal grandfather; Cancer in her father; Cancer (age of onset: 79) in her paternal aunt and paternal uncle; Celiac Disease in her brother; Colon Cancer (age of onset: 79) in her father, paternal aunt, paternal uncle, and paternal uncle; Diabetes in her maternal grandmother, mother, and paternal grandmother; Emphysema in her maternal grandfather;  Problems in her brother; Heart Attack (age of onset: 66) in her mother; Heart Disease in her brother and mother; High Blood Pressure in her brother and mother; Irritable Bowel Syndrome in her sister; Kidney Disease in her brother and mother; Other in her sister; Ovarian Cancer (age of onset: 48) in her paternal grandmother; Prostate Cancer (age of onset: 61) in an other family member; Stroke in her mother and paternal grandmother. Social History  Erik Beck  reports that she has never smoked. She has never used smokeless tobacco. She reports current alcohol use. She reports that she does not use drugs.     Medications    Current Outpatient Medications:     amitriptyline (ELAVIL) 25 MG tablet, Take 1 tablet by mouth nightly, Disp: 90 tablet, Rfl: 0    fluticasone (FLONASE) 50 MCG/ACT nasal spray, SPRAY 1 SPRAY INTO EACH NOSTRIL EVERY DAY, Disp: 1 Bottle, Rfl: 0    SYNTHROID 125 MCG tablet, TAKE 1 TABLET BY MOUTH EVERY DAY, Disp: 90 tablet, Rfl: 1    busPIRone HCl (BUSPAR PO), Take by mouth, Disp: , Rfl:     omeprazole (PRILOSEC) 20 MG delayed release capsule, Take 20 mg by mouth nightly, Disp: , Rfl:     famotidine (PEPCID) 20 MG tablet, Take 1 tablet by mouth nightly, Disp: 30 tablet, Rfl: 6    furosemide (LASIX) 20 MG tablet, Take 2 tablets by mouth daily, Disp: 180 tablet, Rfl: 0    DULoxetine (CYMBALTA) 60 MG extended release capsule, TAKE 1 CAPSULE BY MOUTH EVERY DAY, Disp: 90 capsule, Rfl: 3    rosuvastatin (CRESTOR) 20 MG tablet, Take 1 tablet by mouth daily, Disp: 90 tablet, Rfl: 3    esomeprazole (NEXIUM) 40 MG delayed release capsule, Take 1 capsule by mouth every morning (before breakfast), Disp: 90 capsule, Rfl: 3    Omega-3 Fatty Acids (FISH OIL) 1000 MG CAPS, Take 6,000 mg by mouth 3 times daily, Disp: , Rfl:     magnesium oxide (MAG-OX) 400 MG tablet, Take 1,200 mg by mouth daily, Disp: , Rfl:   Lysine 500 MG TABS, Take 1,500 mg by mouth daily, Disp: , Rfl:     ferrous sulfate (IRON 325) 325 (65 Fe) MG tablet, Take 325 mg by mouth daily (with breakfast), Disp: , Rfl:     Lysine HCl 1000 MG TABS, Take 1.5 tablets by mouth daily , Disp: , Rfl:     Cinnamon 500 MG TABS, Take 6 tablets by mouth daily , Disp: , Rfl:     ascorbic acid (VITAMIN C) 1000 MG tablet, Take 0.5 tablets by mouth 3 times daily, Disp: 30 tablet, Rfl: 3    B Complex-Folic Acid (F-081 BALANCED TR PO), Take 100 mg by mouth 3 times daily (before meals) [employer] , Disp: , Rfl:     vitamin D (CHOLECALCIFEROL) 5000 UNITS CAPS capsule, Take 5,000 Units by mouth daily , Disp: , Rfl:     Subjective:      Review of Systems   Constitutional: Positive for activity change. Negative for appetite change, chills, diaphoresis, fatigue, fever and unexpected weight change. HENT: Negative for congestion, ear pain, hearing loss, mouth sores, nosebleeds, rhinorrhea, sinus pressure and sore throat. Eyes: Negative for photophobia, pain and visual disturbance. Respiratory: Negative for cough, chest tightness, shortness of breath and wheezing. Cardiovascular: Negative for chest pain and palpitations. Gastrointestinal: Negative for abdominal pain, constipation, diarrhea, nausea and vomiting. Endocrine: Negative for cold intolerance, heat intolerance, polydipsia, polyphagia and polyuria. Genitourinary: Negative for decreased urine volume, difficulty urinating, frequency and hematuria. Musculoskeletal: Positive for arthralgias, myalgias, neck pain and neck stiffness. Negative for back pain, gait problem and joint swelling. Skin: Negative for color change and rash. Allergic/Immunologic: Negative for food allergies and immunocompromised state. Neurological: Positive for weakness, numbness and headaches. Negative for dizziness, tremors, seizures, syncope, facial asymmetry, speech difficulty and light-headedness. Hematological: Does not bruise/bleed easily. Psychiatric/Behavioral: Positive for agitation and sleep disturbance. Negative for behavioral problems, confusion, decreased concentration, dysphoric mood, hallucinations and self-injury. The patient is not nervous/anxious and is not hyperactive. Depressed due to pain affecting her life, emotional agitated affects her sleep       Objective: There were no vitals filed for this visit. Assessment:     1. Chronic bilateral low back pain, unspecified whether sciatica present    2. Complex regional pain syndrome type 1 of right upper extremity    3. Chronic pain syndrome            Plan:      · OARRS reviewed. Current MED:15  · Patient was not offered naloxone for home. · Discussed long term side effects of medications, tolerance, dependency and addiction. · Previous UDS reviewed  · UDS preformed today for compliance. · Patient told can not receive any pain medications from any other source. · No evidence of abuse, diversion or aberrant behavior.  Medications and/or procedures to improve function and quality of life- patient understanding with this and that may not be pain free   Discussed with patient about safe storage of medications at home   Discussed possible weaning of medication dosing dependent on treatment/procedure results.  Testing: none   Procedures: none   Discussed with patient about risks with procedure including infection, reaction to medication, increased pain, or bleeding.  Medications:Norco told to try 1/2 tab Elavil  and take 1-2 hrs before bedtime      Meds. Prescribed:   No orders of the defined types were placed in this encounter. Return in about 3 months (around 1/20/2021). Time spent with patient was 15 minutes, more than 50% was spent Counseling/coordinated the patient'scare.       Electronically signed by NEREYDA Mullen CNP on10/20/2020 at 11:47 AM

## 2020-10-20 NOTE — TELEPHONE ENCOUNTER
Gerhardt Freed called requesting a refill on the following medications:  Requested Prescriptions     Pending Prescriptions Disp Refills    methylphenidate (RITALIN) 20 MG tablet 90 tablet 0     Sig: Take 1 tablet by mouth 3 times daily for 30 days. [facility]      Date of last visit: 06/02/2020  Date of next visit (if applicable): 40/03/8161

## 2020-10-21 LAB
ANION GAP SERPL CALCULATED.3IONS-SCNC: 11 MMOL/L (ref 5–15)
BUN BLDV-MCNC: 19 MG/DL (ref 5–27)
CALCIUM SERPL-MCNC: 9.2 MG/DL (ref 8.5–10.5)
CHLORIDE BLD-SCNC: 103 MMOL/L (ref 98–109)
CO2: 29 MMOL/L (ref 22–32)
CREAT SERPL-MCNC: 0.76 MG/DL (ref 0.4–1)
EGFR AFRICAN AMERICAN: >60 ML/MIN/1.73SQ.M
EGFR IF NONAFRICAN AMERICAN: >60 ML/MIN/1.73SQ.M
GLUCOSE: 97 MG/DL (ref 65–99)
POTASSIUM SERPL-SCNC: 5.2 MMOL/L (ref 3.5–5)
SODIUM BLD-SCNC: 143 MMOL/L (ref 134–146)

## 2020-10-23 ENCOUNTER — OFFICE VISIT (OUTPATIENT)
Dept: PULMONOLOGY | Age: 65
End: 2020-10-23
Payer: MEDICARE

## 2020-10-23 VITALS
BODY MASS INDEX: 30.9 KG/M2 | WEIGHT: 180 LBS | SYSTOLIC BLOOD PRESSURE: 130 MMHG | TEMPERATURE: 97.4 F | HEART RATE: 84 BPM | DIASTOLIC BLOOD PRESSURE: 80 MMHG | OXYGEN SATURATION: 97 %

## 2020-10-23 PROCEDURE — 1036F TOBACCO NON-USER: CPT | Performed by: NURSE PRACTITIONER

## 2020-10-23 PROCEDURE — 4040F PNEUMOC VAC/ADMIN/RCVD: CPT | Performed by: NURSE PRACTITIONER

## 2020-10-23 PROCEDURE — G8417 CALC BMI ABV UP PARAM F/U: HCPCS | Performed by: NURSE PRACTITIONER

## 2020-10-23 PROCEDURE — G8484 FLU IMMUNIZE NO ADMIN: HCPCS | Performed by: NURSE PRACTITIONER

## 2020-10-23 PROCEDURE — 3017F COLORECTAL CA SCREEN DOC REV: CPT | Performed by: NURSE PRACTITIONER

## 2020-10-23 PROCEDURE — G8427 DOCREV CUR MEDS BY ELIG CLIN: HCPCS | Performed by: NURSE PRACTITIONER

## 2020-10-23 PROCEDURE — 99214 OFFICE O/P EST MOD 30 MIN: CPT | Performed by: NURSE PRACTITIONER

## 2020-10-23 PROCEDURE — 1090F PRES/ABSN URINE INCON ASSESS: CPT | Performed by: NURSE PRACTITIONER

## 2020-10-23 PROCEDURE — 1123F ACP DISCUSS/DSCN MKR DOCD: CPT | Performed by: NURSE PRACTITIONER

## 2020-10-23 PROCEDURE — G8400 PT W/DXA NO RESULTS DOC: HCPCS | Performed by: NURSE PRACTITIONER

## 2020-10-23 RX ORDER — MODAFINIL 200 MG/1
400 TABLET ORAL DAILY
COMMUNITY
End: 2020-11-30 | Stop reason: SDUPTHER

## 2020-10-23 NOTE — PROGRESS NOTES
[place] [facility] and [facility]                                         [id number]  10/23/2020   Chief Complaint   Patient presents with    Follow-up     4mo med check f/u , Provigil, Ritalin, Cymbalta        Pt of Dr. Melody Figueroa       Presentation:   Kristina Velasquez presents for sleep medicine follow up for narcolepsy, restless leg syndrome  Since the last visit Kristina Velasquez has been doing reasonably well with current treatment. She is taking her medications with good compliance. She is also practicing good sleep hygiene. She denies new problems. There have been no recent hospitalizations or ER visits. She denies work related problems or driving issues.     Continues on Provigil 400 mg daily and taking Ritalin 20 mg 2-3 times daily. Cataplexy is controlled with Cymbalta 60 mg. RLS symptoms do persist. Was not tolerant to Neurontin, \"made her feel spacey\". Mirapex was ineffective in the past. When previously seen, discussed talking with pain management aboutTramadol for pain control and treatment of RLS. She forgot but was recently placed on Norco but is hesitant to use for fear of addiction. Ferritin level was 95. Continues Ferrous Sulfate and Vitamin C daily. .    Does have some residual sleepiness, that she \"deals with\". ESS remains 8-10. Progress History:   Since last visit any new medical issues? No  New ER or hospitlal visits? No  Any new or changes in medicines? Yes Norco  Any new sleep medicines? No    Sleep issues:  Do you feel better? Yes  More rested? Sometimes   Better concentration?  Yes      Past Medical History:   Diagnosis Date    Anemia 2000    malabsorption/Celiac disease    Anxiety 02/2018    Dr. Anju Bennett    Arm DVT (deep venous thromboembolism), acute ([facility] [address] 5/15/13    Broken shoulder 09/06/2016    right    Celiac disease     Celiac disease     Complex regional pain syndrome of right upper extremity     Right shoulder, arm, and hand    CRPS (complex [place]    HYSTERECTOMY, TOTAL ABDOMINAL  1985    KNEE SURGERY Right 2006    LAPAROTOMY EXPLORATORY N/A 8/31/2018    LAPAROTOMY EXPLORATORY, lysis of adhesions, closure innerotomoy performed by Boris Whelan MD at [facility] 2006    capsule endoscopy    PA EGD TRANSORAL BIOPSY SINGLE/MULTIPLE Left 2/8/2018    EGD BIOPSY performed by Alexa Randolph MD at [address] [facility] [address] OFFICE/OUTPT [address] Left 8/31/2018    ROBOTIC EXCISION OF LEFT PARARENAL MASS performed by Boris Whelan MD at [address]  1/14/97    SINUS SURGERY  x4 1982, 1990, 1997, 2005    [address]   [address]    Due to complictions after mass removal.    SPINAL CORD STIMULATOR SURGERY N/A 11/14/2019    Cervical SCS Trial performed [address] Beto Barney MD [address] N/A 6/12/2020    retrial due to lead migration Cervical SCS trial entrance T1 to C 2 performed by Beto Barney MD at [address] [facility]  1998,2009,2010,2011,2012, 2015, 2/2016       Social History     Tobacco Use    Smoking status: Never Smoker    Smokeless tobacco: Never Used   Substance Use Topics    Alcohol use: Yes     Comment: socially    Drug use: No       Allergies   Allergen Reactions    Dilaudid [Hydromorphone Hcl]      Respiratory failure    Iron Anaphylaxis    Percodan [Oxycodone-Aspirin]      Anxiety     Gluten Diarrhea    Gluten Meal Other (See Comments)     Other reaction(s):  Intolerance, Other: See Comments  Celiac disease  Celiac disease    Maxzide [Hydrochlorothiazide W-Triamterene] Nausea Only     dizziness    Nalfon [Fenoprofen Calcium] Hives    Oxycodone-Acetaminophen     Oxycodone-Aspirin     Percocet  [Oxycodone-Acetaminophen]     Reglan [Metoclopramide]     Fenoprofen Hives    Sulfa Antibiotics Hives       Current Outpatient Medications   Medication Sig Dispense Refill    modafinil (PROVIGIL) 200 MG tablet Take 400 mg by mouth daily.  methylphenidate (RITALIN) 20 MG tablet Take 1 tablet by mouth 3 times daily for 30 days. 90 tablet 0    fluticasone (FLONASE) 50 MCG/ACT nasal spray SPRAY 1 SPRAY INTO EACH NOSTRIL EVERY DAY 1 Bottle 0    SYNTHROID 125 MCG tablet TAKE 1 TABLET BY MOUTH EVERY DAY 90 tablet 1    busPIRone HCl (BUSPAR PO) Take by mouth      omeprazole (PRILOSEC) 20 MG delayed release capsule Take 20 mg by mouth nightly      famotidine (PEPCID) 20 MG tablet Take 1 tablet by mouth nightly 30 tablet 6    furosemide (LASIX) 20 MG tablet Take 2 tablets by mouth daily 180 tablet 0    DULoxetine (CYMBALTA) 60 MG extended release capsule TAKE 1 CAPSULE BY MOUTH EVERY DAY 90 capsule 3    rosuvastatin (CRESTOR) 20 MG tablet Take 1 tablet by mouth daily 90 tablet 3    esomeprazole (NEXIUM) 40 MG delayed release capsule Take 1 capsule by mouth every morning (before breakfast) 90 capsule 3    Omega-3 Fatty Acids (FISH OIL) 1000 MG CAPS Take 6,000 mg by mouth 3 times daily      magnesium oxide (MAG-OX) 400 MG tablet Take 1,200 mg by mouth daily      Lysine 500 MG TABS Take 1,500 mg by mouth daily      ferrous sulfate (IRON 325) 325 (65 Fe) MG tablet Take 325 mg by mouth daily (with breakfast)      Lysine HCl 1000 MG TABS Take 1.5 tablets by mouth daily       Cinnamon 500 MG TABS Take 6 tablets by mouth daily       ascorbic acid (VITAMIN C) 1000 MG tablet Take 0.5 tablets by mouth 3 times daily 30 tablet 3    B Complex-Folic Acid (W-964 BALANCED TR PO) Take 100 mg by mouth 3 times daily (before meals) [facility]       vitamin D (CHOLECALCIFEROL) 5000 UNITS CAPS capsule Take 5,000 Units by mouth daily        No current facility-administered medications for this visit.         Family History   Problem Relation Age of Onset    Diabetes Mother         type II    Stroke Mother     High Blood Pressure Mother     Kidney Disease Mother     Heart Disease Mother     Heart Attack Mother 66        2/28/15    Colon Cancer Father 79        jejunum   [facility] Cancer Father         Lyphoma    Irritable Bowel Syndrome Sister     High Blood Pressure Brother     Kidney Disease Brother     Celiac Disease Brother     Diabetes Maternal Grandmother         type II    Blindness Maternal Grandmother     Emphysema Maternal Grandfather     Stroke Paternal Grandmother     Diabetes Paternal Grandmother         type II    Ovarian Cancer Paternal Grandmother 48    Brain Cancer Paternal Grandfather 80        brain tumor    Asthma Brother      Problems Brother     Heart Disease Brother     Other Sister         1days old, pneumonia complications    Colon Cancer Paternal Aunt 79    Colon Cancer Paternal Uncle 79    Colon Cancer Paternal Uncle 79    Cancer Paternal Uncle 79        bone     Prostate Cancer Other 61    Cancer Paternal Aunt 79        bone ca'      Review of Systems   General/Constitutional: No recent loss of weight or appetite changes. No fever or chills. HENT: Negative. Eyes: Negative. Upper respiratory tract: No nasal stuffiness or post nasal drip. Lower respiratory tract/ lungs: No cough or sputum production. No hemoptysis. Cardiovascular: No palpitations or chest pain. Gastrointestinal: No nausea or vomiting. Neurological: No focal neurologiacal weakness. Extremities: No edema. Musculoskeletal: No new complaints. Genitourinary: No complaints. Hematological: Negative. Psychiatric/Behavioral: Negative. Skin: No itching. Physical Exam:    BMI:  Body mass index is 30.9 kg/m².     Wt Readings from Last 3 Encounters:   10/23/20 180 lb (81.6 kg)   08/18/20 175 lb (79.4 kg)   07/20/20 175 lb (79.4 kg)     Vitals: /80 (Site: Left Upper Arm, Position: Sitting, Cuff Size: Medium Adult)   Pulse 84   Temp 97.4 °F (36.3 °C) (Temporal)   Wt 180 lb (81.6 kg)   SpO2 97% Comment: r/a  BMI 30.90 kg/m²       Physical Exam   General Appearance - Moderately built, moderately nourished, in no acute distress. HEENT - Head is normocephalic, atraumatic. PERRL. Oral mucosa pink and moist, no oral thrush. Neck - Supple, symmetrical, trachea midline and soft. Lungs - Clear to auscultation, no wheezes, rales or rhonchi, aeration good. Cardiovascular - Heart sounds are normal. Regular rhythm normal rate without murmur, gallop or rub. Abdomen - Soft, nontender, non-distended. Neurologic - Alert and oriented x 3. Skin - No bruising or bleeding. Extremities - No cyanosis, clubbing or edema. Assessment      Diagnosis Orders   1. Primary narcolepsy with cataplexy     2. RLS (restless legs syndrome)     3. Chronic pain syndrome            Plan   -Continue Provigil 400 mg daily.  -Continue Ritalin 20 mg 2-3 times daily. If feel losing effectiveness, she will call me to change to Adderrall vs add Sunosi vs Wakix. Literature given on both. -Continue Cymbalta. -Re-educated on RLS, triggers and alleviating measures.  -Discussed that Narcotics can be used to treat RLS. She is hesitant due risk with addiction. Advised to talk with pain management about possibly trying Tramadol instead. 25 minutes was spent on this visit with > 50% being face to face obtaining HPI, educating about medications used to treat Narcolepsy and newer options, educating on RLS and treatment options and coordinating a treatment plan. - She call my officefor earlier appointment if needed for worsening of sleep symptoms.   - Shirley Rushing was educated about my impression and plan. Patient verbalizes understanding. We will see Pam Parekh back in: 6 months.     Electronically signed by NEREYDA Lui CNP on 10/23/2020 at 10:47 AM

## 2020-10-26 ENCOUNTER — TELEPHONE (OUTPATIENT)
Dept: FAMILY MEDICINE CLINIC | Age: 65
End: 2020-10-26

## 2020-10-26 NOTE — TELEPHONE ENCOUNTER
----- Message from Angelito Hooper MD sent at 10/25/2020  6:25 PM EDT -----  Let her know that the lab showed just a slight amount of excess potassium which may have no real significance. It would though be good to repeat the lab in a month.

## 2020-11-28 ENCOUNTER — CLINICAL DOCUMENTATION (OUTPATIENT)
Dept: PULMONOLOGY | Age: 65
End: 2020-11-28

## 2020-11-28 RX ORDER — METHYLPHENIDATE HYDROCHLORIDE 20 MG/1
20 TABLET ORAL 3 TIMES DAILY
Qty: 90 TABLET | Refills: 0 | Status: SHIPPED | OUTPATIENT
Start: 2020-11-28 | End: 2020-12-28

## 2020-11-30 RX ORDER — MODAFINIL 200 MG/1
400 TABLET ORAL DAILY
Qty: 60 TABLET | Refills: 5 | Status: SHIPPED | OUTPATIENT
Start: 2020-11-30 | End: 2020-12-30

## 2020-11-30 NOTE — TELEPHONE ENCOUNTER
Tiffani Reagan called requesting a refill on the following medications:  Requested Prescriptions     Pending Prescriptions Disp Refills    modafinil (PROVIGIL) 200 MG tablet       Sig: Take 2 tablets by mouth daily.      [facility]arm[facility]    Date of last visit: 10/23/20  Date of next visit (if applicable): 3/54/1997

## 2020-12-30 RX ORDER — FUROSEMIDE 20 MG/1
40 TABLET ORAL DAILY
Qty: 180 TABLET | Refills: 1 | Status: SHIPPED | OUTPATIENT
Start: 2020-12-30 | End: 2021-08-23 | Stop reason: SDUPTHER

## 2020-12-31 LAB
ANION GAP SERPL CALCULATED.3IONS-SCNC: 11 MMOL/L (ref 5–15)
BUN BLDV-MCNC: 15 MG/DL (ref 5–27)
CALCIUM SERPL-MCNC: 9.7 MG/DL (ref 8.5–10.5)
CHLORIDE BLD-SCNC: 101 MMOL/L (ref 98–109)
CO2: 31 MMOL/L (ref 22–32)
CREAT SERPL-MCNC: 0.81 MG/DL (ref 0.4–1)
EGFR AFRICAN AMERICAN: >60 ML/MIN/1.73SQ.M
EGFR IF NONAFRICAN AMERICAN: >60 ML/MIN/1.73SQ.M
GLUCOSE: 108 MG/DL (ref 65–99)
POTASSIUM SERPL-SCNC: 4.1 MMOL/L (ref 3.5–5)
SODIUM BLD-SCNC: 143 MMOL/L (ref 134–146)

## 2021-01-05 ENCOUNTER — TELEPHONE (OUTPATIENT)
Dept: FAMILY MEDICINE CLINIC | Age: 66
End: 2021-01-05

## 2021-01-05 NOTE — TELEPHONE ENCOUNTER
Pt called asking for results of labs she had done on 12/30/20.     Zeinab may be reached at [phone]

## 2021-01-07 ENCOUNTER — VIRTUAL VISIT (OUTPATIENT)
Dept: FAMILY MEDICINE CLINIC | Age: 66
End: 2021-01-07

## 2021-01-07 DIAGNOSIS — G44.209 TENSION HEADACHE: Primary | ICD-10-CM

## 2021-01-07 DIAGNOSIS — M79.7 FIBROSITIS OF NECK: ICD-10-CM

## 2021-01-07 DIAGNOSIS — G90.50 RSD (REFLEX SYMPATHETIC DYSTROPHY): ICD-10-CM

## 2021-01-07 PROCEDURE — 99213 OFFICE O/P EST LOW 20 MIN: CPT | Performed by: FAMILY MEDICINE

## 2021-01-07 PROCEDURE — 1123F ACP DISCUSS/DSCN MKR DOCD: CPT | Performed by: FAMILY MEDICINE

## 2021-01-07 PROCEDURE — G8400 PT W/DXA NO RESULTS DOC: HCPCS | Performed by: FAMILY MEDICINE

## 2021-01-07 PROCEDURE — 1036F TOBACCO NON-USER: CPT | Performed by: FAMILY MEDICINE

## 2021-01-07 PROCEDURE — 1090F PRES/ABSN URINE INCON ASSESS: CPT | Performed by: FAMILY MEDICINE

## 2021-01-07 PROCEDURE — 4040F PNEUMOC VAC/ADMIN/RCVD: CPT | Performed by: FAMILY MEDICINE

## 2021-01-07 PROCEDURE — 3017F COLORECTAL CA SCREEN DOC REV: CPT | Performed by: FAMILY MEDICINE

## 2021-01-07 PROCEDURE — G8417 CALC BMI ABV UP PARAM F/U: HCPCS | Performed by: FAMILY MEDICINE

## 2021-01-07 PROCEDURE — G8427 DOCREV CUR MEDS BY ELIG CLIN: HCPCS | Performed by: FAMILY MEDICINE

## 2021-01-07 RX ORDER — MODAFINIL 200 MG/1
400 TABLET ORAL DAILY
COMMUNITY
End: 2021-06-01 | Stop reason: SDUPTHER

## 2021-01-07 RX ORDER — BUTALBITAL, ACETAMINOPHEN AND CAFFEINE 50; 325; 40 MG/1; MG/1; MG/1
1 TABLET ORAL EVERY 4 HOURS PRN
Qty: 30 TABLET | Refills: 0 | Status: SHIPPED | OUTPATIENT
Start: 2021-01-07 | End: 2022-05-10 | Stop reason: CLARIF

## 2021-01-07 RX ORDER — METHYLPHENIDATE HYDROCHLORIDE 20 MG/1
20 TABLET ORAL 3 TIMES DAILY
COMMUNITY
End: 2021-01-13 | Stop reason: SDUPTHER

## 2021-01-07 RX ORDER — METAXALONE 800 MG/1
800 TABLET ORAL 3 TIMES DAILY
Qty: 30 TABLET | Refills: 0 | Status: SHIPPED | OUTPATIENT
Start: 2021-01-07 | End: 2021-01-17

## 2021-01-07 RX ORDER — PREDNISONE 20 MG/1
TABLET ORAL
Qty: 15 TABLET | Refills: 0 | Status: SHIPPED | OUTPATIENT
Start: 2021-01-07 | End: 2021-03-02 | Stop reason: ALTCHOICE

## 2021-01-07 ASSESSMENT — ENCOUNTER SYMPTOMS
SHORTNESS OF BREATH: 0
COUGH: 0
ABDOMINAL PAIN: 0
CHEST TIGHTNESS: 0
WHEEZING: 0
CONSTIPATION: 0
SORE THROAT: 0
EYE PAIN: 0
NAUSEA: 0
PHOTOPHOBIA: 0

## 2021-01-07 NOTE — PROGRESS NOTES
left forearm 2013, nasal screen pos Aug 2015   Goldy Re Narcolepsy 2000    Dr. Cassidy Sheriff    Narcolepsy and cataplexy     Partial bowel obstruction (HCC)     Multile times. S/P partial small bowel resection    Restless legs syndrome 1993    Dr. Livia Poe RSD upper limb 2016    Right Shoulder- Dr. 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 Silva Hollow Thyroid disease      Review of Systems   Constitutional: Negative for appetite change, fatigue and fever. HENT: Negative for congestion, ear pain, postnasal drip and sore throat. Eyes: Negative for photophobia, pain and visual disturbance. Respiratory: Negative for cough, chest tightness, shortness of breath and wheezing. Cardiovascular: Negative for chest pain, palpitations and leg swelling. Gastrointestinal: Negative for abdominal pain, constipation and nausea. Genitourinary: Negative for dysuria and frequency. Musculoskeletal: Negative for arthralgias, joint swelling, neck pain and neck stiffness. rsd  Of  rigth  Arm and  Shoulder and neck   Skin: Negative for rash. Neurological: Positive for headaches. Negative for dizziness, weakness and numbness. Headache  Occipital  And  rADIATES  ANTERIORLY  TO  TOP  OF  PARIETAL  OCCIPITAL AREA    Hematological: Negative for adenopathy. Does not bruise/bleed easily. Psychiatric/Behavioral: Negative for behavioral problems and sleep disturbance. The patient is not nervous/anxious. Objective:   Physical Exam  Constitutional:       Appearance: Normal appearance. She is normal weight. Neck:      Musculoskeletal: Normal range of motion. Neck rigidity (ON N  RIGHT  POSTERIOr) present. Musculoskeletal: Normal range of motion.       Comments:   Right  posterior  Neck area  Base  Of  occiput   Neurological: General: No focal deficit present. Mental Status: She is alert and oriented to person, place, and time. Cranial Nerves: No cranial nerve deficit. Sensory: No sensory deficit. Motor: No weakness. Coordination: Coordination normal.      Deep Tendon Reflexes: Reflexes normal.   Psychiatric:         Mood and Affect: Mood normal.         Behavior: Behavior normal.         Thought Content: Thought content normal.         Judgment: Judgment normal.         Assessment:       Diagnosis Orders   1. Tension headache     2. Fibrositis of neck     3. RSD (reflex sympathetic dystrophy)           Plan:      Current Outpatient Medications   Medication Sig Dispense Refill    modafinil (PROVIGIL) 200 MG tablet Take 400 mg by mouth daily.  methylphenidate (RITALIN) 20 MG tablet Take 20 mg by mouth 3 times daily.       butalbital-acetaminophen-caffeine (FIORICET, ESGIC) -40 MG per tablet Take 1 tablet by mouth every 4 hours as needed for Headaches 30 tablet 0    metaxalone (SKELAXIN) 800 MG tablet Take 1 tablet by mouth 3 times daily for 10 days 30 tablet 0    predniSONE (DELTASONE) 20 MG tablet One  Tab  Po  Bid  For 5 days and then one a day  For 5  Days 15 tablet 0    furosemide (LASIX) 20 MG tablet Take 2 tablets by mouth daily 180 tablet 1    fluticasone (FLONASE) 50 MCG/ACT nasal spray SPRAY 1 SPRAY INTO EACH NOSTRIL EVERY DAY 1 Bottle 0    SYNTHROID 125 MCG tablet TAKE 1 TABLET BY MOUTH EVERY DAY 90 tablet 1    omeprazole (PRILOSEC) 20 MG delayed release capsule Take 20 mg by mouth nightly      famotidine (PEPCID) 20 MG tablet Take 1 tablet by mouth nightly 30 tablet 6    DULoxetine (CYMBALTA) 60 MG extended release capsule TAKE 1 CAPSULE BY MOUTH EVERY DAY 90 capsule 3    rosuvastatin (CRESTOR) 20 MG tablet Take 1 tablet by mouth daily 90 tablet 3    esomeprazole (NEXIUM) 40 MG delayed release capsule Take 1 capsule by mouth every morning (before breakfast) 90 capsule 3  Omega-3 Fatty Acids (FISH OIL) 1000 MG CAPS Take 6,000 mg by mouth 3 times daily      magnesium oxide (MAG-OX) 400 MG tablet Take 1,200 mg by mouth daily      Lysine 500 MG TABS Take 1,500 mg by mouth daily      ferrous sulfate (IRON 325) 325 (65 Fe) MG tablet Take 325 mg by mouth daily (with breakfast)      Lysine HCl 1000 MG TABS Take 1.5 tablets by mouth daily       Cinnamon 500 MG TABS Take 6 tablets by mouth daily       ascorbic acid (VITAMIN C) 1000 MG tablet Take 0.5 tablets by mouth 3 times daily 30 tablet 3    B Complex-Folic Acid (B-414 BALANCED TR PO) Take 100 mg by mouth 3 times daily (before meals) [facility] [employer]       vitamin D (CHOLECALCIFEROL) 5000 UNITS CAPS capsule Take 5,000 Units by mouth daily       busPIRone HCl (BUSPAR PO) Take by mouth       No current facility-administered medications for this visit. No orders of the defined types were placed in this encounter.         Occipital  Headache and   Use  pdn and  Skelaxin and   The   fiorecet         covid  precautions   Video  15  mins  Evette Alamo MD

## 2021-01-12 ENCOUNTER — TELEPHONE (OUTPATIENT)
Dept: PULMONOLOGY | Age: 66
End: 2021-01-12

## 2021-01-13 ENCOUNTER — CLINICAL DOCUMENTATION (OUTPATIENT)
Dept: PULMONOLOGY | Age: 66
End: 2021-01-13

## 2021-01-13 DIAGNOSIS — G47.411 PRIMARY NARCOLEPSY WITH CATAPLEXY: Primary | ICD-10-CM

## 2021-01-13 RX ORDER — METHYLPHENIDATE HYDROCHLORIDE 20 MG/1
20 TABLET ORAL 3 TIMES DAILY
Qty: 90 TABLET | Refills: 0 | Status: SHIPPED | OUTPATIENT
Start: 2021-01-13 | End: 2021-03-02 | Stop reason: SDUPTHER

## 2021-03-02 ENCOUNTER — OFFICE VISIT (OUTPATIENT)
Dept: FAMILY MEDICINE CLINIC | Age: 66
End: 2021-03-02

## 2021-03-02 VITALS
DIASTOLIC BLOOD PRESSURE: 82 MMHG | HEART RATE: 84 BPM | WEIGHT: 182.38 LBS | TEMPERATURE: 96.2 F | RESPIRATION RATE: 16 BRPM | SYSTOLIC BLOOD PRESSURE: 132 MMHG | HEIGHT: 64 IN | BODY MASS INDEX: 31.14 KG/M2

## 2021-03-02 DIAGNOSIS — K22.0 INCOMPETENT LOWER ESOPHAGEAL SPHINCTER: ICD-10-CM

## 2021-03-02 DIAGNOSIS — K21.9 HIATAL HERNIA WITH GASTROESOPHAGEAL REFLUX DISEASE WITHOUT ESOPHAGITIS: ICD-10-CM

## 2021-03-02 DIAGNOSIS — E03.9 HYPOTHYROIDISM, UNSPECIFIED TYPE: ICD-10-CM

## 2021-03-02 DIAGNOSIS — G43.909 MIXED MIGRAINE AND MUSCLE CONTRACTION HEADACHE: Primary | ICD-10-CM

## 2021-03-02 DIAGNOSIS — G89.29 CHRONIC CERVICAL PAIN: ICD-10-CM

## 2021-03-02 DIAGNOSIS — M54.2 CHRONIC CERVICAL PAIN: ICD-10-CM

## 2021-03-02 DIAGNOSIS — G44.209 MIXED MIGRAINE AND MUSCLE CONTRACTION HEADACHE: Primary | ICD-10-CM

## 2021-03-02 DIAGNOSIS — M54.12 RIGHT CERVICAL RADICULOPATHY: ICD-10-CM

## 2021-03-02 DIAGNOSIS — G90.511 COMPLEX REGIONAL PAIN SYNDROME TYPE 1 OF RIGHT UPPER EXTREMITY: ICD-10-CM

## 2021-03-02 DIAGNOSIS — K44.9 HIATAL HERNIA WITH GASTROESOPHAGEAL REFLUX DISEASE WITHOUT ESOPHAGITIS: ICD-10-CM

## 2021-03-02 PROCEDURE — G8427 DOCREV CUR MEDS BY ELIG CLIN: HCPCS | Performed by: FAMILY MEDICINE

## 2021-03-02 PROCEDURE — 3017F COLORECTAL CA SCREEN DOC REV: CPT | Performed by: FAMILY MEDICINE

## 2021-03-02 PROCEDURE — 1036F TOBACCO NON-USER: CPT | Performed by: FAMILY MEDICINE

## 2021-03-02 PROCEDURE — 99214 OFFICE O/P EST MOD 30 MIN: CPT | Performed by: FAMILY MEDICINE

## 2021-03-02 PROCEDURE — 1090F PRES/ABSN URINE INCON ASSESS: CPT | Performed by: FAMILY MEDICINE

## 2021-03-02 PROCEDURE — G8417 CALC BMI ABV UP PARAM F/U: HCPCS | Performed by: FAMILY MEDICINE

## 2021-03-02 PROCEDURE — 1123F ACP DISCUSS/DSCN MKR DOCD: CPT | Performed by: FAMILY MEDICINE

## 2021-03-02 PROCEDURE — G8400 PT W/DXA NO RESULTS DOC: HCPCS | Performed by: FAMILY MEDICINE

## 2021-03-02 PROCEDURE — 4040F PNEUMOC VAC/ADMIN/RCVD: CPT | Performed by: FAMILY MEDICINE

## 2021-03-02 RX ORDER — LEVOTHYROXINE SODIUM 125 MCG
TABLET ORAL
Qty: 90 TABLET | Refills: 1 | Status: SHIPPED | OUTPATIENT
Start: 2021-03-02 | End: 2021-10-07 | Stop reason: SDUPTHER

## 2021-03-02 RX ORDER — DULOXETIN HYDROCHLORIDE 60 MG/1
CAPSULE, DELAYED RELEASE ORAL
Qty: 90 CAPSULE | Refills: 3 | Status: SHIPPED
Start: 2021-03-02 | End: 2021-03-02 | Stop reason: SDUPTHER

## 2021-03-02 RX ORDER — DULOXETIN HYDROCHLORIDE 60 MG/1
CAPSULE, DELAYED RELEASE ORAL
Qty: 180 CAPSULE | Refills: 0 | Status: SHIPPED | OUTPATIENT
Start: 2021-03-02 | End: 2021-07-15

## 2021-03-02 RX ORDER — FAMOTIDINE 20 MG/1
20 TABLET, FILM COATED ORAL NIGHTLY
Qty: 90 TABLET | Refills: 1 | Status: SHIPPED | OUTPATIENT
Start: 2021-03-02 | End: 2021-08-23 | Stop reason: SDUPTHER

## 2021-03-02 ASSESSMENT — ENCOUNTER SYMPTOMS
NAUSEA: 0
COUGH: 0
WHEEZING: 0
CHEST TIGHTNESS: 0
SHORTNESS OF BREATH: 0
ABDOMINAL PAIN: 0
CONSTIPATION: 0
EYE PAIN: 0
SORE THROAT: 0

## 2021-03-02 NOTE — TELEPHONE ENCOUNTER
Date of last visit:  6/17/2020  Date of next visit:  Visit date not found    Requested Prescriptions     Pending Prescriptions Disp Refills    famotidine (PEPCID) 20 MG tablet 90 tablet 1     Sig: Take 1 tablet by mouth nightly    SYNTHROID 125 MCG tablet 90 tablet 1     Sig: TAKE 1 TABLET BY MOUTH EVERY DAY

## 2021-03-02 NOTE — PROGRESS NOTES
Subjective:      Patient ID: Maryann Sandoval is a 72 y.o. female. Occipital and    Complex  regional  Syndrome  And  Dr Jason Ruff       Neck and  Right  Arm  Pain   Noted      Headache   This is a new problem. The current episode started more than 1 month ago (  3 moonthss and  now  headache ). The problem occurs daily. The pain is located in the occipital region. The pain radiates to the right shoulder. The quality of the pain is described as aching and shooting. The pain is mild. Pertinent negatives include no abdominal pain, coughing, dizziness, ear pain, eye pain, fever, nausea, neck pain, numbness, sore throat or weakness. Nothing aggravates the symptoms. She has tried NSAIDs for the symptoms. The treatment provided moderate relief.      Past Medical History:   Diagnosis Date    Anemia 2000    malabsorption/Celiac disease    Anxiety 02/2018    Dr. Lilian Reynolds    Arm DVT (deep venous thromboembolism), acute ([facility] [address] 5/15/13    Broken shoulder 09/06/2016    right    Celiac disease     Celiac disease     Complex regional pain syndrome of right upper extremity     Right shoulder, arm, and hand    CRPS (complex regional pain syndrome type I) 2016    Dr. Bianka Bang    Depression 09/2016    Dr. Bianka Bang    Depression     GERD (gastroesophageal reflux disease) 2000    and celiac- Dr. Michael Brandt Headache(784.0)     migraines-Dr. Shruthi Mathis Heart murmur     History of blood transfusion     Hx of reactive hypoglycemia     Dr. Rich Rao Hyperlipidemia 2013    Dr. Shruthi Mathis Hypothyroidism 1993    Dr. Jo Bautista IBS (irritable bowel syndrome)     PER PT     Irritable bowel syndrome 1997    Dr. Lillie Roberts at [facility] 89 MDRO (multiple drug resistant organisms) resistance     Meniere disease 2005    Dr. Brisa Knott Mitral valve prolapse syndrome 2008    MRSA (methicillin resistant Staphylococcus aureus) 2013, 8/1/15    left forearm 2013, nasal screen pos Aug 2015 Mercedes Dillard    Narcolepsy and cataplexy     Partial bowel obstruction (HCC)     Multile times.   S/P partial small bowel resection    Restless legs syndrome 1993    Dr. Johann Oneal. Syed Ruby RSD upper limb 2016    Right Shoulder- Dr. 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 Dayton Alexandre Thyroid disease      Past Surgical History:   Procedure Laterality Date    ABDOMEN SURGERY  04/23/2013    ABD Exploration, removal of ommentum, lysis of adhesions-Dr. Nitesh Min    ABDOMEN SURGERY      removed mass \"near kidney\"     ABDOMINAL ADHESION SURGERY  10/2010    Resection-Dr. Nitesh Min, twice not sure of exact year   Delmis Chua Left 2006   Viviana Ballesteros  2003     Dr. Tanja Cabrera  [id number], 2015    COLONOSCOPY  2016    Dr Jovanni Beltran, [facility] Right 2006    LAPAROTOMY EXPLORATORY N/A 8/31/2018    LAPAROTOMY EXPLORATORY, lysis of adhesions, closure innerotomoy performed by Kelsey Altamirano MD at [facility]  2006    capsule endoscopy    CA EGD TRANSORAL BIOPSY SINGLE/MULTIPLE Left 2/8/2018    EGD BIOPSY performed by Will Gupta MD at [facility] OFFICE/OUTPT [facility] Left 8/31/2018    ROBOTIC EXCISION OF LEFT PARARENAL MASS performed by Kelsey Altamirano MD at [address]  1/14/97    SINUS SURGERY  x4 1982, 1990, 1997, 2005    [address]    Due to complictions after mass removal.    SPINAL CORD STIMULATOR SURGERY N/A 11/14/2019 Cervical SCS Trial performed by Michell Reyes MD [facility] [address] [facility] N/A 6/12/2020    retrial due to lead migration Cervical SCS trial entrance T1 to C 2 performed by Michell Reyes MD at [facility]  1998,2009,2010,2011,2012, 2015, 2/2016     Social History     Socioeconomic History    Marital status:      Spouse name: Kerry Everett Number of children: 3    Years of education: 15    Highest education level: Not on file   Occupational History    Occupation: disability   Social Needs    Financial resource strain: Not on file    Food insecurity     Worry: Not on file     Inability: Not on file   [employer] needs     Medical: Not on file     Non-medical: Not on file   Tobacco Use    Smoking status: Never Smoker    Smokeless tobacco: Never Used   Substance and Sexual Activity    Alcohol use: Yes     Comment: socially    Drug use: No    Sexual activity: Not Currently     Partners: Male   Lifestyle    Physical activity     Days per week: Not on file     Minutes per session: Not on file    Stress: Not on file   Relationships    Social connections     Talks on phone: Not on file     Gets together: Not on file     Attends Jainism service: Not on file     Active member of club or organization: Not on file     Attends meetings of clubs or organizations: Not on file     Relationship status: Not on file    Intimate partner violence     Fear of current or ex partner: Not on file     Emotionally abused: Not on file     Physically abused: Not on file     Forced sexual activity: Not on file   Other Topics Concern    Not on file   Social History Narrative    Not on file     Family History   Problem Relation Age of Onset    Diabetes Mother         type II    Stroke Mother     High Blood Pressure Mother     Kidney Disease Mother     Heart Disease Mother     Heart Attack Mother 66        2/28/15  Colon Cancer Father 79        jejunum   Ayesha Aguilar Cancer Father         Lyphoma    Irritable Bowel Syndrome Sister     High Blood Pressure Brother     Kidney Disease Brother     Celiac Disease Brother     Diabetes Maternal Grandmother         type II    Blindness Maternal Grandmother     Emphysema Maternal Grandfather     Stroke Paternal Grandmother     Diabetes Paternal Grandmother         type II    Ovarian Cancer Paternal Grandmother 48    Brain Cancer Paternal Grandfather 80        brain tumor    Asthma Brother      Problems Brother     Heart Disease Brother     Other Sister         1days old, pneumonia complications    Colon Cancer Paternal Aunt 79    Colon Cancer Paternal Uncle 79    Colon Cancer Paternal Uncle 79    Cancer Paternal Uncle 79        bone     Prostate Cancer Other 61    Cancer Paternal Aunt 79        bone ca'       Review of Systems   Constitutional: Negative for appetite change, fatigue and fever. HENT: Negative for congestion, ear pain, postnasal drip and sore throat. Eyes: Negative for pain and visual disturbance. Respiratory: Negative for cough, chest tightness, shortness of breath and wheezing. Cardiovascular: Negative for chest pain, palpitations and leg swelling. Gastrointestinal: Negative for abdominal pain, constipation and nausea. Genitourinary: Negative for dysuria and frequency. Musculoskeletal: Negative for arthralgias, joint swelling, neck pain and neck stiffness. Skin: Negative for rash. Neurological: Positive for headaches. Negative for dizziness, weakness and numbness. Hematological: Negative for adenopathy. Does not bruise/bleed easily. Psychiatric/Behavioral: Negative for behavioral problems and sleep disturbance. The patient is not nervous/anxious. /82 (Site: Right Upper Arm, Position: Sitting, Cuff Size: Medium Adult)   Pulse 84   Temp 96.2 °F (35.7 °C) (Oral)   Resp 16   Ht 5' 4\" (1.626 m)   Wt 182 lb 6 oz (82.7 kg)   BMI 31.30 kg/m²   Objective:   Physical Exam  Vitals signs and nursing note reviewed. Constitutional:       Appearance: She is well-developed. HENT:      Head: Normocephalic and atraumatic. Right Ear: External ear normal.      Left Ear: External ear normal.      Nose: Nose normal.   Eyes:      General: No scleral icterus. Conjunctiva/sclera: Conjunctivae normal.      Pupils: Pupils are equal, round, and reactive to light. Neck:      Musculoskeletal: Normal range of motion and neck supple. Thyroid: No thyromegaly. Vascular: No JVD. Comments:   Neck pain    Noted  With  Spasm   right  Side and  Some  Of the  cspine and muscle pain  To  Base  Of  Neck at  occiput and  Radiates  forward  Cardiovascular:      Rate and Rhythm: Normal rate and regular rhythm. Heart sounds: Normal heart sounds. Pulmonary:      Effort: Pulmonary effort is normal.      Breath sounds: Normal breath sounds. No wheezing or rales. Abdominal:      General: Bowel sounds are normal. There is no distension. Palpations: Abdomen is soft. There is no mass. Tenderness: There is no abdominal tenderness. Musculoskeletal: Normal range of motion. General: No tenderness. Comments:   Right  Side  Weakness    Lymphadenopathy:      Cervical: No cervical adenopathy. Skin:     General: Skin is warm and dry. Findings: No rash. Neurological:      Mental Status: She is alert and oriented to person, place, and time. Cranial Nerves: No cranial nerve deficit. Deep Tendon Reflexes: Reflexes are normal and symmetric.       Comments:   Right arm  Weakness dn  Limited  rom and    Right  Hand limited  Use          Assessment:       Diagnosis Orders 1. Mixed migraine and muscle contraction headache  CT HEAD WO CONTRAST    MRI CERVICAL SPINE WO CONTRAST   2. Complex regional pain syndrome type 1 of right upper extremity  DULoxetine (CYMBALTA) 60 MG extended release capsule   3. Chronic cervical pain  MRI CERVICAL SPINE WO CONTRAST   4. Right cervical radiculopathy  MRI CERVICAL SPINE WO CONTRAST         Plan:      Current Outpatient Medications   Medication Sig Dispense Refill    DULoxetine (CYMBALTA) 60 MG extended release capsule TAKE 2 CAPSULE BY MOUTH EVERY DAY 90 capsule 3    methylphenidate (RITALIN) 20 MG tablet Take 1 tablet by mouth 3 times daily for 58 days. 90 tablet 0    modafinil (PROVIGIL) 200 MG tablet Take 400 mg by mouth daily.       butalbital-acetaminophen-caffeine (FIORICET, ESGIC) -40 MG per tablet Take 1 tablet by mouth every 4 hours as needed for Headaches 30 tablet 0    furosemide (LASIX) 20 MG tablet Take 2 tablets by mouth daily 180 tablet 1    fluticasone (FLONASE) 50 MCG/ACT nasal spray SPRAY 1 SPRAY INTO EACH NOSTRIL EVERY DAY 1 Bottle 0    SYNTHROID 125 MCG tablet TAKE 1 TABLET BY MOUTH EVERY DAY 90 tablet 1    omeprazole (PRILOSEC) 20 MG delayed release capsule Take 20 mg by mouth nightly      famotidine (PEPCID) 20 MG tablet Take 1 tablet by mouth nightly 30 tablet 6    rosuvastatin (CRESTOR) 20 MG tablet Take 1 tablet by mouth daily 90 tablet 3    esomeprazole (NEXIUM) 40 MG delayed release capsule Take 1 capsule by mouth every morning (before breakfast) 90 capsule 3    Omega-3 Fatty Acids (FISH OIL) 1000 MG CAPS Take 6,000 mg by mouth 3 times daily      magnesium oxide (MAG-OX) 400 MG tablet Take 1,200 mg by mouth daily      Lysine 500 MG TABS Take 1,500 mg by mouth daily      ferrous sulfate (IRON 325) 325 (65 Fe) MG tablet Take 325 mg by mouth daily (with breakfast)      Cinnamon 500 MG TABS Take 6 tablets by mouth daily  ascorbic acid (VITAMIN C) 1000 MG tablet Take 0.5 tablets by mouth 3 times daily 30 tablet 3    B Complex-Folic Acid (I-815 BALANCED TR PO) Take 100 mg by mouth 3 times daily (before meals) [facility]       vitamin D (CHOLECALCIFEROL) 5000 UNITS CAPS capsule Take 5,000 Units by mouth daily        No current facility-administered medications for this visit.           Orders Placed This Encounter   Procedures    CT HEAD WO CONTRAST     Standing Status:   Future     Standing Expiration Date:   3/2/2022     Order Specific Question:   Reason for exam:     Answer:   persist  daily  headache    MRI CERVICAL SPINE WO CONTRAST     Standing Status:   Future     Standing Expiration Date:   3/2/2022     Order Specific Question:   Reason for exam:     Answer:   neck pain and  radiculopathy           Cymbalta  To  120 and  Use  Skelaxin and   The  fiorecet and   Get  Testing  As  Make  Sure  Not  All  Reflex  Sympathetic  Symptoms   Gloria Saba MD

## 2021-03-09 ENCOUNTER — HOSPITAL ENCOUNTER (OUTPATIENT)
Dept: CT IMAGING | Age: 66
Discharge: HOME | End: 2021-03-09
Payer: MEDICARE

## 2021-03-09 ENCOUNTER — OFFICE VISIT (OUTPATIENT)
Dept: FAMILY MEDICINE CLINIC | Age: 66
End: 2021-03-09

## 2021-03-09 ENCOUNTER — HOSPITAL ENCOUNTER (OUTPATIENT)
Dept: MRI IMAGING | Age: 66
Discharge: HOME | End: 2021-03-09
Payer: MEDICARE

## 2021-03-09 VITALS
SYSTOLIC BLOOD PRESSURE: 130 MMHG | TEMPERATURE: 95.8 F | RESPIRATION RATE: 16 BRPM | DIASTOLIC BLOOD PRESSURE: 80 MMHG | HEART RATE: 92 BPM | BODY MASS INDEX: 30.99 KG/M2 | WEIGHT: 181.5 LBS | HEIGHT: 64 IN

## 2021-03-09 DIAGNOSIS — G89.29 CHRONIC CERVICAL PAIN: ICD-10-CM

## 2021-03-09 DIAGNOSIS — G43.909 MIXED MIGRAINE AND MUSCLE CONTRACTION HEADACHE: ICD-10-CM

## 2021-03-09 DIAGNOSIS — G44.209 MIXED MIGRAINE AND MUSCLE CONTRACTION HEADACHE: ICD-10-CM

## 2021-03-09 DIAGNOSIS — M54.2 CHRONIC CERVICAL PAIN: ICD-10-CM

## 2021-03-09 DIAGNOSIS — G90.511 COMPLEX REGIONAL PAIN SYNDROME TYPE 1 OF RIGHT UPPER EXTREMITY: ICD-10-CM

## 2021-03-09 DIAGNOSIS — M54.12 RIGHT CERVICAL RADICULOPATHY: ICD-10-CM

## 2021-03-09 DIAGNOSIS — B02.9 HERPES ZOSTER WITHOUT COMPLICATION: Primary | ICD-10-CM

## 2021-03-09 PROCEDURE — 99213 OFFICE O/P EST LOW 20 MIN: CPT | Performed by: EMERGENCY MEDICINE

## 2021-03-09 PROCEDURE — G8427 DOCREV CUR MEDS BY ELIG CLIN: HCPCS | Performed by: EMERGENCY MEDICINE

## 2021-03-09 PROCEDURE — 1090F PRES/ABSN URINE INCON ASSESS: CPT | Performed by: EMERGENCY MEDICINE

## 2021-03-09 PROCEDURE — G8417 CALC BMI ABV UP PARAM F/U: HCPCS | Performed by: EMERGENCY MEDICINE

## 2021-03-09 PROCEDURE — 1036F TOBACCO NON-USER: CPT | Performed by: EMERGENCY MEDICINE

## 2021-03-09 PROCEDURE — G8400 PT W/DXA NO RESULTS DOC: HCPCS | Performed by: EMERGENCY MEDICINE

## 2021-03-09 PROCEDURE — 1123F ACP DISCUSS/DSCN MKR DOCD: CPT | Performed by: EMERGENCY MEDICINE

## 2021-03-09 PROCEDURE — 70450 CT HEAD/BRAIN W/O DYE: CPT

## 2021-03-09 PROCEDURE — 72141 MRI NECK SPINE W/O DYE: CPT

## 2021-03-09 PROCEDURE — 3017F COLORECTAL CA SCREEN DOC REV: CPT | Performed by: EMERGENCY MEDICINE

## 2021-03-09 PROCEDURE — 4040F PNEUMOC VAC/ADMIN/RCVD: CPT | Performed by: EMERGENCY MEDICINE

## 2021-03-09 RX ORDER — FAMCICLOVIR 500 MG/1
500 TABLET, FILM COATED ORAL 3 TIMES DAILY
Qty: 21 TABLET | Refills: 0 | Status: SHIPPED | OUTPATIENT
Start: 2021-03-09 | End: 2021-03-16

## 2021-03-09 ASSESSMENT — ENCOUNTER SYMPTOMS
ABDOMINAL PAIN: 0
NAUSEA: 0
VOICE CHANGE: 0
SORE THROAT: 0
SINUS PRESSURE: 0
SHORTNESS OF BREATH: 0
WHEEZING: 0
VOMITING: 0
BACK PAIN: 0
CONSTIPATION: 0
RHINORRHEA: 0
CHEST TIGHTNESS: 0
DIARRHEA: 0
COUGH: 0
TROUBLE SWALLOWING: 0

## 2021-03-09 NOTE — PROGRESS NOTES
Visit Date: 3/9/2021    Subjective:    Idania Wang is a 72 y. o.female who presents today for:  Chief Complaint   Patient presents with    Herpes Zoster     rash under left breast and flank         HPI:     HPI     Red rash since yesterday worry about shingles. Sitting a grand child who is 3-11weeks old      CurrentHome Medications:  Current Outpatient Medications   Medication Sig Dispense Refill    famciclovir (FAMVIR) 500 MG tablet Take 1 tablet by mouth 3 times daily for 7 days 21 tablet 0    famotidine (PEPCID) 20 MG tablet Take 1 tablet by mouth nightly 90 tablet 1    SYNTHROID 125 MCG tablet TAKE 1 TABLET BY MOUTH EVERY DAY 90 tablet 1    DULoxetine (CYMBALTA) 60 MG extended release capsule TAKE 2 CAPSULE BY MOUTH EVERY  capsule 0    methylphenidate (RITALIN) 20 MG tablet Take 1 tablet by mouth 3 times daily for 58 days. 90 tablet 0    modafinil (PROVIGIL) 200 MG tablet Take 400 mg by mouth daily.       butalbital-acetaminophen-caffeine (FIORICET, ESGIC) -40 MG per tablet Take 1 tablet by mouth every 4 hours as needed for Headaches 30 tablet 0    furosemide (LASIX) 20 MG tablet Take 2 tablets by mouth daily 180 tablet 1    fluticasone (FLONASE) 50 MCG/ACT nasal spray SPRAY 1 SPRAY INTO EACH NOSTRIL EVERY DAY 1 Bottle 0    omeprazole (PRILOSEC) 20 MG delayed release capsule Take 20 mg by mouth nightly      rosuvastatin (CRESTOR) 20 MG tablet Take 1 tablet by mouth daily 90 tablet 3    esomeprazole (NEXIUM) 40 MG delayed release capsule Take 1 capsule by mouth every morning (before breakfast) 90 capsule 3    Omega-3 Fatty Acids (FISH OIL) 1000 MG CAPS Take 6,000 mg by mouth 3 times daily      magnesium oxide (MAG-OX) 400 MG tablet Take 1,200 mg by mouth daily      Lysine 500 MG TABS Take 1,500 mg by mouth daily      ferrous sulfate (IRON 325) 325 (65 Fe) MG tablet Take 325 mg by mouth daily (with breakfast)      Cinnamon 500 MG TABS Take 6 tablets by mouth daily       Musculoskeletal: Normal range of motion and neck supple. Thyroid: No thyromegaly. Vascular: No JVD. Cardiovascular:      Rate and Rhythm: Normal rate and regular rhythm. Heart sounds: No murmur. No friction rub. Pulmonary:      Effort: Pulmonary effort is normal.      Breath sounds: Normal breath sounds. No wheezing or rales. Chest:      Chest wall: No tenderness. Abdominal:      General: Bowel sounds are normal.      Palpations: Abdomen is soft. There is no mass. Tenderness: There is no abdominal tenderness. Musculoskeletal:        Arms:    Lymphadenopathy:      Cervical: No cervical adenopathy. Skin:     Findings: No rash. Neurological:      Mental Status: She is alert and oriented to person, place, and time. Psychiatric:         Behavior: Behavior is cooperative. Assessment:         Diagnosis Orders   1. Herpes zoster without complication     2. Complex regional pain syndrome type 1 of right upper extremity         Plan:      Medications Prescribed:  Orders Placed This Encounter   Medications    famciclovir (FAMVIR) 500 MG tablet     Sig: Take 1 tablet by mouth 3 times daily for 7 days     Dispense:  21 tablet     Refill:  0     Orders Placed:  No orders of the defined types were placed in this encounter. Return in about 6 days (around 3/15/2021) for shingles. Discussed use, benefit, and side effects of prescribedmedications. All patient questions answered. Pt voiced understanding. Instructedto continue current medications, diet and exercise. Patient agreed with treatmentplan.

## 2021-03-10 ENCOUNTER — TELEPHONE (OUTPATIENT)
Dept: FAMILY MEDICINE CLINIC | Age: 66
End: 2021-03-10

## 2021-03-10 NOTE — TELEPHONE ENCOUNTER
----- Message from Maureen Valle MD sent at 3/10/2021  5:09 AM EST -----  Please Call ct of head wnl and pending is the mri of the cervical; spine

## 2021-03-11 ENCOUNTER — TELEPHONE (OUTPATIENT)
Dept: FAMILY MEDICINE CLINIC | Age: 66
End: 2021-03-11

## 2021-03-11 NOTE — TELEPHONE ENCOUNTER
----- Message from Maureen Valle MD sent at 3/11/2021  8:31 AM EST -----  No cervical spasm  Noted  And mri no acute changes .   Please call

## 2021-03-16 ENCOUNTER — OFFICE VISIT (OUTPATIENT)
Dept: FAMILY MEDICINE CLINIC | Age: 66
End: 2021-03-16

## 2021-03-16 VITALS
HEIGHT: 65 IN | BODY MASS INDEX: 30.49 KG/M2 | TEMPERATURE: 95.2 F | WEIGHT: 183 LBS | RESPIRATION RATE: 16 BRPM | HEART RATE: 92 BPM | DIASTOLIC BLOOD PRESSURE: 78 MMHG | SYSTOLIC BLOOD PRESSURE: 134 MMHG

## 2021-03-16 DIAGNOSIS — B02.9 HERPES ZOSTER WITHOUT COMPLICATION: Primary | ICD-10-CM

## 2021-03-16 PROCEDURE — 1123F ACP DISCUSS/DSCN MKR DOCD: CPT | Performed by: EMERGENCY MEDICINE

## 2021-03-16 PROCEDURE — G8417 CALC BMI ABV UP PARAM F/U: HCPCS | Performed by: EMERGENCY MEDICINE

## 2021-03-16 PROCEDURE — G8400 PT W/DXA NO RESULTS DOC: HCPCS | Performed by: EMERGENCY MEDICINE

## 2021-03-16 PROCEDURE — 1036F TOBACCO NON-USER: CPT | Performed by: EMERGENCY MEDICINE

## 2021-03-16 PROCEDURE — 3017F COLORECTAL CA SCREEN DOC REV: CPT | Performed by: EMERGENCY MEDICINE

## 2021-03-16 PROCEDURE — 99213 OFFICE O/P EST LOW 20 MIN: CPT | Performed by: EMERGENCY MEDICINE

## 2021-03-16 PROCEDURE — G8427 DOCREV CUR MEDS BY ELIG CLIN: HCPCS | Performed by: EMERGENCY MEDICINE

## 2021-03-16 PROCEDURE — 1090F PRES/ABSN URINE INCON ASSESS: CPT | Performed by: EMERGENCY MEDICINE

## 2021-03-16 PROCEDURE — 4040F PNEUMOC VAC/ADMIN/RCVD: CPT | Performed by: EMERGENCY MEDICINE

## 2021-03-16 ASSESSMENT — ENCOUNTER SYMPTOMS
SHORTNESS OF BREATH: 0
CONSTIPATION: 0
BACK PAIN: 0
SORE THROAT: 0
COUGH: 0
WHEEZING: 0
VOMITING: 0
SINUS PRESSURE: 0
ABDOMINAL PAIN: 0
RHINORRHEA: 0
VOICE CHANGE: 0
NAUSEA: 0
DIARRHEA: 0
TROUBLE SWALLOWING: 0
CHEST TIGHTNESS: 0

## 2021-03-16 NOTE — PROGRESS NOTES
Visit Date: 3/16/2021    Subjective:    Phillip Lord is a 72 y. o.female who presents today for:  Chief Complaint   Patient presents with    Check-Up    Herpes Zoster     follow up. ran out of meds yesterday. HPI:     HPI     Here for follow-up re shingles , she finished Famvir yesterday, Garlan Audrey is less pain and rashes has driedup    CurrentHome Medications:  Current Outpatient Medications   Medication Sig Dispense Refill    famciclovir (FAMVIR) 500 MG tablet Take 1 tablet by mouth 3 times daily for 7 days 21 tablet 0    famotidine (PEPCID) 20 MG tablet Take 1 tablet by mouth nightly 90 tablet 1    SYNTHROID 125 MCG tablet TAKE 1 TABLET BY MOUTH EVERY DAY 90 tablet 1    DULoxetine (CYMBALTA) 60 MG extended release capsule TAKE 2 CAPSULE BY MOUTH EVERY  capsule 0    methylphenidate (RITALIN) 20 MG tablet Take 1 tablet by mouth 3 times daily for 58 days. 90 tablet 0    modafinil (PROVIGIL) 200 MG tablet Take 400 mg by mouth daily.       butalbital-acetaminophen-caffeine (FIORICET, ESGIC) -40 MG per tablet Take 1 tablet by mouth every 4 hours as needed for Headaches 30 tablet 0    furosemide (LASIX) 20 MG tablet Take 2 tablets by mouth daily 180 tablet 1    fluticasone (FLONASE) 50 MCG/ACT nasal spray SPRAY 1 SPRAY INTO EACH NOSTRIL EVERY DAY 1 Bottle 0    omeprazole (PRILOSEC) 20 MG delayed release capsule Take 20 mg by mouth nightly      rosuvastatin (CRESTOR) 20 MG tablet Take 1 tablet by mouth daily 90 tablet 3    esomeprazole (NEXIUM) 40 MG delayed release capsule Take 1 capsule by mouth every morning (before breakfast) 90 capsule 3    Omega-3 Fatty Acids (FISH OIL) 1000 MG CAPS Take 6,000 mg by mouth 3 times daily      magnesium oxide (MAG-OX) 400 MG tablet Take 1,200 mg by mouth daily      Lysine 500 MG TABS Take 1,500 mg by mouth daily      ferrous sulfate (IRON 325) 325 (65 Fe) MG tablet Take 325 mg by mouth daily (with breakfast)      Cinnamon 500 MG TABS Take 6 tablets by mouth daily       ascorbic acid (VITAMIN C) 1000 MG tablet Take 0.5 tablets by mouth 3 times daily 30 tablet 3    B Complex-Folic Acid (S-764 BALANCED TR PO) Take 100 mg by mouth 3 times daily (before meals) [facility]       vitamin D (CHOLECALCIFEROL) 5000 UNITS CAPS capsule Take 5,000 Units by mouth daily        No current facility-administered medications for this visit. Subjective:      Review of Systems   Constitutional: Negative for appetite change, chills, diaphoresis, fatigue and fever. HENT: Negative for congestion, ear pain, postnasal drip, rhinorrhea, sinus pressure, sneezing, sore throat, trouble swallowing and voice change. Respiratory: Negative for cough, chest tightness, shortness of breath and wheezing. Cardiovascular: Negative for chest pain, palpitations and leg swelling. Gastrointestinal: Negative for abdominal pain, constipation, diarrhea, nausea and vomiting. Musculoskeletal: Negative for arthralgias, back pain, joint swelling, myalgias, neck pain and neck stiffness. Skin: Positive for rash. Neurological: Negative for dizziness, syncope, weakness, light-headedness, numbness and headaches. Objective:     /78 (Site: Left Upper Arm, Position: Sitting, Cuff Size: Medium Adult)   Pulse 92   Temp 95.2 °F (35.1 °C) (Skin)   Resp 16   Ht 5' 5\" (1.651 m)   Wt 183 lb (83 kg)   BMI 30.45 kg/m²   BP Readings from Last 3 Encounters:   03/16/21 134/78   03/09/21 130/80   03/02/21 132/82     Wt Readings from Last 3 Encounters:   03/16/21 183 lb (83 kg)   03/09/21 181 lb 8 oz (82.3 kg)   03/02/21 182 lb 6 oz (82.7 kg)       Physical Exam  Vitals signs reviewed. Constitutional:       Appearance: She is well-developed. HENT:      Head: Normocephalic and atraumatic. Right Ear: External ear normal.      Left Ear: External ear normal.      Nose: Nose normal.   Eyes:      General: No scleral icterus.      Conjunctiva/sclera: Conjunctivae normal. Pupils: Pupils are equal, round, and reactive to light. Neck:      Musculoskeletal: Normal range of motion and neck supple. Thyroid: No thyromegaly. Vascular: No JVD. Cardiovascular:      Rate and Rhythm: Normal rate and regular rhythm. Heart sounds: No murmur. No friction rub. Pulmonary:      Effort: Pulmonary effort is normal.      Breath sounds: Normal breath sounds. No wheezing or rales. Comments: Rashes on the left jason-lateral chest has dried up less tender, no other rashes  Chest:      Chest wall: No tenderness. Abdominal:      General: Bowel sounds are normal.      Palpations: Abdomen is soft. There is no mass. Tenderness: There is no abdominal tenderness. Lymphadenopathy:      Cervical: No cervical adenopathy. Skin:     Findings: No rash. Neurological:      Mental Status: She is alert and oriented to person, place, and time. Psychiatric:         Behavior: Behavior is cooperative. Assessment:         Diagnosis Orders   1. Herpes zoster without complication         Plan:      Medications Prescribed:  No orders of the defined types were placed in this encounter. Orders Placed:  No orders of the defined types were placed in this encounter. Advised that she can see grand children next week, but let it be covered     Return if symptoms worsen or fail to improve. Discussed use, benefit, and side effects of prescribedmedications. All patient questions answered. Pt voiced understanding. Instructedto continue current medications, diet and exercise. Patient agreed with treatmentplan.

## 2021-04-01 ENCOUNTER — HOSPITAL ENCOUNTER (OUTPATIENT)
Age: 66
Discharge: HOME | End: 2021-04-01
Payer: MEDICARE

## 2021-04-01 PROCEDURE — U0003 INFECTIOUS AGENT DETECTION BY NUCLEIC ACID (DNA OR RNA); SEVERE ACUTE RESPIRATORY SYNDROME CORONAVIRUS 2 (SARS-COV-2) (CORONAVIRUS DISEASE [COVID-19]), AMPLIFIED PROBE TECHNIQUE, MAKING USE OF HIGH THROUGHPUT TECHNOLOGIES AS DESCRIBED BY CMS-2020-01-R: HCPCS

## 2021-04-05 LAB
SARS-COV-2: NOT DETECTED
SOURCE: NORMAL

## 2021-04-09 ENCOUNTER — TELEPHONE (OUTPATIENT)
Dept: FAMILY MEDICINE CLINIC | Age: 66
End: 2021-04-09

## 2021-04-09 NOTE — TELEPHONE ENCOUNTER
Pt is requesting diflucan to be sent to pharmacy she had a trail spinal cord stimulator placed and the antibiotic gave her a yeast infection.            [address] [facility].

## 2021-04-12 ENCOUNTER — OFFICE VISIT (OUTPATIENT)
Dept: PHYSICAL MEDICINE AND REHAB | Age: 66
End: 2021-04-12
Payer: MEDICARE

## 2021-04-12 VITALS
BODY MASS INDEX: 30.49 KG/M2 | DIASTOLIC BLOOD PRESSURE: 86 MMHG | WEIGHT: 183 LBS | SYSTOLIC BLOOD PRESSURE: 144 MMHG | HEIGHT: 65 IN | HEART RATE: 70 BPM

## 2021-04-12 DIAGNOSIS — B37.31 VAGINAL YEAST INFECTION: ICD-10-CM

## 2021-04-12 DIAGNOSIS — F11.90 CHRONIC, CONTINUOUS USE OF OPIOIDS: ICD-10-CM

## 2021-04-12 DIAGNOSIS — M54.50 CHRONIC BILATERAL LOW BACK PAIN, UNSPECIFIED WHETHER SCIATICA PRESENT: Primary | ICD-10-CM

## 2021-04-12 DIAGNOSIS — G90.511 COMPLEX REGIONAL PAIN SYNDROME TYPE 1 OF RIGHT UPPER EXTREMITY: ICD-10-CM

## 2021-04-12 DIAGNOSIS — G89.4 CHRONIC PAIN SYNDROME: ICD-10-CM

## 2021-04-12 DIAGNOSIS — G89.29 CHRONIC BILATERAL LOW BACK PAIN, UNSPECIFIED WHETHER SCIATICA PRESENT: Primary | ICD-10-CM

## 2021-04-12 PROCEDURE — 1036F TOBACCO NON-USER: CPT | Performed by: NURSE PRACTITIONER

## 2021-04-12 PROCEDURE — G8427 DOCREV CUR MEDS BY ELIG CLIN: HCPCS | Performed by: NURSE PRACTITIONER

## 2021-04-12 PROCEDURE — 1090F PRES/ABSN URINE INCON ASSESS: CPT | Performed by: NURSE PRACTITIONER

## 2021-04-12 PROCEDURE — G8417 CALC BMI ABV UP PARAM F/U: HCPCS | Performed by: NURSE PRACTITIONER

## 2021-04-12 PROCEDURE — 4040F PNEUMOC VAC/ADMIN/RCVD: CPT | Performed by: NURSE PRACTITIONER

## 2021-04-12 PROCEDURE — 3017F COLORECTAL CA SCREEN DOC REV: CPT | Performed by: NURSE PRACTITIONER

## 2021-04-12 PROCEDURE — 99214 OFFICE O/P EST MOD 30 MIN: CPT | Performed by: NURSE PRACTITIONER

## 2021-04-12 PROCEDURE — 1123F ACP DISCUSS/DSCN MKR DOCD: CPT | Performed by: NURSE PRACTITIONER

## 2021-04-12 PROCEDURE — G8400 PT W/DXA NO RESULTS DOC: HCPCS | Performed by: NURSE PRACTITIONER

## 2021-04-12 RX ORDER — FLUCONAZOLE 150 MG/1
150 TABLET ORAL ONCE
Qty: 1 TABLET | Refills: 0 | Status: SHIPPED | OUTPATIENT
Start: 2021-04-12 | End: 2021-04-12

## 2021-04-12 RX ORDER — FLUCONAZOLE 200 MG/1
200 TABLET ORAL DAILY
Qty: 1 TABLET | Refills: 1 | Status: SHIPPED | OUTPATIENT
Start: 2021-04-12 | End: 2021-04-19

## 2021-04-12 RX ORDER — HYDROCODONE BITARTRATE AND ACETAMINOPHEN 5; 325 MG/1; MG/1
1 TABLET ORAL EVERY 8 HOURS PRN
Qty: 90 TABLET | Refills: 0 | Status: SHIPPED | OUTPATIENT
Start: 2021-04-12 | End: 2021-05-12

## 2021-04-12 ASSESSMENT — ENCOUNTER SYMPTOMS
PHOTOPHOBIA: 0
VOMITING: 0
SHORTNESS OF BREATH: 0
SINUS PRESSURE: 0
WHEEZING: 0
DIARRHEA: 0
NAUSEA: 0
COLOR CHANGE: 0
SORE THROAT: 0
BACK PAIN: 0
COUGH: 0
EYE PAIN: 0
RHINORRHEA: 0
CONSTIPATION: 0
ABDOMINAL PAIN: 0
CHEST TIGHTNESS: 0

## 2021-04-12 NOTE — PROGRESS NOTES
pain medications from other sources. She denies any ER visits since last visit. Had shingles  3/9/2021  Pain scale with out pain medications or at its worst is 8/10. Pain scale with pain medications or at its best is 7/10. Last dose of  Norco  was  3 weeks ago   Last UDS  9/2020 + ETS  Educated  Usually has drinks on Saturday for dinner dates she does not take her Norco  On that day she rarely  takes Norco last script filled June 2020       The patientis allergic to dilaudid [hydromorphone hcl]; iron; percodan [oxycodone-aspirin]; gluten; gluten meal; maxzide [hydrochlorothiazide w-triamterene]; nalfon [fenoprofen calcium]; oxycodone-acetaminophen; oxycodone-aspirin; percocet  [oxycodone-acetaminophen]; reglan [metoclopramide]; fenoprofen; and sulfa antibiotics. Subjective:      Review of Systems   Constitutional: Positive for activity change. Negative for appetite change, chills, diaphoresis, fatigue, fever and unexpected weight change. HENT: Negative for congestion, ear pain, hearing loss, mouth sores, nosebleeds, rhinorrhea, sinus pressure and sore throat. Eyes: Negative for photophobia, pain and visual disturbance. Respiratory: Negative for cough, chest tightness, shortness of breath and wheezing. Cardiovascular: Negative for chest pain and palpitations. Gastrointestinal: Negative for abdominal pain, constipation, diarrhea, nausea and vomiting. Endocrine: Negative for cold intolerance, heat intolerance, polydipsia, polyphagia and polyuria. Genitourinary: Negative for decreased urine volume, difficulty urinating, frequency and hematuria. Musculoskeletal: Positive for arthralgias, myalgias, neck pain and neck stiffness. Negative for back pain, gait problem and joint swelling. Skin: Negative for color change and rash. Allergic/Immunologic: Negative for food allergies and immunocompromised state. Neurological: Positive for weakness, numbness and headaches.  Negative for dizziness, tremors, seizures, syncope, facial asymmetry, speech difficulty and light-headedness. Hematological: Does not bruise/bleed easily. Psychiatric/Behavioral: Positive for agitation and sleep disturbance. Negative for behavioral problems, confusion, decreased concentration, dysphoric mood, hallucinations and self-injury. The patient is not nervous/anxious and is not hyperactive. Depressed due to pain affecting her life, emotional agitated affects her sleep  Tearful today feels alone in this tx plan. Objective:     Vitals:    04/12/21 1129   BP: (!) 144/86   Site: Left Upper Arm   Position: Sitting   Cuff Size: Medium Adult   Pulse: 70   Weight: 183 lb (83 kg)   Height: 5' 5\" (1.651 m)       Physical Exam  Vitals signs and nursing note reviewed. Constitutional:       General: She is not in acute distress. Appearance: She is well-developed. She is not diaphoretic. HENT:      Head: Normocephalic and atraumatic. Right Ear: External ear normal.      Left Ear: External ear normal.      Nose: Nose normal.      Mouth/Throat:      Pharynx: No oropharyngeal exudate. Eyes:      General: No scleral icterus. Right eye: No discharge. Left eye: No discharge. Conjunctiva/sclera: Conjunctivae normal.      Pupils: Pupils are equal, round, and reactive to light. Neck:      Musculoskeletal: Full passive range of motion without pain, normal range of motion and neck supple. Normal range of motion. No edema, erythema, neck rigidity or muscular tenderness. Thyroid: No thyromegaly. Cardiovascular:      Rate and Rhythm: Normal rate and regular rhythm. Heart sounds: Normal heart sounds. No murmur. No friction rub. No gallop. Pulmonary:      Effort: Pulmonary effort is normal. No respiratory distress. Breath sounds: Normal breath sounds. No wheezing or rales. Chest:      Chest wall: No tenderness.    Abdominal:      General: Bowel sounds are normal. There is no distension. Palpations: Abdomen is soft. Tenderness: There is no abdominal tenderness. There is no guarding or rebound. Musculoskeletal:         General: Tenderness present. Right shoulder: She exhibits decreased range of motion, tenderness, bony tenderness, pain, spasm and decreased strength. Right elbow: She exhibits decreased range of motion. Tenderness found. Right wrist: She exhibits decreased range of motion, tenderness and bony tenderness. Cervical back: She exhibits decreased range of motion, tenderness, bony tenderness, pain and spasm. Thoracic back: She exhibits decreased range of motion, tenderness, bony tenderness, pain and spasm. Lumbar back: She exhibits tenderness, bony tenderness, pain and spasm. Back:       Right upper arm: She exhibits tenderness and bony tenderness. Right forearm: She exhibits tenderness and bony tenderness. Right hand: She exhibits decreased range of motion, tenderness and bony tenderness. Decreased sensation noted. Decreased strength noted. Left foot: Tenderness present. Comments: RUE CRPS limited ROM hypersensitivity neuropathy   Bottom of left foot plantar area feels numb and like she is standing on a marble. Skin:     General: Skin is warm. Coloration: Skin is not pale. Findings: No erythema or rash. Neurological:      Mental Status: She is alert and oriented to person, place, and time. She is not disoriented. Cranial Nerves: No cranial nerve deficit. Sensory: Sensation is intact. No sensory deficit. Motor: Weakness present. No atrophy or abnormal muscle tone. Coordination: Coordination is intact. Coordination normal.      Gait: Gait is intact. Gait normal.      Deep Tendon Reflexes: Babinski sign absent on the right side. Reflex Scores:       Tricep reflexes are 1+ on the right side and 2+ on the left side.        Bicep reflexes are 1+ on the right side and 2+ on the left side. Brachioradialis reflexes are 1+ on the right side and 2+ on the left side. Patellar reflexes are 1+ on the right side and 2+ on the left side. Achilles reflexes are 1+ on the right side and 2+ on the left side. Psychiatric:         Attention and Perception: Attention and perception normal. She is attentive. Mood and Affect: Mood is depressed. Mood is not anxious. Affect is tearful. Affect is not labile, blunt, angry or inappropriate. Speech: Speech normal. She is communicative. Speech is not rapid and pressured, delayed, slurred or tangential.         Behavior: Behavior normal. Behavior is not agitated, slowed, aggressive, withdrawn, hyperactive or combative. Behavior is cooperative. Thought Content: Thought content normal. Thought content is not paranoid or delusional. Thought content does not include homicidal or suicidal ideation. Thought content does not include homicidal or suicidal plan. Cognition and Memory: Cognition normal. Memory is impaired. She does not exhibit impaired recent memory or impaired remote memory. Judgment: Judgment normal. Judgment is not impulsive or inappropriate. Comments: Pt depressed due to pain emotional  Tearful  Forgetful at times            Assessment:     1. Chronic bilateral low back pain, unspecified whether sciatica present    2. Complex regional pain syndrome type 1 of right upper extremity    3. Chronic pain syndrome    4. Chronic, continuous use of opioids    5. Vaginal yeast infection            Plan:      · OARRS reviewed. Current MED: 15  · Patient was not offered naloxone for home. · Discussed long term side effects of medications, tolerance, dependency and addiction. · Previous UDS reviewed  · UDS preformed today for compliance. · Patient told can not receive any pain medications from any other source. · No evidence of abuse, diversion or aberrant behavior.    Medications and/or procedures

## 2021-04-20 DIAGNOSIS — G47.411 PRIMARY NARCOLEPSY WITH CATAPLEXY: ICD-10-CM

## 2021-04-20 RX ORDER — METHYLPHENIDATE HYDROCHLORIDE 20 MG/1
20 TABLET ORAL 3 TIMES DAILY
Qty: 90 TABLET | Refills: 0 | Status: SHIPPED | OUTPATIENT
Start: 2021-04-20 | End: 2022-01-25 | Stop reason: SDUPTHER

## 2021-04-20 NOTE — TELEPHONE ENCOUNTER
Sudhakar Vee called requesting a refill on the following medications:  Requested Prescriptions     Pending Prescriptions Disp Refills    methylphenidate (RITALIN) 20 MG tablet 90 tablet 0     Sig: Take 1 tablet by mouth 3 times daily for 58 days.      Pharmacy verified:  [facility]    Date of last visit: 10/23/2020 Javier Silveira) (10/23/2018 Sravan Cagle)  Date of next visit (if applicable): 85/13/59139

## 2021-05-25 PROBLEM — D76.3 OTHER HISTIOCYTOSIS SYNDROMES (HCC): Status: ACTIVE | Noted: 2021-05-25

## 2021-06-01 ENCOUNTER — OFFICE VISIT (OUTPATIENT)
Dept: PULMONOLOGY | Age: 66
End: 2021-06-01
Payer: MEDICARE

## 2021-06-01 VITALS
WEIGHT: 183 LBS | TEMPERATURE: 97.6 F | HEART RATE: 94 BPM | SYSTOLIC BLOOD PRESSURE: 110 MMHG | BODY MASS INDEX: 30.49 KG/M2 | DIASTOLIC BLOOD PRESSURE: 78 MMHG | HEIGHT: 65 IN | OXYGEN SATURATION: 96 %

## 2021-06-01 DIAGNOSIS — G47.19 EXCESSIVE DAYTIME SLEEPINESS: ICD-10-CM

## 2021-06-01 DIAGNOSIS — G47.8 NON-RESTORATIVE SLEEP: ICD-10-CM

## 2021-06-01 DIAGNOSIS — G47.411 PRIMARY NARCOLEPSY WITH CATAPLEXY: ICD-10-CM

## 2021-06-01 DIAGNOSIS — G47.11 IDIOPATHIC HYPERSOMNIA: Primary | ICD-10-CM

## 2021-06-01 DIAGNOSIS — G25.81 RLS (RESTLESS LEGS SYNDROME): ICD-10-CM

## 2021-06-01 DIAGNOSIS — G89.4 CHRONIC PAIN SYNDROME: ICD-10-CM

## 2021-06-01 PROCEDURE — 3017F COLORECTAL CA SCREEN DOC REV: CPT | Performed by: PHYSICIAN ASSISTANT

## 2021-06-01 PROCEDURE — 1090F PRES/ABSN URINE INCON ASSESS: CPT | Performed by: PHYSICIAN ASSISTANT

## 2021-06-01 PROCEDURE — 4040F PNEUMOC VAC/ADMIN/RCVD: CPT | Performed by: PHYSICIAN ASSISTANT

## 2021-06-01 PROCEDURE — 1123F ACP DISCUSS/DSCN MKR DOCD: CPT | Performed by: PHYSICIAN ASSISTANT

## 2021-06-01 PROCEDURE — G8400 PT W/DXA NO RESULTS DOC: HCPCS | Performed by: PHYSICIAN ASSISTANT

## 2021-06-01 PROCEDURE — 99214 OFFICE O/P EST MOD 30 MIN: CPT | Performed by: PHYSICIAN ASSISTANT

## 2021-06-01 PROCEDURE — 1036F TOBACCO NON-USER: CPT | Performed by: PHYSICIAN ASSISTANT

## 2021-06-01 PROCEDURE — G8427 DOCREV CUR MEDS BY ELIG CLIN: HCPCS | Performed by: PHYSICIAN ASSISTANT

## 2021-06-01 PROCEDURE — G8417 CALC BMI ABV UP PARAM F/U: HCPCS | Performed by: PHYSICIAN ASSISTANT

## 2021-06-01 RX ORDER — METHYLPHENIDATE HYDROCHLORIDE 20 MG/1
20 TABLET ORAL 3 TIMES DAILY
Qty: 270 TABLET | Refills: 0 | Status: SHIPPED | OUTPATIENT
Start: 2021-06-01 | End: 2022-05-18 | Stop reason: SDUPTHER

## 2021-06-01 RX ORDER — MODAFINIL 200 MG/1
400 TABLET ORAL DAILY
Qty: 180 TABLET | Refills: 1 | Status: SHIPPED | OUTPATIENT
Start: 2021-06-01 | End: 2021-11-24 | Stop reason: SDUPTHER

## 2021-06-01 NOTE — PROGRESS NOTES
[place] [facility] and [address] [facility]                                         [id number]  6/1/2021   Chief Complaint   Patient presents with    Follow-up     8mo sleep f/u, narcolepsy without cataplexy, RLS. Pt of [place]     ESS:12  SAQLI:44    Presentation:   Shane Zaldivar presents for sleep medicine follow up for hypersomnia   Since the last visit Shane Zaldivar has been doing reasonably well with Provigil and Ritalin. She is taking Ritalin 2-3 times a day. She still gets tired. She was told in 2003 that she had narcolpesy however did not achieve REM in MSLT naps. She has been treated with Provigil and Ritalin since. She also had an AHI on PSG in 2003 of 5.5      Progress History:   Since last visit any new medical issues? No  New ER or hospitlal visits? No  Any new or changes in medicines? No  Any new sleep medicines? No    Sleep issues:  Do you feel better? No  More rested? Sometimes   Better concentration?  no      Past Medical History:   Diagnosis Date    Anemia 2000    malabsorption/Celiac disease    Anxiety 02/2018    Dr. Yulia Pereira    Arm DVT (deep venous thromboembolism), acute ([facility] [address] 5/15/13    Broken shoulder 09/06/2016    right    Celiac disease     Celiac disease     Complex regional pain syndrome of right upper extremity     Right shoulder, arm, and hand    CRPS (complex regional pain syndrome type I) 2016    Dr. Rodrick aCrney    Depression 09/2016    Dr. Rodrick Carney    Depression     GERD (gastroesophageal reflux disease) 2000    and celiac- Dr. Bianka Olivia- Rafy Saber Headache(784.0)     migraines-Dr. Trisha Kirkland Heart murmur     History of blood transfusion     Hx of reactive hypoglycemia     Dr. Nelly Hernandez Hyperlipidemia 2013    Dr. Trisha Kirkland Hypothyroidism 1993    Dr. Darius Carson    IBS (irritable bowel syndrome)     PER PT     Irritable bowel syndrome 1997    [facility]. Bianka Olivia at [facility] MDRO (multiple drug resistant organisms) resistance     Meniere disease 2005    Dr. Clarisa Kaplan    Mitral valve prolapse syndrome 2008    MRSA (methicillin resistant Staphylococcus aureus) 2013, 8/1/15    left forearm 2013, nasal screen pos Aug 2015   Fanta Sanders Narcolepsy 2000    Dr. Santy Avendaño    Narcolepsy and cataplexy     Partial bowel obstruction (HCC)     Multile times.   S/P partial small bowel resection    Restless legs syndrome 1993    Dr. Khoi Myers. Dwayne    RSD upper limb 2016    Right Shoulder- Dr. 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 Ibarra Ano Thyroid disease        Past Surgical History:   Procedure Laterality Date    ABDOMEN SURGERY  04/23/2013    ABD Exploration, removal of ommentum, lysis of adhesions-Dr. Norbert Chou    ABDOMEN SURGERY      removed mass \"near kidney\"     ABDOMINAL ADHESION SURGERY  10/2010    Resection-Dr. Norbert Chou, twice not sure of exact year   Maudie Levo    Dr. [facility] Left 2006   Cathy Rendon  2003     Dr. Catalino Allison  [phone], 2015    COLONOSCOPY  2016    Dr Kyle Finn, COLON, DIAGNOSTIC     Narendrajoyce Ortega, [facility] Right 2006    LAPAROTOMY EXPLORATORY N/A 8/31/2018    LAPAROTOMY EXPLORATORY, lysis of adhesions, closure innerotomoy performed by Lillian Burk MD at [address] [place] [address]  2006    capsule endoscopy    [place] EGD TRANSORAL BIOPSY SINGLE/MULTIPLE Left 2/8/2018    EGD BIOPSY performed by Isaiah Veras MD at [address] [facility] OFFICE/OUTPT [facility] Left 8/31/2018    ROBOTIC EXCISION OF LEFT PARARENAL MASS performed by Lillian Burk MD at [place] [address]  1/14/97    SINUS SURGERY  x4 1982, 200, 1997, 2005     SMALL INTESTINE SURGERY  9497    Due to complictions after mass removal.   [facility] STIMULATOR SURGERY N/A 11/14/2019    Cervical SCS Trial performed by Cecilia Joseph MD at [address] [facility] N/A 6/12/2020    retrial due to lead migration Cervical SCS trial entrance T1 to C 2 performed by Cecilia Joseph MD at [address] [facility]  1998,2009,2010,2011,2012, 2015, 2/2016       Social History     Tobacco Use    Smoking status: Never Smoker    Smokeless tobacco: Never Used   Vaping Use    Vaping Use: Never used   Substance Use Topics    Alcohol use: Yes     Comment: socially    Drug use: No       Allergies   Allergen Reactions    Dilaudid [Hydromorphone Hcl]      Respiratory failure    Iron Anaphylaxis    Percodan [Oxycodone-Aspirin]      Anxiety     Gluten Diarrhea    Gluten Meal Other (See Comments)     Other reaction(s): Intolerance, Other: See Comments  Celiac disease  Celiac disease    Maxzide [Hydrochlorothiazide W-Triamterene] Nausea Only     dizziness    Nalfon [Fenoprofen Calcium] Hives    Oxycodone-Acetaminophen     Oxycodone-Aspirin     Percocet  [Oxycodone-Acetaminophen]     Reglan [Metoclopramide]     Fenoprofen Hives    Sulfa Antibiotics Hives       Current Outpatient Medications   Medication Sig Dispense Refill    modafinil (PROVIGIL) 200 MG tablet Take 2 tablets by mouth daily for 180 days. 180 tablet 1    methylphenidate (RITALIN) 20 MG tablet Take 1 tablet by mouth 3 times daily for 90 days.  270 tablet 0    famotidine (PEPCID) 20 MG tablet Take 1 tablet by mouth nightly 90 tablet 1    SYNTHROID 125 MCG tablet TAKE 1 TABLET BY MOUTH EVERY DAY 90 tablet 1    DULoxetine (CYMBALTA) 60 MG extended release capsule TAKE 2 CAPSULE BY MOUTH EVERY  capsule 0    butalbital-acetaminophen-caffeine (FIORICET, ESGIC) -40 MG per tablet Take 1 tablet by mouth every 4 hours as needed for Headaches 30 tablet 0    furosemide (LASIX) 20 MG tablet Take 2 tablets by mouth daily 180 tablet 1    fluticasone (FLONASE) 50 MCG/ACT nasal spray SPRAY 1 SPRAY INTO EACH NOSTRIL EVERY DAY 1 Bottle 0    omeprazole (PRILOSEC) 20 MG delayed release capsule Take 20 mg by mouth nightly      esomeprazole (NEXIUM) 40 MG delayed release capsule Take 1 capsule by mouth every morning (before breakfast) 90 capsule 3    Omega-3 Fatty Acids (FISH OIL) 1000 MG CAPS Take 6,000 mg by mouth 3 times daily      magnesium oxide (MAG-OX) 400 MG tablet Take 1,200 mg by mouth daily      Lysine 500 MG TABS Take 1,500 mg by mouth daily      ferrous sulfate (IRON 325) 325 (65 Fe) MG tablet Take 325 mg by mouth daily (with breakfast)      Cinnamon 500 MG TABS Take 6 tablets by mouth daily       ascorbic acid (VITAMIN C) 1000 MG tablet Take 0.5 tablets by mouth 3 times daily 30 tablet 3    B Complex-Folic Acid (Q-806 BALANCED TR PO) Take 100 mg by mouth 3 times daily (before meals) [facility] [place]       vitamin D (CHOLECALCIFEROL) 5000 UNITS CAPS capsule Take 5,000 Units by mouth daily       rosuvastatin (CRESTOR) 20 MG tablet Take 1 tablet by mouth daily 90 tablet 3     No current facility-administered medications for this visit.        Family History   Problem Relation Age of Onset    Diabetes Mother         type II    Stroke Mother     High Blood Pressure Mother     Kidney Disease Mother     Heart Disease Mother     Heart Attack Mother 66        2/28/15    Colon Cancer Father 79        jejunum   Arron German Cancer Father         Lyphoma    Irritable Bowel Syndrome Sister     High Blood Pressure Brother     Kidney Disease Brother     Celiac Disease Brother     Diabetes Maternal Grandmother         type II    Blindness Maternal Grandmother     Emphysema Maternal Grandfather     Stroke Paternal Grandmother     Diabetes Paternal Grandmother         type II    Ovarian Cancer Paternal Grandmother 47

## 2021-06-11 PROBLEM — E44.0 MODERATE MALNUTRITION (HCC): Status: RESOLVED | Noted: 2018-09-14 | Resolved: 2021-06-11

## 2021-07-15 ENCOUNTER — TELEPHONE (OUTPATIENT)
Dept: FAMILY MEDICINE CLINIC | Age: 66
End: 2021-07-15

## 2021-07-15 DIAGNOSIS — K21.00 GASTROESOPHAGEAL REFLUX DISEASE WITH ESOPHAGITIS: ICD-10-CM

## 2021-07-15 RX ORDER — ESOMEPRAZOLE MAGNESIUM 40 MG/1
CAPSULE, DELAYED RELEASE ORAL
Qty: 30 CAPSULE | Refills: 0 | Status: SHIPPED | OUTPATIENT
Start: 2021-07-15 | End: 2021-07-16 | Stop reason: SDUPTHER

## 2021-07-15 NOTE — TELEPHONE ENCOUNTER
Date of last visit:  6/17/2020  Date of next visit:  7/15/2021    Requested Prescriptions     Pending Prescriptions Disp Refills    esomeprazole (Vysr) 40 MG delayed release capsule [Pharmacy Med Name: Esomeprazole Magnesium Oral Capsule Delayed Release 40 MG] 90 capsule 0     Sig: TAKE 1 CAPSULE BY MOUTH IN THE MORNING  BEFORE BREAKFAST

## 2021-07-16 DIAGNOSIS — K21.00 GASTROESOPHAGEAL REFLUX DISEASE WITH ESOPHAGITIS: ICD-10-CM

## 2021-07-16 RX ORDER — ESOMEPRAZOLE MAGNESIUM 40 MG/1
40 CAPSULE, DELAYED RELEASE ORAL
Qty: 90 CAPSULE | Refills: 0 | Status: SHIPPED | OUTPATIENT
Start: 2021-07-16 | End: 2021-08-23 | Stop reason: SDUPTHER

## 2021-07-16 NOTE — TELEPHONE ENCOUNTER
She has seen Dr Sergio Skinner and Dr Laura Jeff due to Dr Collazo's schedule being full. Patient did have a scheduled appt on 6/21 but waited 2hrs and had to leave due to work. She did schedule an appt for 8/23/21 but she would like a 90 day of the Cymbalta and Nexium because it is cheaper for her. Please contact pharmacy and change to 90 day.

## 2021-08-17 DIAGNOSIS — G90.511 COMPLEX REGIONAL PAIN SYNDROME TYPE 1 OF RIGHT UPPER EXTREMITY: ICD-10-CM

## 2021-08-17 DIAGNOSIS — M54.12 RIGHT CERVICAL RADICULOPATHY: ICD-10-CM

## 2021-08-17 RX ORDER — DULOXETIN HYDROCHLORIDE 60 MG/1
CAPSULE, DELAYED RELEASE ORAL
Qty: 60 CAPSULE | Refills: 0 | Status: SHIPPED | OUTPATIENT
Start: 2021-08-17 | End: 2021-08-23 | Stop reason: SDUPTHER

## 2021-08-17 NOTE — TELEPHONE ENCOUNTER
Date of last visit:  6/17/2020  Date of next visit:  8/23/2021    Requested Prescriptions     Pending Prescriptions Disp Refills    DULoxetine (CYMBALTA) 60 MG extended release capsule 60 capsule 0     Sig: TAKE 2 CAPSULES BY MOUTH EVERY DAY

## 2021-08-23 ENCOUNTER — TELEPHONE (OUTPATIENT)
Dept: FAMILY MEDICINE CLINIC | Age: 66
End: 2021-08-23

## 2021-08-23 ENCOUNTER — OFFICE VISIT (OUTPATIENT)
Dept: FAMILY MEDICINE CLINIC | Age: 66
End: 2021-08-23

## 2021-08-23 VITALS
SYSTOLIC BLOOD PRESSURE: 130 MMHG | DIASTOLIC BLOOD PRESSURE: 78 MMHG | WEIGHT: 185.38 LBS | HEIGHT: 65 IN | BODY MASS INDEX: 30.89 KG/M2 | HEART RATE: 68 BPM | TEMPERATURE: 95.6 F | RESPIRATION RATE: 16 BRPM

## 2021-08-23 DIAGNOSIS — I87.2 VENOUS INSUFFICIENCY: ICD-10-CM

## 2021-08-23 DIAGNOSIS — K44.9 HIATAL HERNIA WITH GASTROESOPHAGEAL REFLUX DISEASE WITHOUT ESOPHAGITIS: ICD-10-CM

## 2021-08-23 DIAGNOSIS — K21.00 GASTROESOPHAGEAL REFLUX DISEASE WITH ESOPHAGITIS, UNSPECIFIED WHETHER HEMORRHAGE: ICD-10-CM

## 2021-08-23 DIAGNOSIS — E78.5 HYPERLIPIDEMIA, UNSPECIFIED HYPERLIPIDEMIA TYPE: ICD-10-CM

## 2021-08-23 DIAGNOSIS — M54.12 RIGHT CERVICAL RADICULOPATHY: ICD-10-CM

## 2021-08-23 DIAGNOSIS — Z78.0 POST-MENOPAUSAL: ICD-10-CM

## 2021-08-23 DIAGNOSIS — Z12.31 VISIT FOR SCREENING MAMMOGRAM: Primary | ICD-10-CM

## 2021-08-23 DIAGNOSIS — J01.90 ACUTE SINUSITIS, RECURRENCE NOT SPECIFIED, UNSPECIFIED LOCATION: ICD-10-CM

## 2021-08-23 DIAGNOSIS — K22.0 INCOMPETENT LOWER ESOPHAGEAL SPHINCTER: ICD-10-CM

## 2021-08-23 DIAGNOSIS — E03.9 HYPOTHYROIDISM, UNSPECIFIED TYPE: ICD-10-CM

## 2021-08-23 DIAGNOSIS — K21.9 HIATAL HERNIA WITH GASTROESOPHAGEAL REFLUX DISEASE WITHOUT ESOPHAGITIS: ICD-10-CM

## 2021-08-23 DIAGNOSIS — G90.511 COMPLEX REGIONAL PAIN SYNDROME TYPE 1 OF RIGHT UPPER EXTREMITY: ICD-10-CM

## 2021-08-23 PROBLEM — D76.3 OTHER HISTIOCYTOSIS SYNDROMES (HCC): Status: RESOLVED | Noted: 2021-05-25 | Resolved: 2021-08-23

## 2021-08-23 PROCEDURE — 4040F PNEUMOC VAC/ADMIN/RCVD: CPT | Performed by: FAMILY MEDICINE

## 2021-08-23 PROCEDURE — G8417 CALC BMI ABV UP PARAM F/U: HCPCS | Performed by: FAMILY MEDICINE

## 2021-08-23 PROCEDURE — G8400 PT W/DXA NO RESULTS DOC: HCPCS | Performed by: FAMILY MEDICINE

## 2021-08-23 PROCEDURE — 1090F PRES/ABSN URINE INCON ASSESS: CPT | Performed by: FAMILY MEDICINE

## 2021-08-23 PROCEDURE — 1036F TOBACCO NON-USER: CPT | Performed by: FAMILY MEDICINE

## 2021-08-23 PROCEDURE — 1123F ACP DISCUSS/DSCN MKR DOCD: CPT | Performed by: FAMILY MEDICINE

## 2021-08-23 PROCEDURE — 99214 OFFICE O/P EST MOD 30 MIN: CPT | Performed by: FAMILY MEDICINE

## 2021-08-23 PROCEDURE — G8427 DOCREV CUR MEDS BY ELIG CLIN: HCPCS | Performed by: FAMILY MEDICINE

## 2021-08-23 PROCEDURE — 3017F COLORECTAL CA SCREEN DOC REV: CPT | Performed by: FAMILY MEDICINE

## 2021-08-23 RX ORDER — DULOXETIN HYDROCHLORIDE 60 MG/1
CAPSULE, DELAYED RELEASE ORAL
Qty: 180 CAPSULE | Refills: 3 | Status: SHIPPED | OUTPATIENT
Start: 2021-08-23 | End: 2022-08-09 | Stop reason: SDUPTHER

## 2021-08-23 RX ORDER — FUROSEMIDE 20 MG/1
40 TABLET ORAL DAILY
Qty: 180 TABLET | Refills: 3 | Status: SHIPPED | OUTPATIENT
Start: 2021-08-23 | End: 2022-09-07 | Stop reason: SDUPTHER

## 2021-08-23 RX ORDER — FLUCONAZOLE 150 MG/1
150 TABLET ORAL ONCE
Qty: 1 TABLET | Refills: 1 | Status: SHIPPED | OUTPATIENT
Start: 2021-08-23 | End: 2021-08-23

## 2021-08-23 RX ORDER — ESOMEPRAZOLE MAGNESIUM 40 MG/1
40 CAPSULE, DELAYED RELEASE ORAL
Qty: 90 CAPSULE | Refills: 3 | Status: SHIPPED | OUTPATIENT
Start: 2021-08-23 | End: 2022-09-07 | Stop reason: SDUPTHER

## 2021-08-23 RX ORDER — FAMOTIDINE 20 MG/1
20 TABLET, FILM COATED ORAL NIGHTLY
Qty: 90 TABLET | Refills: 3 | Status: SHIPPED | OUTPATIENT
Start: 2021-08-23 | End: 2022-09-07 | Stop reason: SDUPTHER

## 2021-08-23 RX ORDER — AZITHROMYCIN 250 MG/1
TABLET, FILM COATED ORAL
Qty: 1 PACKET | Refills: 0 | Status: SHIPPED | OUTPATIENT
Start: 2021-08-23 | End: 2021-09-02

## 2021-08-23 RX ORDER — ROSUVASTATIN CALCIUM 20 MG/1
20 TABLET, COATED ORAL DAILY
Qty: 90 TABLET | Refills: 3 | Status: SHIPPED | OUTPATIENT
Start: 2021-08-23 | End: 2022-09-12

## 2021-08-23 RX ORDER — LEVOTHYROXINE SODIUM 125 MCG
TABLET ORAL
Qty: 90 TABLET | Refills: 3 | Status: CANCELLED | OUTPATIENT
Start: 2021-08-23

## 2021-08-23 ASSESSMENT — ENCOUNTER SYMPTOMS
SHORTNESS OF BREATH: 0
SINUS PRESSURE: 0
CONSTIPATION: 0

## 2021-08-23 NOTE — PROGRESS NOTES
Lloyd Hill (:  1955) is a 72 y.o. female,Established patient, here for evaluation of the following chief complaint(s):  Wellness Program         ASSESSMENT/PLAN:      Diagnosis Orders   1. Visit for screening mammogram  [facility] DIGITAL SCREEN W OR WO CAD BILATERAL   2. Hyperlipidemia, unspecified hyperlipidemia type  rosuvastatin (CRESTOR) 20 MG tablet    Lipid, Fasting    Comprehensive Metabolic Panel, Fasting   3. Venous insufficiency  furosemide (LASIX) 20 MG tablet   4. Hypothyroidism, unspecified type  TSH   5. Hiatal hernia with gastroesophageal reflux disease without esophagitis  famotidine (PEPCID) 20 MG tablet   6. Incompetent lower esophageal sphincter  famotidine (PEPCID) 20 MG tablet   7. Complex regional pain syndrome type 1 of right upper extremity  DULoxetine (CYMBALTA) 60 MG extended release capsule    Rosalina Ragland DO, Physical Medicine & Rehab, [facility]   8. Right cervical radiculopathy  DULoxetine (CYMBALTA) 60 MG extended release capsule    Rosalina Ragland DO, Physical Medicine & Rehab, [facility]   9. Gastroesophageal reflux disease with esophagitis  esomeprazole (NEXIUM) 40 MG delayed release capsule   10. Post-menopausal  [facility] DIGITAL SCREEN W OR WO CAD BILATERAL    [facility] Dexa Bone Density Scan   11. Acute sinusitis, recurrence not specified, unspecified location  azithromycin (ZITHROMAX) 250 MG tablet    fluconazole (DIFLUCAN) 150 MG tablet     See me in 6 months  See Dr Wing Spann  Do the fasting labs soon  Get the mammogram and DEXA      Subjective   SUBJECTIVE/OBJECTIVE:  HPI  1. The nexium is helpful and she uses pepcid too with good results. 2. The cymbalta helps some with the CRPS. She continues with Occupational therapy. She also sees a massage therapist, and a chiropractor. She has been to the [facility] and had different nerve stimulators and other devices. There is pain, tightness sweating, burningf, numbness, tingling, weakness, shaking, decreased ROM.  She had seen doctors at the Clinic and then Dr Marcell Stone along with one from [facility]. 3. She is concerned about getting the covid vaccination due to the severe reactions she's had with iron infusions and dilaudid  Review of Systems   Constitutional: Negative for fatigue. HENT: Negative for sinus pressure. Eyes: Negative for visual disturbance. Respiratory: Negative for shortness of breath. Cardiovascular: Negative for chest pain. Gastrointestinal: Negative for constipation. Genitourinary: Negative. Musculoskeletal: Positive for arthralgias, joint swelling and myalgias. Skin: Negative for rash. Neurological: Positive for tremors, weakness and numbness. Negative for headaches. Psychiatric/Behavioral: Positive for sleep disturbance. The patient's medications, allergies, past medical problems, surgical, social, and family histories were reviewed and updated as needed. Objective   Physical Exam  Constitutional:       General: She is not in acute distress. Appearance: She is well-developed. HENT:      Head: Normocephalic and atraumatic. Right Ear: External ear normal.      Left Ear: External ear normal.      Nose: Nose normal.      Mouth/Throat:      Pharynx: No oropharyngeal exudate. Eyes:      General: No scleral icterus. Conjunctiva/sclera: Conjunctivae normal.   Neck:      Thyroid: No thyromegaly. Vascular: No carotid bruit. Trachea: No tracheal deviation. Cardiovascular:      Rate and Rhythm: Normal rate and regular rhythm. Heart sounds: Normal heart sounds. Pulmonary:      Effort: Pulmonary effort is normal.      Breath sounds: Normal breath sounds. Abdominal:      General: Bowel sounds are normal.      Palpations: Abdomen is soft. There is no mass. Musculoskeletal:      Cervical back: Neck supple. Lymphadenopathy:      Cervical: No cervical adenopathy. Skin:     General: Skin is warm and dry.    Neurological:      Mental Status: She is alert and oriented to person, place, and time. Psychiatric:         Behavior: Behavior normal.     Blood pressure 130/78, pulse 68, temperature 95.6 °F (35.3 °C), temperature source Skin, resp. rate 16, height 5' 5\" (1.651 m), weight 185 lb 6 oz (84.1 kg), not currently breastfeeding. An electronic signature was used to authenticate this note.     --Susan Lira MD

## 2021-08-23 NOTE — LETTER
[facility] [address] [id number]  Phone: [phone]  Fax: [phone]    Storm Councilman, MD        August 23, 2021      Please excuse Emily Lemonclifton from the covid vaccination due to her significant medical allergies.     Sincerely,      Storm Councilman, MD

## 2021-09-21 ENCOUNTER — TELEPHONE (OUTPATIENT)
Dept: PHYSICAL MEDICINE AND REHAB | Age: 66
End: 2021-09-21

## 2021-09-21 NOTE — TELEPHONE ENCOUNTER
Patient called to reschedule today's appointment because she is running about 20 minutes late due to talking with daughter who has Covid. Her appointment is scheduled for 09/29/2021. Patient is requesting an order to continue occupational therapy be faxed to [facility].

## 2021-09-22 DIAGNOSIS — G90.511 COMPLEX REGIONAL PAIN SYNDROME TYPE 1 OF RIGHT UPPER EXTREMITY: Primary | ICD-10-CM

## 2021-09-30 ENCOUNTER — NURSE ONLY (OUTPATIENT)
Dept: LAB | Age: 66
End: 2021-09-30

## 2021-09-30 DIAGNOSIS — E03.9 HYPOTHYROIDISM, UNSPECIFIED TYPE: ICD-10-CM

## 2021-09-30 DIAGNOSIS — E78.5 HYPERLIPIDEMIA, UNSPECIFIED HYPERLIPIDEMIA TYPE: ICD-10-CM

## 2021-09-30 LAB
ALBUMIN SERPL-MCNC: 4.9 G/DL (ref 3.5–5.1)
ALP BLD-CCNC: 102 U/L (ref 38–126)
ALT SERPL-CCNC: 33 U/L (ref 11–66)
ANION GAP SERPL CALCULATED.3IONS-SCNC: 12 MEQ/L (ref 8–16)
AST SERPL-CCNC: 35 U/L (ref 5–40)
BILIRUB SERPL-MCNC: 0.4 MG/DL (ref 0.3–1.2)
BUN BLDV-MCNC: 19 MG/DL (ref 7–22)
CALCIUM SERPL-MCNC: 9.9 MG/DL (ref 8.5–10.5)
CHLORIDE BLD-SCNC: 100 MEQ/L (ref 98–111)
CHOLESTEROL, TOTAL: 133 MG/DL (ref 100–199)
CO2: 29 MEQ/L (ref 23–33)
CREAT SERPL-MCNC: 0.6 MG/DL (ref 0.4–1.2)
GFR SERPL CREATININE-BSD FRML MDRD: > 90 ML/MIN/1.73M2
GLUCOSE BLD-MCNC: 110 MG/DL (ref 70–108)
HDLC SERPL-MCNC: 50 MG/DL
LDL CHOLESTEROL CALCULATED: 63 MG/DL
POTASSIUM SERPL-SCNC: 3.9 MEQ/L (ref 3.5–5.2)
SODIUM BLD-SCNC: 141 MEQ/L (ref 135–145)
TOTAL PROTEIN: 7.3 G/DL (ref 6.1–8)
TRIGL SERPL-MCNC: 98 MG/DL (ref 0–199)
TSH SERPL DL<=0.05 MIU/L-ACNC: 2.84 UIU/ML (ref 0.4–4.2)

## 2021-10-07 DIAGNOSIS — E03.9 HYPOTHYROIDISM, UNSPECIFIED TYPE: ICD-10-CM

## 2021-10-07 RX ORDER — LEVOTHYROXINE SODIUM 125 MCG
TABLET ORAL
Qty: 90 TABLET | Refills: 3 | Status: SHIPPED | OUTPATIENT
Start: 2021-10-07 | End: 2022-09-07 | Stop reason: SDUPTHER

## 2021-10-07 NOTE — TELEPHONE ENCOUNTER
Date of last visit:  8/23/2021  Date of next visit:  2/25/2022    Requested Prescriptions     Pending Prescriptions Disp Refills    SYNTHROID 125 MCG tablet 90 tablet 1     Sig: TAKE 1 TABLET BY MOUTH EVERY DAY

## 2021-10-08 ENCOUNTER — TELEPHONE (OUTPATIENT)
Dept: FAMILY MEDICINE CLINIC | Age: 66
End: 2021-10-08

## 2021-10-08 DIAGNOSIS — E78.5 HYPERLIPIDEMIA, UNSPECIFIED HYPERLIPIDEMIA TYPE: Primary | ICD-10-CM

## 2021-10-08 NOTE — TELEPHONE ENCOUNTER
----- Message from Florin Thomas MD sent at 10/8/2021  1:53 PM EDT -----  Let her know the labs were ok and to check the fasting lab again in mid April

## 2021-10-18 ENCOUNTER — HOSPITAL ENCOUNTER (OUTPATIENT)
Dept: MRI IMAGING | Age: 66
Discharge: HOME | End: 2021-10-18
Payer: MEDICARE

## 2021-10-18 DIAGNOSIS — M75.01 ADHESIVE BURSITIS OF RIGHT SHOULDER: ICD-10-CM

## 2021-10-18 PROCEDURE — 73221 MRI JOINT UPR EXTREM W/O DYE: CPT

## 2021-11-24 DIAGNOSIS — G47.11 IDIOPATHIC HYPERSOMNIA: ICD-10-CM

## 2021-11-24 DIAGNOSIS — G47.411 PRIMARY NARCOLEPSY WITH CATAPLEXY: ICD-10-CM

## 2021-11-24 RX ORDER — MODAFINIL 200 MG/1
400 TABLET ORAL DAILY
Qty: 180 TABLET | Refills: 1 | Status: SHIPPED | OUTPATIENT
Start: 2021-11-24 | End: 2022-05-18 | Stop reason: SDUPTHER

## 2021-11-24 NOTE — TELEPHONE ENCOUNTER
Tom Holman called requesting a refill on the following medications:  Requested Prescriptions     Pending Prescriptions Disp Refills    modafinil (PROVIGIL) 200 MG tablet 180 tablet 1     Sig: Take 2 tablets by mouth daily for 180 days.      [facility]    Date of last visit: 06/01/2021  Date of next visit (if applicable): Visit date not found

## 2021-12-10 ENCOUNTER — HOSPITAL ENCOUNTER (OUTPATIENT)
Dept: WOMENS IMAGING | Age: 66
Discharge: HOME | End: 2021-12-10
Payer: MEDICARE

## 2021-12-10 DIAGNOSIS — Z78.0 POST-MENOPAUSAL: ICD-10-CM

## 2021-12-10 DIAGNOSIS — Z12.31 VISIT FOR SCREENING MAMMOGRAM: ICD-10-CM

## 2021-12-10 PROCEDURE — 77080 DXA BONE DENSITY AXIAL: CPT

## 2021-12-10 PROCEDURE — 77063 BREAST TOMOSYNTHESIS BI: CPT

## 2021-12-14 ENCOUNTER — TELEPHONE (OUTPATIENT)
Dept: FAMILY MEDICINE CLINIC | Age: 66
End: 2021-12-14

## 2021-12-14 DIAGNOSIS — M81.0 AGE-RELATED OSTEOPOROSIS WITHOUT CURRENT PATHOLOGICAL FRACTURE: Primary | ICD-10-CM

## 2021-12-14 NOTE — TELEPHONE ENCOUNTER
----- Message from Melly Sainz MD sent at 12/14/2021 11:39 AM EST -----  Follow-up with Dr. Cleveland Gross to discuss results Bone density

## 2021-12-20 PROBLEM — M81.0 AGE-RELATED OSTEOPOROSIS WITHOUT CURRENT PATHOLOGICAL FRACTURE: Status: ACTIVE | Noted: 2021-12-20

## 2021-12-21 NOTE — TELEPHONE ENCOUNTER
Let her know that the DEXA showed osteoporosis in the right hip. I would like her to have some non fasting labs done and to see me soon to discuss it's treatment.

## 2022-01-25 ENCOUNTER — OFFICE VISIT (OUTPATIENT)
Dept: PULMONOLOGY | Age: 67
End: 2022-01-25
Payer: MEDICARE

## 2022-01-25 VITALS
DIASTOLIC BLOOD PRESSURE: 78 MMHG | BODY MASS INDEX: 29.66 KG/M2 | HEIGHT: 65 IN | HEART RATE: 74 BPM | OXYGEN SATURATION: 98 % | TEMPERATURE: 97.2 F | WEIGHT: 178 LBS | SYSTOLIC BLOOD PRESSURE: 130 MMHG

## 2022-01-25 DIAGNOSIS — G47.19 EXCESSIVE DAYTIME SLEEPINESS: ICD-10-CM

## 2022-01-25 DIAGNOSIS — G25.81 RLS (RESTLESS LEGS SYNDROME): ICD-10-CM

## 2022-01-25 DIAGNOSIS — G47.11 IDIOPATHIC HYPERSOMNIA: Primary | ICD-10-CM

## 2022-01-25 PROBLEM — G47.411 NARCOLEPSY AND CATAPLEXY: Status: ACTIVE | Noted: 2022-01-25

## 2022-01-25 PROBLEM — G47.411 NARCOLEPSY AND CATAPLEXY: Status: RESOLVED | Noted: 2022-01-25 | Resolved: 2022-01-25

## 2022-01-25 PROBLEM — R07.9 CHEST PAIN: Status: RESOLVED | Noted: 2018-10-01 | Resolved: 2022-01-25

## 2022-01-25 PROCEDURE — 1090F PRES/ABSN URINE INCON ASSESS: CPT | Performed by: PHYSICIAN ASSISTANT

## 2022-01-25 PROCEDURE — 1123F ACP DISCUSS/DSCN MKR DOCD: CPT | Performed by: PHYSICIAN ASSISTANT

## 2022-01-25 PROCEDURE — 3017F COLORECTAL CA SCREEN DOC REV: CPT | Performed by: PHYSICIAN ASSISTANT

## 2022-01-25 PROCEDURE — G8417 CALC BMI ABV UP PARAM F/U: HCPCS | Performed by: PHYSICIAN ASSISTANT

## 2022-01-25 PROCEDURE — 99214 OFFICE O/P EST MOD 30 MIN: CPT | Performed by: PHYSICIAN ASSISTANT

## 2022-01-25 PROCEDURE — 4040F PNEUMOC VAC/ADMIN/RCVD: CPT | Performed by: PHYSICIAN ASSISTANT

## 2022-01-25 PROCEDURE — G8484 FLU IMMUNIZE NO ADMIN: HCPCS | Performed by: PHYSICIAN ASSISTANT

## 2022-01-25 PROCEDURE — 1036F TOBACCO NON-USER: CPT | Performed by: PHYSICIAN ASSISTANT

## 2022-01-25 PROCEDURE — G8399 PT W/DXA RESULTS DOCUMENT: HCPCS | Performed by: PHYSICIAN ASSISTANT

## 2022-01-25 PROCEDURE — G8427 DOCREV CUR MEDS BY ELIG CLIN: HCPCS | Performed by: PHYSICIAN ASSISTANT

## 2022-01-25 RX ORDER — METHYLPHENIDATE HYDROCHLORIDE 20 MG/1
20 TABLET ORAL 3 TIMES DAILY
Qty: 270 TABLET | Refills: 0 | Status: SHIPPED | OUTPATIENT
Start: 2022-01-25 | End: 2022-04-25

## 2022-01-25 NOTE — PROGRESS NOTES
[facility] and [address] [facility]                                         [id number]  1/25/2022   Chief Complaint   Patient presents with    Follow-up     renew med last seen 6/2021        Pt of Dr. Valerie Cárdenas 17  SAQLI 52    Presentation:   Rajeev Zeng presents for sleep medicine follow up for idiopathic hypersomnia  Since the last visit Rajeev Zeng has been doing reasonably well with Provigil. Symptoms of hypersomnia are improved with Provigil. She stopped taking the Ritalin on her own to see if any difference. She feels like she needs the Ritalin and does better with Ritalin . She was suppose to have a sleep study but had to cancel. She has been more tired since being off Ritalin. She was found to have mild LEA on original PSG in 2003. She never achieved REM on MSLT and therefore does not officially have narcolepsy although very suggestive    Progress History:   Since last visit any new medical issues? No  New ER or hospitlal visits? No  Any new or changes in medicines? No  Any new sleep medicines? No    Sleep issues:  Do you feel better? No  More rested? No   Better concentration?  no      Past Medical History:   Diagnosis Date    Age-related osteoporosis without current pathological fracture     Anemia 2000    malabsorption/Celiac disease    Anxiety 02/2018    Dr. Sary Richard    Arm DVT (deep venous thromboembolism), acute ([facility] [address] 05/15/2013    Broken shoulder 09/06/2016    right    Celiac disease     Celiac disease     Complex regional pain syndrome of right upper extremity     Right shoulder, arm, and hand    CRPS (complex regional pain syndrome type I) 2016    Dr. Nima Regalado    Depression 09/2016    Dr. Nima Regalado    Depression     GERD (gastroesophageal reflux disease) 2000    and celiac- Dr. Ela Lopez- Olvin Anderson Headache(784.0)     migraines-Dr. Andrés Galloway Heart murmur     History of blood transfusion     Hx of reactive hypoglycemia     Dr. Melinda Jordan Hyperlipidemia 2013    Dr. Jameel Perrin Hypothyroidism 1993    Dr. Helene Heath    IBS (irritable bowel syndrome)     PER PT     Irritable bowel syndrome 1997    [facility]. Marcell Joe at [facility] MDRO (multiple drug resistant organisms) resistance     Meniere disease 2005    Dr. Reina Silveira    Mitral valve prolapse syndrome 2008    MRSA (methicillin resistant Staphylococcus aureus) 2013, 8/1/15    left forearm 2013, nasal screen pos Aug 2015   Tristian Vera Narcolepsy 2000    Dr. Elodia House    Narcolepsy and cataplexy     Partial bowel obstruction (HCC)     Multile times.   S/P partial small bowel resection    Restless legs syndrome 1993    Dr. Julio Rice    RSD upper limb 2016    Right Shoulder- Dr. 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 Narayan Band Thyroid disease        Past Surgical History:   Procedure Laterality Date    ABDOMEN SURGERY  04/23/2013    ABD Exploration, removal of ommentum, lysis of adhesions-Dr. Saleem Storey    ABDOMEN SURGERY      removed mass \"near kidney\"     ABDOMINAL ADHESION SURGERY  10/2010    Resection-Dr. Saleem Storey, twice not sure of exact year   Keon Murray Left 2006   Emely Bustillo  2003     Dr. Ghosh [facility]  [id number], 2015    [facility]    LAPAROTOMY EXPLORATORY N/A 8/31/2018    LAPAROTOMY EXPLORATORY, lysis of adhesions, closure innerotomoy performed by Za Prasad MD at [address] [facility]  2006    capsule endoscopy    SC EGD TRANSORAL BIOPSY SINGLE/MULTIPLE Left 2/8/2018 EGD BIOPSY performed by Luda Berman MD at [address] [facility] [address] OFFICE/OUTPT [address] Left 8/31/2018    ROBOTIC EXCISION OF LEFT PARARENAL MASS performed by Liang Rodriguez MD at [address]  1/14/97    SINUS SURGERY  x4 1982, 1990, 1997, 2005    [address]    Due to complictions after mass removal.   [facility] STIMULATOR SURGERY N/A 11/14/2019    Cervical SCS Trial performed by Willy Rucker MD at [address] [facility] N/A 6/12/2020    retrial due to lead migration Cervical SCS trial entrance T1 to C 2 performed by Willy Rucker MD at [address] [facility]  1998,2009,2010,2011,2012, 2015, 2/2016       Social History     Tobacco Use    Smoking status: Never Smoker    Smokeless tobacco: Never Used   Vaping Use    Vaping Use: Never used   Substance Use Topics    Alcohol use: Yes     Comment: socially    Drug use: No       Allergies   Allergen Reactions    Dilaudid [Hydromorphone Hcl]      Respiratory failure    Iron Anaphylaxis    Percodan [Oxycodone-Aspirin]      Anxiety     Gluten Diarrhea    Gluten Meal Other (See Comments)     Other reaction(s): Intolerance, Other: See Comments  Celiac disease  Celiac disease    Maxzide [Hydrochlorothiazide W-Triamterene] Nausea Only     dizziness    Nalfon [Fenoprofen Calcium] Hives    Oxycodone-Acetaminophen     Oxycodone-Aspirin     Percocet  [Oxycodone-Acetaminophen]     Reglan [Metoclopramide]     Fenoprofen Hives    Sulfa Antibiotics Hives       Current Outpatient Medications   Medication Sig Dispense Refill    methylphenidate (RITALIN) 20 MG tablet Take 1 tablet by mouth 3 times daily for 90 days. 270 tablet 0    modafinil (PROVIGIL) 200 MG tablet Take 2 tablets by mouth daily for 180 days.  180 tablet 1    SYNTHROID 125 MCG tablet TAKE 1 TABLET BY MOUTH EVERY DAY 90 tablet 3    rosuvastatin (CRESTOR) 20 MG tablet Take 1 tablet by mouth daily 90 tablet 3    furosemide (LASIX) 20 MG tablet Take 2 tablets by mouth daily 180 tablet 3    famotidine (PEPCID) 20 MG tablet Take 1 tablet by mouth nightly 90 tablet 3    DULoxetine (CYMBALTA) 60 MG extended release capsule TAKE 2 CAPSULES BY MOUTH EVERY  capsule 3    esomeprazole (NEXIUM) 40 MG delayed release capsule Take 1 capsule by mouth every morning (before breakfast) 90 capsule 3    butalbital-acetaminophen-caffeine (FIORICET, ESGIC) -40 MG per tablet Take 1 tablet by mouth every 4 hours as needed for Headaches 30 tablet 0    fluticasone (FLONASE) 50 MCG/ACT nasal spray SPRAY 1 SPRAY INTO EACH NOSTRIL EVERY DAY 1 Bottle 0    omeprazole (PRILOSEC) 20 MG delayed release capsule Take 20 mg by mouth nightly      Omega-3 Fatty Acids (FISH OIL) 1000 MG CAPS Take 6,000 mg by mouth 3 times daily      magnesium oxide (MAG-OX) 400 MG tablet Take 1,200 mg by mouth daily      Lysine 500 MG TABS Take 1,500 mg by mouth daily      ferrous sulfate (IRON 325) 325 (65 Fe) MG tablet Take 325 mg by mouth daily (with breakfast)      Cinnamon 500 MG TABS Take 6 tablets by mouth daily       ascorbic acid (VITAMIN C) 1000 MG tablet Take 0.5 tablets by mouth 3 times daily 30 tablet 3    B Complex-Folic Acid (F-726 BALANCED TR PO) Take 100 mg by mouth 3 times daily (before meals) [facility] [place]       vitamin D (CHOLECALCIFEROL) 5000 UNITS CAPS capsule Take 5,000 Units by mouth daily        No current facility-administered medications for this visit.        Family History   Problem Relation Age of Onset    Diabetes Mother         type II    Stroke Mother     High Blood Pressure Mother     Kidney Disease Mother     Heart Disease Mother     Heart Attack Mother 66        2/28/15    Colon Cancer Father 79        jejunum   Rosalene Wesly Cancer Father         Lyphoma    Irritable Bowel Syndrome Sister     High Blood Pressure Brother     Kidney Disease Brother     Celiac Disease Brother     Diabetes Maternal Grandmother         type II    Blindness Maternal Grandmother     Uterine Cancer Maternal Grandmother     Emphysema Maternal Grandfather     Stroke Paternal Grandmother     Diabetes Paternal Grandmother         type II    Ovarian Cancer Paternal Grandmother 48    Brain Cancer Paternal Grandfather 80        brain tumor    Asthma Brother      Problems Brother     Heart Disease Brother     Other Sister         1days old, pneumonia complications    Colon Cancer Paternal Aunt 79    Colon Cancer Paternal Uncle 79    Colon Cancer Paternal Uncle 79    Cancer Paternal Uncle 79        bone     Prostate Cancer Other 61    Cancer Paternal Aunt 72        bone ca'    Breast Cancer Paternal Aunt 72    Breast Cancer Paternal Cousin 27    Breast Cancer Paternal Aunt 72    Breast Cancer Maternal Aunt 72    Breast Cancer Maternal Aunt 65        Review of Systems -   General ROS: stable / unchanged  ENT ROS: negative for - nasal congestion, oral lesions or sore throat  Hematological andLymphatic ROS: negative  Endocrine ROS: negative  Respiratory ROS: no cough, shortness of breath, or wheezing  Cardiovascular ROS: no chest pain or dyspnea on exertion  Gastrointestinal ROS: no abdominal pain,change in bowel habits, or black or bloody stools  Musculoskeletal ROS: negative  Neurological ROS: negative    Physical Exam:    BMI:  Body mass index is 29.62 kg/m². Wt Readings from Last 3 Encounters:   01/25/22 178 lb (80.7 kg)   08/23/21 185 lb 6 oz (84.1 kg)   06/01/21 183 lb (83 kg)     Vitals: /78 (Site: Left Upper Arm, Position: Sitting, Cuff Size: Large Adult)   Pulse 74   Temp 97.2 °F (36.2 °C) (Temporal)   Ht 5' 5\" (1.651 m)   Wt 178 lb (80.7 kg)   SpO2 98%   BMI 29.62 kg/m²       Physical Exam  Constitutional:       Appearance: She is well-developed. HENT:      Head: Normocephalic and atraumatic.       Right Ear: External ear normal.      Left Ear: External ear normal.   Eyes:      Conjunctiva/sclera: Conjunctivae normal.      Pupils: Pupils are equal, round, and reactive to light. Cardiovascular:      Rate and Rhythm: Normal rate and regular rhythm. Heart sounds: Normal heart sounds. Pulmonary:      Effort: Pulmonary effort is normal.      Breath sounds: Normal breath sounds. Musculoskeletal:         General: Normal range of motion. Cervical back: Normal range of motion and neck supple. Skin:     General: Skin is warm and dry. Neurological:      Mental Status: She is alert and oriented to person, place, and time. Psychiatric:         Behavior: Behavior normal.         Thought Content: Thought content normal.         Judgment: Judgment normal.            Assessment      Diagnosis Orders   1. Idiopathic hypersomnia  Baseline Diagnostic Sleep Study   2. RLS (restless legs syndrome)     3. Excessive daytime sleepiness  Baseline Diagnostic Sleep Study          Plan   - Will continue Provigil and resume Ritalin   - Will get PSG to re-eval LEA since mild LEA found on original PSG  - may need to treat if LEA has progressed. - She call my officefor earlier appointment if needed for worsening of sleep symptoms.   - She was instructed on weight loss  - Mati Pack was educated about my impression and plan. Patient verbalizes understanding.   We will see Bevia Miller love after PSG     TAMAR Baez PA-C  1/25/2022

## 2022-01-26 ENCOUNTER — TELEPHONE (OUTPATIENT)
Dept: SLEEP CENTER | Age: 67
End: 2022-01-26

## 2022-01-26 NOTE — TELEPHONE ENCOUNTER
Zeinab is scheduled 1/31/22 for a PSG. Please schedule a follow up accordingly. Thank you!!   Bill Lot

## 2022-01-27 ENCOUNTER — APPOINTMENT (OUTPATIENT)
Dept: GENERAL RADIOLOGY | Age: 67
End: 2022-01-27
Payer: MEDICARE

## 2022-01-27 ENCOUNTER — HOSPITAL ENCOUNTER (EMERGENCY)
Age: 67
Discharge: HOME | End: 2022-01-27
Payer: MEDICARE

## 2022-01-27 VITALS
TEMPERATURE: 97.6 F | RESPIRATION RATE: 20 BRPM | SYSTOLIC BLOOD PRESSURE: 105 MMHG | HEART RATE: 108 BPM | WEIGHT: 177 LBS | DIASTOLIC BLOOD PRESSURE: 68 MMHG | OXYGEN SATURATION: 97 % | BODY MASS INDEX: 29.45 KG/M2

## 2022-01-27 DIAGNOSIS — J40 BRONCHITIS: Primary | ICD-10-CM

## 2022-01-27 PROCEDURE — 71046 X-RAY EXAM CHEST 2 VIEWS: CPT

## 2022-01-27 PROCEDURE — 99213 OFFICE O/P EST LOW 20 MIN: CPT

## 2022-01-27 PROCEDURE — 99213 OFFICE O/P EST LOW 20 MIN: CPT | Performed by: NURSE PRACTITIONER

## 2022-01-27 RX ORDER — ONDANSETRON 4 MG/1
4 TABLET, ORALLY DISINTEGRATING ORAL EVERY 8 HOURS PRN
Qty: 40 TABLET | Refills: 0 | Status: SHIPPED | OUTPATIENT
Start: 2022-01-27 | End: 2022-05-10 | Stop reason: CLARIF

## 2022-01-27 RX ORDER — CETIRIZINE HYDROCHLORIDE 10 MG/1
10 TABLET ORAL DAILY
Qty: 30 TABLET | Refills: 0 | Status: SHIPPED | OUTPATIENT
Start: 2022-01-27 | End: 2022-02-26

## 2022-01-27 RX ORDER — PREDNISONE 20 MG/1
20 TABLET ORAL 2 TIMES DAILY
Qty: 10 TABLET | Refills: 0 | Status: SHIPPED | OUTPATIENT
Start: 2022-01-27 | End: 2022-02-01

## 2022-01-27 RX ORDER — BENZONATATE 200 MG/1
200 CAPSULE ORAL 3 TIMES DAILY PRN
Qty: 30 CAPSULE | Refills: 0 | Status: SHIPPED | OUTPATIENT
Start: 2022-01-27 | End: 2022-02-03

## 2022-01-27 RX ORDER — AMOXICILLIN AND CLAVULANATE POTASSIUM 875; 125 MG/1; MG/1
1 TABLET, FILM COATED ORAL 2 TIMES DAILY
Qty: 20 TABLET | Refills: 0 | Status: SHIPPED | OUTPATIENT
Start: 2022-01-27 | End: 2022-02-06

## 2022-01-27 ASSESSMENT — ENCOUNTER SYMPTOMS
COUGH: 0
SHORTNESS OF BREATH: 0
SORE THROAT: 0
NAUSEA: 0
VOMITING: 0
RHINORRHEA: 0
CHEST TIGHTNESS: 0
DIARRHEA: 0

## 2022-01-27 NOTE — ED NOTES
Pt discharged. Pt verbalized understanding of discharge instructions and scripts. Pt walked out per self in stable condition.      José Miguel Rogers LPN  35/38/93 [id number]

## 2022-01-27 NOTE — ED TRIAGE NOTES
Pt walked to  Room 4. Pt here with complaints of a cough, congestion. Pt had covid new years day. Pt was down for 17 days. Pt got a neg test, but can't quit coughing. Pt wants a chest x-ray.

## 2022-01-27 NOTE — Clinical Note
Cay Mcburney was seen and treated in our emergency department on 1/27/2022. She may return to work on 01/28/2022. If you have any questions or concerns, please don't hesitate to call.       Dewane Habermann, NEREYDA - CNP

## 2022-01-27 NOTE — ED PROVIDER NOTES
[facility] 36  Urgent Care Encounter       CHIEF COMPLAINT       Chief Complaint   Patient presents with    Cough     Cough and congestion. Pt was positve for covid New years day. Was down for 17 days. Got a negative cough, but can't stop coughing. Pt did take z pack. Finished it last saturday. Nurses Notes reviewed and I agree except as noted in the HPI. HISTORY OF PRESENT ILLNESS   Yolanda Howard is a 77 y.o. female who presents to the [facility] urgent care for evaluation of cough. She reports that she was diagnosed with COVID-19 on January 1. She reports that she was symptomatic for 17 days. She reports that she was treated with a azithromycin and the symptoms had improved. She reports in the last week or 2 that the symptoms have worsened. She does report nasal congestion, and rhinorrhea. She denies fever or chills. She is requesting a chest x-ray. The history is provided by the patient. No  was used. REVIEW OF SYSTEMS     Review of Systems   Constitutional: Negative for activity change, appetite change, chills, fatigue and fever. HENT: Negative for ear discharge, ear pain, rhinorrhea and sore throat. Respiratory: Negative for cough, chest tightness and shortness of breath. Cardiovascular: Negative for chest pain. Gastrointestinal: Negative for diarrhea, nausea and vomiting. Genitourinary: Negative for dysuria. Skin: Negative for rash. Allergic/Immunologic: Negative for environmental allergies and food allergies. Neurological: Negative for dizziness and headaches.        PAST MEDICAL HISTORY         Diagnosis Date    Age-related osteoporosis without current pathological fracture     Anemia 2000    malabsorption/Celiac disease    Anxiety 02/2018    Dr. Navarro     Arm DVT (deep venous thromboembolism), acute ([facility] [address] 05/15/2013    Broken shoulder 09/06/2016    right    Celiac disease     Celiac disease     Complex regional pain syndrome of right upper extremity     Right shoulder, arm, and hand    CRPS (complex regional pain syndrome type I) 2016    Dr. Erin Lord    Depression 09/2016    Dr. Erin Lord    Depression     GERD (gastroesophageal reflux disease) 2000    and celiac- Dr. Shannan Humhprey Headache(784.0)     migraines-Dr. Parish Randle Heart murmur     History of blood transfusion     Hx of reactive hypoglycemia     Dr. Morris Carney Hyperlipidemia 2013    Dr. Parish Randle Hypothyroidism 1993    Dr. Gorodn Arthur    IBS (irritable bowel syndrome)     PER PT     Irritable bowel syndrome 1997    [facility] Jo Ann Hampton [facility] MDRO (multiple drug resistant organisms) resistance     Meniere disease 2005    Dr. Óscar Roberts    Mitral valve prolapse syndrome 2008    MRSA (methicillin resistant Staphylococcus aureus) 2013, 8/1/15    left forearm 2013, nasal screen pos Aug 2015    Partial bowel obstruction (HCC)     Multile times. S/P partial small bowel resection    Restless legs syndrome 1993    Dr. Amol Estrada RSD upper limb 2016    Right Shoulder- Dr. 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 Travis Osman Thyroid disease        SURGICALHISTORY     Patient  has a past surgical history that includes Cholecystectomy (2003 ); Abdominal adhesion surgery (10/2010); knee surgery (Right, 2006); sinus surgery (x4 1982, 1990, 1997, 2005 ); Abdomen surgery (04/23/2013); Carpal tunnel release (Left, 2006); sigmoidoscopy (1/14/97); other surgical history (2006); Colonoscopy ([id number],[id number],[id number], 2015); Upper gastrointestinal endoscopy ([id number],[id number],5394,8632, 2015, 2/2016); Endoscopy, colon, diagnostic; fracture surgery; pr egd transoral biopsy single/multiple (Left, 2/8/2018);  Appendectomy (1985); pr office/outpt visit,procedure only (Left, 8/31/2018); LAPAROTOMY EXPLORATORY (N/A, 8/31/2018); Spinal Cord Stimulator Surgery (N/A, 11/14/2019); Colonoscopy (2016); Abdomen surgery; Small intestine surgery (2018); Cholecystectomy; Spinal Cord Stimulator Surgery (N/A, 6/12/2020); Hysterectomy (1985); Hysterectomy, total abdominal (1985); and Hysterectomy, total abdominal (1985). CURRENT MEDICATIONS       Discharge Medication List as of 1/27/2022  4:15 PM      CONTINUE these medications which have NOT CHANGED    Details   methylphenidate (RITALIN) 20 MG tablet Take 1 tablet by mouth 3 times daily for 90 days. , Disp-270 tablet, R-0Normal      modafinil (PROVIGIL) 200 MG tablet Take 2 tablets by mouth daily for 180 days. , Disp-180 tablet, R-1Normal      SYNTHROID 125 MCG tablet TAKE 1 TABLET BY MOUTH EVERY DAY, Disp-90 tablet, R-3, DAWNormal      rosuvastatin (CRESTOR) 20 MG tablet Take 1 tablet by mouth daily, Disp-90 tablet, R-3Normal      furosemide (LASIX) 20 MG tablet Take 2 tablets by mouth daily, Disp-180 tablet, R-3Normal      famotidine (PEPCID) 20 MG tablet Take 1 tablet by mouth nightly, Disp-90 tablet, R-3Normal      DULoxetine (CYMBALTA) 60 MG extended release capsule TAKE 2 CAPSULES BY MOUTH EVERY DAY, Disp-180 capsule, R-3Normal      esomeprazole (NEXIUM) 40 MG delayed release capsule Take 1 capsule by mouth every morning (before breakfast), Disp-90 capsule, R-3Normal      butalbital-acetaminophen-caffeine (FIORICET, ESGIC) -40 MG per tablet Take 1 tablet by mouth every 4 hours as needed for Headaches, Disp-30 tablet, R-0Normal      fluticasone (FLONASE) 50 MCG/ACT nasal spray SPRAY 1 SPRAY INTO EACH NOSTRIL EVERY DAY, Disp-1 Bottle,R-0Normal      omeprazole (PRILOSEC) 20 MG delayed release capsule Take 20 mg by mouth nightlyHistorical Med      Omega-3 Fatty Acids (FISH OIL) 1000 MG CAPS Take 6,000 mg by mouth 3 times dailyHistorical Med      magnesium oxide (MAG-OX) 400 MG tablet Take 1,200 mg by mouth dailyHistorical Med      Lysine 500 MG TABS Take 1,500 mg by mouth dailyHistorical Med      ferrous sulfate (IRON 325) 325 (65 Fe) MG tablet Take 325 mg by mouth daily (with breakfast)Historical Med      Cinnamon 500 MG TABS Take 6 tablets by mouth daily Historical Med      ascorbic acid (VITAMIN C) 1000 MG tablet Take 0.5 tablets by mouth 3 times daily, Disp-30 tablet, R-3OTC      B Complex-Folic Acid (Z-961 BALANCED TR PO) Take 100 mg by mouth 3 times daily (before meals) [facility] [employer]       vitamin D (CHOLECALCIFEROL) 5000 UNITS CAPS capsule Take 5,000 Units by mouth daily Historical Med             ALLERGIES     Patient is is allergic to dilaudid [hydromorphone hcl], iron, percodan [oxycodone-aspirin], gluten, gluten meal, maxzide [hydrochlorothiazide w-triamterene], nalfon [fenoprofen calcium], oxycodone-acetaminophen, oxycodone-aspirin, percocet  [oxycodone-acetaminophen], reglan [metoclopramide], fenoprofen, and sulfa antibiotics. Patients   Immunization History   Administered Date(s) Administered    Tdap (Boostrix, Adacel) 06/17/2020       FAMILY HISTORY     Patient's family history includes Asthma in her brother; Blindness in her maternal grandmother; Brain Cancer (age of onset: 80) in her paternal grandfather; Breast Cancer (age of onset: 27) in her paternal cousin; Breast Cancer (age of onset: 72) in her maternal aunt, maternal aunt, paternal aunt, and paternal aunt; Cancer in her father; Cancer (age of onset: 79) in her paternal aunt and paternal uncle; Celiac Disease in her brother; Colon Cancer (age of onset: 79) in her father, paternal aunt, paternal uncle, and paternal uncle; Diabetes in her maternal grandmother, mother, and paternal grandmother; Emphysema in her maternal grandfather;  Problems in her brother; Heart Attack (age of onset: 66) in her mother; Heart Disease in her brother and mother; High Blood Pressure in her brother and mother; Irritable Bowel Syndrome in her sister; Kidney Disease in her brother and mother;  Other in her sister; Ovarian Cancer (age of onset: 48) in her paternal grandmother; Prostate Cancer (age of onset: 61) in an other family member; Stroke in her mother and paternal grandmother; Uterine Cancer in her maternal grandmother. SOCIAL HISTORY     Patient  reports that she has never smoked. She has never used smokeless tobacco. She reports current alcohol use. She reports that she does not use drugs. PHYSICAL EXAM     ED TRIAGE VITALS  BP: 105/68, Temp: 97.6 °F (36.4 °C), Pulse: 108, Resp: 20, SpO2: 97 %,Estimated body mass index is 29.45 kg/m² as calculated from the following:    Height as of 1/25/22: 5' 5\" (1.651 m). Weight as of this encounter: 177 lb (80.3 kg). ,No LMP recorded. Patient is postmenopausal.    Physical Exam  Vitals and nursing note reviewed. Constitutional:       General: She is not in acute distress. Appearance: Normal appearance. She is not ill-appearing, toxic-appearing or diaphoretic. HENT:      Head: Normocephalic. Right Ear: Ear canal and external ear normal.      Left Ear: Ear canal and external ear normal.      Nose: Nose normal. No congestion or rhinorrhea. Mouth/Throat:      Mouth: Mucous membranes are moist.      Pharynx: Oropharynx is clear. No oropharyngeal exudate or posterior oropharyngeal erythema. Cardiovascular:      Rate and Rhythm: Normal rate. Pulses: Normal pulses. Pulmonary:      Effort: Pulmonary effort is normal. No respiratory distress. Breath sounds: No stridor. No wheezing or rhonchi. Abdominal:      General: Abdomen is flat. Bowel sounds are normal.      Palpations: Abdomen is soft. Musculoskeletal:         General: No swelling or tenderness. Normal range of motion. Cervical back: Normal range of motion. Neurological:      General: No focal deficit present. Mental Status: She is alert and oriented to person, place, and time.    Psychiatric:         Mood and Affect: Mood normal.         Behavior: Behavior normal. DIAGNOSTIC RESULTS     Labs:No results found for this visit on 01/27/22. IMAGING:    XR CHEST (2 VW)   Final Result   There is no acute intrathoracic process. **This report has been created using voice recognition software. It may contain minor errors which are inherent in voice recognition technology. **      Final report electronically signed by Dr Pamela Encarnacion on 1/27/2022 3:46 PM            EKG: None      URGENT CARE COURSE:     Vitals:    01/27/22 1524   BP: 105/68   Pulse: 108   Resp: 20   Temp: 97.6 °F (36.4 °C)   TempSrc: Temporal   SpO2: 97%   Weight: 177 lb (80.3 kg)       Medications - No data to display         PROCEDURES:  None    FINAL IMPRESSION      1. Bronchitis          DISPOSITION/ PLAN     Patient seen and evaluated for the above symptoms. A chest x-ray was completed with no acute findings. Symptoms consistent with likely acute bronchitis. She is provided a prescription for prednisone, Zyrtec, and Tessalon. She is instructed to use these medications for 48 hours and if no improvement fill the Augmentin and Zofran. She is instructed use over-the-counter Tylenol and Motrin for pain or fever. Instructed to follow-up with her PCP in 3 to 5 days with new worsening symptoms. She is agreeable to the above plan and denies questions or concerns at this time.       PATIENT REFERRED TO:  Kapil Pinto MD  [address] [place] [address] / [facility] CORNELIUS [facility] [address]      DISCHARGE MEDICATIONS:  Discharge Medication List as of 1/27/2022  4:15 PM      START taking these medications    Details   predniSONE (DELTASONE) 20 MG tablet Take 1 tablet by mouth 2 times daily for 5 days, Disp-10 tablet, R-0Normal      cetirizine (ZYRTEC) 10 MG tablet Take 1 tablet by mouth daily, Disp-30 tablet, R-0Normal      benzonatate (TESSALON) 200 MG capsule Take 1 capsule by mouth 3 times daily as needed for Cough, Disp-30 capsule, R-0Normal      amoxicillin-clavulanate (AUGMENTIN) 875-125 MG per tablet Take 1 tablet by mouth 2 times daily for 10 days, Disp-20 tablet, R-0Print      ondansetron (ZOFRAN ODT) 4 MG disintegrating tablet Take 1 tablet by mouth every 8 hours as needed for Nausea or Vomiting, Disp-40 tablet, R-0Print             Discharge Medication List as of 1/27/2022  4:15 PM          Discharge Medication List as of 1/27/2022  4:15 PM          NEREYDA Jones CNP    (Please note that portions of this note were completed with a voice recognition program. Efforts were made to edit the dictations but occasionally words are mis-transcribed.)           NEREYDA Jones CNP  01/27/22 2928

## 2022-02-21 ENCOUNTER — OFFICE VISIT (OUTPATIENT)
Dept: CARDIOLOGY CLINIC | Age: 67
End: 2022-02-21
Payer: MEDICARE

## 2022-02-21 VITALS
HEIGHT: 64 IN | WEIGHT: 174.9 LBS | DIASTOLIC BLOOD PRESSURE: 79 MMHG | SYSTOLIC BLOOD PRESSURE: 130 MMHG | HEART RATE: 93 BPM | BODY MASS INDEX: 29.86 KG/M2

## 2022-02-21 DIAGNOSIS — R00.2 HEART PALPITATIONS: ICD-10-CM

## 2022-02-21 DIAGNOSIS — R06.02 SOB (SHORTNESS OF BREATH): ICD-10-CM

## 2022-02-21 DIAGNOSIS — R06.00 DYSPNEA, UNSPECIFIED TYPE: Primary | ICD-10-CM

## 2022-02-21 PROCEDURE — 1123F ACP DISCUSS/DSCN MKR DOCD: CPT | Performed by: INTERNAL MEDICINE

## 2022-02-21 PROCEDURE — 4040F PNEUMOC VAC/ADMIN/RCVD: CPT | Performed by: INTERNAL MEDICINE

## 2022-02-21 PROCEDURE — G8399 PT W/DXA RESULTS DOCUMENT: HCPCS | Performed by: INTERNAL MEDICINE

## 2022-02-21 PROCEDURE — 99204 OFFICE O/P NEW MOD 45 MIN: CPT | Performed by: INTERNAL MEDICINE

## 2022-02-21 PROCEDURE — G8484 FLU IMMUNIZE NO ADMIN: HCPCS | Performed by: INTERNAL MEDICINE

## 2022-02-21 PROCEDURE — 1036F TOBACCO NON-USER: CPT | Performed by: INTERNAL MEDICINE

## 2022-02-21 PROCEDURE — G8417 CALC BMI ABV UP PARAM F/U: HCPCS | Performed by: INTERNAL MEDICINE

## 2022-02-21 PROCEDURE — 3017F COLORECTAL CA SCREEN DOC REV: CPT | Performed by: INTERNAL MEDICINE

## 2022-02-21 PROCEDURE — G8427 DOCREV CUR MEDS BY ELIG CLIN: HCPCS | Performed by: INTERNAL MEDICINE

## 2022-02-21 PROCEDURE — 1090F PRES/ABSN URINE INCON ASSESS: CPT | Performed by: INTERNAL MEDICINE

## 2022-02-21 PROCEDURE — 93000 ELECTROCARDIOGRAM COMPLETE: CPT | Performed by: INTERNAL MEDICINE

## 2022-02-21 NOTE — PROGRESS NOTES
[address] [facility] [address]  [place] [address]  Dept: [phone]  Dept Fax: [phone]  Loc: [phone]    Visit Date: 2/21/2022    Ms. Marixa Oneal is a 77 y.o. female  who presented for:  Chief Complaint   Patient presents with    New Patient       HPI:   78 yo F c hx of MVP, PVC, COVID19 in Jan 2022 is here to establish cardiologist.  She used to see Dr. Riya Brantley few years back. Her EKG shows SR with anteriolateral infarct. Does report some shortness of breath. She wants to be checked out properly. Current Outpatient Medications:     cetirizine (ZYRTEC) 10 MG tablet, Take 1 tablet by mouth daily, Disp: 30 tablet, Rfl: 0    ondansetron (ZOFRAN ODT) 4 MG disintegrating tablet, Take 1 tablet by mouth every 8 hours as needed for Nausea or Vomiting, Disp: 40 tablet, Rfl: 0    methylphenidate (RITALIN) 20 MG tablet, Take 1 tablet by mouth 3 times daily for 90 days. , Disp: 270 tablet, Rfl: 0    modafinil (PROVIGIL) 200 MG tablet, Take 2 tablets by mouth daily for 180 days. , Disp: 180 tablet, Rfl: 1    SYNTHROID 125 MCG tablet, TAKE 1 TABLET BY MOUTH EVERY DAY, Disp: 90 tablet, Rfl: 3    rosuvastatin (CRESTOR) 20 MG tablet, Take 1 tablet by mouth daily, Disp: 90 tablet, Rfl: 3    furosemide (LASIX) 20 MG tablet, Take 2 tablets by mouth daily, Disp: 180 tablet, Rfl: 3    famotidine (PEPCID) 20 MG tablet, Take 1 tablet by mouth nightly, Disp: 90 tablet, Rfl: 3    DULoxetine (CYMBALTA) 60 MG extended release capsule, TAKE 2 CAPSULES BY MOUTH EVERY DAY, Disp: 180 capsule, Rfl: 3    esomeprazole (NEXIUM) 40 MG delayed release capsule, Take 1 capsule by mouth every morning (before breakfast), Disp: 90 capsule, Rfl: 3    butalbital-acetaminophen-caffeine (FIORICET, ESGIC) -40 MG per tablet, Take 1 tablet by mouth every 4 hours as needed for Headaches, Disp: 30 tablet, Rfl: 0    fluticasone (FLONASE) 50 MCG/ACT nasal spray, SPRAY 1 SPRAY INTO EACH NOSTRIL EVERY DAY, Disp: 1 Bottle, Rfl: 0    omeprazole (PRILOSEC) 20 MG delayed release capsule, Take 20 mg by mouth nightly, Disp: , Rfl:     Omega-3 Fatty Acids (FISH OIL) 1000 MG CAPS, Take 6,000 mg by mouth 3 times daily, Disp: , Rfl:     magnesium oxide (MAG-OX) 400 MG tablet, Take 1,200 mg by mouth daily, Disp: , Rfl:     Lysine 500 MG TABS, Take 1,500 mg by mouth daily, Disp: , Rfl:     ferrous sulfate (IRON 325) 325 (65 Fe) MG tablet, Take 325 mg by mouth daily (with breakfast), Disp: , Rfl:     Cinnamon 500 MG TABS, Take 6 tablets by mouth daily , Disp: , Rfl:     ascorbic acid (VITAMIN C) 1000 MG tablet, Take 0.5 tablets by mouth 3 times daily, Disp: 30 tablet, Rfl: 3    B Complex-Folic Acid (I-885 BALANCED TR PO), Take 100 mg by mouth 3 times daily (before meals) [facility] [employer] , Disp: , Rfl:     vitamin D (CHOLECALCIFEROL) 5000 UNITS CAPS capsule, Take 5,000 Units by mouth daily , Disp: , Rfl:     Past Medical History  [facility]  has a past medical history of Age-related osteoporosis without current pathological fracture, Anemia, Anxiety, Arm DVT (deep venous thromboembolism), acute ([address] Broken shoulder, Celiac disease, Celiac disease, Complex regional pain syndrome of right upper extremity, CRPS (complex regional pain syndrome type I), Depression, Depression, GERD (gastroesophageal reflux disease), Headache(784.0), Heart murmur, History of blood transfusion, Hx of reactive hypoglycemia, Hyperlipidemia, Hypothyroidism, IBS (irritable bowel syndrome), Irritable bowel syndrome, MDRO (multiple drug resistant organisms) resistance, Meniere disease, Mitral valve prolapse syndrome, MRSA (methicillin resistant Staphylococcus aureus), Partial bowel obstruction (HCC), Restless legs syndrome, RSD upper limb, RSD upper limb, Sinus infection, 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     Social History  [facility]  reports that she has never smoked. She has never used smokeless tobacco. She reports current alcohol use. She reports that she does not use drugs. Family History  Phuc Gomes family history includes Asthma in her brother; Blindness in her maternal grandmother; Brain Cancer (age of onset: 80) in her paternal grandfather; Breast Cancer (age of onset: 27) in her paternal cousin; Breast Cancer (age of onset: 72) in her maternal aunt, maternal aunt, paternal aunt, and paternal aunt; Cancer in her father; Cancer (age of onset: 79) in her paternal aunt and paternal uncle; Celiac Disease in her brother; Colon Cancer (age of onset: 79) in her father, paternal aunt, paternal uncle, and paternal uncle; Diabetes in her maternal grandmother, mother, and paternal grandmother; Emphysema in her maternal grandfather;  Problems in her brother; Heart Attack (age of onset: 66) in her mother; Heart Disease in her brother and mother; High Blood Pressure in her brother and mother; Irritable Bowel Syndrome in her sister; Kidney Disease in her brother and mother; Other in her sister; Ovarian Cancer (age of onset: 48) in her paternal grandmother; Prostate Cancer (age of onset: 61) in an other family member; Stroke in her mother and paternal grandmother; Uterine Cancer in her maternal grandmother.     Past Surgical History   Past Surgical History:   Procedure Laterality Date    ABDOMEN SURGERY  04/23/2013    ABD Exploration, removal of ommentum, lysis of adhesions-Dr. Doug Erazo    ABDOMEN SURGERY      removed mass \"near kidney\"     ABDOMINAL ADHESION SURGERY  10/2010    Resection-Dr. Doug Erazo, twice not sure of exact year   Anmol Hastings Left 2006   Sonia Aquino  2003     Dr. Kathleen Rivera  [id number], 2015    COLONOSCOPY  2016    Dr Waters [facility], COLON, [address] [facility] [address], [address]    KNEE SURGERY Right 2006    LAPAROTOMY EXPLORATORY N/A 8/31/2018    LAPAROTOMY EXPLORATORY, lysis of adhesions, closure innerotomoy performed by Winston Chairez MD at [address] [facility]  2006    capsule endoscopy    MT EGD TRANSORAL BIOPSY SINGLE/MULTIPLE Left 2/8/2018    EGD BIOPSY performed by Kenan Toney MD at [address] [facility] [address] OFFICE/OUTPT [address] [facility] Left 8/31/2018    ROBOTIC EXCISION OF LEFT PARARENAL MASS performed by Winston Chairez MD at [facility] [address]  1/14/97    SINUS SURGERY  x4 1982, 1990, 1997, 2005    [address]   [address]    Due to complictions after mass removal.    SPINAL CORD STIMULATOR SURGERY N/A 11/14/2019    Cervical SCS Trial performed by Fartun Tsang MD at [address] [place] [address] N/A 6/12/2020    retrial due to lead migration Cervical SCS trial entrance T1 to C 2 performed by aFrtun Tsang MD at [address] [facility]  1998,2009,2010,2011,2012, 2015, 2/2016       Subjective:     REVIEW OF SYSTEMS  Constitutional: denies sweats, chills and fever  HENT: denies  congestion, sinus pressure, sneezing and sore throat. Eyes: denies  pain, discharge, redness and itching. Respiratory: denies apnea, cough  Gastrointestinal: denies blood in stool, constipation, diarrhea   Endocrine: denies cold intolerance, heat intolerance, polydipsia. Genitourinary: denies dysuria, enuresis, flank pain and hematuria. Musculoskeletal: denies arthralgias, joint swelling and neck pain. Neurological: denies numbness and headaches. Psychiatric/Behavioral: denies agitation, confusion, decreased concentration and dysphoric mood    All others reviewed and are negative.    Objective:     /79   Pulse 93   Ht 5' 4\" (1.626 m)   Wt 174 lb 14.4 oz (79.3 kg)   BMI 30.02 kg/m²     Wt Readings from Last 3 Encounters:   02/21/22 174 lb 14.4 oz (79.3 kg)   01/27/22 177 lb (80.3 kg)   01/25/22 178 lb (80.7 kg)     BP Readings from Last 3 Encounters:   02/21/22 130/79   01/27/22 105/68   01/25/22 130/78       PHYSICAL EXAM  Constitutional: Oriented to person, place, and time. Appears well-developed and well-nourished. HENT:   Head: Normocephalic and atraumatic. Eyes: EOM are normal. Pupils are equal, round, and reactive to light. Neck: Normal range of motion. Neck supple. No JVD present. Cardiovascular: Normal rate , normal heart sounds and intact distal pulses. Pulmonary/Chest: Effort normal and breath sounds normal. No respiratory distress. No wheezes. No rales. Abdominal: Soft. Bowel sounds are normal. No distension. There is no tenderness. Musculoskeletal: Normal range of motion. No edema. Neurological: Alert and oriented to person, place, and time. No cranial nerve deficit. Coordination normal.   Skin: Skin is warm and dry. Psychiatric: Normal mood and affect.        Lab Results   Component Value Date    CKTOTAL 34 10/02/2018    CKTOTAL 38 10/02/2018    CKTOTAL 37 10/01/2018       Lab Results   Component Value Date    WBC 7.0 06/29/2020    WBC 7.1 10/01/2018    RBC 4.85 06/29/2020    HGB 15.3 06/29/2020    HCT 45.6 06/29/2020    MCV 94 06/29/2020    MCH 31.6 06/29/2020    MCHC 33.6 06/29/2020    RDW 13.7 06/29/2020     06/29/2020     10/01/2018    MPV 8.9 06/29/2020       Lab Results   Component Value Date     09/30/2021    K 3.9 09/30/2021    K 4.6 09/01/2018     09/30/2021    CO2 29 09/30/2021    BUN 19 09/30/2021    LABALBU 4.9 09/30/2021    LABALBU 4.1 03/19/2012    CREATININE 0.6 09/30/2021    CALCIUM 9.9 09/30/2021    LABGLOM >90 09/30/2021    GLUCOSE 110 09/30/2021    GLUCOSE 108 12/30/2020       Lab Results   Component Value Date    ALKPHOS 102 09/30/2021    ALT 33 09/30/2021    AST 35 09/30/2021    PROT 7.3 09/30/2021    BILITOT 0.4 09/30/2021 BILITOT NEGATIVE 08/28/2018    BILIDIR <0.2 10/01/2018    LABALBU 4.9 09/30/2021    LABALBU 4.1 03/19/2012       Lab Results   Component Value Date    MG 2.1 06/29/2020       Lab Results   Component Value Date    INR 0.98 10/01/2018    INR 0.90 01/03/2017    INR 0.90 03/15/2014         Lab Results   Component Value Date    LABA1C 5.4 06/29/2020       Lab Results   Component Value Date    TRIG 98 09/30/2021    HDL 50 09/30/2021    LDLCALC 63 09/30/2021    LABVLDL 20 06/29/2020       Lab Results   Component Value Date    TSH 2.840 09/30/2021         Testing Reviewed:      I haveindividually reviewed the below cardiac tests    EKG:    ECHO: No results found for this or any previous visit. STRESS:    CATH:    Assessment/Plan       Diagnosis Orders   1. Dyspnea, unspecified type     2. Heart palpitations  EKG 12 lead     Shortness of breath  Dyspnea on exertion  Hx PVC  Mitral valve prolapse  Recent COVID19    Reviewed EKG which showed SR with anteriolateral infarct , this is a new findings compared to old EKGs  Will get Echo to evaluate MVP and LVSF  Will get cardiolite stress test to evaluate sob and abnormal EKG. The patient is asked to make an attempt to improve diet and exercise patterns to aid in medical management of this problem. Advised more plant based nutrition/meditarrean diet   Advised patient to call office or seek immediate medical attention if there is any new onset of  any chest pain, sob, palpitations, lightheadedness, dizziness, orthopnea, PND or pedal edema. All medication side effects were discussed in details. Thank youfor allowing me to participate in the care of this patient. Please do not hesitate to contact me for any further questions. Return in about 4 weeks (around 3/21/2022), or if symptoms worsen or fail to improve, for Review testing, Regular follow up.        Electronically signed by Margarita Forbes MD [facility]  2/21/2022 at 2:30 PM EST

## 2022-03-01 ENCOUNTER — HOSPITAL ENCOUNTER (OUTPATIENT)
Dept: NON INVASIVE DIAGNOSTICS | Age: 67
Discharge: HOME | End: 2022-03-01
Payer: MEDICARE

## 2022-03-01 DIAGNOSIS — R00.2 HEART PALPITATIONS: ICD-10-CM

## 2022-03-01 DIAGNOSIS — R06.00 DYSPNEA, UNSPECIFIED TYPE: ICD-10-CM

## 2022-03-01 DIAGNOSIS — R06.02 SOB (SHORTNESS OF BREATH): ICD-10-CM

## 2022-03-01 LAB
LV EF: 53 %
LV EF: 75 %
LVEF MODALITY: NORMAL
LVEF MODALITY: NORMAL

## 2022-03-01 PROCEDURE — A9500 TC99M SESTAMIBI: HCPCS | Performed by: INTERNAL MEDICINE

## 2022-03-01 PROCEDURE — 93306 TTE W/DOPPLER COMPLETE: CPT

## 2022-03-01 PROCEDURE — 93017 CV STRESS TEST TRACING ONLY: CPT | Performed by: INTERNAL MEDICINE

## 2022-03-01 PROCEDURE — 78452 HT MUSCLE IMAGE SPECT MULT: CPT

## 2022-03-01 PROCEDURE — 3430000000 HC RX DIAGNOSTIC RADIOPHARMACEUTICAL: Performed by: INTERNAL MEDICINE

## 2022-03-01 RX ADMIN — Medication 9.4 MILLICURIE: at 13:15

## 2022-03-01 RX ADMIN — Medication 31.8 MILLICURIE: at 14:22

## 2022-03-04 ENCOUNTER — OFFICE VISIT (OUTPATIENT)
Dept: CARDIOLOGY CLINIC | Age: 67
End: 2022-03-04
Payer: MEDICARE

## 2022-03-04 VITALS
HEART RATE: 84 BPM | WEIGHT: 172 LBS | SYSTOLIC BLOOD PRESSURE: 110 MMHG | DIASTOLIC BLOOD PRESSURE: 68 MMHG | BODY MASS INDEX: 29.37 KG/M2 | HEIGHT: 64 IN

## 2022-03-04 DIAGNOSIS — R06.02 SOB (SHORTNESS OF BREATH): Primary | ICD-10-CM

## 2022-03-04 PROCEDURE — 4040F PNEUMOC VAC/ADMIN/RCVD: CPT | Performed by: INTERNAL MEDICINE

## 2022-03-04 PROCEDURE — G8428 CUR MEDS NOT DOCUMENT: HCPCS | Performed by: INTERNAL MEDICINE

## 2022-03-04 PROCEDURE — G8484 FLU IMMUNIZE NO ADMIN: HCPCS | Performed by: INTERNAL MEDICINE

## 2022-03-04 PROCEDURE — 1036F TOBACCO NON-USER: CPT | Performed by: INTERNAL MEDICINE

## 2022-03-04 PROCEDURE — 1090F PRES/ABSN URINE INCON ASSESS: CPT | Performed by: INTERNAL MEDICINE

## 2022-03-04 PROCEDURE — 99213 OFFICE O/P EST LOW 20 MIN: CPT | Performed by: INTERNAL MEDICINE

## 2022-03-04 PROCEDURE — G8399 PT W/DXA RESULTS DOCUMENT: HCPCS | Performed by: INTERNAL MEDICINE

## 2022-03-04 PROCEDURE — G8417 CALC BMI ABV UP PARAM F/U: HCPCS | Performed by: INTERNAL MEDICINE

## 2022-03-04 PROCEDURE — 1123F ACP DISCUSS/DSCN MKR DOCD: CPT | Performed by: INTERNAL MEDICINE

## 2022-03-04 PROCEDURE — 3017F COLORECTAL CA SCREEN DOC REV: CPT | Performed by: INTERNAL MEDICINE

## 2022-03-04 NOTE — PROGRESS NOTES
Pt here for 3-4 week check up - stress and echo     Pt continues with sob     Pt states med list is correct from last appt 2/21/22

## 2022-03-04 NOTE — PROGRESS NOTES
[address] [facility] [place] [address] [place] [address]  Dept: [phone]  Dept Fax: [phone]  Loc: [phone]    Visit Date: 3/4/2022    Ms. Winston Alberts is a 77 y.o. female  who presented for:  Chief Complaint   Patient presents with    Check-Up    Shortness of Breath       HPI:   78 yo F with PMH of MVP, PVC, COVID19 in Jan 2022 is here for follow-up of SOB and history of PVCs. Recent echo shows normal LV systolic function. EF 50-55%. Trivial aortic regurgitation noted. Stress test negative for ischemia. She is feeling better than prior - no further palpitations or shortness of breath. Occasional leg swelling - treated with Lasix per PCP. Overall patient feels much improved since last visit after recovering from Covid. Current Outpatient Medications:     ondansetron (ZOFRAN ODT) 4 MG disintegrating tablet, Take 1 tablet by mouth every 8 hours as needed for Nausea or Vomiting, Disp: 40 tablet, Rfl: 0    methylphenidate (RITALIN) 20 MG tablet, Take 1 tablet by mouth 3 times daily for 90 days. , Disp: 270 tablet, Rfl: 0    modafinil (PROVIGIL) 200 MG tablet, Take 2 tablets by mouth daily for 180 days. , Disp: 180 tablet, Rfl: 1    SYNTHROID 125 MCG tablet, TAKE 1 TABLET BY MOUTH EVERY DAY, Disp: 90 tablet, Rfl: 3    rosuvastatin (CRESTOR) 20 MG tablet, Take 1 tablet by mouth daily, Disp: 90 tablet, Rfl: 3    furosemide (LASIX) 20 MG tablet, Take 2 tablets by mouth daily, Disp: 180 tablet, Rfl: 3    famotidine (PEPCID) 20 MG tablet, Take 1 tablet by mouth nightly, Disp: 90 tablet, Rfl: 3    DULoxetine (CYMBALTA) 60 MG extended release capsule, TAKE 2 CAPSULES BY MOUTH EVERY DAY, Disp: 180 capsule, Rfl: 3    esomeprazole (NEXIUM) 40 MG delayed release capsule, Take 1 capsule by mouth every morning (before breakfast), Disp: 90 capsule, Rfl: 3    butalbital-acetaminophen-caffeine (FIORICET, ESGIC) -40 MG per tablet, Take 1 tablet by mouth every 4 hours as needed for Headaches, Disp: 30 tablet, Rfl: 0    fluticasone (FLONASE) 50 MCG/ACT nasal spray, SPRAY 1 SPRAY INTO EACH NOSTRIL EVERY DAY, Disp: 1 Bottle, Rfl: 0    omeprazole (PRILOSEC) 20 MG delayed release capsule, Take 20 mg by mouth nightly, Disp: , Rfl:     Omega-3 Fatty Acids (FISH OIL) 1000 MG CAPS, Take 6,000 mg by mouth 3 times daily, Disp: , Rfl:     magnesium oxide (MAG-OX) 400 MG tablet, Take 1,200 mg by mouth daily, Disp: , Rfl:     Lysine 500 MG TABS, Take 1,500 mg by mouth daily, Disp: , Rfl:     ferrous sulfate (IRON 325) 325 (65 Fe) MG tablet, Take 325 mg by mouth daily (with breakfast), Disp: , Rfl:     Cinnamon 500 MG TABS, Take 6 tablets by mouth daily , Disp: , Rfl:     ascorbic acid (VITAMIN C) 1000 MG tablet, Take 0.5 tablets by mouth 3 times daily, Disp: 30 tablet, Rfl: 3    B Complex-Folic Acid (Z-731 BALANCED TR PO), Take 100 mg by mouth 3 times daily (before meals) [employer] , Disp: , Rfl:     vitamin D (CHOLECALCIFEROL) 5000 UNITS CAPS capsule, Take 5,000 Units by mouth daily , Disp: , Rfl:     Past Medical History  Wylie Dakin  has a past medical history of Age-related osteoporosis without current pathological fracture, Anemia, Anxiety, Arm DVT (deep venous thromboembolism), acute ([facility] [address] Broken shoulder, Celiac disease, Celiac disease, Complex regional pain syndrome of right upper extremity, CRPS (complex regional pain syndrome type I), Depression, Depression, GERD (gastroesophageal reflux disease), Headache(784.0), Heart murmur, History of blood transfusion, Hx of reactive hypoglycemia, Hyperlipidemia, Hypothyroidism, IBS (irritable bowel syndrome), Irritable bowel syndrome, MDRO (multiple drug resistant organisms) resistance, Meniere disease, Mitral valve prolapse syndrome, MRSA (methicillin resistant Staphylococcus aureus), Partial bowel obstruction (HCC), Restless legs syndrome, RSD upper limb, RSD upper limb, Sinus infection, 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 Social History  Phuc Gomes  reports that she has never smoked. She has never used smokeless tobacco. She reports current alcohol use. She reports that she does not use drugs. Family History  Phuc Gomes family history includes Asthma in her brother; Blindness in her maternal grandmother; Brain Cancer (age of onset: 80) in her paternal grandfather; Breast Cancer (age of onset: 27) in her paternal cousin; Breast Cancer (age of onset: 72) in her maternal aunt, maternal aunt, paternal aunt, and paternal aunt; Cancer in her father; Cancer (age of onset: 79) in her paternal aunt and paternal uncle; Celiac Disease in her brother; Colon Cancer (age of onset: 79) in her father, paternal aunt, paternal uncle, and paternal uncle; Diabetes in her maternal grandmother, mother, and paternal grandmother; Emphysema in her maternal grandfather;  Problems in her brother; Heart Attack (age of onset: 66) in her mother; Heart Disease in her brother and mother; High Blood Pressure in her brother and mother; Irritable Bowel Syndrome in her sister; Kidney Disease in her brother and mother; Other in her sister; Ovarian Cancer (age of onset: 48) in her paternal grandmother; Prostate Cancer (age of onset: 61) in an other family member; Stroke in her mother and paternal grandmother; Uterine Cancer in her maternal grandmother.     Past Surgical History   Past Surgical History:   Procedure Laterality Date    ABDOMEN SURGERY  04/23/2013    ABD Exploration, removal of ommentum, lysis of adhesions-Dr. Doug Erazo    ABDOMEN SURGERY      removed mass \"near kidney\"     ABDOMINAL ADHESION SURGERY  10/2010    Resection-Dr. Doug Erazo, twice not sure of exact year   Anmol Hastings Left 2006   [address] [place]  2003     Dr. Kathleen Rivera  [id number], 2015    COLONOSCOPY  2016 Dr Kodak Spencer, COLON, DIAGNOSTIC     [address]   [address], [address]    KNEE SURGERY Right 2006    LAPAROTOMY EXPLORATORY N/A 8/31/2018    LAPAROTOMY EXPLORATORY, lysis of adhesions, closure innerotomoy performed by Sergio Garcia MD at [address] [facility] [place]  2006    capsule endoscopy    NH EGD TRANSORAL BIOPSY SINGLE/MULTIPLE Left 2/8/2018    EGD BIOPSY performed by Gera Wong MD at [facility] OFFICE/OUTPT [address] Left 8/31/2018    ROBOTIC EXCISION OF LEFT PARARENAL MASS performed by Sergio Garcia MD at [address]  1/14/97    SINUS SURGERY  x4 1982, 1990, 1997, 2005    [address]   [address]    Due to complictions after mass removal.    SPINAL CORD STIMULATOR SURGERY N/A 11/14/2019    Cervical SCS Trial performed by Adrien Alvarez MD at [address] [facility] N/A 6/12/2020    retrial due to lead migration Cervical SCS trial entrance T1 to C 2 performed by Adrien Alvarez MD at [address] [facility]  1998,2009,2010,2011,2012, 2015, 2/2016       Subjective:     REVIEW OF SYSTEMS  Constitutional: denies sweats, chills and fever  HENT: denies  congestion, sinus pressure, sneezing and sore throat. Eyes: denies  pain, discharge, redness and itching. Respiratory: denies apnea, cough  Gastrointestinal: denies blood in stool, constipation, diarrhea   Endocrine: denies cold intolerance, heat intolerance, polydipsia. Genitourinary: denies dysuria, enuresis, flank pain and hematuria. Musculoskeletal: denies arthralgias, joint swelling and neck pain. Neurological: denies numbness and headaches. Psychiatric/Behavioral: denies agitation, confusion, decreased concentration and dysphoric mood    All others reviewed and are negative. Objective:     /68   Pulse 84   Ht 5' 4\" (1.626 m)   Wt 172 lb (78 kg)   BMI 29.52 kg/m²     Wt Readings from Last 3 Encounters:   03/04/22 172 lb (78 kg)   02/21/22 174 lb 14.4 oz (79.3 kg)   01/27/22 177 lb (80.3 kg)     BP Readings from Last 3 Encounters:   03/04/22 110/68   02/21/22 130/79   01/27/22 105/68       PHYSICAL EXAM  Constitutional: Oriented to person, place, and time. Appears well-developed and well-nourished. HENT:   Head: Normocephalic and atraumatic. Eyes: EOM are normal. Pupils are equal, round, and reactive to light. Neck: Normal range of motion. Neck supple. No JVD present. Cardiovascular: Normal rate , normal heart sounds and intact distal pulses. Pulmonary/Chest: Effort normal and breath sounds normal. No respiratory distress. No wheezes. No rales. Abdominal: Soft. Bowel sounds are normal. No distension. There is no tenderness. Musculoskeletal: Normal range of motion. No edema. Neurological: Alert and oriented to person, place, and time. No cranial nerve deficit. Coordination normal.   Skin: Skin is warm and dry. Psychiatric: Normal mood and affect.        Lab Results   Component Value Date    CKTOTAL 34 10/02/2018    CKTOTAL 38 10/02/2018    CKTOTAL 37 10/01/2018       Lab Results   Component Value Date    WBC 7.0 06/29/2020    WBC 7.1 10/01/2018    RBC 4.85 06/29/2020    HGB 15.3 06/29/2020    HCT 45.6 06/29/2020    MCV 94 06/29/2020    MCH 31.6 06/29/2020    MCHC 33.6 06/29/2020    RDW 13.7 06/29/2020     06/29/2020     10/01/2018    MPV 8.9 06/29/2020       Lab Results   Component Value Date     09/30/2021    K 3.9 09/30/2021    K 4.6 09/01/2018     09/30/2021    CO2 29 09/30/2021    BUN 19 09/30/2021    LABALBU 4.9 09/30/2021    LABALBU 4.1 03/19/2012    CREATININE 0.6 09/30/2021    CALCIUM 9.9 09/30/2021    LABGLOM >90 09/30/2021    GLUCOSE 110 09/30/2021    GLUCOSE 108 12/30/2020       Lab Results   Component Value Date    [facility] 102 2021    ALT 33 2021    AST 35 2021    PROT 7.3 2021    BILITOT 0.4 2021    BILITOT NEGATIVE 2018    BILIDIR <0.2 10/01/2018    LABALBU 4.9 2021    LABALBU 4.1 2012       Lab Results   Component Value Date    MG 2.1 2020       Lab Results   Component Value Date    INR 0.98 10/01/2018    INR 0.90 2017    INR 0.90 03/15/2014         Lab Results   Component Value Date    LABA1C 5.4 2020       Lab Results   Component Value Date    TRIG 98 2021    HDL 50 2021    LDLCALC 63 2021    LABVLDL 20 2020       Lab Results   Component Value Date    TSH 2.840 2021         Testing Reviewed:      I haveindividually reviewed the below cardiac tests    EK22  Sinus rhythm; 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 Transthoracic Echocardiography Report (TTE)      Demographics      Patient Name   Yudith Fisher Gender              Female                  K      MR #           [id number]        Race                                                      Ethnicity      Account #      [de-identified]        Room Number      Accession      [id number]       Date of Study       2022   Number      Date of Birth  1955       Referring Physician Lino Harvey MD      Age            77 year(s)       Sonographer         Maulik Nascimento RVT                                      Interpreting        Amelia Harvey MD                                   Physician     Procedure     Type of Study      TTE procedure:ECHOCARDIOGRAM COMPLETE 2D W DOPPLER W COLOR.      Procedure Date  Date: 2022 Start: 12:11 PM     Study Location: Echo Lab  Technical Quality: Limited visualization due to poor acoustical window.     Indications:Palpitations.     Additional Medical History:DVT, COVID, anemia, GERD, thyroid disease,  hyperlipidemia, murmur, mitral valve prolapse     Patient Status: Routine     Height: 64 inches Weight: 174 pounds BSA: 1.84 m^2 BMI: 29.87 kg/m^2     BP: 130/79 mmHg      Conclusions      Summary   Technically difficult examination. Left ventricular size and systolic function is normal. Ejection fraction   was estimated at 50-55%. LV wall thickness is within normal limits. Trivial aortic regurgitation is noted. Signature      ----------------------------------------------------------------   Electronically signed by Bharati Patterson MD (Interpreting   physician) on 03/01/2022 at 01:20 PM   ----------------------------------------------------------------      Findings      Mitral Valve   The mitral valve structure is normal with normal leaflet separation. DOPPLER: The transmitral velocity was within the normal range with no   evidence for mitral stenosis. There was no evidence of mitral   regurgitation. Aortic Valve   The aortic valve appears trileaflet with normal thickness and leaflet   excursion. DOPPLER: Transaortic velocity was within the normal range with   no evidence of aortic stenosis. Trivial aortic regurgitation is noted. Tricuspid Valve   The tricuspid valve structure is normal with normal leaflet separation. DOPPLER: There is no evidence of tricuspid stenosis. Mild tricuspid regurgitation visualized. Pulmonic Valve   The pulmonic valve was not well visualized . Left Atrium   Left atrial size is normal.      Left Ventricle   Left ventricular size and systolic function is normal. Ejection fraction   was estimated at 50-55%. LV wall thickness is within normal limits. Right Atrium   Right atrial size was normal.      Right Ventricle   The right ventricular size appears normal with normal systolic function   and wall thickness. Pericardial Effusion   The pericardium appears normal with no evidence of a pericardial effusion.       Pleural Effusion   No evidence of pleural effusion. Aorta / Great Vessels   -Aortic root dimension within normal limits. -IVC size is within normal limits with normal respiratory phasic changes.      M-Mode/2D Measurements & Calculations      LV Diastolic   LV Systolic Dimension: 3  AV Cusp Separation: 1.1 cmLA   Dimension: 3.9 cm                        Dimension: 3.6 cmAO Root   cm             LV Volume Diastolic: 12.7 Dimension: 3.3 cmLA Area: 12.8   LV FS:23.1 %   ml                        cm^2   LV PW          LV Volume Systolic: 14.9   Diastolic: 1.1 ml   cm             LV EDV/LV EDV Index: 52.7   Septum         ml/29 m^2LV ESV/LV ESV    RV Diastolic Dimension: 2.9 cm   Diastolic: 1.2 Index: 02.0 ml/15 m^2   cm             EF Calculated: 46.3 %     LA/Aorta: 1.09                     LV Length: 7 cm           LA volume/Index: 29.7 ml /16m^2      LV Area   Diastolic:   08.6 cm^2   LV Area   Systolic: 15.4   cm^2     Doppler Measurements & Calculations      MV Peak E-Wave: 60.2 cm/s  AV Peak Velocity: 127 LVOT Peak Velocity: 101   MV Peak A-Wave: 84.8 cm/s  cm/s                  cm/s   MV E/A Ratio: 0.71         AV Peak Gradient:     LVOT Peak Gradient: 4   MV Peak Gradient: 1.45     6.45 mmHg             mmHg   mmHg                                                    TV Peak E-Wave: 42.7 cm/s   MV Deceleration Time: 259                        TV Peak A-Wave: 36.1 cm/s   msec   MV P1/2t: 76 msec          AV P1/2t: 583 msec    TV Peak Gradient: 0.73   MVA by PHT:2.89 cm^2       IVRT: 116 msec        mmHg                                                    TR Velocity:167 cm/s   MV E' Septal Velocity: 5.5                       TR Gradient:11.16 mmHg   cm/s                       AV DVI (Vmax):0.8     PV Peak Velocity: 82.6   MV A' Septal Velocity: 9.1                       cm/s   cm/s                                             PV Peak Gradient: 2.73   MV E' Lateral Velocity:                          mmHg   4.6 cm/s   MV A' Lateral Velocity:   10.7 cm/s   E/E' septal: 10.95   E/E' lateral: 13.09    STRESS:  Procedure Type:        Nuclear Stress Test:Exercise, Exercise Cardiolite Stress       Indications: Abnormal rest ECG.       Medical History:Patient history obtained by: Manuel Pride RN . Family history of heart disease.        Risk Factors        The patient risk factors include:treated hypercholesterolemia.       Stress Protocols        Resting ECG    Normal sinus rhythm.        Resting HR:87 bpm                Resting BP:141/70 mmHg       Stress Protocol:Exercise - [employer]   +--------+-----+----+-----------+-----+--------+---+--+--------+------+----+   ! Stage # ! Time ! Work! Speed/Grade! Heart! Blood   !RPE!CP! Pain    !Pain  !Pain! !        !     !    !           !Rate ! Pressure!   !  !Location! Action! Type!   +--------+-----+----+-----------+-----+--------+---+--+--------+------+----+   ! 1.0     !03:00!4.7 !1.7/10.0   !121  !148/72  !   !  !        !      !    !   +--------+-----+----+-----------+-----+--------+---+--+--------+------+----+   ! 2.0     !06:00!7.1 !2.5/12.0   !137  !162/73  !   !  !        !      !    !   +--------+-----+----+-----------+-----+--------+---+--+--------+------+----+   ! Recovery! 00:00!    !           !138  !166/72  !   !  !        !      !    !   +--------+-----+----+-----------+-----+--------+---+--+--------+------+----+   ! Recovery! 02:00!    !           !78   !157/71  !   !  !        !      !    !   +--------+-----+----+-----------+-----+--------+---+--+--------+------+----+   ! Recovery! 05:00!    !           !96   !142/81  !   !  !        !      !    !   +--------+-----+----+-----------+-----+--------+---+--+--------+------+----+        Peak HR:138 bpm                 HR response: Appropriate    Peak BP:166/72 mmHg             BP response: Normal Resting BP with    Predicted HR: 154 bpm           appropriate response to Stress    % of predicted HR: 90           HR/BP product:[id number]    Test duration:6 min             Max exercise: 7.1 METS    Reason for termination:Target    HR achieved        ECG Findings    No ischemic EKG changes.        Arrhythmias    No stress induced arrhythmia.        Symptoms    No chest pain during the stress.        Imaging Protocols        Rest                                Stress        Isotope:Tc-99m Sestamibi            Isotope: Tc-99m Sestamibi    Isotope dose:9.4 mCi                Isotope dose:31.8 mCi    Date:03/01/2022 13:15               Date:03/01/2022 14:22        Technique:           SPECT          Technique:           Gated   [de-identified]                                                        SPECT       Imaging Results:Calculated gated LVEF 75 %. The T.I.D. ratio was . 91 . Myocardial perfusion imaging is not suggestive for myocardial ischemia. This study was negative for ischemia.        Conclusions        Summary    Exercise EKG stress test is not suggestive for ischemia.    Calculated gated LVEF 75 %.    The T.I.D. ratio was . 91 .    Myocardial perfusion imaging is not suggestive for myocardial ischemia.    This study was negative for ischemia.        Recommendation    Clinical correlation is recommended.    Aggressive risk factor management.    Medical management.        Signatures        ----------------------------------------------------------------    Electronically signed by Lalita Rosenberg MD (Interpreting    Cardiologist) on 03/01/2022 at 17:37    ----------------------------------------------------------------       CATH:  None    Assessment/Plan       Diagnosis Orders   1. SOB (shortness of breath)       Shortness of breath, improved, likely due to Covid  Hx PVC  Mitral valve prolapse  Recent COVID19    Reviewed EKG, Echo, and Stress test - all overall normal without acute findings.   Symptoms have improved  Continue current medical management of risk factors  Follow-up PRN  The patient is asked to make an attempt to improve diet and exercise patterns to aid in medical management of this problem. Advised more plant based nutrition/meditarrean diet   Advised patient to call office or seek immediate medical attention if there is any new onset of  any chest pain, sob, palpitations, lightheadedness, dizziness, orthopnea, PND or pedal edema. All medication side effects were discussed in details. Thank youfor allowing me to participate in the care of this patient. Please do not hesitate to contact me for any further questions. Return if symptoms worsen or fail to improve, for Review testing, Regular follow up.        Electronically signed by Indu Medeiros MD  3/4/2022

## 2022-05-10 ENCOUNTER — OFFICE VISIT (OUTPATIENT)
Dept: FAMILY MEDICINE CLINIC | Age: 67
End: 2022-05-10

## 2022-05-10 VITALS
HEART RATE: 68 BPM | HEIGHT: 64 IN | BODY MASS INDEX: 29.52 KG/M2 | DIASTOLIC BLOOD PRESSURE: 78 MMHG | SYSTOLIC BLOOD PRESSURE: 136 MMHG | RESPIRATION RATE: 14 BRPM

## 2022-05-10 DIAGNOSIS — Z00.00 INITIAL MEDICARE ANNUAL WELLNESS VISIT: Primary | ICD-10-CM

## 2022-05-10 DIAGNOSIS — I87.2 VENOUS INSUFFICIENCY: ICD-10-CM

## 2022-05-10 DIAGNOSIS — G90.511 COMPLEX REGIONAL PAIN SYNDROME TYPE 1 OF RIGHT UPPER EXTREMITY: ICD-10-CM

## 2022-05-10 PROCEDURE — 3017F COLORECTAL CA SCREEN DOC REV: CPT | Performed by: FAMILY MEDICINE

## 2022-05-10 PROCEDURE — G0438 PPPS, INITIAL VISIT: HCPCS | Performed by: FAMILY MEDICINE

## 2022-05-10 PROCEDURE — 1123F ACP DISCUSS/DSCN MKR DOCD: CPT | Performed by: FAMILY MEDICINE

## 2022-05-10 RX ORDER — METHYLPHENIDATE HYDROCHLORIDE 10 MG/1
20 TABLET ORAL 3 TIMES DAILY
COMMUNITY
End: 2022-05-18

## 2022-05-10 ASSESSMENT — PATIENT HEALTH QUESTIONNAIRE - PHQ9
SUM OF ALL RESPONSES TO PHQ QUESTIONS 1-9: 11
SUM OF ALL RESPONSES TO PHQ9 QUESTIONS 1 & 2: 4
8. MOVING OR SPEAKING SO SLOWLY THAT OTHER PEOPLE COULD HAVE NOTICED. OR THE OPPOSITE, BEING SO FIGETY OR RESTLESS THAT YOU HAVE BEEN MOVING AROUND A LOT MORE THAN USUAL: 0
9. THOUGHTS THAT YOU WOULD BE BETTER OFF DEAD, OR OF HURTING YOURSELF: 0
SUM OF ALL RESPONSES TO PHQ QUESTIONS 1-9: 11
10. IF YOU CHECKED OFF ANY PROBLEMS, HOW DIFFICULT HAVE THESE PROBLEMS MADE IT FOR YOU TO DO YOUR WORK, TAKE CARE OF THINGS AT HOME, OR GET ALONG WITH OTHER PEOPLE: 0
5. POOR APPETITE OR OVEREATING: 0
2. FEELING DOWN, DEPRESSED OR HOPELESS: 3
1. LITTLE INTEREST OR PLEASURE IN DOING THINGS: 1
3. TROUBLE FALLING OR STAYING ASLEEP: 2
SUM OF ALL RESPONSES TO PHQ QUESTIONS 1-9: 11
SUM OF ALL RESPONSES TO PHQ QUESTIONS 1-9: 11
7. TROUBLE CONCENTRATING ON THINGS, SUCH AS READING THE NEWSPAPER OR WATCHING TELEVISION: 2
6. FEELING BAD ABOUT YOURSELF - OR THAT YOU ARE A FAILURE OR HAVE LET YOURSELF OR YOUR FAMILY DOWN: 0
4. FEELING TIRED OR HAVING LITTLE ENERGY: 3

## 2022-05-10 ASSESSMENT — LIFESTYLE VARIABLES
HOW OFTEN DO YOU HAVE A DRINK CONTAINING ALCOHOL: 2-3 TIMES A WEEK
HOW MANY STANDARD DRINKS CONTAINING ALCOHOL DO YOU HAVE ON A TYPICAL DAY: 1 OR 2

## 2022-05-10 NOTE — PROGRESS NOTES
Medicare Annual Wellness Visit    Lloyd Hill is here for Medicare AWV    Assessment & Plan   Venous insufficiency  Complex regional pain syndrome type 1 of right upper extremity      Recommendations for Preventive Services Due: see orders and patient instructions/AVS.  Recommended screening schedule for the next 5-10 years is provided to the patient in written form: see Patient Instructions/AVS.     No follow-ups on file. Subjective   1. We discussed how she sees Dr Wing Spann. 2. She feels that there is some memory troubles and has to write things down  3. Since having covid the right ear that had tinnitis is louder. It's distracting  4. The sinuses bother her and she uses pills and sprays. She uses a navage. She's had 4 sinus surgeries. We discussed ENT referral   5. There was a right knee scope in [place]  6. She is down about the chronic illness and wonders about an antidepresent on top of the cymbalta  7. The legs swell at times    Patient's complete Health Risk Assessment and screening values have been reviewed and are found in Flowsheets. The following problems were reviewed today and where indicated follow up appointments were made and/or referrals ordered.     Positive Risk Factor Screenings with Interventions:      Depression:  PHQ-2 Score: 4  PHQ-9 Total Score: 11    Severity:1-4 = minimal depression, 5-9 = mild depression, 10-14 = moderate depression, 15-19 = moderately severe depression, 20-27 = severe depression    Depression Interventions:  · we discussed her on the cymbalta and the trouble in combining meds with it            General Health and ACP:  General  In general, how would you say your health is?: Fair  In the past 7 days, have you experienced any of the following: New or Increased Pain, New or Increased Fatigue, Loneliness, Social Isolation, Stress or Anger?: (!) Yes  Select all that apply: (!) New or Increased Pain,New or Increased Fatigue,Stress  Do you get the social and emotional support that you need?: Yes  Do you have a Living Will?: Yes    Advance Directives     Power of  Living Will ACP-Advance Directive ACP-Power of     Not on File Not on File Not on File Not on File      General Health Risk Interventions:  · all related to her injury    Health Habits/Nutrition:     Physical Activity: Inactive    Days of Exercise per Week: 0 days    Minutes of Exercise per Session: 0 min     Have you lost any weight without trying in the past 3 months?: No     Have you seen the dentist within the past year?: Yes    Health Habits/Nutrition Interventions:  · the answers were reviewed             Objective   Vitals:    05/10/22 1454   BP: 136/78   Site: Right Upper Arm   Position: Sitting   Cuff Size: Small Adult   Pulse: 68   Resp: 14   Height: 5' 4\" (1.626 m)      Body mass index is 29.52 kg/m². General Appearance: alert and oriented to person, place and time, well-developed and well-nourished, in no acute distress  Skin: warm and dry, no rash or erythema  Head: normocephalic and atraumatic  Eyes: pupils equal, round, and reactive to light, extraocular eye movements intact, conjunctivae normal  Pulmonary/Chest: clear to auscultation bilaterally- no wheezes, rales or rhonchi, normal air movement, no respiratory distress  Cardiovascular: normal rate, regular rhythm, normal S1 and S2, no murmurs and no gallops  Abdomen: soft, non-tender, non-distended, normal bowel sounds, no masses or organomegaly  Extremities: no cyanosis and no clubbing  Musculoskeletal: normal range of motion, no joint swelling, deformity or tenderness  Neurologic: gait and coordination normal and speech normal       Allergies   Allergen Reactions    Dilaudid [Hydromorphone Hcl]      Respiratory failure    Iron Anaphylaxis    Percodan [Oxycodone-Aspirin]      Anxiety     Gluten Diarrhea    Gluten Meal Other (See Comments)     Other reaction(s):  Intolerance, Other: See Comments  Celiac disease  Celiac disease    Maxzide [Hydrochlorothiazide W-Triamterene] Nausea Only     dizziness    Nalfon [Fenoprofen Calcium] Hives    Oxycodone-Acetaminophen     Oxycodone-Aspirin     Percocet  [Oxycodone-Acetaminophen]     Reglan [Metoclopramide]     Fenoprofen Hives    Sulfa Antibiotics Hives     Prior to Visit Medications    Medication Sig Taking? Authorizing Provider   methylphenidate (RITALIN) 10 MG tablet Take 20 mg by mouth 3 times daily. Yes Historical Provider, MD   modafinil (PROVIGIL) 200 MG tablet Take 2 tablets by mouth daily for 180 days.  Yes Jasmin Mar PA-C   SYNTHROID 125 MCG tablet TAKE 1 TABLET BY MOUTH EVERY DAY Yes Lenny Ackerman MD   rosuvastatin (CRESTOR) 20 MG tablet Take 1 tablet by mouth daily Yes Lenny Ackerman MD   furosemide (LASIX) 20 MG tablet Take 2 tablets by mouth daily Yes Lenny Ackerman MD   famotidine (PEPCID) 20 MG tablet Take 1 tablet by mouth nightly Yes Lenny Ackerman MD   DULoxetine (CYMBALTA) 60 MG extended release capsule TAKE 2 CAPSULES BY MOUTH EVERY DAY Yes Lenny Ackerman MD   esomeprazole (NEXIUM) 40 MG delayed release capsule Take 1 capsule by mouth every morning (before breakfast) Yes Lenny Ackerman MD   fluticasone (FLONASE) 50 MCG/ACT nasal spray SPRAY 1 SPRAY INTO EACH NOSTRIL EVERY DAY Yes Lenny Ackerman MD   omeprazole (PRILOSEC) 20 MG delayed release capsule Take 20 mg by mouth nightly Yes Historical Provider, MD   Omega-3 Fatty Acids (FISH OIL) 1000 MG CAPS Take 6,000 mg by mouth 3 times daily Yes Historical Provider, MD   magnesium oxide (MAG-OX) 400 MG tablet Take 1,200 mg by mouth daily Yes Historical Provider, MD   ferrous sulfate (IRON 325) 325 (65 Fe) MG tablet Take 325 mg by mouth daily (with breakfast) Yes Historical Provider, MD   Cinnamon 500 MG TABS Take 6 tablets by mouth daily  Yes Historical Provider, MD   ascorbic acid (VITAMIN C) 1000 MG tablet Take 0.5 tablets by mouth 3 times daily Yes Mary Jo ESPINOZA Cassidy Russo MD   B Complex-Folic Acid (J-043 BALANCED TR PO) Take 100 mg by mouth 3 times daily (before meals) [facility] [place]  Yes Historical Provider, MD   vitamin D (CHOLECALCIFEROL) 5000 UNITS CAPS capsule Take 5,000 Units by mouth daily  Yes Historical Provider, MD   ondansetron (ZOFRAN ODT) 4 MG disintegrating tablet Take 1 tablet by mouth every 8 hours as needed for Nausea or Vomiting  Laineblair Gates APRN - CNP   butalbital-acetaminophen-caffeine (FIORICET, ESGIC) -40 MG per tablet Take 1 tablet by mouth every 4 hours as needed for Headaches  Lauren Perez MD   Lysine 500 MG TABS Take 1,500 mg by mouth daily  Historical Provider, MD [place] (Including outside providers/suppliers regularly involved in providing care):   Patient Care Team:  Lenny Ackerman MD as PCP - General (Family Medicine)  Lenny Ackerman MD as PCP - [facility] Empaneled Provider  Bridgett Reynoso DO as Consulting Physician (Family Medicine)  Viraj Nayak MD as Cardiologist (Cardiology)    Reviewed and updated this visit:  Tobacco  Allergies  Meds  Med Hx  Surg Hx  Soc Hx  Fam Hx

## 2022-05-10 NOTE — PATIENT INSTRUCTIONS
Consider compression hose at 15-30 mm of Mercury on in the morning and off at bedtime  Personalized Preventive Plan for Media Canavan - 5/10/2022  Medicare offers a range of preventive health benefits. Some of the tests and screenings are paid in full while other may be subject to a deductible, co-insurance, and/or copay. Some of these benefits include a comprehensive review of your medical history including lifestyle, illnesses that may run in your family, and various assessments and screenings as appropriate. After reviewing your medical record and screening and assessments performed today your provider may have ordered immunizations, labs, imaging, and/or referrals for you. A list of these orders (if applicable) as well as your Preventive Care list are included within your After Visit Summary for your review. Other Preventive Recommendations:    · A preventive eye exam performed by an eye specialist is recommended every 1-2 years to screen for glaucoma; cataracts, macular degeneration, and other eye disorders. · A preventive dental visit is recommended every 6 months. · Try to get at least 150 minutes of exercise per week or 10,000 steps per day on a pedometer . · Order or download the FREE \"Exercise & Physical Activity: Your Everyday Guide\" from [employer]. Call 5-185.225.4639 or search [employer] online. · You need 5832-2799 mg of calcium and 9920-1533 IU of vitamin D per day. It is possible to meet your calcium requirement with diet alone, but a vitamin D supplement is usually necessary to meet this goal.  · When exposed to the sun, use a sunscreen that protects against both UVA and UVB radiation with an SPF of 30 or greater. Reapply every 2 to 3 hours or after sweating, drying off with a towel, or swimming. · Always wear a seat belt when traveling in a car. Always wear a helmet when riding a bicycle or motorcycle.

## 2022-05-18 ENCOUNTER — OFFICE VISIT (OUTPATIENT)
Dept: PULMONOLOGY | Age: 67
End: 2022-05-18
Payer: MEDICARE

## 2022-05-18 VITALS
OXYGEN SATURATION: 98 % | DIASTOLIC BLOOD PRESSURE: 80 MMHG | HEIGHT: 64 IN | BODY MASS INDEX: 30.46 KG/M2 | SYSTOLIC BLOOD PRESSURE: 120 MMHG | WEIGHT: 178.4 LBS | TEMPERATURE: 97.8 F | HEART RATE: 94 BPM

## 2022-05-18 DIAGNOSIS — G47.33 OSA (OBSTRUCTIVE SLEEP APNEA): ICD-10-CM

## 2022-05-18 DIAGNOSIS — G25.81 RLS (RESTLESS LEGS SYNDROME): ICD-10-CM

## 2022-05-18 DIAGNOSIS — G47.11 IDIOPATHIC HYPERSOMNIA: Primary | ICD-10-CM

## 2022-05-18 DIAGNOSIS — G47.19 EXCESSIVE DAYTIME SLEEPINESS: ICD-10-CM

## 2022-05-18 DIAGNOSIS — G47.411 PRIMARY NARCOLEPSY WITH CATAPLEXY: ICD-10-CM

## 2022-05-18 PROCEDURE — G8399 PT W/DXA RESULTS DOCUMENT: HCPCS | Performed by: PHYSICIAN ASSISTANT

## 2022-05-18 PROCEDURE — 1123F ACP DISCUSS/DSCN MKR DOCD: CPT | Performed by: PHYSICIAN ASSISTANT

## 2022-05-18 PROCEDURE — G8417 CALC BMI ABV UP PARAM F/U: HCPCS | Performed by: PHYSICIAN ASSISTANT

## 2022-05-18 PROCEDURE — G8427 DOCREV CUR MEDS BY ELIG CLIN: HCPCS | Performed by: PHYSICIAN ASSISTANT

## 2022-05-18 PROCEDURE — 1090F PRES/ABSN URINE INCON ASSESS: CPT | Performed by: PHYSICIAN ASSISTANT

## 2022-05-18 PROCEDURE — 3017F COLORECTAL CA SCREEN DOC REV: CPT | Performed by: PHYSICIAN ASSISTANT

## 2022-05-18 PROCEDURE — 4040F PNEUMOC VAC/ADMIN/RCVD: CPT | Performed by: PHYSICIAN ASSISTANT

## 2022-05-18 PROCEDURE — 99214 OFFICE O/P EST MOD 30 MIN: CPT | Performed by: PHYSICIAN ASSISTANT

## 2022-05-18 PROCEDURE — 1036F TOBACCO NON-USER: CPT | Performed by: PHYSICIAN ASSISTANT

## 2022-05-18 RX ORDER — MODAFINIL 200 MG/1
400 TABLET ORAL DAILY
Qty: 180 TABLET | Refills: 1 | Status: SHIPPED | OUTPATIENT
Start: 2022-05-18 | End: 2022-11-14

## 2022-05-18 RX ORDER — METHYLPHENIDATE HYDROCHLORIDE 20 MG/1
20 TABLET ORAL 3 TIMES DAILY
Qty: 270 TABLET | Refills: 0 | Status: SHIPPED | OUTPATIENT
Start: 2022-05-18 | End: 2022-10-05 | Stop reason: SDUPTHER

## 2022-05-18 NOTE — PROGRESS NOTES
[place] [facility] and [address] [facility]                                         [id number]  5/18/2022   Patient is here today for medication refills       Pt of Dr. Abel April    Presentation:   Erik Beck presents for sleep medicine follow up for hypersomnia   Since the last visit Erik Beck has been doing reasonably well with Provigil. Symptoms of hypersomnia are improved with Provigil. She stopped taking the Ritalin on her own to see if any difference. She feels like she needs the Ritalin and does better with Ritalin . She was suppose to have a sleep study but had to cancel. She has been more tired since being off Ritalin. She was found to have mild LEA on original PSG in 2003. She never achieved REM on MSLT and therefore does not officially have narcolepsy although very suggestive. AHI on previous PSG in 2003 was 5.5. She has chronic pain that contributes to her fatigue. She has been recommended to undergo PSG and but not followed through yet. Progress History:   Since last visit any new medical issues? No  New ER or hospitlal visits? No  Any new or changes in medicines? No  Any new sleep medicines? No    Sleep issues:  Do you feel better? No  More rested? No   Better concentration?  no      Past Medical History:   Diagnosis Date    Age-related osteoporosis without current pathological fracture     Anemia 2000    malabsorption/Celiac disease    Anxiety 02/2018    Dr. Noe Osborn    Arm DVT (deep venous thromboembolism), acute ([facility] [address] 05/15/2013    Broken shoulder 09/06/2016    right    Celiac disease     Celiac disease     Complex regional pain syndrome of right upper extremity     Right shoulder, arm, and hand    CRPS (complex regional pain syndrome type I) 2016    Dr. Hurtado Pump    Depression 09/2016    Dr. Hurtado Pump    Depression     GERD (gastroesophageal reflux disease) 2000    and celiac- Dr. Yesenia Bedolla Headache(784.0)     migraines- TRANSORAL BIOPSY SINGLE/MULTIPLE Left 2/8/2018    EGD BIOPSY performed by Yoli Smith MD at [address] [facility] [address] OFFICE/OUTPT [address] Left 8/31/2018    ROBOTIC EXCISION OF LEFT PARARENAL MASS performed by Usama Kenny MD at [address]  1/14/97    SINUS SURGERY  x4 1982, 1990, 1997, 2005    [address]   [address]    Due to complictions after mass removal.   [facility] STIMULATOR SURGERY N/A 11/14/2019    Cervical SCS Trial performed by Merline Blanco MD at [address] [facility] N/A 6/12/2020    retrial due to lead migration Cervical SCS trial entrance T1 to C 2 performed by Merline Blanco MD at [address] [facility]  1998,2009,2010,2011,2012, 2015, 2/2016       Social History     Tobacco Use    Smoking status: Never Smoker    Smokeless tobacco: Never Used   Vaping Use    Vaping Use: Never used   Substance Use Topics    Alcohol use: Yes     Comment: socially    Drug use: No       Allergies   Allergen Reactions    Dilaudid [Hydromorphone Hcl]      Respiratory failure    Iron Anaphylaxis    Percodan [Oxycodone-Aspirin]      Anxiety     Gluten Diarrhea    Gluten Meal Other (See Comments)     Other reaction(s): Intolerance, Other: See Comments  Celiac disease  Celiac disease    Maxzide [Hydrochlorothiazide W-Triamterene] Nausea Only     dizziness    Nalfon [Fenoprofen Calcium] Hives    Oxycodone-Acetaminophen     Oxycodone-Aspirin     Percocet  [Oxycodone-Acetaminophen]     Reglan [Metoclopramide]     Fenoprofen Hives    Sulfa Antibiotics Hives       Current Outpatient Medications   Medication Sig Dispense Refill    modafinil (PROVIGIL) 200 MG tablet Take 2 tablets by mouth daily for 180 days. 180 tablet 1    methylphenidate (RITALIN) 20 MG tablet Take 1 tablet by mouth 3 times daily for 90 days.  270 tablet 0    SYNTHROID 125 MCG tablet TAKE 1 TABLET BY MOUTH EVERY Maternal Grandfather     Stroke Paternal Grandmother     Diabetes Paternal Grandmother         type II    Ovarian Cancer Paternal Grandmother 48    Brain Cancer Paternal Grandfather 80        brain tumor    Asthma Brother      Problems Brother     Heart Disease Brother     Other Sister         1days old, pneumonia complications    Colon Cancer Paternal Aunt 79    Colon Cancer Paternal Uncle 79    Colon Cancer Paternal Uncle 79    Cancer Paternal Uncle 79        bone     Prostate Cancer Other 61    Cancer Paternal Aunt 72        bone ca'    Breast Cancer Paternal Aunt 72    Breast Cancer Paternal Cousin 27    Breast Cancer Paternal Aunt 72    Breast Cancer Maternal Aunt 72    Breast Cancer Maternal Aunt 65        Review of Systems -   General ROS: stable / unchanged  ENT ROS: negative for - nasal congestion, oral lesions or sore throat  Hematological andLymphatic ROS: negative  Endocrine ROS: negative  Respiratory ROS: no cough, shortness of breath, or wheezing  Cardiovascular ROS: no chest pain or dyspnea on exertion  Gastrointestinal ROS: no abdominal pain,change in bowel habits, or black or bloody stools  Musculoskeletal ROS: negative  Neurological ROS: negative    Physical Exam:    BMI:  Body mass index is 31.11 kg/m². Wt Readings from Last 3 Encounters:   05/18/22 178 lb 6.4 oz (80.9 kg)   03/04/22 172 lb (78 kg)   02/21/22 174 lb 14.4 oz (79.3 kg)     Vitals: /80 (Site: Left Upper Arm, Position: Sitting, Cuff Size: Small Adult)   Pulse 94   Temp 97.8 °F (36.6 °C)   Ht 5' 3.5\" (1.613 m)   Wt 178 lb 6.4 oz (80.9 kg)   SpO2 98% Comment: on r/a  BMI 31.11 kg/m²       Physical Exam  Constitutional:       General: She is not in acute distress. Appearance: Normal appearance. She is normal weight. She is not ill-appearing. HENT:      Head: Normocephalic and atraumatic. Nose: Nose normal.   Eyes:      Extraocular Movements: Extraocular movements intact.       Pupils: Pupils are equal, round, and reactive to light. Pulmonary:      Effort: Pulmonary effort is normal.   Neurological:      Mental Status: She is alert and oriented to person, place, and time. Psychiatric:         Attention and Perception: Attention and perception normal.         Mood and Affect: Mood and affect normal.         Speech: Speech normal.         Behavior: Behavior normal.         Thought Content: Thought content normal.         Cognition and Memory: Cognition and memory normal.         Judgment: Judgment normal.        Assessment      Diagnosis Orders   1. Idiopathic hypersomnia  modafinil (PROVIGIL) 200 MG tablet    methylphenidate (RITALIN) 20 MG tablet   2. RLS (restless legs syndrome)     3. Excessive daytime sleepiness     4. Primary narcolepsy with cataplexy  modafinil (PROVIGIL) 200 MG tablet    methylphenidate (RITALIN) 20 MG tablet   5. LEA (obstructive sleep apnea)          Plan   - still believe she needs PSG at some  - hypersomnia related to [facility], chronic pain and possibly LEA  - RLS well controlled   - Will continue Provigil and Ritalin   - She call my officefor earlier appointment if needed for worsening of sleep symptoms.   - She was instructed on weight loss  - Lucia Starch was educated about my impression and plan. Patient verbalizes understanding.   We will see Rafita Ji back in: 6 months     Mallorie London PA-C, [place]  5/18/2022

## 2022-05-23 ENCOUNTER — TELEPHONE (OUTPATIENT)
Dept: FAMILY MEDICINE CLINIC | Age: 67
End: 2022-05-23

## 2022-05-23 RX ORDER — FLUCONAZOLE 150 MG/1
150 TABLET ORAL ONCE
Qty: 1 TABLET | Refills: 1 | Status: SHIPPED | OUTPATIENT
Start: 2022-05-23 | End: 2022-05-23

## 2022-05-23 NOTE — TELEPHONE ENCOUNTER
Patient called stating Ortho started her on an antibiotic and she now has a yeast infection. She is requesting Diflucan to [address] [place] [address]. Advised her it would be tomorrow.     Please call her

## 2022-08-09 ENCOUNTER — TELEMEDICINE (OUTPATIENT)
Dept: PSYCHIATRY | Age: 67
End: 2022-08-09
Payer: MEDICARE

## 2022-08-09 DIAGNOSIS — R41.3 MEMORY PROBLEM: ICD-10-CM

## 2022-08-09 DIAGNOSIS — G89.4 CHRONIC PAIN SYNDROME: ICD-10-CM

## 2022-08-09 DIAGNOSIS — F41.9 ANXIETY: ICD-10-CM

## 2022-08-09 DIAGNOSIS — F32.A DEPRESSION, UNSPECIFIED DEPRESSION TYPE: Primary | ICD-10-CM

## 2022-08-09 PROCEDURE — 90792 PSYCH DIAG EVAL W/MED SRVCS: CPT | Performed by: PSYCHIATRY & NEUROLOGY

## 2022-08-09 RX ORDER — FLUOXETINE HYDROCHLORIDE 20 MG/1
20 CAPSULE ORAL DAILY
Qty: 90 CAPSULE | Refills: 0 | Status: SHIPPED | OUTPATIENT
Start: 2022-08-09

## 2022-08-09 RX ORDER — DULOXETIN HYDROCHLORIDE 60 MG/1
CAPSULE, DELAYED RELEASE ORAL
Qty: 180 CAPSULE | Refills: 0 | Status: SHIPPED | OUTPATIENT
Start: 2022-08-09
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
## 2022-09-07 DIAGNOSIS — M81.0 AGE-RELATED OSTEOPOROSIS WITHOUT CURRENT PATHOLOGICAL FRACTURE: Primary | ICD-10-CM

## 2022-09-07 DIAGNOSIS — K21.00 GASTROESOPHAGEAL REFLUX DISEASE WITH ESOPHAGITIS, UNSPECIFIED WHETHER HEMORRHAGE: ICD-10-CM

## 2022-09-07 DIAGNOSIS — K44.9 HIATAL HERNIA WITH GASTROESOPHAGEAL REFLUX DISEASE WITHOUT ESOPHAGITIS: ICD-10-CM

## 2022-09-07 DIAGNOSIS — E03.9 HYPOTHYROIDISM, UNSPECIFIED TYPE: ICD-10-CM

## 2022-09-07 DIAGNOSIS — K22.0 INCOMPETENT LOWER ESOPHAGEAL SPHINCTER: ICD-10-CM

## 2022-09-07 DIAGNOSIS — I87.2 VENOUS INSUFFICIENCY: ICD-10-CM

## 2022-09-07 DIAGNOSIS — K21.9 HIATAL HERNIA WITH GASTROESOPHAGEAL REFLUX DISEASE WITHOUT ESOPHAGITIS: ICD-10-CM

## 2022-09-07 DIAGNOSIS — E78.5 HYPERLIPIDEMIA, UNSPECIFIED HYPERLIPIDEMIA TYPE: ICD-10-CM

## 2022-09-07 RX ORDER — ESOMEPRAZOLE MAGNESIUM 40 MG/1
40 CAPSULE, DELAYED RELEASE ORAL
Qty: 90 CAPSULE | Refills: 2 | Status: SHIPPED | OUTPATIENT
Start: 2022-09-07

## 2022-09-07 RX ORDER — FAMOTIDINE 20 MG/1
20 TABLET, FILM COATED ORAL NIGHTLY
Qty: 90 TABLET | Refills: 2 | Status: SHIPPED | OUTPATIENT
Start: 2022-09-07

## 2022-09-07 RX ORDER — LEVOTHYROXINE SODIUM 125 MCG
TABLET ORAL
Qty: 90 TABLET | Refills: 2 | Status: SHIPPED | OUTPATIENT
Start: 2022-09-07

## 2022-09-07 RX ORDER — FUROSEMIDE 20 MG/1
40 TABLET ORAL DAILY
Qty: 180 TABLET | Refills: 2 | Status: SHIPPED | OUTPATIENT
Start: 2022-09-07

## 2022-09-07 NOTE — TELEPHONE ENCOUNTER
Date of last visit:  5/10/2022  Date of next visit:  Visit date not found    Requested Prescriptions     Pending Prescriptions Disp Refills    furosemide (LASIX) 20 MG tablet 180 tablet 3     Sig: Take 2 tablets by mouth daily    famotidine (PEPCID) 20 MG tablet 90 tablet 3     Sig: Take 1 tablet by mouth nightly    esomeprazole (NEXIUM) 40 MG delayed release capsule 90 capsule 3     Sig: Take 1 capsule by mouth every morning (before breakfast)    SYNTHROID 125 MCG tablet 90 tablet 3     Sig: TAKE 1 TABLET BY MOUTH EVERY DAY

## 2022-09-07 NOTE — TELEPHONE ENCOUNTER
Pt called req #90 day refills for    Famotidine  Furosemide  Rosuvastatin  Esomeprazole  Synthroid    [facility]

## 2022-09-08 ENCOUNTER — TELEPHONE (OUTPATIENT)
Dept: FAMILY MEDICINE CLINIC | Age: 67
End: 2022-09-08

## 2022-09-08 NOTE — TELEPHONE ENCOUNTER
----- Message from Kailyn Osman MD sent at 9/7/2022  6:46 PM EDT -----  She's due for some fasting labs now.

## 2022-09-10 DIAGNOSIS — E78.5 HYPERLIPIDEMIA, UNSPECIFIED HYPERLIPIDEMIA TYPE: ICD-10-CM

## 2022-09-12 RX ORDER — ROSUVASTATIN CALCIUM 20 MG/1
TABLET, COATED ORAL
Qty: 30 TABLET | Refills: 0 | Status: SHIPPED | OUTPATIENT
Start: 2022-09-12 | End: 2022-10-10 | Stop reason: SDUPTHER

## 2022-10-03 DIAGNOSIS — G47.11 IDIOPATHIC HYPERSOMNIA: ICD-10-CM

## 2022-10-03 DIAGNOSIS — G47.411 PRIMARY NARCOLEPSY WITH CATAPLEXY: ICD-10-CM

## 2022-10-03 NOTE — TELEPHONE ENCOUNTER
Nash Khalil called requesting a refill on the following medications:  Requested Prescriptions     Pending Prescriptions Disp Refills    methylphenidate (RITALIN) 20 MG tablet 270 tablet 0     Sig: Take 1 tablet by mouth 3 times daily for 90 days.      Pharmacy verified:  .pv  [address] [place] [address] [id number] - [place], [address] [facility] Sonia Cruz [phone]    Date of last visit: 05/18/2022  Date of next visit (if applicable): 77/10/7590

## 2022-10-04 DIAGNOSIS — E78.5 HYPERLIPIDEMIA, UNSPECIFIED HYPERLIPIDEMIA TYPE: ICD-10-CM

## 2022-10-04 NOTE — TELEPHONE ENCOUNTER
The pharmacy is  requesting a refill of the below medication which has been pended for you:     Requested Prescriptions     Pending Prescriptions Disp Refills    rosuvastatin (CRESTOR) 20 MG tablet [Pharmacy Med Name: Rosuvastatin Calcium Oral Tablet 20 MG] 90 tablet 3     Sig: TAKE 1 TABLET BY MOUTH EVERY DAY       Last Appointment Date: 5/10/2022  Next Appointment Date: Visit date not found    Allergies   Allergen Reactions    Dilaudid [Hydromorphone Hcl]      Respiratory failure    Iron Anaphylaxis    Percodan [Oxycodone-Aspirin]      Anxiety     Gluten Diarrhea    Gluten Meal Other (See Comments)     Other reaction(s):  Intolerance, Other: See Comments  Celiac disease  Celiac disease    Maxzide [Hydrochlorothiazide W-Triamterene] Nausea Only     dizziness    Nalfon [Fenoprofen Calcium] Hives    Oxycodone-Acetaminophen     Oxycodone-Aspirin     Percocet  [Oxycodone-Acetaminophen]     Reglan [Metoclopramide]     Fenoprofen Hives    Sulfa Antibiotics Hives

## 2022-10-05 RX ORDER — ROSUVASTATIN CALCIUM 20 MG/1
TABLET, COATED ORAL
Qty: 90 TABLET | Refills: 3 | OUTPATIENT
Start: 2022-10-05

## 2022-10-05 RX ORDER — METHYLPHENIDATE HYDROCHLORIDE 20 MG/1
20 TABLET ORAL 3 TIMES DAILY
Qty: 270 TABLET | Refills: 0 | Status: SHIPPED | OUTPATIENT
Start: 2022-10-05 | End: 2023-01-03

## 2022-10-05 NOTE — TELEPHONE ENCOUNTER
I would like to see the result of the fasting labs ordered in early September before renewing the crestor. Will she need a small Rx of it while waiting on the lab result?

## 2022-10-06 LAB
ALBUMIN SERPL-MCNC: 4.3 G/DL (ref 3.5–5.2)
ALK PHOSPHATASE: 114 U/L (ref 40–142)
ALT SERPL-CCNC: 38 U/L (ref 5–40)
ANION GAP SERPL CALCULATED.3IONS-SCNC: 9 MEQ/L (ref 7–16)
AST SERPL-CCNC: 38 U/L (ref 9–40)
BILIRUB SERPL-MCNC: 0.2 MG/DL
BUN BLDV-MCNC: 16 MG/DL (ref 8–23)
CALCIUM SERPL-MCNC: 9.5 MG/DL (ref 8.5–10.5)
CHLORIDE BLD-SCNC: 104 MEQ/L (ref 95–107)
CHOLESTEROL/HDL RATIO: 3.1 RATIO
CHOLESTEROL: 156 MG/DL
CO2: 27 MEQ/L (ref 19–31)
CREAT SERPL-MCNC: 0.56 MG/DL (ref 0.6–1.3)
EGFR IF NONAFRICAN AMERICAN: 100 ML/MIN/1.73
GLUCOSE: 123 MG/DL (ref 70–99)
HDLC SERPL-MCNC: 51 MG/DL
LDL CHOLESTEROL CALCULATED: 84 MG/DL
LDL/HDL RATIO: 1.6 RATIO
POTASSIUM SERPL-SCNC: 4.5 MEQ/L (ref 3.5–5.4)
SODIUM BLD-SCNC: 140 MEQ/L (ref 133–146)
TOTAL PROTEIN: 6.9 G/DL (ref 6.1–8.3)
TRIGL SERPL-MCNC: 104 MG/DL
TSH SERPL DL<=0.05 MIU/L-ACNC: 5.46 UIU/ML (ref 0.4–4.1)
VITAMIN D 25-HYDROXY: 66 NG/ML
VLDLC SERPL CALC-MCNC: 21 MG/DL

## 2022-10-06 NOTE — TELEPHONE ENCOUNTER
Zeinab informed. Lab results are now in [address] [place] [address] Rd. Req a #90 refill to [facility].     Pt req called with lab results once reviewed

## 2022-10-10 DIAGNOSIS — E55.9 VITAMIN D DEFICIENCY: ICD-10-CM

## 2022-10-10 DIAGNOSIS — R73.9 HYPERGLYCEMIA: ICD-10-CM

## 2022-10-10 DIAGNOSIS — E78.5 HYPERLIPIDEMIA, UNSPECIFIED HYPERLIPIDEMIA TYPE: Primary | ICD-10-CM

## 2022-10-10 DIAGNOSIS — E78.5 HYPERLIPIDEMIA, UNSPECIFIED HYPERLIPIDEMIA TYPE: ICD-10-CM

## 2022-10-10 DIAGNOSIS — E03.9 HYPOTHYROIDISM, UNSPECIFIED TYPE: ICD-10-CM

## 2022-10-10 RX ORDER — ROSUVASTATIN CALCIUM 20 MG/1
20 TABLET, COATED ORAL DAILY
Qty: 90 TABLET | Refills: 3 | Status: SHIPPED | OUTPATIENT
Start: 2022-10-10 | End: 2022-10-28

## 2022-10-10 NOTE — RESULT ENCOUNTER NOTE
Let her know the lipids were fine. The thyroid lab was just a bit off and should be checked again in mid April. It would be good to check the slightly elevated glucose then too.

## 2022-10-10 NOTE — TELEPHONE ENCOUNTER
----- Message from Nuvia Bella MD sent at 10/10/2022  3:25 PM EDT -----  Let her know the lipids were fine. The thyroid lab was just a bit off and should be checked again in mid April. It would be good to check the slightly elevated glucose then too.

## 2022-10-28 DIAGNOSIS — E78.5 HYPERLIPIDEMIA, UNSPECIFIED HYPERLIPIDEMIA TYPE: ICD-10-CM

## 2022-10-28 RX ORDER — ROSUVASTATIN CALCIUM 20 MG/1
TABLET, COATED ORAL
Qty: 90 TABLET | Refills: 1 | Status: SHIPPED | OUTPATIENT
Start: 2022-10-28

## 2022-10-28 NOTE — TELEPHONE ENCOUNTER
Date of last visit:  5/10/2022  Date of next visit:  Visit date not found    Requested Prescriptions     Pending Prescriptions Disp Refills    rosuvastatin (CRESTOR) 20 MG tablet [Pharmacy Med Name: Rosuvastatin Calcium Oral Tablet 20 MG] 30 tablet 0     Sig: TAKE 1 TABLET BY MOUTH EVERY DAY

## 2022-10-28 NOTE — TELEPHONE ENCOUNTER
Pt called said she is completely out. I told her that we had refilled this on 10/10/22 however it was sent to [address] [place] [address] and she no longer uses that pharmacy. She uses [facility] now.

## 2022-11-10 RX ORDER — FLUOXETINE HYDROCHLORIDE 20 MG/1
20 CAPSULE ORAL DAILY
Qty: 90 CAPSULE | Refills: 0 | Status: SHIPPED | OUTPATIENT
Start: 2022-11-10

## 2022-11-10 NOTE — TELEPHONE ENCOUNTER
Zeinab called into the office stating that she needs a refill on her Prozac 20mg;#90 with 0 refills;last with a start date of 08/09/2022. Medications is pending your approval for a 90 day supply with 0 refills. She was last seen on 12/07/2022. She was last seen on 08/09/2022.

## 2022-11-18 DIAGNOSIS — G47.11 IDIOPATHIC HYPERSOMNIA: ICD-10-CM

## 2022-11-18 DIAGNOSIS — G47.411 PRIMARY NARCOLEPSY WITH CATAPLEXY: ICD-10-CM

## 2022-11-18 RX ORDER — MODAFINIL 200 MG/1
TABLET ORAL
Qty: 180 TABLET | Refills: 0 | OUTPATIENT
Start: 2022-11-18

## 2022-11-18 NOTE — PROGRESS NOTES
[facility] and [facility]                                         [id number]  11/21/2022   Chief Complaint   Patient presents with    Follow-up     6mo med check. Taking Provigil for hypersomnia and re-started on Ritalin. Notes she ran out of her Provigil. Dr Devan Tolliver set a script for 2 Provigil to hold her until today. Notes she does want to go ahead with the sleep study. Former Pt of Dr. Marlow Class  12  SAQLI    50    Presentation:   Guillermo Lorenzo presents for sleep medicine follow up for idiopathic hypersomnia. Since the last visit Guillermo Lorenzo has been doing reasonably well with Provigil. Symptoms of hypersomnia  are improved as long as she takes it. She ran out of medication this weekend and was more tired until refilled. She takes Ritalin BID with good benefit. She has been having more insomnia recently related to stress. She was found to have mild LEA on original PSG in 2003. She never achieved REM on MSLT and therefore does not officially have narcolepsy although very suggestive. AHI on previous PSG in 2003 was 5.5. She has chronic pain that contributes to her fatigue. She has been recommended to undergo PSG and but not followed through yet but now more receptive. She states that she does snore. She has nonrestorative sleep. She is tired during the day at times without medications    Progress History:   Since last visit any new medical issues? No  New ER or hospitlal visits? No  Any new or changes in medicines? No  Any new sleep medicines? No    Sleep issues:  Do you feel better? Yes and No  More rested? Yes   Better concentration?  yes      Past Medical History:   Diagnosis Date    Age-related osteoporosis without current pathological fracture     Anemia 2000    malabsorption/Celiac disease    Anxiety 02/2018    Dr. Radha Nolan    Arm DVT (deep venous thromboembolism), acute ([facility] [address] 05/15/2013    Broken shoulder 09/06/2016    right    Celiac disease Celiac disease     Complex regional pain syndrome of right upper extremity     Right shoulder, arm, and hand    CRPS (complex regional pain syndrome type I) 2016    Dr. Jessica Hernandez    Depression 09/2016    Dr. Jessica Hernandez    Depression     GERD (gastroesophageal reflux disease) 2000    and celiac- Dr. Lynnette Mueller- [facility]    Headache(784.0)     migraines-Dr. Estela Antunez    Heart murmur     History of blood transfusion     Hx of reactive hypoglycemia     Dr. Arvin Nair    Hyperlipidemia 2013    Dr. Pallavi Rodgers    Hypothyroidism 1993    Dr. Magno Fisher    IBS (irritable bowel syndrome)     PER PT     Irritable bowel syndrome 1997    [facility]. Lynnette [facility] at [facility]    MDRO (multiple drug resistant organisms) resistance     Meniere disease 2005    Dr. Chico Grant    Mitral valve prolapse syndrome 2008    MRSA (methicillin resistant Staphylococcus aureus) 2013, 8/1/15    left forearm 2013, nasal screen pos Aug 2015    Partial bowel obstruction (HCC)     Multile times.   S/P partial small bowel resection    Restless legs syndrome 1993    Dr. Adolphus Duane    RSD upper limb 2016    Right Shoulder- Dr. 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 Simi Fisher    Thyroid disease        Past Surgical History:   Procedure Laterality Date    ABDOMEN SURGERY  04/23/2013    ABD Exploration, removal of ommentum, lysis of adhesions-Dr. Mireille Mathur    ABDOMEN SURGERY      removed mass \"near kidney\"     ABDOMINAL ADHESION SURGERY  10/2010    Resection-Dr. Mireille Mathur, twice not sure of exact year    Leopoldo Agreste Dr. Marnell Frederick Left 2006    CHOLECYSTECTOMY  2003     Dr. Stephanie Tai  [id number], 2015    COLONOSCOPY  2016    Dr Peter Mayer, [facility] ([address] [place] [address]) 1985    HYSTERECT[facility], TOTAL ABDOMINAL ([facility]  1985    Dr. Lara Dong, [address] (CERVIX REMOVED)  1985    KNEE SURGERY Right 2006    LAPAROTOMY EXPLORATORY N/A 8/31/2018    LAPAROTOMY EXPLORATORY, lysis of adhesions, closure innerotomoy performed by Lenard Rosales MD at [facility]  2006    capsule endoscopy    [place] EGD TRANSORAL BIOPSY SINGLE/MULTIPLE Left 2/8/2018    EGD BIOPSY performed by Sheila Ganser, MD at [address] [facility] OFFICE/OUTPT [facility] Left 8/31/2018    ROBOTIC EXCISION OF LEFT PARARENAL MASS performed by Lenard Rosales MD at [address] [facility]  1/14/97    SINUS SURGERY  x4 1982, 1990, 1997, 2005     SMALL INTESTINE SURGERY  4626    Due to complictions after mass removal.    SPINAL CORD STIMULATOR SURGERY N/A 11/14/2019    Cervical SCS Trial performed by Lino Huffman MD at [address] [facility] N/A 6/12/2020    retrial due to lead migration Cervical SCS trial entrance T1 to C 2 performed by Lino Huffman MD at [address] [facility]  1998,2009,2010,2011,2012, 2015, 2/2016       Social History     Tobacco Use    Smoking status: Never    Smokeless tobacco: Never    Tobacco comments:     Never smoker   Vaping Use    Vaping Use: Never used   Substance Use Topics    Alcohol use: Yes     Comment: socially    Drug use: No       Allergies   Allergen Reactions    Dilaudid [Hydromorphone Hcl]      Respiratory failure    Iron Anaphylaxis    Percodan [Oxycodone-Aspirin]      Anxiety     Gluten Diarrhea    Gluten Meal Other (See Comments)     Other reaction(s):  Intolerance, Other: See Comments  Celiac disease  Celiac disease    Maxzide [Hydrochlorothiazide W-Triamterene] Nausea Only     dizziness    Nalfon [Fenoprofen Calcium] Hives    Oxycodone-Acetaminophen     Oxycodone-Aspirin     Percocet  [Oxycodone-Acetaminophen]     Reglan [Metoclopramide] Fenoprofen Hives    Sulfa Antibiotics Hives       Current Outpatient Medications   Medication Sig Dispense Refill    modafinil (PROVIGIL) 200 MG tablet Take 2 tablets by mouth daily for 180 days. 180 tablet 1    FLUoxetine (PROZAC) 20 MG capsule Take 1 capsule by mouth daily 90 capsule 0    rosuvastatin (CRESTOR) 20 MG tablet TAKE 1 TABLET BY MOUTH EVERY DAY 90 tablet 1    methylphenidate (RITALIN) 20 MG tablet Take 1 tablet by mouth 3 times daily for 90 days. 270 tablet 0    furosemide (LASIX) 20 MG tablet Take 2 tablets by mouth daily 180 tablet 2    famotidine (PEPCID) 20 MG tablet Take 1 tablet by mouth nightly 90 tablet 2    esomeprazole (NEXIUM) 40 MG delayed release capsule Take 1 capsule by mouth every morning (before breakfast) 90 capsule 2    SYNTHROID 125 MCG tablet TAKE 1 TABLET BY MOUTH EVERY DAY 90 tablet 2    DULoxetine (CYMBALTA) 60 MG extended release capsule TAKE 2 CAPSULES BY MOUTH EVERY  capsule 0    fluticasone (FLONASE) 50 MCG/ACT nasal spray SPRAY 1 SPRAY INTO EACH NOSTRIL EVERY DAY 1 Bottle 0    Omega-3 Fatty Acids (FISH OIL) 1000 MG CAPS Take 6,000 mg by mouth 3 times daily      magnesium oxide (MAG-OX) 400 MG tablet Take 1,200 mg by mouth daily      ferrous sulfate (IRON 325) 325 (65 Fe) MG tablet Take 325 mg by mouth daily (with breakfast)      Cinnamon 500 MG TABS Take 6 tablets by mouth daily       ascorbic acid (VITAMIN C) 1000 MG tablet Take 0.5 tablets by mouth 3 times daily 30 tablet 3    B Complex-Folic Acid (Y-430 BALANCED TR PO) Take 100 mg by mouth 3 times daily (before meals) [facility]       vitamin D (CHOLECALCIFEROL) 5000 UNITS CAPS capsule Take 5,000 Units by mouth daily        No current facility-administered medications for this visit.        Family History   Problem Relation Age of Onset    Diabetes Mother         type II    Stroke Mother     High Blood Pressure Mother     Kidney Disease Mother     Heart Disease Mother     Heart Attack Mother 66 2/28/15    Colon Cancer Father 79        jejunum    Cancer Father         Lyphoma    Irritable Bowel Syndrome Sister     High Blood Pressure Brother     Kidney Disease Brother     Celiac Disease Brother     Diabetes Maternal Grandmother         type II    Blindness Maternal Grandmother     Uterine Cancer Maternal Grandmother     Emphysema Maternal Grandfather     Stroke Paternal Grandmother     Diabetes Paternal Grandmother         type II    Ovarian Cancer Paternal Grandmother 48    Brain Cancer Paternal Grandfather 80        brain tumor    Asthma Brother      Problems Brother     Heart Disease Brother     Other Sister         1days old, pneumonia complications    Colon Cancer Paternal Aunt 79    Colon Cancer Paternal Uncle 79    Colon Cancer Paternal Uncle 79    Cancer Paternal Uncle 79        bone     Prostate Cancer Other 61    Cancer Paternal Aunt 72        bone ca'    Breast Cancer Paternal Aunt 72    Breast Cancer Paternal Cousin 27    Breast Cancer Paternal Aunt 72    Breast Cancer Maternal Aunt 72    Breast Cancer Maternal Aunt 65        Review of Systems -   General ROS: stable / unchanged  ENT ROS: negative for - nasal congestion, oral lesions or sore throat  Hematological andLymphatic ROS: negative  Endocrine ROS: negative  Respiratory ROS: no cough, shortness of breath, or wheezing  Cardiovascular ROS: no chest pain or dyspnea on exertion  Gastrointestinal ROS: no abdominal pain,change in bowel habits, or black or bloody stools  Musculoskeletal ROS: negative  Neurological ROS: negative    Physical Exam:    BMI:  Body mass index is 31.18 kg/m².     Wt Readings from Last 3 Encounters:   11/21/22 178 lb 12.8 oz (81.1 kg)   05/18/22 178 lb 6.4 oz (80.9 kg)   03/04/22 172 lb (78 kg)     Vitals: /84 (Site: Left Upper Arm, Position: Sitting, Cuff Size: Medium Adult)   Pulse 85   Temp 97.6 °F (36.4 °C) (Oral)   Ht 5' 3.5\" (1.613 m)   Wt 178 lb 12.8 oz (81.1 kg)   SpO2 97% Comment: r/a  BMI 31.18 kg/m²       Physical Exam  Constitutional:       General: She is not in acute distress. Appearance: Normal appearance. She is normal weight. She is not ill-appearing. HENT:      Head: Normocephalic and atraumatic. Nose: Nose normal.   Eyes:      Extraocular Movements: Extraocular movements intact. Pupils: Pupils are equal, round, and reactive to light. Pulmonary:      Effort: Pulmonary effort is normal.   Neurological:      Mental Status: She is alert and oriented to person, place, and time. Psychiatric:         Attention and Perception: Attention and perception normal.         Mood and Affect: Mood and affect normal.         Speech: Speech normal.         Behavior: Behavior normal.         Thought Content: Thought content normal.         Cognition and Memory: Cognition and memory normal.         Judgment: Judgment normal.          Assessment      Diagnosis Orders   1. Idiopathic hypersomnia        2. LEA (obstructive sleep apnea)        3. Snoring        4. Excessive daytime sleepiness        5. Non-restorative sleep               Plan   -She is finally agreeable to proceeding with a PSG. -She was diagnosed with idiopathic hypersomnia in 2003 but at that time had a mildly elevated AHI of 5.5.  -Her obstructive sleep apnea although mild has never been addressed or treated and this was discussed with her.  -Discussed possible treatment options if she still has sleep apnea  -Continue Provigil and Ritalin for idiopathic hypersomnia  - She call my officefor earlier appointment if needed for worsening of sleep symptoms.   - She was instructed on weight loss  - Guillermo Lorenzo was educated about my impression and plan. Patient verbalizes understanding.   We will see Maria Del Rosario Moore back after PSG    Ana Luisa Fuentes PA-C, MPAS  11/21/2022

## 2022-11-18 NOTE — TELEPHONE ENCOUNTER
Patient is calling in regarding this. She does have an appt 11/21/2022 but she is out of this medication. Please advise.

## 2022-11-18 NOTE — TELEPHONE ENCOUNTER
Received refill request for Provigil. Medication was last ordered by Nichol Romero. Medication was last ordered on 5/18/22 with 1 refills. Patient was last seen in the office 5/18/22. Does patient have a scheduled follow up?: yes - 11/21/22    Medication needs to be sent to [facility] [address] - [place], [address] [phone]. Thank you, please advise! Patient's Allergies: Allergies   Allergen Reactions    Dilaudid [Hydromorphone Hcl]      Respiratory failure    Iron Anaphylaxis    Percodan [Oxycodone-Aspirin]      Anxiety     Gluten Diarrhea    Gluten Meal Other (See Comments)     Other reaction(s):  Intolerance, Other: See Comments  Celiac disease  Celiac disease    Maxzide [Hydrochlorothiazide W-Triamterene] Nausea Only     dizziness    Nalfon [Fenoprofen Calcium] Hives    Oxycodone-Acetaminophen     Oxycodone-Aspirin     Percocet  [Oxycodone-Acetaminophen]     Reglan [Metoclopramide]     Fenoprofen Hives    Sulfa Antibiotics Hives

## 2022-11-19 ENCOUNTER — TELEPHONE (OUTPATIENT)
Dept: PULMONOLOGY | Age: 67
End: 2022-11-19

## 2022-11-19 DIAGNOSIS — G47.411 PRIMARY NARCOLEPSY WITH CATAPLEXY: ICD-10-CM

## 2022-11-19 DIAGNOSIS — G47.11 IDIOPATHIC HYPERSOMNIA: ICD-10-CM

## 2022-11-19 DIAGNOSIS — Z76.0 MEDICATION REFILL: Primary | ICD-10-CM

## 2022-11-19 RX ORDER — MODAFINIL 200 MG/1
400 TABLET ORAL DAILY
Qty: 6 TABLET | Refills: 0 | Status: SHIPPED | OUTPATIENT
Start: 2022-11-19 | End: 2022-11-21 | Stop reason: SDUPTHER

## 2022-11-19 NOTE — TELEPHONE ENCOUNTER
I received a perfect serve message from the patient at 11:25 am on 11/19/22 regarding refill request for her medication regarding treatment for ? idiopathic hypersomnia. -PDMP (Prescription Drug Monitoring Program Data) report reviewed on [facility] today i.e on 11/19/22    I did PDMP check as above. Patient received 90-day supply of Ritalin on 5 October 2022. I called patient at her given phone number in [employer] [phone]. Phone message is coming stating that she is not available to speak with me at this time. I tried to leave a message on her answering service to call back to speak with me as soon as possible by calling on [phone]. However, her answering service is full and not accepting any more messages. I am not able to leave a text message through [employer]. There is no option in perfect serve to do so. I will forward this message to Ms. Rich jack PA-C to address the refill request.    Jonnie.

## 2022-11-21 ENCOUNTER — OFFICE VISIT (OUTPATIENT)
Dept: PULMONOLOGY | Age: 67
End: 2022-11-21
Payer: MEDICARE

## 2022-11-21 VITALS
TEMPERATURE: 97.6 F | DIASTOLIC BLOOD PRESSURE: 84 MMHG | OXYGEN SATURATION: 97 % | BODY MASS INDEX: 30.52 KG/M2 | SYSTOLIC BLOOD PRESSURE: 136 MMHG | WEIGHT: 178.8 LBS | HEART RATE: 85 BPM | HEIGHT: 64 IN

## 2022-11-21 DIAGNOSIS — G47.8 NON-RESTORATIVE SLEEP: ICD-10-CM

## 2022-11-21 DIAGNOSIS — G47.11 IDIOPATHIC HYPERSOMNIA: Primary | ICD-10-CM

## 2022-11-21 DIAGNOSIS — G47.19 EXCESSIVE DAYTIME SLEEPINESS: ICD-10-CM

## 2022-11-21 DIAGNOSIS — G47.33 OSA (OBSTRUCTIVE SLEEP APNEA): ICD-10-CM

## 2022-11-21 DIAGNOSIS — R06.83 SNORING: ICD-10-CM

## 2022-11-21 PROCEDURE — 1090F PRES/ABSN URINE INCON ASSESS: CPT | Performed by: PHYSICIAN ASSISTANT

## 2022-11-21 PROCEDURE — 3017F COLORECTAL CA SCREEN DOC REV: CPT | Performed by: PHYSICIAN ASSISTANT

## 2022-11-21 PROCEDURE — G8417 CALC BMI ABV UP PARAM F/U: HCPCS | Performed by: PHYSICIAN ASSISTANT

## 2022-11-21 PROCEDURE — G8399 PT W/DXA RESULTS DOCUMENT: HCPCS | Performed by: PHYSICIAN ASSISTANT

## 2022-11-21 PROCEDURE — 1123F ACP DISCUSS/DSCN MKR DOCD: CPT | Performed by: PHYSICIAN ASSISTANT

## 2022-11-21 PROCEDURE — 99214 OFFICE O/P EST MOD 30 MIN: CPT | Performed by: PHYSICIAN ASSISTANT

## 2022-11-21 PROCEDURE — G8484 FLU IMMUNIZE NO ADMIN: HCPCS | Performed by: PHYSICIAN ASSISTANT

## 2022-11-21 PROCEDURE — G8427 DOCREV CUR MEDS BY ELIG CLIN: HCPCS | Performed by: PHYSICIAN ASSISTANT

## 2022-11-21 PROCEDURE — 1036F TOBACCO NON-USER: CPT | Performed by: PHYSICIAN ASSISTANT

## 2022-11-21 RX ORDER — MODAFINIL 200 MG/1
400 TABLET ORAL DAILY
Qty: 180 TABLET | Refills: 1 | Status: SHIPPED | OUTPATIENT
Start: 2022-11-21 | End: 2023-05-20

## 2022-12-08 ENCOUNTER — TELEPHONE (OUTPATIENT)
Dept: FAMILY MEDICINE CLINIC | Age: 67
End: 2022-12-08

## 2022-12-08 NOTE — TELEPHONE ENCOUNTER
There is a refill encounter for this from t today as she gets from Dr Tara Contreras. Spoke with pt.

## 2022-12-08 NOTE — TELEPHONE ENCOUNTER
THIS IS A  N Paulino Keller is requesting a medication refill on Zeinab's behalf for Cymbalta 60mg;#180 with 0 refills;last with a start date of 08/09/2022 and last refill date of 09/07/2022. Medication is pending your approval for a 90 day supply with 0 refills; she is scheduled with Dr. Phoenix Sheth on 01/26/23.

## 2022-12-08 NOTE — TELEPHONE ENCOUNTER
Yeny Moses called requesting a refill of their Cymbalta.        [facility] nik      AS

## 2022-12-11 RX ORDER — DULOXETIN HYDROCHLORIDE 60 MG/1
CAPSULE, DELAYED RELEASE ORAL
Qty: 180 CAPSULE | Refills: 0 | Status: SHIPPED | OUTPATIENT
Start: 2022-12-11

## 2022-12-27 ENCOUNTER — TELEPHONE (OUTPATIENT)
Dept: SLEEP CENTER | Age: 67
End: 2022-12-27

## 2023-01-04 ENCOUNTER — HOSPITAL ENCOUNTER (OUTPATIENT)
Dept: WOMENS IMAGING | Age: 68
Discharge: HOME | End: 2023-01-04
Payer: MEDICARE

## 2023-01-04 DIAGNOSIS — Z12.31 VISIT FOR SCREENING MAMMOGRAM: ICD-10-CM

## 2023-01-04 PROCEDURE — 77063 BREAST TOMOSYNTHESIS BI: CPT

## 2023-01-11 NOTE — TELEPHONE ENCOUNTER
[phone]. [facility] pick-up 6pm.    Sent.   Electronically signed by Kodak Denny MD on 11/10/2022 at 4:30 PM

## 2023-01-24 NOTE — TELEPHONE ENCOUNTER
Pt was called to r/s her appt. Next appt 3/21.     Requesting a refill  Prozac 20mg 1 capsule daily    Pt has 7-8 pills left  Last appt 8/9/2022  Next appt 3/21  Pharmacy: [facility],[place] [id number] is loaded and pending review and approval.

## 2023-01-25 RX ORDER — FLUOXETINE HYDROCHLORIDE 20 MG/1
20 CAPSULE ORAL DAILY
Qty: 90 CAPSULE | Refills: 0 | Status: SHIPPED | OUTPATIENT
Start: 2023-01-25

## 2023-02-06 ENCOUNTER — TELEPHONE (OUTPATIENT)
Dept: PULMONOLOGY | Age: 68
End: 2023-02-06

## 2023-02-06 DIAGNOSIS — G47.411 PRIMARY NARCOLEPSY WITH CATAPLEXY: ICD-10-CM

## 2023-02-06 DIAGNOSIS — G47.11 IDIOPATHIC HYPERSOMNIA: ICD-10-CM

## 2023-02-06 RX ORDER — METHYLPHENIDATE HYDROCHLORIDE 20 MG/1
20 TABLET ORAL 3 TIMES DAILY
Qty: 270 TABLET | Refills: 0 | Status: SHIPPED | OUTPATIENT
Start: 2023-02-06 | End: 2023-05-07

## 2023-03-07 ENCOUNTER — TELEPHONE (OUTPATIENT)
Dept: SLEEP CENTER | Age: 68
End: 2023-03-07

## 2023-03-07 NOTE — TELEPHONE ENCOUNTER
Several attempts have been made to schedule patients sleep study. Letter sent.         [facility] Session

## 2023-03-13 RX ORDER — DULOXETIN HYDROCHLORIDE 60 MG/1
CAPSULE, DELAYED RELEASE ORAL
Qty: 180 CAPSULE | Refills: 0 | Status: SHIPPED | OUTPATIENT
Start: 2023-03-13

## 2023-03-13 NOTE — TELEPHONE ENCOUNTER
Pharmacy request for Duloxetine HCL 60mg #180 no  refills with a start date of 12/11/22 last refill date 12/12/22.     Last visit 12/12/22  Next visit 03/21/23    Pending your review for 90 day supple with no refill

## 2023-04-24 ENCOUNTER — HOSPITAL ENCOUNTER (EMERGENCY)
Age: 68
Discharge: HOME | End: 2023-04-24
Payer: MEDICARE

## 2023-04-24 VITALS
HEART RATE: 86 BPM | BODY MASS INDEX: 27.31 KG/M2 | SYSTOLIC BLOOD PRESSURE: 143 MMHG | HEIGHT: 64 IN | RESPIRATION RATE: 16 BRPM | DIASTOLIC BLOOD PRESSURE: 74 MMHG | WEIGHT: 160 LBS | OXYGEN SATURATION: 100 % | TEMPERATURE: 97.5 F

## 2023-04-24 DIAGNOSIS — H66.91 RIGHT OTITIS MEDIA, UNSPECIFIED OTITIS MEDIA TYPE: Primary | ICD-10-CM

## 2023-04-24 PROCEDURE — 99213 OFFICE O/P EST LOW 20 MIN: CPT | Performed by: NURSE PRACTITIONER

## 2023-04-24 PROCEDURE — 99213 OFFICE O/P EST LOW 20 MIN: CPT

## 2023-04-24 RX ORDER — CEFDINIR 300 MG/1
300 CAPSULE ORAL 2 TIMES DAILY
Qty: 20 CAPSULE | Refills: 0 | Status: SHIPPED | OUTPATIENT
Start: 2023-04-24 | End: 2023-05-04

## 2023-04-24 ASSESSMENT — ENCOUNTER SYMPTOMS
SORE THROAT: 0
SHORTNESS OF BREATH: 0
COUGH: 0
NAUSEA: 0
VOMITING: 0

## 2023-04-24 ASSESSMENT — PAIN - FUNCTIONAL ASSESSMENT: PAIN_FUNCTIONAL_ASSESSMENT: 0-10

## 2023-04-24 ASSESSMENT — PAIN DESCRIPTION - ORIENTATION: ORIENTATION: RIGHT

## 2023-04-24 ASSESSMENT — PAIN DESCRIPTION - LOCATION: LOCATION: EAR

## 2023-04-24 NOTE — ED PROVIDER NOTES
10y+), IM, 0.5mL 06/17/2020       FAMILY HISTORY     Patient's family history includes Asthma in her brother; Blindness in her maternal grandmother; Brain Cancer (age of onset: 80) in her paternal grandfather; Breast Cancer (age of onset: 27) in her paternal cousin; Breast Cancer (age of onset: 72) in her maternal aunt, maternal aunt, paternal aunt, and paternal aunt; Cancer in her father; Cancer (age of onset: 79) in her paternal aunt and paternal uncle; Celiac Disease in her brother; Colon Cancer (age of onset: 79) in her father, paternal aunt, paternal uncle, and paternal uncle; Diabetes in her maternal grandmother, mother, and paternal grandmother; Emphysema in her maternal grandfather;  Problems in her brother; Heart Attack (age of onset: 66) in her mother; Heart Disease in her brother and mother; High Blood Pressure in her brother and mother; Irritable Bowel Syndrome in her sister; Kidney Disease in her brother and mother; Other in her sister; Ovarian Cancer (age of onset: 48) in her paternal grandmother; Prostate Cancer (age of onset: 61) in an other family member; Stroke in her mother and paternal grandmother; Uterine Cancer in her maternal grandmother. SOCIAL HISTORY     Patient  reports that she has never smoked. She has never used smokeless tobacco. She reports current alcohol use. She reports that she does not use drugs. PHYSICAL EXAM     ED TRIAGE VITALS  BP: (!) 143/74, Temp: 97.5 °F (36.4 °C), Heart Rate: 86, Resp: 16, SpO2: 100 %,Estimated body mass index is 31.18 kg/m² as calculated from the following:    Height as of 11/21/22: 5' 3.5\" (1.613 m). Weight as of 11/21/22: 178 lb 12.8 oz (81.1 kg). ,No LMP recorded. Patient is postmenopausal.    Physical Exam  Vitals and nursing note reviewed. Constitutional:       General: She is not in acute distress. Appearance: She is well-developed. She is not diaphoretic. HENT:      Right Ear: Tympanic membrane is erythematous and bulging.

## 2023-05-08 DIAGNOSIS — E78.5 HYPERLIPIDEMIA, UNSPECIFIED HYPERLIPIDEMIA TYPE: ICD-10-CM

## 2023-05-08 DIAGNOSIS — G47.411 PRIMARY NARCOLEPSY WITH CATAPLEXY: ICD-10-CM

## 2023-05-08 DIAGNOSIS — G47.11 IDIOPATHIC HYPERSOMNIA: ICD-10-CM

## 2023-05-08 RX ORDER — ROSUVASTATIN CALCIUM 20 MG/1
20 TABLET, COATED ORAL DAILY
Qty: 90 TABLET | Refills: 1 | OUTPATIENT
Start: 2023-05-08

## 2023-05-08 RX ORDER — MODAFINIL 200 MG/1
400 TABLET ORAL DAILY
Qty: 180 TABLET | Refills: 1 | Status: SHIPPED | OUTPATIENT
Start: 2023-05-08 | End: 2023-11-04

## 2023-05-09 ENCOUNTER — TELEPHONE (OUTPATIENT)
Dept: PULMONOLOGY | Age: 68
End: 2023-05-09

## 2023-05-09 DIAGNOSIS — G47.411 PRIMARY NARCOLEPSY WITH CATAPLEXY: ICD-10-CM

## 2023-05-09 DIAGNOSIS — G47.11 IDIOPATHIC HYPERSOMNIA: ICD-10-CM

## 2023-05-09 NOTE — TELEPHONE ENCOUNTER
Patient called office through on call service. Follows with Dalton Marie PA-C for [facility], she called for refill of her modafinil 5/8/2023 which was approved by Venancio Claude and sent to [place]. Pt went to  med today and pharmacy told her they can not fill , it is too early . PDMP reviewed, does appear she is 10 day early for refill   Patient has been taking this med for awhile now and swears she is taking 2 tablets daily as prescribed, never more. I called to discuss with Venancio Claude , she gave verbal for early refill and will continue to monitor use/ refills closely,   I called [facility] to give verbal order, on hold 22 min , called back and left VM to confirm OK for early fill.      Electronically signed by NEREYDA Graham CNP on 5/9/2023 at 5:21 PM

## 2023-05-10 ENCOUNTER — TELEPHONE (OUTPATIENT)
Dept: FAMILY MEDICINE CLINIC | Age: 68
End: 2023-05-10

## 2023-05-10 ENCOUNTER — OFFICE VISIT (OUTPATIENT)
Dept: FAMILY MEDICINE CLINIC | Age: 68
End: 2023-05-10

## 2023-05-10 VITALS
RESPIRATION RATE: 16 BRPM | DIASTOLIC BLOOD PRESSURE: 80 MMHG | HEART RATE: 70 BPM | BODY MASS INDEX: 30.45 KG/M2 | WEIGHT: 178.38 LBS | HEIGHT: 64 IN | SYSTOLIC BLOOD PRESSURE: 122 MMHG

## 2023-05-10 DIAGNOSIS — E78.5 HYPERLIPIDEMIA, UNSPECIFIED HYPERLIPIDEMIA TYPE: ICD-10-CM

## 2023-05-10 DIAGNOSIS — K44.9 HIATAL HERNIA WITH GASTROESOPHAGEAL REFLUX DISEASE WITHOUT ESOPHAGITIS: ICD-10-CM

## 2023-05-10 DIAGNOSIS — K21.9 HIATAL HERNIA WITH GASTROESOPHAGEAL REFLUX DISEASE WITHOUT ESOPHAGITIS: ICD-10-CM

## 2023-05-10 DIAGNOSIS — K22.0 INCOMPETENT LOWER ESOPHAGEAL SPHINCTER: ICD-10-CM

## 2023-05-10 DIAGNOSIS — E03.9 HYPOTHYROIDISM, UNSPECIFIED TYPE: ICD-10-CM

## 2023-05-10 DIAGNOSIS — N76.0 ACUTE VAGINITIS: ICD-10-CM

## 2023-05-10 DIAGNOSIS — I87.2 VENOUS INSUFFICIENCY: ICD-10-CM

## 2023-05-10 DIAGNOSIS — H65.01 NON-RECURRENT ACUTE SEROUS OTITIS MEDIA OF RIGHT EAR: ICD-10-CM

## 2023-05-10 DIAGNOSIS — H65.01 NON-RECURRENT ACUTE SEROUS OTITIS MEDIA OF RIGHT EAR: Primary | ICD-10-CM

## 2023-05-10 DIAGNOSIS — R73.9 HYPERGLYCEMIA: Primary | ICD-10-CM

## 2023-05-10 DIAGNOSIS — K21.00 GASTROESOPHAGEAL REFLUX DISEASE WITH ESOPHAGITIS, UNSPECIFIED WHETHER HEMORRHAGE: ICD-10-CM

## 2023-05-10 RX ORDER — LEVOTHYROXINE SODIUM 125 MCG
TABLET ORAL
Qty: 90 TABLET | Refills: 2 | Status: CANCELLED | OUTPATIENT
Start: 2023-05-10

## 2023-05-10 RX ORDER — FAMOTIDINE 20 MG/1
20 TABLET, FILM COATED ORAL NIGHTLY
Qty: 90 TABLET | Refills: 3 | Status: SHIPPED | OUTPATIENT
Start: 2023-05-10

## 2023-05-10 RX ORDER — FLUCONAZOLE 150 MG/1
150 TABLET ORAL ONCE
Qty: 1 TABLET | Refills: 1 | Status: SHIPPED | OUTPATIENT
Start: 2023-05-10 | End: 2023-05-10

## 2023-05-10 RX ORDER — ESOMEPRAZOLE MAGNESIUM 40 MG/1
40 CAPSULE, DELAYED RELEASE ORAL
Qty: 90 CAPSULE | Refills: 3 | Status: SHIPPED | OUTPATIENT
Start: 2023-05-10

## 2023-05-10 RX ORDER — ROSUVASTATIN CALCIUM 20 MG/1
20 TABLET, COATED ORAL DAILY
Qty: 90 TABLET | Refills: 3 | Status: SHIPPED | OUTPATIENT
Start: 2023-05-10

## 2023-05-10 RX ORDER — CIPROFLOXACIN 500 MG/1
500 TABLET, FILM COATED ORAL 2 TIMES DAILY
Qty: 20 TABLET | Refills: 0 | Status: SHIPPED | OUTPATIENT
Start: 2023-05-10 | End: 2023-05-20

## 2023-05-10 RX ORDER — METHYLPREDNISOLONE 4 MG/1
TABLET ORAL
Qty: 1 KIT | Refills: 0 | Status: SHIPPED | OUTPATIENT
Start: 2023-05-10 | End: 2023-05-16

## 2023-05-10 RX ORDER — FUROSEMIDE 20 MG/1
40 TABLET ORAL DAILY
Qty: 180 TABLET | Refills: 3 | Status: SHIPPED | OUTPATIENT
Start: 2023-05-10

## 2023-05-10 SDOH — ECONOMIC STABILITY: FOOD INSECURITY: WITHIN THE PAST 12 MONTHS, YOU WORRIED THAT YOUR FOOD WOULD RUN OUT BEFORE YOU GOT MONEY TO BUY MORE.: NEVER TRUE

## 2023-05-10 SDOH — ECONOMIC STABILITY: FOOD INSECURITY: WITHIN THE PAST 12 MONTHS, THE FOOD YOU BOUGHT JUST DIDN'T LAST AND YOU DIDN'T HAVE MONEY TO GET MORE.: NEVER TRUE

## 2023-05-10 SDOH — ECONOMIC STABILITY: HOUSING INSECURITY
IN THE LAST 12 MONTHS, WAS THERE A TIME WHEN YOU DID NOT HAVE A STEADY PLACE TO SLEEP OR SLEPT IN A SHELTER (INCLUDING NOW)?: NO

## 2023-05-10 SDOH — ECONOMIC STABILITY: INCOME INSECURITY: HOW HARD IS IT FOR YOU TO PAY FOR THE VERY BASICS LIKE FOOD, HOUSING, MEDICAL CARE, AND HEATING?: NOT HARD AT ALL

## 2023-05-10 ASSESSMENT — PATIENT HEALTH QUESTIONNAIRE - PHQ9
10. IF YOU CHECKED OFF ANY PROBLEMS, HOW DIFFICULT HAVE THESE PROBLEMS MADE IT FOR YOU TO DO YOUR WORK, TAKE CARE OF THINGS AT HOME, OR GET ALONG WITH OTHER PEOPLE: 0
6. FEELING BAD ABOUT YOURSELF - OR THAT YOU ARE A FAILURE OR HAVE LET YOURSELF OR YOUR FAMILY DOWN: 0
2. FEELING DOWN, DEPRESSED OR HOPELESS: 3
SUM OF ALL RESPONSES TO PHQ QUESTIONS 1-9: 8
9. THOUGHTS THAT YOU WOULD BE BETTER OFF DEAD, OR OF HURTING YOURSELF: 0
8. MOVING OR SPEAKING SO SLOWLY THAT OTHER PEOPLE COULD HAVE NOTICED. OR THE OPPOSITE, BEING SO FIGETY OR RESTLESS THAT YOU HAVE BEEN MOVING AROUND A LOT MORE THAN USUAL: 0
SUM OF ALL RESPONSES TO PHQ QUESTIONS 1-9: 8
SUM OF ALL RESPONSES TO PHQ QUESTIONS 1-9: 8
7. TROUBLE CONCENTRATING ON THINGS, SUCH AS READING THE NEWSPAPER OR WATCHING TELEVISION: 2
SUM OF ALL RESPONSES TO PHQ9 QUESTIONS 1 & 2: 3
SUM OF ALL RESPONSES TO PHQ QUESTIONS 1-9: 8
3. TROUBLE FALLING OR STAYING ASLEEP: 0
4. FEELING TIRED OR HAVING LITTLE ENERGY: 3
1. LITTLE INTEREST OR PLEASURE IN DOING THINGS: 0
5. POOR APPETITE OR OVEREATING: 0

## 2023-05-10 ASSESSMENT — ENCOUNTER SYMPTOMS
SHORTNESS OF BREATH: 0
SINUS PRESSURE: 0
CONSTIPATION: 0

## 2023-05-10 NOTE — TELEPHONE ENCOUNTER
Called [facility] ent and had to leave message for staff about appt sooner. Staff to call patient back. After informing patient she had called daughter and would now like referral to dr. Jamila Santoro in [address]. Patient ok with waiting to get into dr. Jamila Santoro. She will let [facility] ent know when they call her back.

## 2023-05-10 NOTE — PROGRESS NOTES
No components found for: CHLPL  Lab Results   Component Value Date    TRIG 104 10/05/2022     Lab Results   Component Value Date    HDL 51 10/05/2022     Lab Results   Component Value Date    LDLCALC 84 10/05/2022     Lab Results   Component Value Date    LABVLDL 21 10/05/2022       Lab Results   Component Value Date    ALT 38 10/05/2022    AST 38 10/05/2022    ALKPHOS 114 10/05/2022    BILITOT 0.2 10/05/2022           Is patient currently taking any cholesterol medications? Yes   If yes, see med list as above    Is the patient reporting any side effects of cholesterol medications? No    Is the patient taking any over the counter medications? Yes   If yes, see med list as above    Is the patient taking a daily aspirin? No      Patient Self-Management Goal for Chronic Condition  Goal: I will take all medications as prescribed by my doctor, and I will call the office if I am having any medication problems. Barriers to success: none  Plan for overcoming my barriers: N/A     Confidence: 9/10  Date goal set: 5/10/23  Date goal attained:     Have you seen any other physician or provider since your last visit no    Have you had any other diagnostic tests since your last visit? no    Have you changed or stopped any medications since your last visit including any over-the-counter medicines, vitamins, or herbal medicines? no     Are you taking all your prescribed medications?  Yes    If NO, why?

## 2023-05-10 NOTE — PROGRESS NOTES
Josemanuel De La Torre (:  1955) is a 79 y.o. female,Established patient, here for evaluation of the following chief complaint(s): Other (Er fu)         ASSESSMENT/PLAN:           Subjective   SUBJECTIVE/OBJECTIVE:  HPI  The right ear was plugged and she went to urgent care. She was given ATB for ROM without help  Review of Systems   Constitutional:  Negative for fatigue. HENT:  Negative for sinus pressure. Eyes:  Negative for visual disturbance. Respiratory:  Negative for shortness of breath. Cardiovascular:  Negative for chest pain. Gastrointestinal:  Negative for constipation. Genitourinary: Negative. Musculoskeletal:  Negative for arthralgias. Skin:  Negative for rash. Neurological:  Negative for headaches. Objective   Physical Exam  Constitutional:       Appearance: Normal appearance. She is well-developed. HENT:      Head: Normocephalic and atraumatic. Left Ear: Tympanic membrane normal.      Ears:        Nose: No congestion or rhinorrhea. Mouth/Throat:      Pharynx: No oropharyngeal exudate or posterior oropharyngeal erythema. Eyes:      General: No scleral icterus. Conjunctiva/sclera: Conjunctivae normal.   Neck:      Trachea: No tracheal deviation. Cardiovascular:      Rate and Rhythm: Normal rate and regular rhythm. Heart sounds: Normal heart sounds. Pulmonary:      Effort: Pulmonary effort is normal.      Breath sounds: Normal breath sounds. Skin:     General: Skin is warm and dry. Neurological:      General: No focal deficit present. Mental Status: She is alert. Psychiatric:         Behavior: Behavior normal.              An electronic signature was used to authenticate this note.     --Bridgett Brooks MD

## 2023-05-22 RX ORDER — FLUOXETINE HYDROCHLORIDE 20 MG/1
20 CAPSULE ORAL DAILY
Qty: 30 CAPSULE | Refills: 1 | Status: SHIPPED | OUTPATIENT
Start: 2023-05-22

## 2023-05-22 RX ORDER — DULOXETIN HYDROCHLORIDE 60 MG/1
120 CAPSULE, DELAYED RELEASE ORAL DAILY
Qty: 60 CAPSULE | Refills: 1 | Status: SHIPPED | OUTPATIENT
Start: 2023-05-22

## 2023-06-06 LAB
ABSOLUTE BASO #: 0.05 K/UL (ref 0–0.2)
ABSOLUTE EOS #: 0.13 K/UL (ref 0–0.5)
ABSOLUTE LYMPH #: 1.71 K/UL (ref 1–4)
ABSOLUTE MONO #: 0.65 K/UL (ref 0.2–1)
ABSOLUTE NEUT #: 4.24 K/UL (ref 1.5–7.5)
BASOPHILS RELATIVE PERCENT: 0.7 %
EOSINOPHILS RELATIVE PERCENT: 1.9 %
HCT VFR BLD CALC: 48.5 % (ref 34–45)
HEMOGLOBIN: 16.2 G/DL (ref 11.5–15.5)
LYMPHOCYTE %: 25.1 %
MCH RBC QN AUTO: 30.9 PG (ref 25–33)
MCHC RBC AUTO-ENTMCNC: 33.4 G/DL (ref 31–36)
MCV RBC AUTO: 92.4 FL (ref 80–99)
MONOCYTES # BLD: 9.6 %
NEUTROPHILS RELATIVE PERCENT: 62.4 %
PDW BLD-RTO: 12.3 % (ref 11.5–15)
PLATELETS: 320 K/UL (ref 130–400)
PMV BLD AUTO: 10.2 FL (ref 9.3–13)
RBC: 5.25 M/UL (ref 3.8–5.4)
WBC: 6.8 K/UL (ref 3.5–11)

## 2023-06-07 LAB
ALBUMIN SERPL-MCNC: 4.8 G/DL (ref 3.5–5.2)
ALK PHOSPHATASE: 105 U/L (ref 40–142)
ALT SERPL-CCNC: 56 U/L (ref 5–40)
ANION GAP SERPL CALCULATED.3IONS-SCNC: 12 MEQ/L (ref 7–16)
AST SERPL-CCNC: 38 U/L (ref 9–40)
AVERAGE GLUCOSE: 120 MG/DL
BILIRUB SERPL-MCNC: 0.2 MG/DL
BUN BLDV-MCNC: 15 MG/DL (ref 8–23)
CALCIUM SERPL-MCNC: 9.9 MG/DL (ref 8.5–10.5)
CHLORIDE BLD-SCNC: 96 MEQ/L (ref 95–107)
CHOLESTEROL/HDL RATIO: 4.3 RATIO
CHOLESTEROL: 229 MG/DL
CO2: 31 MEQ/L (ref 19–31)
CREAT SERPL-MCNC: 0.72 MG/DL (ref 0.6–1.3)
EGFR IF NONAFRICAN AMERICAN: 92 ML/MIN/1.73
GLUCOSE: 105 MG/DL (ref 70–99)
HBA1C MFR BLD: 5.8 % (ref 4.2–5.6)
HDLC SERPL-MCNC: 53 MG/DL
LDL CHOLESTEROL CALCULATED: 156 MG/DL
LDL/HDL RATIO: 2.9 RATIO
POTASSIUM SERPL-SCNC: 4.4 MEQ/L (ref 3.5–5.4)
SODIUM BLD-SCNC: 139 MEQ/L (ref 133–146)
T4 FREE: 1.38 NG/DL (ref 0.8–1.9)
TOTAL PROTEIN: 7.2 G/DL (ref 6.1–8.3)
TRIGL SERPL-MCNC: 100 MG/DL
TSH SERPL DL<=0.05 MIU/L-ACNC: 3.89 UIU/ML (ref 0.4–4.1)
VLDLC SERPL CALC-MCNC: 20 MG/DL

## 2023-06-19 DIAGNOSIS — J10.1 INFLUENZA B: ICD-10-CM

## 2023-06-19 DIAGNOSIS — G47.11 IDIOPATHIC HYPERSOMNIA: Primary | ICD-10-CM

## 2023-06-19 NOTE — TELEPHONE ENCOUNTER
Patient was last seen 11/21/2022 and she scheduled next available with Aura Velasquez for 9/11/2023. She needs her ritalin refilled to [facility]. Please advise.

## 2023-06-22 RX ORDER — METHYLPHENIDATE HYDROCHLORIDE 20 MG/1
20 TABLET ORAL 3 TIMES DAILY
Qty: 90 TABLET | Refills: 0 | Status: SHIPPED | OUTPATIENT
Start: 2023-06-22 | End: 2023-07-22

## 2023-06-22 RX ORDER — METHYLPHENIDATE HYDROCHLORIDE 20 MG/1
20 TABLET ORAL 3 TIMES DAILY PRN
COMMUNITY
End: 2023-06-22 | Stop reason: SDUPTHER

## 2023-06-22 NOTE — TELEPHONE ENCOUNTER
Received refill request for Ritalin 20mg. Medication was last ordered by Michael Lee. Medication was last ordered on 2/6/23 with 0 refills. Patient was last seen in the office 11/21/22. Does patient have a scheduled follow up?: yes - 9/11/23. Medication needs to be sent to [facility] [address] - [place], [address] [place] [address] [phone] - F [phone] [address] [place] [address] NANCY/ Tye Dykes [facility]   Phone:  [phone]  Fax:  [phone]. Thank you, please advise! Patient's Allergies: Allergies   Allergen Reactions    Dilaudid [Hydromorphone Hcl]      Respiratory failure    Iron Anaphylaxis    Percodan [Oxycodone-Aspirin]      Anxiety     Gluten Diarrhea    Gluten Meal Other (See Comments)     Other reaction(s):  Intolerance, Other: See Comments  Celiac disease  Celiac disease    Maxzide [Hydrochlorothiazide W-Triamterene] Nausea Only     dizziness    Nalfon [Fenoprofen Calcium] Hives    Oxycodone-Acetaminophen     Oxycodone-Aspirin     Percocet  [Oxycodone-Acetaminophen]     Reglan [Metoclopramide]     Fenoprofen Hives    Sulfa Antibiotics Hives

## 2023-06-23 DIAGNOSIS — E03.9 HYPOTHYROIDISM, UNSPECIFIED TYPE: ICD-10-CM

## 2023-06-23 RX ORDER — LEVOTHYROXINE SODIUM 125 MCG
TABLET ORAL
Qty: 20 TABLET | Refills: 0 | Status: SHIPPED | OUTPATIENT
Start: 2023-06-23

## 2023-06-29 ENCOUNTER — TELEMEDICINE (OUTPATIENT)
Dept: PSYCHIATRY | Age: 68
End: 2023-06-29
Payer: MEDICARE

## 2023-06-29 DIAGNOSIS — G89.4 CHRONIC PAIN SYNDROME: ICD-10-CM

## 2023-06-29 DIAGNOSIS — R41.3 MEMORY PROBLEM: ICD-10-CM

## 2023-06-29 DIAGNOSIS — F32.A DEPRESSION, UNSPECIFIED DEPRESSION TYPE: Primary | ICD-10-CM

## 2023-06-29 DIAGNOSIS — F41.9 ANXIETY: ICD-10-CM

## 2023-06-29 PROCEDURE — 99214 OFFICE O/P EST MOD 30 MIN: CPT | Performed by: PSYCHIATRY & NEUROLOGY

## 2023-06-29 PROCEDURE — G8399 PT W/DXA RESULTS DOCUMENT: HCPCS | Performed by: PSYCHIATRY & NEUROLOGY

## 2023-06-29 PROCEDURE — 1123F ACP DISCUSS/DSCN MKR DOCD: CPT | Performed by: PSYCHIATRY & NEUROLOGY

## 2023-06-29 PROCEDURE — G8427 DOCREV CUR MEDS BY ELIG CLIN: HCPCS | Performed by: PSYCHIATRY & NEUROLOGY

## 2023-06-29 PROCEDURE — 3017F COLORECTAL CA SCREEN DOC REV: CPT | Performed by: PSYCHIATRY & NEUROLOGY

## 2023-06-29 PROCEDURE — 1090F PRES/ABSN URINE INCON ASSESS: CPT | Performed by: PSYCHIATRY & NEUROLOGY

## 2023-06-29 RX ORDER — DULOXETIN HYDROCHLORIDE 60 MG/1
120 CAPSULE, DELAYED RELEASE ORAL DAILY
Qty: 60 CAPSULE | Refills: 2 | Status: SHIPPED | OUTPATIENT
Start: 2023-06-29

## 2023-06-29 RX ORDER — FLUOXETINE HYDROCHLORIDE 40 MG/1
40 CAPSULE ORAL EVERY MORNING
Qty: 30 CAPSULE | Refills: 2 | Status: SHIPPED | OUTPATIENT
Start: 2023-06-29

## 2023-06-30 DIAGNOSIS — E03.9 HYPOTHYROIDISM, UNSPECIFIED TYPE: ICD-10-CM

## 2023-06-30 RX ORDER — LEVOTHYROXINE SODIUM 125 MCG
TABLET ORAL
Qty: 180 TABLET | OUTPATIENT
Start: 2023-06-30

## 2023-07-10 ENCOUNTER — OFFICE VISIT (OUTPATIENT)
Dept: CARDIOLOGY CLINIC | Age: 68
End: 2023-07-10
Payer: MEDICARE

## 2023-07-10 VITALS
DIASTOLIC BLOOD PRESSURE: 89 MMHG | SYSTOLIC BLOOD PRESSURE: 130 MMHG | WEIGHT: 180 LBS | BODY MASS INDEX: 30.73 KG/M2 | HEART RATE: 91 BPM | HEIGHT: 64 IN

## 2023-07-10 DIAGNOSIS — R06.02 SOB (SHORTNESS OF BREATH): Primary | ICD-10-CM

## 2023-07-10 PROCEDURE — 93000 ELECTROCARDIOGRAM COMPLETE: CPT | Performed by: INTERNAL MEDICINE

## 2023-07-10 PROCEDURE — 3017F COLORECTAL CA SCREEN DOC REV: CPT | Performed by: INTERNAL MEDICINE

## 2023-07-10 PROCEDURE — 1036F TOBACCO NON-USER: CPT | Performed by: INTERNAL MEDICINE

## 2023-07-10 PROCEDURE — G8427 DOCREV CUR MEDS BY ELIG CLIN: HCPCS | Performed by: INTERNAL MEDICINE

## 2023-07-10 PROCEDURE — 1090F PRES/ABSN URINE INCON ASSESS: CPT | Performed by: INTERNAL MEDICINE

## 2023-07-10 PROCEDURE — G8417 CALC BMI ABV UP PARAM F/U: HCPCS | Performed by: INTERNAL MEDICINE

## 2023-07-10 PROCEDURE — G8399 PT W/DXA RESULTS DOCUMENT: HCPCS | Performed by: INTERNAL MEDICINE

## 2023-07-10 PROCEDURE — 99214 OFFICE O/P EST MOD 30 MIN: CPT | Performed by: INTERNAL MEDICINE

## 2023-07-10 PROCEDURE — 1123F ACP DISCUSS/DSCN MKR DOCD: CPT | Performed by: INTERNAL MEDICINE

## 2023-07-10 RX ORDER — MONTELUKAST SODIUM 10 MG/1
TABLET ORAL
COMMUNITY
Start: 2023-07-05 | End: 2023-12-01

## 2023-07-10 NOTE — PROGRESS NOTES
Pt C/O heart palpitations, swelling in feet at times    Pre op clearance ear sx dr Manuel Saul 7/17/2023.       Pt denies CP, SOB, Headache, dizziness,fatigue
Hao Mullins MD [facility]  7/10/2023 at 2:30 PM EST

## 2023-07-14 ENCOUNTER — HOSPITAL ENCOUNTER (OUTPATIENT)
Age: 68
Discharge: HOME | End: 2023-07-14
Payer: MEDICARE

## 2023-07-14 ENCOUNTER — HOSPITAL ENCOUNTER (OUTPATIENT)
Dept: GENERAL RADIOLOGY | Age: 68
Discharge: HOME | End: 2023-07-14
Payer: MEDICARE

## 2023-07-14 DIAGNOSIS — Z01.818 PREOP EXAMINATION: ICD-10-CM

## 2023-07-14 LAB
ANION GAP SERPL CALC-SCNC: 12 MEQ/L (ref 8–16)
BASOPHILS ABSOLUTE: 0 THOU/MM3 (ref 0–0.1)
BASOPHILS NFR BLD AUTO: 0.7 %
BUN SERPL-MCNC: 14 MG/DL (ref 7–22)
CALCIUM SERPL-MCNC: 9.2 MG/DL (ref 8.5–10.5)
CHLORIDE SERPL-SCNC: 101 MEQ/L (ref 98–111)
CO2 SERPL-SCNC: 26 MEQ/L (ref 23–33)
CREAT SERPL-MCNC: 0.6 MG/DL (ref 0.4–1.2)
DEPRECATED RDW RBC AUTO: 46.6 FL (ref 35–45)
EOSINOPHIL NFR BLD AUTO: 2 %
EOSINOPHILS ABSOLUTE: 0.1 THOU/MM3 (ref 0–0.4)
ERYTHROCYTE [DISTWIDTH] IN BLOOD BY AUTOMATED COUNT: 13.3 % (ref 11.5–14.5)
GFR SERPL CREATININE-BSD FRML MDRD: > 60 ML/MIN/1.73M2
GLUCOSE SERPL-MCNC: 100 MG/DL (ref 70–108)
HCT VFR BLD AUTO: 46.7 % (ref 37–47)
HGB BLD-MCNC: 15.2 GM/DL (ref 12–16)
IMM GRANULOCYTES # BLD AUTO: 0.01 THOU/MM3 (ref 0–0.07)
IMM GRANULOCYTES NFR BLD AUTO: 0.2 %
LYMPHOCYTES ABSOLUTE: 1.8 THOU/MM3 (ref 1–4.8)
LYMPHOCYTES NFR BLD AUTO: 29.7 %
MCH RBC QN AUTO: 31 PG (ref 26–33)
MCHC RBC AUTO-ENTMCNC: 32.5 GM/DL (ref 32.2–35.5)
MCV RBC AUTO: 95.1 FL (ref 81–99)
MONOCYTES ABSOLUTE: 0.5 THOU/MM3 (ref 0.4–1.3)
MONOCYTES NFR BLD AUTO: 8.5 %
NEUTROPHILS NFR BLD AUTO: 58.9 %
NRBC BLD AUTO-RTO: 0 /100 WBC
PLATELET # BLD AUTO: 294 THOU/MM3 (ref 130–400)
PMV BLD AUTO: 9.3 FL (ref 9.4–12.4)
POTASSIUM SERPL-SCNC: 4.2 MEQ/L (ref 3.5–5.2)
RBC # BLD AUTO: 4.91 MILL/MM3 (ref 4.2–5.4)
SEGMENTED NEUTROPHILS ABSOLUTE COUNT: 3.6 THOU/MM3 (ref 1.8–7.7)
SODIUM SERPL-SCNC: 139 MEQ/L (ref 135–145)
WBC # BLD AUTO: 6.1 THOU/MM3 (ref 4.8–10.8)

## 2023-07-14 PROCEDURE — 80048 BASIC METABOLIC PNL TOTAL CA: CPT

## 2023-07-14 PROCEDURE — 71046 X-RAY EXAM CHEST 2 VIEWS: CPT

## 2023-07-14 PROCEDURE — 85025 COMPLETE CBC W/AUTO DIFF WBC: CPT

## 2023-07-14 PROCEDURE — 36415 COLL VENOUS BLD VENIPUNCTURE: CPT

## 2023-07-26 ENCOUNTER — TELEPHONE (OUTPATIENT)
Dept: PULMONOLOGY | Age: 68
End: 2023-07-26

## 2023-07-26 DIAGNOSIS — E03.9 HYPOTHYROIDISM, UNSPECIFIED TYPE: ICD-10-CM

## 2023-07-26 DIAGNOSIS — G47.11 IDIOPATHIC HYPERSOMNIA: ICD-10-CM

## 2023-07-26 RX ORDER — LEVOTHYROXINE SODIUM 125 MCG
TABLET ORAL
Qty: 90 TABLET | Refills: 0 | Status: SHIPPED | OUTPATIENT
Start: 2023-07-26

## 2023-07-26 NOTE — TELEPHONE ENCOUNTER
Patient is calling in for her refill of ritalin 20 mg 3 times daily to [facility]. She is out of medication, please advise.

## 2023-07-26 NOTE — TELEPHONE ENCOUNTER
Alisha Gonzalez called requesting a refill of their:     SYNTHROID 125 MCG tablet QD    Send 90 days supply to [facility] [employer] [facility]

## 2023-07-26 NOTE — TELEPHONE ENCOUNTER
Date of last visit:  5/10/2023  Date of next visit:  Visit date not found    Requested Prescriptions     Pending Prescriptions Disp Refills    SYNTHROID 125 MCG tablet 90 tablet      Sig: TAKE 1 TABLET BY MOUTH EVERY DAY

## 2023-07-27 RX ORDER — METHYLPHENIDATE HYDROCHLORIDE 20 MG/1
20 TABLET ORAL 3 TIMES DAILY
Qty: 90 TABLET | Refills: 0 | Status: SHIPPED | OUTPATIENT
Start: 2023-07-27 | End: 2023-08-26

## 2023-07-28 NOTE — TELEPHONE ENCOUNTER
Patient is calling and states that [facility] will not be able to fill this until Monday and she is out. So she is wanting to know if it can be sent to [facility] today instead so she wouldn't have to go without this weekend.   Please advise    [facility]/PHARMACY #[id number]- [place], [place] - [address]

## 2023-09-18 NOTE — PROGRESS NOTES
[facility] and [address]                                         [id number]  9/19/2023   Chief Complaint   Patient presents with    Follow-up     10mo Idiopathic Hypersomnia f/u for med check. Notes she is not sleeping well d/t losing her mother 27 days ago. Is waking up calling for her mother during her sleep. Pt of Luis Alberto Amin PA-C    ESS:  10  SAQLI:  39    Presentation:   Nirmala Ang presents for sleep medicine follow up for [facility]  Since the last visit Nirmala Ang has been doing reasonably well with Provigil and Ritalin. She was suppose to have PSG last year and did not get it done. She states that she does get benefit from Provigil and Ritalin. She is very tearful today she lost her mother 27 days ago was really struggling with grief. She was found to have mild LEA on original PSG in 2003. She never achieved REM on MSLT and therefore does not officially have narcolepsy although very suggestive. AHI on previous PSG in 2003 was 5.5     Progress History:   Since last visit any new medical issues? No  New ER or hospitlal visits? No  Any new or changes in medicines? No  Any new sleep medicines? No    Sleep issues:  Do you feel better? Yes and No  More rested? Sometimes   Better concentration?  yes      Past Medical History:   Diagnosis Date    Age-related osteoporosis without current pathological fracture     Anemia 2000    malabsorption/Celiac disease    Anxiety 02/2018    Dr. Marc Pérez    Arm DVT (deep venous thromboembolism), acute ([address]) 05/15/2013    Broken shoulder 09/06/2016    right    Celiac disease     Celiac disease     Complex regional pain syndrome of right upper extremity     Right shoulder, arm, and hand    CRPS (complex regional pain syndrome type I) 2016    Dr. Johanny Shipman    Depression 09/2016    Dr. Johanny Shipman    Depression     GERD (gastroesophageal reflux disease) 2000    and celiac- Dr. Aravind Mello    Headache(784.0)     migraines-

## 2023-09-19 ENCOUNTER — OFFICE VISIT (OUTPATIENT)
Dept: PULMONOLOGY | Age: 68
End: 2023-09-19
Payer: MEDICARE

## 2023-09-19 VITALS
TEMPERATURE: 97.8 F | SYSTOLIC BLOOD PRESSURE: 110 MMHG | WEIGHT: 177.6 LBS | HEIGHT: 64 IN | HEART RATE: 107 BPM | OXYGEN SATURATION: 99 % | BODY MASS INDEX: 30.32 KG/M2 | DIASTOLIC BLOOD PRESSURE: 64 MMHG

## 2023-09-19 DIAGNOSIS — G47.33 OSA (OBSTRUCTIVE SLEEP APNEA): ICD-10-CM

## 2023-09-19 DIAGNOSIS — E66.9 OBESITY (BMI 30-39.9): ICD-10-CM

## 2023-09-19 DIAGNOSIS — F43.21 GRIEF: ICD-10-CM

## 2023-09-19 DIAGNOSIS — G47.11 IDIOPATHIC HYPERSOMNIA: Primary | ICD-10-CM

## 2023-09-19 DIAGNOSIS — F51.04 PSYCHOPHYSIOLOGICAL INSOMNIA: ICD-10-CM

## 2023-09-19 PROCEDURE — 1090F PRES/ABSN URINE INCON ASSESS: CPT | Performed by: PHYSICIAN ASSISTANT

## 2023-09-19 PROCEDURE — 3017F COLORECTAL CA SCREEN DOC REV: CPT | Performed by: PHYSICIAN ASSISTANT

## 2023-09-19 PROCEDURE — 99214 OFFICE O/P EST MOD 30 MIN: CPT | Performed by: PHYSICIAN ASSISTANT

## 2023-09-19 PROCEDURE — 1036F TOBACCO NON-USER: CPT | Performed by: PHYSICIAN ASSISTANT

## 2023-09-19 PROCEDURE — G8417 CALC BMI ABV UP PARAM F/U: HCPCS | Performed by: PHYSICIAN ASSISTANT

## 2023-09-19 PROCEDURE — 1123F ACP DISCUSS/DSCN MKR DOCD: CPT | Performed by: PHYSICIAN ASSISTANT

## 2023-09-19 PROCEDURE — G8427 DOCREV CUR MEDS BY ELIG CLIN: HCPCS | Performed by: PHYSICIAN ASSISTANT

## 2023-09-19 PROCEDURE — G8399 PT W/DXA RESULTS DOCUMENT: HCPCS | Performed by: PHYSICIAN ASSISTANT

## 2023-09-19 RX ORDER — PYRIDOXINE HCL (VITAMIN B6) 100 MG
TABLET ORAL
COMMUNITY

## 2023-09-19 RX ORDER — MODAFINIL 200 MG/1
400 TABLET ORAL DAILY
Qty: 180 TABLET | Refills: 1 | Status: SHIPPED | OUTPATIENT
Start: 2023-09-19 | End: 2024-03-17

## 2023-09-19 RX ORDER — METHYLPHENIDATE HYDROCHLORIDE 20 MG/1
20 TABLET ORAL 3 TIMES DAILY
Qty: 90 TABLET | Refills: 0 | Status: SHIPPED | OUTPATIENT
Start: 2023-09-19 | End: 2023-10-19

## 2023-10-06 ENCOUNTER — TELEPHONE (OUTPATIENT)
Dept: FAMILY MEDICINE CLINIC | Age: 68
End: 2023-10-06

## 2023-10-06 RX ORDER — DULOXETIN HYDROCHLORIDE 60 MG/1
120 CAPSULE, DELAYED RELEASE ORAL DAILY
Qty: 180 CAPSULE | Refills: 0 | Status: CANCELLED | OUTPATIENT
Start: 2023-10-06

## 2023-10-06 RX ORDER — DULOXETIN HYDROCHLORIDE 60 MG/1
120 CAPSULE, DELAYED RELEASE ORAL DAILY
Qty: 6 CAPSULE | Refills: 1 | Status: SHIPPED | OUTPATIENT
Start: 2023-10-06

## 2023-10-06 NOTE — TELEPHONE ENCOUNTER
Pt called stating shes out of her CYMBALTA 60mg, she says the dr that normally orders it is out of town and she is out.  Shes wanting to know if dr will refill the entire script if not could he send in 6 tablets to last her until she can get in with other doctor   [address]

## 2023-10-06 NOTE — TELEPHONE ENCOUNTER
Melida Morley called requesting a refill on the following medications:  Requested Prescriptions     Pending Prescriptions Disp Refills    DULoxetine (CYMBALTA) 60 MG extended release capsule 180 capsule 0     Sig: Take 2 capsules by mouth daily    FLUoxetine (PROZAC) 40 MG capsule 90 capsule 0     Sig: Take 1 capsule by mouth every morning     Reports she is out of medication as of today.      Date of last visit: 6/29/2023  Date of next visit (if applicable):12/18/2023  Pharm[facility] Name: [facility],  [place], [place]

## 2023-10-09 RX ORDER — FLUOXETINE HYDROCHLORIDE 40 MG/1
40 CAPSULE ORAL EVERY MORNING
Qty: 90 CAPSULE | Refills: 0 | Status: SHIPPED | OUTPATIENT
Start: 2023-10-09

## 2023-10-09 NOTE — TELEPHONE ENCOUNTER
It appear Rx for Cymbalta that was pended to this provider was canceled when Rx was sent by Dr. Arelis Miranda for a #6. Patient is still needing Rx for Cymbalta. Pending.

## 2023-10-10 RX ORDER — DULOXETIN HYDROCHLORIDE 60 MG/1
120 CAPSULE, DELAYED RELEASE ORAL DAILY
Qty: 180 CAPSULE | Refills: 0 | Status: SHIPPED | OUTPATIENT
Start: 2023-10-10

## 2023-10-30 DIAGNOSIS — E03.9 HYPOTHYROIDISM, UNSPECIFIED TYPE: ICD-10-CM

## 2023-10-30 RX ORDER — LEVOTHYROXINE SODIUM 125 MCG
TABLET ORAL
Qty: 90 TABLET | Refills: 0 | Status: SHIPPED | OUTPATIENT
Start: 2023-10-30

## 2023-11-02 ENCOUNTER — TELEPHONE (OUTPATIENT)
Dept: PULMONOLOGY | Age: 68
End: 2023-11-02

## 2023-11-02 DIAGNOSIS — G47.11 IDIOPATHIC HYPERSOMNIA: ICD-10-CM

## 2023-11-02 RX ORDER — METHYLPHENIDATE HYDROCHLORIDE 20 MG/1
20 TABLET ORAL 3 TIMES DAILY
Qty: 90 TABLET | Refills: 0 | Status: SHIPPED | OUTPATIENT
Start: 2023-11-02 | End: 2023-12-02

## 2023-11-02 NOTE — TELEPHONE ENCOUNTER
Patient called in to request a script for Ritilan and provigil. Ritalin last filled 09/19/2023 with 0 refills   Last visit 9/19/2023  Next visit 12/04/2023    [facility] and confirmed with them that she still has Provigil refills on file. Called and notified patient of this as well. So she just will need the Ritalin sent into [facility]. Thanks!

## 2023-12-11 ENCOUNTER — TELEPHONE (OUTPATIENT)
Dept: FAMILY MEDICINE CLINIC | Age: 68
End: 2023-12-11

## 2023-12-11 DIAGNOSIS — G47.11 IDIOPATHIC HYPERSOMNIA: Primary | ICD-10-CM

## 2023-12-11 RX ORDER — METHYLPHENIDATE HYDROCHLORIDE 20 MG/1
20 TABLET ORAL 3 TIMES DAILY
Qty: 90 TABLET | Refills: 0 | Status: SHIPPED | OUTPATIENT
Start: 2023-12-11 | End: 2024-01-10

## 2023-12-11 RX ORDER — METHYLPHENIDATE HYDROCHLORIDE 20 MG/1
20 TABLET ORAL 3 TIMES DAILY
COMMUNITY
End: 2023-12-11 | Stop reason: SDUPTHER

## 2023-12-11 NOTE — TELEPHONE ENCOUNTER
Patient called to schedule appt with Dr. Darin Hope. She said she is still following his recommendations, but she hasnt been seen since 06/15/2020. She is scheduled for a f/u appt, but is a new pt since it has been more than 3 years. She voiced understanding but wanted to know if he is able to order labs at all? Please advise  I was not sure on this since shes been a pt more recently.

## 2023-12-11 NOTE — TELEPHONE ENCOUNTER
Called pt and explained since new pt and so long ago since seen, he cannot order without reviewing things with her. She voiced understanding. Made sure she is on the wait list too.

## 2023-12-11 NOTE — TELEPHONE ENCOUNTER
Patient called and requested a new script for Ritalin.    Last ordered 11/2/2023  Last visit 9/19/2023  Next visit 1/22/2024

## 2024-01-08 ENCOUNTER — HOSPITAL ENCOUNTER (OUTPATIENT)
Dept: WOMENS IMAGING | Age: 69
Discharge: HOME | End: 2024-01-08
Payer: MEDICARE

## 2024-01-08 ENCOUNTER — APPOINTMENT (OUTPATIENT)
Dept: MRI IMAGING | Age: 69
End: 2024-01-08
Attending: OTOLARYNGOLOGY
Payer: MEDICARE

## 2024-01-08 VITALS — HEIGHT: 64 IN | BODY MASS INDEX: 29.02 KG/M2 | WEIGHT: 170 LBS

## 2024-01-08 DIAGNOSIS — Z12.31 VISIT FOR SCREENING MAMMOGRAM: ICD-10-CM

## 2024-01-08 PROCEDURE — 77067 SCR MAMMO BI INCL CAD: CPT

## 2024-01-24 DIAGNOSIS — E03.9 HYPOTHYROIDISM, UNSPECIFIED TYPE: ICD-10-CM

## 2024-01-24 RX ORDER — LEVOTHYROXINE SODIUM 125 MCG
TABLET ORAL
Qty: 30 TABLET | Refills: 1 | Status: SHIPPED | OUTPATIENT
Start: 2024-01-24

## 2024-01-24 NOTE — TELEPHONE ENCOUNTER
Date of last visit:  5/10/2023  Date of next visit:  2/26/2024    Requested Prescriptions     Pending Prescriptions Disp Refills    SYNTHROID 125 MCG tablet 90 tablet 3     Sig: TAKE 1 TABLET BY MOUTH EVERY DAY

## 2024-01-26 ENCOUNTER — TELEPHONE (OUTPATIENT)
Dept: PULMONOLOGY | Age: 69
End: 2024-01-26

## 2024-01-26 DIAGNOSIS — G47.411 PRIMARY NARCOLEPSY WITH CATAPLEXY: ICD-10-CM

## 2024-01-26 NOTE — TELEPHONE ENCOUNTER
Pt called clinical line left message asking for a refill on ritalin 20 mg  last ordered 12-11-23, last visit 9-19-23, next visit 3-11-24 refills 0 send to [facility]'s pharmacy

## 2024-01-27 RX ORDER — METHYLPHENIDATE HYDROCHLORIDE 20 MG/1
20 TABLET ORAL 3 TIMES DAILY
Qty: 90 TABLET | Refills: 0 | Status: SHIPPED | OUTPATIENT
Start: 2024-01-27 | End: 2024-02-26

## 2024-01-30 ENCOUNTER — HOSPITAL ENCOUNTER (OUTPATIENT)
Dept: MRI IMAGING | Age: 69
Discharge: HOME | End: 2024-01-30
Attending: OTOLARYNGOLOGY
Payer: MEDICARE

## 2024-01-30 DIAGNOSIS — H90.3 SENSORY HEARING LOSS, BILATERAL: ICD-10-CM

## 2024-01-30 PROCEDURE — A9579 GAD-BASE MR CONTRAST NOS,1ML: HCPCS | Performed by: OTOLARYNGOLOGY

## 2024-01-30 PROCEDURE — 70553 MRI BRAIN STEM W/O & W/DYE: CPT

## 2024-01-30 PROCEDURE — 6360000004 HC RX CONTRAST MEDICATION: Performed by: OTOLARYNGOLOGY

## 2024-01-30 RX ADMIN — GADOTERIDOL 15 ML: 279.3 INJECTION, SOLUTION INTRAVENOUS at 16:15

## 2024-02-26 SDOH — HEALTH STABILITY: PHYSICAL HEALTH: ON AVERAGE, HOW MANY MINUTES DO YOU ENGAGE IN EXERCISE AT THIS LEVEL?: 10 MIN

## 2024-02-26 SDOH — HEALTH STABILITY: PHYSICAL HEALTH: ON AVERAGE, HOW MANY DAYS PER WEEK DO YOU ENGAGE IN MODERATE TO STRENUOUS EXERCISE (LIKE A BRISK WALK)?: 1 DAY

## 2024-02-26 ASSESSMENT — PATIENT HEALTH QUESTIONNAIRE - PHQ9
7. TROUBLE CONCENTRATING ON THINGS, SUCH AS READING THE NEWSPAPER OR WATCHING TELEVISION: NEARLY EVERY DAY
SUM OF ALL RESPONSES TO PHQ QUESTIONS 1-9: 18
10. IF YOU CHECKED OFF ANY PROBLEMS, HOW DIFFICULT HAVE THESE PROBLEMS MADE IT FOR YOU TO DO YOUR WORK, TAKE CARE OF THINGS AT HOME, OR GET ALONG WITH OTHER PEOPLE: SOMEWHAT DIFFICULT
5. POOR APPETITE OR OVEREATING: MORE THAN HALF THE DAYS
4. FEELING TIRED OR HAVING LITTLE ENERGY: NEARLY EVERY DAY
6. FEELING BAD ABOUT YOURSELF - OR THAT YOU ARE A FAILURE OR HAVE LET YOURSELF OR YOUR FAMILY DOWN: NOT AT ALL
3. TROUBLE FALLING OR STAYING ASLEEP: NEARLY EVERY DAY
9. THOUGHTS THAT YOU WOULD BE BETTER OFF DEAD, OR OF HURTING YOURSELF: NOT AT ALL
8. MOVING OR SPEAKING SO SLOWLY THAT OTHER PEOPLE COULD HAVE NOTICED. OR THE OPPOSITE, BEING SO FIGETY OR RESTLESS THAT YOU HAVE BEEN MOVING AROUND A LOT MORE THAN USUAL: SEVERAL DAYS
SUM OF ALL RESPONSES TO PHQ QUESTIONS 1-9: 18
SUM OF ALL RESPONSES TO PHQ QUESTIONS 1-9: 18
1. LITTLE INTEREST OR PLEASURE IN DOING THINGS: NEARLY EVERY DAY
SUM OF ALL RESPONSES TO PHQ9 QUESTIONS 1 & 2: 6
SUM OF ALL RESPONSES TO PHQ QUESTIONS 1-9: 18
2. FEELING DOWN, DEPRESSED OR HOPELESS: NEARLY EVERY DAY

## 2024-02-26 ASSESSMENT — LIFESTYLE VARIABLES
HOW OFTEN DO YOU HAVE A DRINK CONTAINING ALCOHOL: 2-4 TIMES A MONTH
HOW MANY STANDARD DRINKS CONTAINING ALCOHOL DO YOU HAVE ON A TYPICAL DAY: 1 OR 2

## 2024-03-11 DIAGNOSIS — G47.11 IDIOPATHIC HYPERSOMNIA: Primary | ICD-10-CM

## 2024-03-11 RX ORDER — METHYLPHENIDATE HYDROCHLORIDE 20 MG/1
20 TABLET ORAL 3 TIMES DAILY
COMMUNITY
End: 2024-03-12 | Stop reason: SDUPTHER

## 2024-03-11 NOTE — TELEPHONE ENCOUNTER
Patient was last seen 9/19/2023 and her next appt is 4/22/2024.  She is calling in for her ritalin 20 mg three times daily to [facility] pharmacy, she is out of medication.  Please advise.

## 2024-03-12 RX ORDER — METHYLPHENIDATE HYDROCHLORIDE 20 MG/1
20 TABLET ORAL 3 TIMES DAILY
Qty: 90 TABLET | Refills: 0 | Status: SHIPPED | OUTPATIENT
Start: 2024-03-12 | End: 2024-04-11

## 2024-03-22 ENCOUNTER — TELEPHONE (OUTPATIENT)
Dept: FAMILY MEDICINE CLINIC | Age: 69
End: 2024-03-22

## 2024-03-22 DIAGNOSIS — E03.9 HYPOTHYROIDISM, UNSPECIFIED TYPE: ICD-10-CM

## 2024-03-22 DIAGNOSIS — E78.5 HYPERLIPIDEMIA, UNSPECIFIED HYPERLIPIDEMIA TYPE: ICD-10-CM

## 2024-03-22 DIAGNOSIS — K21.00 GASTROESOPHAGEAL REFLUX DISEASE WITH ESOPHAGITIS, UNSPECIFIED WHETHER HEMORRHAGE: ICD-10-CM

## 2024-03-22 RX ORDER — ROSUVASTATIN CALCIUM 20 MG/1
20 TABLET, COATED ORAL DAILY
Qty: 20 TABLET | Refills: 0 | Status: SHIPPED | OUTPATIENT
Start: 2024-03-22

## 2024-03-22 RX ORDER — ESOMEPRAZOLE MAGNESIUM 40 MG/1
40 CAPSULE, DELAYED RELEASE ORAL
Qty: 20 CAPSULE | Refills: 0 | Status: SHIPPED | OUTPATIENT
Start: 2024-03-22

## 2024-03-22 RX ORDER — LEVOTHYROXINE SODIUM 125 MCG
TABLET ORAL
Qty: 20 TABLET | Refills: 0 | Status: SHIPPED | OUTPATIENT
Start: 2024-03-22

## 2024-03-22 NOTE — TELEPHONE ENCOUNTER
Pt had an appt today in office at 2:50pm but was still waiting to be seen at [facility] so she rescheduled for dr soonest space on 4/10. She would like to know if  can send small refill of nexium 40mg 1 cap daily, synthroid 125mcg 1 tab po daily, and crestor 20mg 1 tab daily, just enough to last her until her appt ?  [facility]

## 2024-04-15 PROBLEM — H61.20 IMPACTED CERUMEN: Status: ACTIVE | Noted: 2024-04-15

## 2024-04-15 PROBLEM — M75.122 COMPLETE TEAR OF LEFT ROTATOR CUFF: Status: ACTIVE | Noted: 2024-04-15

## 2024-04-15 PROBLEM — J32.9 CHRONIC SINUSITIS: Status: ACTIVE | Noted: 2024-04-15

## 2024-04-15 PROBLEM — S83.241A ACUTE MEDIAL MENISCUS TEAR, RIGHT, INITIAL ENCOUNTER: Status: ACTIVE | Noted: 2022-03-10

## 2024-04-15 PROBLEM — M23.41 CHONDRAL LOOSE BODY OF RIGHT KNEE JOINT: Status: ACTIVE | Noted: 2022-03-10

## 2024-04-15 PROBLEM — H69.90 DYSFUNCTION OF EUSTACHIAN TUBE: Status: ACTIVE | Noted: 2023-07-05

## 2024-04-15 PROBLEM — H65.93 MEE (MIDDLE EAR EFFUSION), BILATERAL: Status: ACTIVE | Noted: 2024-04-15

## 2024-04-16 ENCOUNTER — TELEPHONE (OUTPATIENT)
Dept: FAMILY MEDICINE CLINIC | Age: 69
End: 2024-04-16

## 2024-04-16 ASSESSMENT — LIFESTYLE VARIABLES
HOW OFTEN DO YOU HAVE A DRINK CONTAINING ALCOHOL: 3
HOW MANY STANDARD DRINKS CONTAINING ALCOHOL DO YOU HAVE ON A TYPICAL DAY: 1
HOW OFTEN DO YOU HAVE SIX OR MORE DRINKS ON ONE OCCASION: 1

## 2024-04-16 NOTE — TELEPHONE ENCOUNTER
ZARIA that pt cancelled her appointment today again last minute.    This appears to be pt's 9th time in a row for a 2 appt time spot for her [employer] [facility] appointment since June of \"2023\"

## 2024-04-16 NOTE — TELEPHONE ENCOUNTER
I spoke to Ne and she will inform her that another no show or last minute cancellation will cause her to be released.

## 2024-04-18 NOTE — TELEPHONE ENCOUNTER
MOM for patient to call back and ask for me.    4 appointments cancelled less than 1/2hr prior to the appt time sine beginning of 2024

## 2024-04-22 ENCOUNTER — OFFICE VISIT (OUTPATIENT)
Dept: PULMONOLOGY | Age: 69
End: 2024-04-22
Payer: MEDICARE

## 2024-04-22 VITALS
TEMPERATURE: 98 F | WEIGHT: 176.6 LBS | OXYGEN SATURATION: 96 % | HEIGHT: 64 IN | SYSTOLIC BLOOD PRESSURE: 126 MMHG | DIASTOLIC BLOOD PRESSURE: 74 MMHG | BODY MASS INDEX: 30.15 KG/M2 | HEART RATE: 92 BPM

## 2024-04-22 DIAGNOSIS — F43.21 GRIEF: ICD-10-CM

## 2024-04-22 DIAGNOSIS — G47.11 IDIOPATHIC HYPERSOMNIA: Primary | ICD-10-CM

## 2024-04-22 DIAGNOSIS — G47.411 PRIMARY NARCOLEPSY WITH CATAPLEXY: ICD-10-CM

## 2024-04-22 DIAGNOSIS — E66.9 OBESITY (BMI 30-39.9): ICD-10-CM

## 2024-04-22 PROCEDURE — G8417 CALC BMI ABV UP PARAM F/U: HCPCS | Performed by: PHYSICIAN ASSISTANT

## 2024-04-22 PROCEDURE — 99213 OFFICE O/P EST LOW 20 MIN: CPT | Performed by: PHYSICIAN ASSISTANT

## 2024-04-22 PROCEDURE — G8399 PT W/DXA RESULTS DOCUMENT: HCPCS | Performed by: PHYSICIAN ASSISTANT

## 2024-04-22 PROCEDURE — 1090F PRES/ABSN URINE INCON ASSESS: CPT | Performed by: PHYSICIAN ASSISTANT

## 2024-04-22 PROCEDURE — G8427 DOCREV CUR MEDS BY ELIG CLIN: HCPCS | Performed by: PHYSICIAN ASSISTANT

## 2024-04-22 PROCEDURE — 1036F TOBACCO NON-USER: CPT | Performed by: PHYSICIAN ASSISTANT

## 2024-04-22 PROCEDURE — 3017F COLORECTAL CA SCREEN DOC REV: CPT | Performed by: PHYSICIAN ASSISTANT

## 2024-04-22 PROCEDURE — 1123F ACP DISCUSS/DSCN MKR DOCD: CPT | Performed by: PHYSICIAN ASSISTANT

## 2024-04-22 RX ORDER — METHYLPHENIDATE HYDROCHLORIDE 20 MG/1
20 TABLET ORAL 3 TIMES DAILY
Qty: 270 TABLET | Refills: 0 | Status: SHIPPED | OUTPATIENT
Start: 2024-04-22 | End: 2024-07-21

## 2024-04-22 RX ORDER — IPRATROPIUM BROMIDE 42 UG/1
SPRAY, METERED NASAL
COMMUNITY

## 2024-04-22 RX ORDER — ALPRAZOLAM 0.25 MG/1
1 TABLET ORAL NIGHTLY
COMMUNITY

## 2024-04-22 RX ORDER — MODAFINIL 200 MG/1
200 TABLET ORAL DAILY
COMMUNITY
End: 2024-04-22 | Stop reason: SDUPTHER

## 2024-04-22 RX ORDER — METHYLPHENIDATE HYDROCHLORIDE 20 MG/1
20 CAPSULE, EXTENDED RELEASE ORAL EVERY MORNING
COMMUNITY
End: 2024-04-22

## 2024-04-22 RX ORDER — MODAFINIL 200 MG/1
200 TABLET ORAL DAILY
Qty: 90 TABLET | Refills: 1 | Status: SHIPPED | OUTPATIENT
Start: 2024-04-22 | End: 2024-10-19

## 2024-04-22 NOTE — PROGRESS NOTES
equal, round, and reactive to light.   Pulmonary:      Effort: Pulmonary effort is normal.   Neurological:      Mental Status: She is alert and oriented to person, place, and time.   Psychiatric:         Attention and Perception: Attention and perception normal.         Mood and Affect: Mood and affect normal.         Speech: Speech normal.         Behavior: Behavior normal.         Thought Content: Thought content normal.         Cognition and Memory: Cognition and memory normal.         Judgment: Judgment normal.            Assessment      Diagnosis Orders   1. Idiopathic hypersomnia        2. Obesity (BMI 30-39.9)        3. Grief             Plan   - continue Provigil and Ritalin, she gets benefit  - mild LEA on PSG in 2003, she is not snoring no need to repeat PSG  - She call my officefor earlier appointment if needed for worsening of sleep symptoms.   - She was instructed on weight loss  - Ankita was educated about my impression and plan. Patient verbalizes understanding.  We will see Ankita Babcock back in: 1 year     Yanna Farias PA-C, MPAS  4/22/2024

## 2024-04-23 ENCOUNTER — TELEPHONE (OUTPATIENT)
Dept: FAMILY MEDICINE CLINIC | Age: 69
End: 2024-04-23

## 2024-04-23 DIAGNOSIS — E03.9 HYPOTHYROIDISM, UNSPECIFIED TYPE: ICD-10-CM

## 2024-04-23 DIAGNOSIS — K21.00 GASTROESOPHAGEAL REFLUX DISEASE WITH ESOPHAGITIS, UNSPECIFIED WHETHER HEMORRHAGE: ICD-10-CM

## 2024-04-23 DIAGNOSIS — E78.5 HYPERLIPIDEMIA, UNSPECIFIED HYPERLIPIDEMIA TYPE: ICD-10-CM

## 2024-04-23 RX ORDER — LEVOTHYROXINE SODIUM 125 MCG
TABLET ORAL
Qty: 10 TABLET | Refills: 0 | Status: SHIPPED | OUTPATIENT
Start: 2024-04-23

## 2024-04-23 RX ORDER — ESOMEPRAZOLE MAGNESIUM 40 MG/1
40 CAPSULE, DELAYED RELEASE ORAL
Qty: 10 CAPSULE | Refills: 0 | Status: SHIPPED | OUTPATIENT
Start: 2024-04-23

## 2024-04-23 RX ORDER — ROSUVASTATIN CALCIUM 20 MG/1
20 TABLET, COATED ORAL DAILY
Qty: 10 TABLET | Refills: 0 | Status: SHIPPED | OUTPATIENT
Start: 2024-04-23

## 2024-04-23 NOTE — TELEPHONE ENCOUNTER
Pt called req a #10 day refill to last until her appt here on 5-2-24.    Pt stated she will definitely be keeping this appt.    Pt req #10 refill for  Synthroid  Rosuvastatin  Esomeprazole    Harish

## 2024-05-02 ENCOUNTER — OFFICE VISIT (OUTPATIENT)
Dept: FAMILY MEDICINE CLINIC | Age: 69
End: 2024-05-02

## 2024-05-02 VITALS
RESPIRATION RATE: 16 BRPM | HEART RATE: 58 BPM | BODY MASS INDEX: 29.6 KG/M2 | WEIGHT: 173.38 LBS | DIASTOLIC BLOOD PRESSURE: 80 MMHG | SYSTOLIC BLOOD PRESSURE: 136 MMHG | HEIGHT: 64 IN

## 2024-05-02 DIAGNOSIS — K22.0 INCOMPETENT LOWER ESOPHAGEAL SPHINCTER: ICD-10-CM

## 2024-05-02 DIAGNOSIS — E03.9 HYPOTHYROIDISM, UNSPECIFIED TYPE: ICD-10-CM

## 2024-05-02 DIAGNOSIS — K44.9 HIATAL HERNIA WITH GASTROESOPHAGEAL REFLUX DISEASE WITHOUT ESOPHAGITIS: ICD-10-CM

## 2024-05-02 DIAGNOSIS — Z00.00 MEDICARE ANNUAL WELLNESS VISIT, SUBSEQUENT: Primary | ICD-10-CM

## 2024-05-02 DIAGNOSIS — M17.11 PRIMARY OSTEOARTHRITIS OF RIGHT KNEE: ICD-10-CM

## 2024-05-02 DIAGNOSIS — E78.5 HYPERLIPIDEMIA, UNSPECIFIED HYPERLIPIDEMIA TYPE: ICD-10-CM

## 2024-05-02 DIAGNOSIS — K21.9 HIATAL HERNIA WITH GASTROESOPHAGEAL REFLUX DISEASE WITHOUT ESOPHAGITIS: ICD-10-CM

## 2024-05-02 DIAGNOSIS — K21.00 GASTROESOPHAGEAL REFLUX DISEASE WITH ESOPHAGITIS, UNSPECIFIED WHETHER HEMORRHAGE: ICD-10-CM

## 2024-05-02 RX ORDER — ESOMEPRAZOLE MAGNESIUM 40 MG/1
40 CAPSULE, DELAYED RELEASE ORAL
Qty: 90 CAPSULE | Refills: 3 | Status: SHIPPED | OUTPATIENT
Start: 2024-05-02

## 2024-05-02 RX ORDER — LEVOTHYROXINE SODIUM 125 MCG
TABLET ORAL
Qty: 28 TABLET | Refills: 0 | Status: SHIPPED | COMMUNITY
Start: 2024-05-02

## 2024-05-02 RX ORDER — FAMOTIDINE 20 MG/1
20 TABLET, FILM COATED ORAL NIGHTLY
Qty: 90 TABLET | Refills: 3 | Status: SHIPPED | OUTPATIENT
Start: 2024-05-02

## 2024-05-02 RX ORDER — ROSUVASTATIN CALCIUM 20 MG/1
20 TABLET, COATED ORAL DAILY
Qty: 90 TABLET | Refills: 2 | Status: CANCELLED | OUTPATIENT
Start: 2024-05-02

## 2024-05-02 ASSESSMENT — PATIENT HEALTH QUESTIONNAIRE - PHQ9
SUM OF ALL RESPONSES TO PHQ QUESTIONS 1-9: 15
10. IF YOU CHECKED OFF ANY PROBLEMS, HOW DIFFICULT HAVE THESE PROBLEMS MADE IT FOR YOU TO DO YOUR WORK, TAKE CARE OF THINGS AT HOME, OR GET ALONG WITH OTHER PEOPLE: SOMEWHAT DIFFICULT
SUM OF ALL RESPONSES TO PHQ QUESTIONS 1-9: 15
SUM OF ALL RESPONSES TO PHQ9 QUESTIONS 1 & 2: 6
6. FEELING BAD ABOUT YOURSELF - OR THAT YOU ARE A FAILURE OR HAVE LET YOURSELF OR YOUR FAMILY DOWN: NOT AT ALL
SUM OF ALL RESPONSES TO PHQ QUESTIONS 1-9: 15
1. LITTLE INTEREST OR PLEASURE IN DOING THINGS: NEARLY EVERY DAY
SUM OF ALL RESPONSES TO PHQ QUESTIONS 1-9: 15
5. POOR APPETITE OR OVEREATING: SEVERAL DAYS
8. MOVING OR SPEAKING SO SLOWLY THAT OTHER PEOPLE COULD HAVE NOTICED. OR THE OPPOSITE, BEING SO FIGETY OR RESTLESS THAT YOU HAVE BEEN MOVING AROUND A LOT MORE THAN USUAL: NOT AT ALL
9. THOUGHTS THAT YOU WOULD BE BETTER OFF DEAD, OR OF HURTING YOURSELF: NOT AT ALL
2. FEELING DOWN, DEPRESSED OR HOPELESS: NEARLY EVERY DAY
3. TROUBLE FALLING OR STAYING ASLEEP: MORE THAN HALF THE DAYS
4. FEELING TIRED OR HAVING LITTLE ENERGY: NEARLY EVERY DAY
7. TROUBLE CONCENTRATING ON THINGS, SUCH AS READING THE NEWSPAPER OR WATCHING TELEVISION: NEARLY EVERY DAY

## 2024-05-02 ASSESSMENT — COLUMBIA-SUICIDE SEVERITY RATING SCALE - C-SSRS
6. HAVE YOU EVER DONE ANYTHING, STARTED TO DO ANYTHING, OR PREPARED TO DO ANYTHING TO END YOUR LIFE?: NO
2. HAVE YOU ACTUALLY HAD ANY THOUGHTS OF KILLING YOURSELF?: NO
1. WITHIN THE PAST MONTH, HAVE YOU WISHED YOU WERE DEAD OR WISHED YOU COULD GO TO SLEEP AND NOT WAKE UP?: NO

## 2024-05-02 NOTE — PROGRESS NOTES
ordered.    Positive Risk Factor Screenings with Interventions:    Fall Risk:  Do you feel unsteady or are you worried about falling? : no  2 or more falls in past year?: no  Fall with injury in past year?: (!) yes     Interventions:    She has the gait trouble     Depression:  PHQ-2 Score: 6  PHQ-9 Total Score: 15    Interpretation:  5-9 mild   10-14 moderate   15-19 moderately severe   20-27 severe   Interventions:  She sees a therapist and a NP                ADL's:   Patient reports needing help with:  Select all that apply: (!) Housekeeping, Laundry  Interventions:  family                  Objective   Vitals:    05/02/24 1446   BP: 136/80   Site: Right Upper Arm   Position: Sitting   Cuff Size: Medium Adult   Pulse: 58   Resp: 16   Weight: 78.6 kg (173 lb 6 oz)   Height: 1.626 m (5' 4\")      Body mass index is 29.76 kg/m².      General Appearance: alert and oriented to person, place and time, well-developed and well-nourished, in no acute distress  Skin: warm and dry, no rash or erythema  Head: normocephalic and atraumatic  Eyes: pupils equal, round, and reactive to light, extraocular eye movements intact, conjunctivae normal  ENT: tympanic membrane, external ear and ear canal normal bilaterally, oropharynx clear and moist with normal mucous membranes  Neck: neck supple and non tender without mass, no thyromegaly or thyroid nodules, no cervical lymphadenopathy   Pulmonary/Chest: clear to auscultation bilaterally- no wheezes, rales or rhonchi, normal air movement, no respiratory distress  Cardiovascular: normal rate, regular rhythm, normal S1 and S2, no murmurs, no gallops, and no carotid bruits  Abdomen: soft, non-tender, non-distended, normal bowel sounds, no masses or organomegaly  Extremities: no cyanosis, no clubbing, and trace edema bilaterally  Musculoskeletal: normal range of motion, no joint swelling, deformity or tenderness and there is some slight swelling to the right knee but no redness and she

## 2024-05-02 NOTE — PATIENT INSTRUCTIONS
SPF of 30 or greater. Reapply every 2 to 3 hours or after sweating, drying off with a towel, or swimming.  Always wear a seat belt when traveling in a car. Always wear a helmet when riding a bicycle or motorcycle.

## 2024-05-03 ENCOUNTER — TELEPHONE (OUTPATIENT)
Dept: PULMONOLOGY | Age: 69
End: 2024-05-03

## 2024-05-03 DIAGNOSIS — G47.11 IDIOPATHIC HYPERSOMNIA: ICD-10-CM

## 2024-05-03 RX ORDER — MODAFINIL 200 MG/1
200 TABLET ORAL 2 TIMES DAILY
Qty: 180 TABLET | Refills: 1 | Status: SHIPPED | OUTPATIENT
Start: 2024-05-03 | End: 2024-10-30

## 2024-05-03 NOTE — TELEPHONE ENCOUNTER
Patient called in stating that her progivil needs to be twice a day not once please advise. [facility].

## 2024-05-06 SDOH — HEALTH STABILITY: PHYSICAL HEALTH: ON AVERAGE, HOW MANY DAYS PER WEEK DO YOU ENGAGE IN MODERATE TO STRENUOUS EXERCISE (LIKE A BRISK WALK)?: 2 DAYS

## 2024-05-06 SDOH — HEALTH STABILITY: PHYSICAL HEALTH: ON AVERAGE, HOW MANY MINUTES DO YOU ENGAGE IN EXERCISE AT THIS LEVEL?: 20 MIN

## 2024-05-18 LAB
ALBUMIN: 4.6 G/DL (ref 3.5–5.2)
ALK PHOSPHATASE: 88 U/L (ref 40–142)
ALT SERPL-CCNC: 34 U/L (ref 5–40)
ANION GAP SERPL CALCULATED.3IONS-SCNC: 13 MEQ/L (ref 7–16)
AST SERPL-CCNC: 31 U/L (ref 9–40)
BILIRUB SERPL-MCNC: 0.4 MG/DL
BUN BLDV-MCNC: 14 MG/DL (ref 8–23)
CALCIUM SERPL-MCNC: 9.8 MG/DL (ref 8.5–10.5)
CHLORIDE BLD-SCNC: 101 MEQ/L (ref 95–107)
CHOLESTEROL, TOTAL: 248 MG/DL
CHOLESTEROL/HDL RATIO: 4.6 RATIO
CO2: 29 MEQ/L (ref 19–31)
CREAT SERPL-MCNC: 0.76 MG/DL (ref 0.6–1.3)
EGFR IF NONAFRICAN AMERICAN: 85 ML/MIN/1.73
GLUCOSE: 96 MG/DL (ref 70–99)
HDLC SERPL-MCNC: 54 MG/DL
LDL CHOLESTEROL: 173 MG/DL
LDL/HDL RATIO: 3.2 RATIO
POTASSIUM SERPL-SCNC: 4.2 MEQ/L (ref 3.5–5.4)
SODIUM BLD-SCNC: 143 MEQ/L (ref 133–146)
TOTAL PROTEIN: 7 G/DL (ref 6.1–8.3)
TRIGL SERPL-MCNC: 105 MG/DL
TSH SERPL DL<=0.05 MIU/L-ACNC: 3.2 UIU/ML (ref 0.4–4.1)
VLDLC SERPL CALC-MCNC: 21 MG/DL

## 2024-05-20 ENCOUNTER — TELEPHONE (OUTPATIENT)
Dept: FAMILY MEDICINE CLINIC | Age: 69
End: 2024-05-20

## 2024-05-20 DIAGNOSIS — E78.5 HYPERLIPIDEMIA, UNSPECIFIED HYPERLIPIDEMIA TYPE: Primary | ICD-10-CM

## 2024-05-20 DIAGNOSIS — E03.9 HYPOTHYROIDISM, UNSPECIFIED TYPE: ICD-10-CM

## 2024-05-20 DIAGNOSIS — E78.5 HYPERLIPIDEMIA, UNSPECIFIED HYPERLIPIDEMIA TYPE: ICD-10-CM

## 2024-05-20 RX ORDER — ROSUVASTATIN CALCIUM 20 MG/1
20 TABLET, COATED ORAL DAILY
Qty: 90 TABLET | Refills: 3 | Status: SHIPPED | OUTPATIENT
Start: 2024-05-20

## 2024-05-20 RX ORDER — LEVOTHYROXINE SODIUM 125 MCG
TABLET ORAL
Qty: 90 TABLET | Refills: 3 | Status: SHIPPED | OUTPATIENT
Start: 2024-05-20

## 2024-05-20 NOTE — TELEPHONE ENCOUNTER
----- Message from Rich Collazo MD sent at 5/19/2024  6:33 PM EDT -----  The LDL is higher than before. Is she on the rosuvastatin everyday?

## 2024-05-20 NOTE — TELEPHONE ENCOUNTER
Date of last visit:  5/2/2024  Date of next visit:  Visit date not found    Requested Prescriptions     Pending Prescriptions Disp Refills    rosuvastatin (CRESTOR) 20 MG tablet 90 tablet 3     Sig: Take 1 tablet by mouth daily    SYNTHROID 125 MCG tablet 90 tablet 3     Sig: TAKE 1 TABLET BY MOUTH EVERY DAY

## 2024-08-02 ENCOUNTER — TELEPHONE (OUTPATIENT)
Dept: PULMONOLOGY | Age: 69
End: 2024-08-02

## 2024-08-02 DIAGNOSIS — G47.11 IDIOPATHIC HYPERSOMNIA: Primary | ICD-10-CM

## 2024-08-02 DIAGNOSIS — K21.00 GASTROESOPHAGEAL REFLUX DISEASE WITH ESOPHAGITIS, UNSPECIFIED WHETHER HEMORRHAGE: ICD-10-CM

## 2024-08-02 DIAGNOSIS — K21.9 HIATAL HERNIA WITH GASTROESOPHAGEAL REFLUX DISEASE WITHOUT ESOPHAGITIS: ICD-10-CM

## 2024-08-02 DIAGNOSIS — E78.5 HYPERLIPIDEMIA, UNSPECIFIED HYPERLIPIDEMIA TYPE: ICD-10-CM

## 2024-08-02 DIAGNOSIS — K22.0 INCOMPETENT LOWER ESOPHAGEAL SPHINCTER: ICD-10-CM

## 2024-08-02 DIAGNOSIS — K44.9 HIATAL HERNIA WITH GASTROESOPHAGEAL REFLUX DISEASE WITHOUT ESOPHAGITIS: ICD-10-CM

## 2024-08-02 RX ORDER — ESOMEPRAZOLE MAGNESIUM 40 MG/1
40 CAPSULE, DELAYED RELEASE ORAL
Qty: 90 CAPSULE | Refills: 3 | Status: SHIPPED | OUTPATIENT
Start: 2024-08-02

## 2024-08-02 RX ORDER — ROSUVASTATIN CALCIUM 20 MG/1
20 TABLET, COATED ORAL DAILY
Qty: 90 TABLET | Refills: 3 | Status: SHIPPED | OUTPATIENT
Start: 2024-08-02

## 2024-08-02 RX ORDER — FAMOTIDINE 20 MG/1
20 TABLET, FILM COATED ORAL NIGHTLY
Qty: 90 TABLET | Refills: 3 | Status: SHIPPED | OUTPATIENT
Start: 2024-08-02

## 2024-08-02 NOTE — TELEPHONE ENCOUNTER
DOLV=04-22-24. DONV=04-22-25. Ankita is calling to request a refill on her Provigil 200 mg bid, and Ritalin 20 mg tid. Ankita would like the Rx called to [facility].

## 2024-08-02 NOTE — TELEPHONE ENCOUNTER
Pt called req ref Nexium 40 mg 1 cap in am 90 supply, Crestor 20mg 1 tab daily 90 supply, Pepcid 20 mg 1 tab HS 90 supply,      [facility]

## 2024-08-02 NOTE — TELEPHONE ENCOUNTER
Called [facility] and they still have a refill on fill for Provigil and stated they will get that ready for the patient.   She will need a script for Ritalin though.   Last ordered 4/22/24  Last visit 4/22/24  Next visit 4/3/25   Thanks!

## 2024-08-05 RX ORDER — METHYLPHENIDATE HYDROCHLORIDE 20 MG/1
20 TABLET ORAL 3 TIMES DAILY
Qty: 270 TABLET | Refills: 0 | Status: SHIPPED | OUTPATIENT
Start: 2024-08-05 | End: 2024-11-03

## 2024-09-06 ENCOUNTER — APPOINTMENT (OUTPATIENT)
Dept: GENERAL RADIOLOGY | Age: 69
End: 2024-09-06
Payer: MEDICARE

## 2024-09-06 ENCOUNTER — APPOINTMENT (OUTPATIENT)
Dept: CT IMAGING | Age: 69
End: 2024-09-06
Payer: MEDICARE

## 2024-09-06 ENCOUNTER — HOSPITAL ENCOUNTER (EMERGENCY)
Age: 69
Discharge: HOME | End: 2024-09-06
Attending: EMERGENCY MEDICINE
Payer: MEDICARE

## 2024-09-06 VITALS
OXYGEN SATURATION: 97 % | RESPIRATION RATE: 18 BRPM | HEART RATE: 91 BPM | DIASTOLIC BLOOD PRESSURE: 86 MMHG | TEMPERATURE: 97.7 F | SYSTOLIC BLOOD PRESSURE: 146 MMHG

## 2024-09-06 DIAGNOSIS — J32.0 MAXILLARY SINUSITIS, UNSPECIFIED CHRONICITY: ICD-10-CM

## 2024-09-06 DIAGNOSIS — S52.125A CLOSED NONDISPLACED FRACTURE OF HEAD OF LEFT RADIUS, INITIAL ENCOUNTER: Primary | ICD-10-CM

## 2024-09-06 DIAGNOSIS — S02.2XXA CLOSED FRACTURE OF NASAL BONE, INITIAL ENCOUNTER: ICD-10-CM

## 2024-09-06 LAB
ANION GAP SERPL CALC-SCNC: 9 MEQ/L (ref 8–16)
BASOPHILS ABSOLUTE: 0.1 THOU/MM3 (ref 0–0.1)
BASOPHILS NFR BLD AUTO: 0.6 %
BUN SERPL-MCNC: 10 MG/DL (ref 7–22)
CALCIUM SERPL-MCNC: 9.2 MG/DL (ref 8.5–10.5)
CHLORIDE SERPL-SCNC: 102 MEQ/L (ref 98–111)
CO2 SERPL-SCNC: 28 MEQ/L (ref 23–33)
CREAT SERPL-MCNC: 0.6 MG/DL (ref 0.4–1.2)
DEPRECATED RDW RBC AUTO: 47.3 FL (ref 35–45)
EKG ATRIAL RATE: 92 BPM
EKG P AXIS: 53 DEGREES
EKG P-R INTERVAL: 154 MS
EKG Q-T INTERVAL: 378 MS
EKG QRS DURATION: 82 MS
EKG QTC CALCULATION (BAZETT): 467 MS
EKG R AXIS: -15 DEGREES
EKG T AXIS: 58 DEGREES
EKG VENTRICULAR RATE: 92 BPM
EOSINOPHIL NFR BLD AUTO: 2.8 %
EOSINOPHILS ABSOLUTE: 0.3 THOU/MM3 (ref 0–0.4)
ERYTHROCYTE [DISTWIDTH] IN BLOOD BY AUTOMATED COUNT: 13.6 % (ref 11.5–14.5)
GFR SERPL CREATININE-BSD FRML MDRD: > 90 ML/MIN/1.73M2
GLUCOSE SERPL-MCNC: 88 MG/DL (ref 70–108)
HCT VFR BLD AUTO: 45.3 % (ref 37–47)
HGB BLD-MCNC: 14.8 GM/DL (ref 12–16)
IMM GRANULOCYTES # BLD AUTO: 0.03 THOU/MM3 (ref 0–0.07)
IMM GRANULOCYTES NFR BLD AUTO: 0.3 %
LYMPHOCYTES ABSOLUTE: 1.6 THOU/MM3 (ref 1–4.8)
LYMPHOCYTES NFR BLD AUTO: 16.4 %
MCH RBC QN AUTO: 30.9 PG (ref 26–33)
MCHC RBC AUTO-ENTMCNC: 32.7 GM/DL (ref 32.2–35.5)
MCV RBC AUTO: 94.6 FL (ref 81–99)
MONOCYTES ABSOLUTE: 0.9 THOU/MM3 (ref 0.4–1.3)
MONOCYTES NFR BLD AUTO: 9.2 %
NEUTROPHILS ABSOLUTE: 6.9 THOU/MM3 (ref 1.8–7.7)
NEUTROPHILS NFR BLD AUTO: 70.7 %
NRBC BLD AUTO-RTO: 0 /100 WBC
OSMOLALITY SERPL CALC.SUM OF ELEC: 276 MOSMOL/KG (ref 275–300)
PLATELET # BLD AUTO: 280 THOU/MM3 (ref 130–400)
PMV BLD AUTO: 9.3 FL (ref 9.4–12.4)
POTASSIUM SERPL-SCNC: 4.4 MEQ/L (ref 3.5–5.2)
RBC # BLD AUTO: 4.79 MILL/MM3 (ref 4.2–5.4)
SODIUM SERPL-SCNC: 139 MEQ/L (ref 135–145)
TROPONIN, HIGH SENSITIVITY: 8 NG/L (ref 0–12)
WBC # BLD AUTO: 9.8 THOU/MM3 (ref 4.8–10.8)

## 2024-09-06 PROCEDURE — 99285 EMERGENCY DEPT VISIT HI MDM: CPT

## 2024-09-06 PROCEDURE — 93005 ELECTROCARDIOGRAM TRACING: CPT | Performed by: EMERGENCY MEDICINE

## 2024-09-06 PROCEDURE — 72125 CT NECK SPINE W/O DYE: CPT

## 2024-09-06 PROCEDURE — 71045 X-RAY EXAM CHEST 1 VIEW: CPT

## 2024-09-06 PROCEDURE — 29105 APPLICATION LONG ARM SPLINT: CPT

## 2024-09-06 PROCEDURE — 93010 ELECTROCARDIOGRAM REPORT: CPT | Performed by: INTERNAL MEDICINE

## 2024-09-06 PROCEDURE — 70450 CT HEAD/BRAIN W/O DYE: CPT

## 2024-09-06 PROCEDURE — 85025 COMPLETE CBC W/AUTO DIFF WBC: CPT

## 2024-09-06 PROCEDURE — 84484 ASSAY OF TROPONIN QUANT: CPT

## 2024-09-06 PROCEDURE — 70486 CT MAXILLOFACIAL W/O DYE: CPT

## 2024-09-06 PROCEDURE — 36415 COLL VENOUS BLD VENIPUNCTURE: CPT

## 2024-09-06 PROCEDURE — 73080 X-RAY EXAM OF ELBOW: CPT

## 2024-09-06 PROCEDURE — 80048 BASIC METABOLIC PNL TOTAL CA: CPT

## 2024-09-06 PROCEDURE — 6370000000 HC RX 637 (ALT 250 FOR IP): Performed by: EMERGENCY MEDICINE

## 2024-09-06 RX ORDER — HYDROCODONE BITARTRATE AND ACETAMINOPHEN 5; 325 MG/1; MG/1
1 TABLET ORAL ONCE
Status: COMPLETED | OUTPATIENT
Start: 2024-09-06 | End: 2024-09-06

## 2024-09-06 RX ORDER — HYDROCODONE BITARTRATE AND ACETAMINOPHEN 5; 325 MG/1; MG/1
1 TABLET ORAL EVERY 6 HOURS PRN
Qty: 12 TABLET | Refills: 0 | Status: SHIPPED | OUTPATIENT
Start: 2024-09-06 | End: 2024-09-09

## 2024-09-06 RX ORDER — AZITHROMYCIN 250 MG/1
TABLET, FILM COATED ORAL
Qty: 1 PACKET | Refills: 0 | Status: SHIPPED | OUTPATIENT
Start: 2024-09-06 | End: 2024-09-10

## 2024-09-06 RX ADMIN — HYDROCODONE BITARTRATE AND ACETAMINOPHEN 1 TABLET: 5; 325 TABLET ORAL at 20:23

## 2024-09-06 ASSESSMENT — PAIN SCALES - GENERAL: PAINLEVEL_OUTOF10: 7

## 2024-09-06 ASSESSMENT — PAIN DESCRIPTION - ORIENTATION: ORIENTATION: LEFT

## 2024-09-06 ASSESSMENT — PAIN - FUNCTIONAL ASSESSMENT: PAIN_FUNCTIONAL_ASSESSMENT: 0-10

## 2024-09-06 ASSESSMENT — PAIN DESCRIPTION - LOCATION: LOCATION: FACE;ELBOW

## 2024-09-06 NOTE — ED NOTES
Pt to ED c/o mechanical fall a couple of hours ago. Pt states her shoe caught on the cement floor and she fell face first. Pt complaining of left elbow pain. Pt has a bruised right eye and nasal swelling. Pt states the ruptured eye vessel in her right eye was prior to the fall. Pt denies any chest pain, SOB, or dizziness prior to the fall. Pt denies LOC. Pt denies blood thinners. Pt states she is \"sleepy\". Pt denies N/V. EKG completed.

## 2024-09-07 NOTE — DISCHARGE INSTRUCTIONS
You were seen at [facility] emergency department for a fall.  In the ER, CT scan of your face showed that you broke your nose, and x-ray of your left arm shows that you have a fractured radial head, which is part of your elbow.  He will be placed in a sling and a splint, and you should follow-up with [facility] Monday morning at their walk-in clinic, which opens at 8 AM.  You should continue icing your nose for 15 minutes at a time tonight, and this will help with swelling and pain.  You will also be prescribed pain medication, and Augmentin for sinus infection.

## 2024-09-07 NOTE — ED PROVIDER NOTES
[facility]  EMERGENCY DEPARTMENT ENCOUNTER          Pt Name: Ankita Babcock  MRN: [id number]  Birthdate 1955  Date of evaluation: 9/6/2024  Treating Resident Physician: Tip Ybarra MD  Supervising Physician: Derrick Little DO    History obtained from the patient.     CHIEF COMPLAINT       Chief Complaint   Patient presents with    Fall    Facial Swelling       HISTORY OF PRESENT ILLNESS    Ankita Babcock is a 68 y.o. female  who presents to the emergency department after a mechanical fall while in the garage. Pt reported increased shoe resistance, falling face forward. She denied LOC but endorsed hitting head. She was able to ambulate after fall, but noticed left elbow/forearm pain, and nose pain. She denied taking blood thinners/AC. No pain meds taken since fall. Pain 3/10 which worsens with movement.    Triage notes and Nursing notes were reviewed by myself.  Any discrepancies are addressed above.    PAST MEDICAL HISTORY     Past Medical History:   Diagnosis Date    Age-related osteoporosis without current pathological fracture     Anemia 2000    malabsorption/Celiac disease    Anxiety 02/2018    Dr. Patrick    Arm DVT (deep venous thromboembolism), acute ([facility]) 05/15/2013    Broken shoulder 09/06/2016    right    Celiac disease     Celiac disease     Complex regional pain syndrome of right upper extremity     Right shoulder, arm, and hand    CRPS (complex regional pain syndrome type I) 2016    Dr. Ruiz    Depression 09/2016    Dr. Ruiz    Depression     GERD (gastroesophageal reflux disease) 2000    and celiac- Dr. Ya- [facility]    Headache(784.0)     migraines-Dr. Dominguez    Heart murmur     History of blood transfusion     Hx of reactive hypoglycemia     Dr. Goff    Hyperlipidemia 2013    Dr. Collazo    Hypothyroidism 1993    Dr. Ramirez carlton    IBS (irritable bowel syndrome)     PER PT     Irritable bowel syndrome 1997      Disp-180 capsule, R-0Normal      FLUoxetine (PROZAC) 40 MG capsule Take 1 capsule by mouth every morning, Disp-90 capsule, R-0Normal      Calcium Carbonate-Vitamin D 500-3.125 MG-MCG TABS Take by mouthHistorical Med      montelukast (SINGULAIR) 10 MG tablet Take by mouthHistorical Med      furosemide (LASIX) 20 MG tablet Take 2 tablets by mouth daily, Disp-180 tablet, R-3Print      fluticasone (FLONASE) 50 MCG/ACT nasal spray SPRAY 1 SPRAY INTO EACH NOSTRIL EVERY DAY, Disp-1 Bottle,R-0Normal      Omega-3 Fatty Acids (FISH OIL) 1000 MG CAPS Take 6 capsules by mouth 3 times dailyHistorical Med      magnesium oxide (MAG-OX) 400 MG tablet Take 3 tablets by mouth dailyHistorical Med      ferrous sulfate (IRON 325) 325 (65 Fe) MG tablet Take 1 tablet by mouth daily (with breakfast)Historical Med      Cinnamon 500 MG TABS Take 6 tablets by mouth daily Historical Med      ascorbic acid (VITAMIN C) 1000 MG tablet Take 0.5 tablets by mouth 3 times daily, Disp-30 tablet, R-3OTC      B Complex-Folic Acid (B-100 BALANCED TR PO) Take 100 mg by mouth 3 times daily (before meals) [facility] [place]       vitamin D (CHOLECALCIFEROL) 5000 UNITS CAPS capsule Take 1 capsule by mouth dailyHistorical Med             ALLERGIES     Dilaudid [hydromorphone hcl], Iron, Percodan [oxycodone-aspirin], Gluten, Gluten meal, Maxzide [hydrochlorothiazide w-triamterene], Nalfon [fenoprofen calcium], Oxycodone-acetaminophen, Oxycodone-aspirin, Percocet  [oxycodone-acetaminophen], Reglan [metoclopramide], Wheat, Fenoprofen, and Sulfa antibiotics    FAMILY HISTORY       Family History   Problem Relation Age of Onset    Diabetes Mother         type II    Stroke Mother     High Blood Pressure Mother     Kidney Disease Mother     Heart Disease Mother     Heart Attack Mother 78        2/28/15    Colon Cancer Father 70        jejunum    Cancer Father         Lyphoma    Irritable Bowel Syndrome Sister     High Blood Pressure Brother     Kidney Disease

## 2024-10-17 ENCOUNTER — OFFICE VISIT (OUTPATIENT)
Dept: FAMILY MEDICINE CLINIC | Age: 69
End: 2024-10-17

## 2024-10-17 VITALS
TEMPERATURE: 98.3 F | SYSTOLIC BLOOD PRESSURE: 146 MMHG | BODY MASS INDEX: 30.49 KG/M2 | HEART RATE: 83 BPM | WEIGHT: 178.6 LBS | OXYGEN SATURATION: 97 % | RESPIRATION RATE: 12 BRPM | HEIGHT: 64 IN | DIASTOLIC BLOOD PRESSURE: 78 MMHG

## 2024-10-17 DIAGNOSIS — H65.01 NON-RECURRENT ACUTE SEROUS OTITIS MEDIA OF RIGHT EAR: ICD-10-CM

## 2024-10-17 DIAGNOSIS — E78.5 HYPERLIPIDEMIA, UNSPECIFIED HYPERLIPIDEMIA TYPE: ICD-10-CM

## 2024-10-17 DIAGNOSIS — R73.9 HYPERGLYCEMIA: ICD-10-CM

## 2024-10-17 DIAGNOSIS — F41.9 ANXIETY: ICD-10-CM

## 2024-10-17 DIAGNOSIS — K90.89 OTHER SPECIFIED INTESTINAL MALABSORPTION: Primary | ICD-10-CM

## 2024-10-17 DIAGNOSIS — N76.0 ACUTE VAGINITIS: ICD-10-CM

## 2024-10-17 DIAGNOSIS — I87.2 VENOUS INSUFFICIENCY: ICD-10-CM

## 2024-10-17 DIAGNOSIS — K21.9 HIATAL HERNIA WITH GASTROESOPHAGEAL REFLUX DISEASE WITHOUT ESOPHAGITIS: ICD-10-CM

## 2024-10-17 DIAGNOSIS — E03.9 HYPOTHYROIDISM, UNSPECIFIED TYPE: ICD-10-CM

## 2024-10-17 DIAGNOSIS — K44.9 HIATAL HERNIA WITH GASTROESOPHAGEAL REFLUX DISEASE WITHOUT ESOPHAGITIS: ICD-10-CM

## 2024-10-17 DIAGNOSIS — Z71.89 ENCOUNTER FOR HERB AND VITAMIN SUPPLEMENT MANAGEMENT: ICD-10-CM

## 2024-10-17 DIAGNOSIS — K22.0 INCOMPETENT LOWER ESOPHAGEAL SPHINCTER: ICD-10-CM

## 2024-10-17 DIAGNOSIS — G47.9 SLEEP DIFFICULTIES: ICD-10-CM

## 2024-10-17 DIAGNOSIS — R45.86 MOOD ALTERED: ICD-10-CM

## 2024-10-17 DIAGNOSIS — M17.11 PRIMARY OSTEOARTHRITIS OF RIGHT KNEE: ICD-10-CM

## 2024-10-17 DIAGNOSIS — R41.3 MEMORY DIFFICULTIES: ICD-10-CM

## 2024-10-17 DIAGNOSIS — K21.00 GASTROESOPHAGEAL REFLUX DISEASE WITH ESOPHAGITIS, UNSPECIFIED WHETHER HEMORRHAGE: ICD-10-CM

## 2024-10-17 DIAGNOSIS — F43.21 GRIEF: ICD-10-CM

## 2024-10-17 RX ORDER — CRANBERRY FRUIT EXTRACT 650 MG
1 CAPSULE ORAL
Status: ON HOLD | COMMUNITY

## 2024-10-17 SDOH — ECONOMIC STABILITY: FOOD INSECURITY: WITHIN THE PAST 12 MONTHS, THE FOOD YOU BOUGHT JUST DIDN'T LAST AND YOU DIDN'T HAVE MONEY TO GET MORE.: NEVER TRUE

## 2024-10-17 SDOH — ECONOMIC STABILITY: INCOME INSECURITY: HOW HARD IS IT FOR YOU TO PAY FOR THE VERY BASICS LIKE FOOD, HOUSING, MEDICAL CARE, AND HEATING?: NOT VERY HARD

## 2024-10-17 SDOH — ECONOMIC STABILITY: FOOD INSECURITY: WITHIN THE PAST 12 MONTHS, YOU WORRIED THAT YOUR FOOD WOULD RUN OUT BEFORE YOU GOT MONEY TO BUY MORE.: NEVER TRUE

## 2024-10-17 NOTE — PROGRESS NOTES
melons, celery, carrot, apple, pear, papaya, and almond.  Foods with less well-defined cross-reactivity to latex are peanuts, peppers, citrus fruits, coconut, pineapple, micah, fig, passion fruit, Ugli fruit, and grape.    This fruit/latex cross-reactivity is worsened by ethylene, a gas used to hasten commercial ripening. In nature, plants produce low levels of the hormone ethylene, which regulates germination, flowering, and ripening. Forced ripening by high ethylene concentrations, plants produce allergenic wound-repair proteins, which are similar to wound-repair proteins made during the tapping of rubber trees. Sensitive individuals who ingest the fruit get a higher dose and worse reaction. Some people may even first become sensitized to latex through fruit.  Can food processing increase the concentrations of allergenic proteins? Latex-sensitized children (and adults) in [place] often experience allergic reactions after eating bananas ripened artificially with ethylene. In the [place], food distribution centers treat unripe bananas and other produce with ethylene to ripen; not commonly done in [place] since fruit is tree-ripened there. Does treatment of food with ethylene induce banana proteins that cross-react with latex? (Ernestina et al.)    References:   Latex in Foods Allergy, http://ehp.niehs.nih.gov/members/2003/5811/5811.html    Search web for \" What’s in Season \" for where you live or are at the time you food shop   Management of Latex, ://medicalcenter.osu.edu/  search for nih, latex-like proteins in foods

## 2024-10-18 ENCOUNTER — APPOINTMENT (OUTPATIENT)
Dept: CT IMAGING | Age: 69
DRG: 388 | End: 2024-10-18
Payer: MEDICARE

## 2024-10-18 ENCOUNTER — APPOINTMENT (OUTPATIENT)
Dept: GENERAL RADIOLOGY | Age: 69
DRG: 388 | End: 2024-10-18
Payer: MEDICARE

## 2024-10-18 ENCOUNTER — HOSPITAL ENCOUNTER (INPATIENT)
Age: 69
LOS: 5 days | Discharge: HOME | DRG: 388 | End: 2024-10-23
Attending: STUDENT IN AN ORGANIZED HEALTH CARE EDUCATION/TRAINING PROGRAM | Admitting: STUDENT IN AN ORGANIZED HEALTH CARE EDUCATION/TRAINING PROGRAM
Payer: MEDICARE

## 2024-10-18 DIAGNOSIS — K56.609 SBO (SMALL BOWEL OBSTRUCTION) (HCC): Primary | ICD-10-CM

## 2024-10-18 PROBLEM — F33.9 EPISODE OF RECURRENT MAJOR DEPRESSIVE DISORDER (HCC): Status: ACTIVE | Noted: 2024-10-18

## 2024-10-18 LAB
ALBUMIN SERPL BCG-MCNC: 4.1 G/DL (ref 3.5–5.1)
ALP SERPL-CCNC: 90 U/L (ref 38–126)
ALT SERPL W/O P-5'-P-CCNC: 27 U/L (ref 11–66)
ANION GAP SERPL CALC-SCNC: 12 MEQ/L (ref 8–16)
AST SERPL-CCNC: 32 U/L (ref 5–40)
BASOPHILS ABSOLUTE: 0 THOU/MM3 (ref 0–0.1)
BASOPHILS NFR BLD AUTO: 0.4 %
BILIRUB SERPL-MCNC: 0.2 MG/DL (ref 0.3–1.2)
BUN SERPL-MCNC: 13 MG/DL (ref 7–22)
CALCIUM SERPL-MCNC: 9.4 MG/DL (ref 8.5–10.5)
CHLORIDE SERPL-SCNC: 101 MEQ/L (ref 98–111)
CO2 SERPL-SCNC: 26 MEQ/L (ref 23–33)
CREAT SERPL-MCNC: 0.6 MG/DL (ref 0.4–1.2)
DEPRECATED RDW RBC AUTO: 47.4 FL (ref 35–45)
EKG ATRIAL RATE: 79 BPM
EKG P AXIS: 15 DEGREES
EKG P-R INTERVAL: 156 MS
EKG Q-T INTERVAL: 392 MS
EKG QRS DURATION: 80 MS
EKG QTC CALCULATION (BAZETT): 449 MS
EKG R AXIS: -32 DEGREES
EKG T AXIS: 61 DEGREES
EKG VENTRICULAR RATE: 79 BPM
EOSINOPHIL NFR BLD AUTO: 1.9 %
EOSINOPHILS ABSOLUTE: 0.2 THOU/MM3 (ref 0–0.4)
ERYTHROCYTE [DISTWIDTH] IN BLOOD BY AUTOMATED COUNT: 13.4 % (ref 11.5–14.5)
GFR SERPL CREATININE-BSD FRML MDRD: > 90 ML/MIN/1.73M2
GLUCOSE SERPL-MCNC: 133 MG/DL (ref 70–108)
HCT VFR BLD AUTO: 48.9 % (ref 37–47)
HGB BLD-MCNC: 15.8 GM/DL (ref 12–16)
IMM GRANULOCYTES # BLD AUTO: 0.04 THOU/MM3 (ref 0–0.07)
IMM GRANULOCYTES NFR BLD AUTO: 0.3 %
LACTATE SERPL-SCNC: 1.2 MMOL/L (ref 0.5–2)
LACTATE SERPL-SCNC: 1.4 MMOL/L (ref 0.5–2)
LIPASE SERPL-CCNC: 24.8 U/L (ref 5.6–51.3)
LYMPHOCYTES ABSOLUTE: 1.4 THOU/MM3 (ref 1–4.8)
LYMPHOCYTES NFR BLD AUTO: 11.9 %
MAGNESIUM SERPL-MCNC: 2.1 MG/DL (ref 1.6–2.4)
MCH RBC QN AUTO: 30.7 PG (ref 26–33)
MCHC RBC AUTO-ENTMCNC: 32.3 GM/DL (ref 32.2–35.5)
MCV RBC AUTO: 95 FL (ref 81–99)
MONOCYTES ABSOLUTE: 0.6 THOU/MM3 (ref 0.4–1.3)
MONOCYTES NFR BLD AUTO: 5.5 %
NEUTROPHILS ABSOLUTE: 9.3 THOU/MM3 (ref 1.8–7.7)
NEUTROPHILS NFR BLD AUTO: 80 %
NRBC BLD AUTO-RTO: 0 /100 WBC
OSMOLALITY SERPL CALC.SUM OF ELEC: 279.6 MOSMOL/KG (ref 275–300)
PLATELET # BLD AUTO: 306 THOU/MM3 (ref 130–400)
PMV BLD AUTO: 9.6 FL (ref 9.4–12.4)
POTASSIUM SERPL-SCNC: 4.1 MEQ/L (ref 3.5–5.2)
PROT SERPL-MCNC: 7 G/DL (ref 6.1–8)
RBC # BLD AUTO: 5.15 MILL/MM3 (ref 4.2–5.4)
SODIUM SERPL-SCNC: 139 MEQ/L (ref 135–145)
TROPONIN, HIGH SENSITIVITY: 8 NG/L (ref 0–12)
WBC # BLD AUTO: 11.6 THOU/MM3 (ref 4.8–10.8)

## 2024-10-18 PROCEDURE — 93010 ELECTROCARDIOGRAM REPORT: CPT | Performed by: INTERNAL MEDICINE

## 2024-10-18 PROCEDURE — 2140000000 HC CCU INTERMEDIATE R&B

## 2024-10-18 PROCEDURE — 99223 1ST HOSP IP/OBS HIGH 75: CPT | Performed by: INTERNAL MEDICINE

## 2024-10-18 PROCEDURE — 83690 ASSAY OF LIPASE: CPT

## 2024-10-18 PROCEDURE — 74018 RADEX ABDOMEN 1 VIEW: CPT

## 2024-10-18 PROCEDURE — 83735 ASSAY OF MAGNESIUM: CPT

## 2024-10-18 PROCEDURE — 2580000003 HC RX 258: Performed by: INTERNAL MEDICINE

## 2024-10-18 PROCEDURE — 96375 TX/PRO/DX INJ NEW DRUG ADDON: CPT

## 2024-10-18 PROCEDURE — 99285 EMERGENCY DEPT VISIT HI MDM: CPT

## 2024-10-18 PROCEDURE — 83605 ASSAY OF LACTIC ACID: CPT

## 2024-10-18 PROCEDURE — 3E0G76Z INTRODUCTION OF NUTRITIONAL SUBSTANCE INTO UPPER GI, VIA NATURAL OR ARTIFICIAL OPENING: ICD-10-PCS | Performed by: SURGERY

## 2024-10-18 PROCEDURE — 6360000004 HC RX CONTRAST MEDICATION: Performed by: STUDENT IN AN ORGANIZED HEALTH CARE EDUCATION/TRAINING PROGRAM

## 2024-10-18 PROCEDURE — 6360000002 HC RX W HCPCS

## 2024-10-18 PROCEDURE — 96374 THER/PROPH/DIAG INJ IV PUSH: CPT

## 2024-10-18 PROCEDURE — 93005 ELECTROCARDIOGRAM TRACING: CPT

## 2024-10-18 PROCEDURE — 0D9670Z DRAINAGE OF STOMACH WITH DRAINAGE DEVICE, VIA NATURAL OR ARTIFICIAL OPENING: ICD-10-PCS | Performed by: SURGERY

## 2024-10-18 PROCEDURE — 36415 COLL VENOUS BLD VENIPUNCTURE: CPT

## 2024-10-18 PROCEDURE — 80053 COMPREHEN METABOLIC PANEL: CPT

## 2024-10-18 PROCEDURE — 6370000000 HC RX 637 (ALT 250 FOR IP)

## 2024-10-18 PROCEDURE — 2580000003 HC RX 258

## 2024-10-18 PROCEDURE — 84484 ASSAY OF TROPONIN QUANT: CPT

## 2024-10-18 PROCEDURE — 85025 COMPLETE CBC W/AUTO DIFF WBC: CPT

## 2024-10-18 PROCEDURE — 74177 CT ABD & PELVIS W/CONTRAST: CPT

## 2024-10-18 PROCEDURE — 96376 TX/PRO/DX INJ SAME DRUG ADON: CPT

## 2024-10-18 PROCEDURE — 6360000002 HC RX W HCPCS: Performed by: INTERNAL MEDICINE

## 2024-10-18 RX ORDER — SODIUM CHLORIDE 0.9 % (FLUSH) 0.9 %
5-40 SYRINGE (ML) INJECTION PRN
Status: DISCONTINUED | OUTPATIENT
Start: 2024-10-18 | End: 2024-10-23 | Stop reason: HOSPADM

## 2024-10-18 RX ORDER — ENOXAPARIN SODIUM 100 MG/ML
40 INJECTION SUBCUTANEOUS DAILY
Status: DISCONTINUED | OUTPATIENT
Start: 2024-10-18 | End: 2024-10-23 | Stop reason: HOSPADM

## 2024-10-18 RX ORDER — MORPHINE SULFATE 4 MG/ML
4 INJECTION, SOLUTION INTRAMUSCULAR; INTRAVENOUS ONCE
Status: COMPLETED | OUTPATIENT
Start: 2024-10-18 | End: 2024-10-18

## 2024-10-18 RX ORDER — BUMETANIDE 0.25 MG/ML
1 INJECTION, SOLUTION INTRAMUSCULAR; INTRAVENOUS 2 TIMES DAILY
Status: DISCONTINUED | OUTPATIENT
Start: 2024-10-18 | End: 2024-10-18

## 2024-10-18 RX ORDER — IOPAMIDOL 755 MG/ML
80 INJECTION, SOLUTION INTRAVASCULAR
Status: COMPLETED | OUTPATIENT
Start: 2024-10-18 | End: 2024-10-18

## 2024-10-18 RX ORDER — MORPHINE SULFATE 2 MG/ML
2 INJECTION, SOLUTION INTRAMUSCULAR; INTRAVENOUS EVERY 4 HOURS PRN
Status: DISCONTINUED | OUTPATIENT
Start: 2024-10-18 | End: 2024-10-18

## 2024-10-18 RX ORDER — POTASSIUM CHLORIDE 7.45 MG/ML
10 INJECTION INTRAVENOUS PRN
Status: ACTIVE | OUTPATIENT
Start: 2024-10-18 | End: 2024-10-21

## 2024-10-18 RX ORDER — MORPHINE SULFATE 2 MG/ML
1 INJECTION, SOLUTION INTRAMUSCULAR; INTRAVENOUS ONCE
Status: COMPLETED | OUTPATIENT
Start: 2024-10-18 | End: 2024-10-18

## 2024-10-18 RX ORDER — FAMOTIDINE 20 MG/1
20 TABLET, FILM COATED ORAL NIGHTLY
Status: DISCONTINUED | OUTPATIENT
Start: 2024-10-18 | End: 2024-10-21

## 2024-10-18 RX ORDER — ONDANSETRON 2 MG/ML
4 INJECTION INTRAMUSCULAR; INTRAVENOUS ONCE
Status: COMPLETED | OUTPATIENT
Start: 2024-10-18 | End: 2024-10-18

## 2024-10-18 RX ORDER — LEVOTHYROXINE SODIUM 125 UG/1
125 TABLET ORAL DAILY
Status: DISCONTINUED | OUTPATIENT
Start: 2024-10-18 | End: 2024-10-22

## 2024-10-18 RX ORDER — SODIUM CHLORIDE 0.9 % (FLUSH) 0.9 %
5-40 SYRINGE (ML) INJECTION EVERY 12 HOURS SCHEDULED
Status: DISCONTINUED | OUTPATIENT
Start: 2024-10-18 | End: 2024-10-23 | Stop reason: HOSPADM

## 2024-10-18 RX ORDER — ROSUVASTATIN CALCIUM 20 MG/1
20 TABLET, COATED ORAL EVERY EVENING
Status: DISCONTINUED | OUTPATIENT
Start: 2024-10-18 | End: 2024-10-23 | Stop reason: HOSPADM

## 2024-10-18 RX ORDER — PROCHLORPERAZINE EDISYLATE 5 MG/ML
10 INJECTION INTRAMUSCULAR; INTRAVENOUS EVERY 6 HOURS PRN
Status: DISCONTINUED | OUTPATIENT
Start: 2024-10-18 | End: 2024-10-23 | Stop reason: HOSPADM

## 2024-10-18 RX ORDER — MORPHINE SULFATE 2 MG/ML
2 INJECTION, SOLUTION INTRAMUSCULAR; INTRAVENOUS EVERY 4 HOURS PRN
Status: DISCONTINUED | OUTPATIENT
Start: 2024-10-18 | End: 2024-10-23 | Stop reason: HOSPADM

## 2024-10-18 RX ORDER — ONDANSETRON 2 MG/ML
4 INJECTION INTRAMUSCULAR; INTRAVENOUS EVERY 6 HOURS PRN
Status: DISCONTINUED | OUTPATIENT
Start: 2024-10-18 | End: 2024-10-23 | Stop reason: HOSPADM

## 2024-10-18 RX ORDER — SODIUM CHLORIDE, SODIUM LACTATE, POTASSIUM CHLORIDE, CALCIUM CHLORIDE 600; 310; 30; 20 MG/100ML; MG/100ML; MG/100ML; MG/100ML
INJECTION, SOLUTION INTRAVENOUS CONTINUOUS
Status: ACTIVE | OUTPATIENT
Start: 2024-10-18 | End: 2024-10-18

## 2024-10-18 RX ORDER — PANTOPRAZOLE SODIUM 40 MG/1
40 TABLET, DELAYED RELEASE ORAL
Status: DISCONTINUED | OUTPATIENT
Start: 2024-10-18 | End: 2024-10-23 | Stop reason: HOSPADM

## 2024-10-18 RX ORDER — DIPHENHYDRAMINE HYDROCHLORIDE 50 MG/ML
25 INJECTION INTRAMUSCULAR; INTRAVENOUS
Status: ACTIVE | OUTPATIENT
Start: 2024-10-18 | End: 2024-10-19

## 2024-10-18 RX ORDER — POTASSIUM CHLORIDE 1500 MG/1
40 TABLET, EXTENDED RELEASE ORAL PRN
Status: ACTIVE | OUTPATIENT
Start: 2024-10-18 | End: 2024-10-21

## 2024-10-18 RX ORDER — METOCLOPRAMIDE HYDROCHLORIDE 5 MG/ML
10 INJECTION INTRAMUSCULAR; INTRAVENOUS ONCE
Status: DISCONTINUED | OUTPATIENT
Start: 2024-10-18 | End: 2024-10-18

## 2024-10-18 RX ORDER — DULOXETIN HYDROCHLORIDE 60 MG/1
120 CAPSULE, DELAYED RELEASE ORAL DAILY
Status: DISCONTINUED | OUTPATIENT
Start: 2024-10-18 | End: 2024-10-22

## 2024-10-18 RX ORDER — MAGNESIUM SULFATE IN WATER 40 MG/ML
2000 INJECTION, SOLUTION INTRAVENOUS PRN
Status: DISCONTINUED | OUTPATIENT
Start: 2024-10-18 | End: 2024-10-23 | Stop reason: HOSPADM

## 2024-10-18 RX ORDER — SODIUM CHLORIDE 9 MG/ML
INJECTION, SOLUTION INTRAVENOUS PRN
Status: DISCONTINUED | OUTPATIENT
Start: 2024-10-18 | End: 2024-10-23 | Stop reason: HOSPADM

## 2024-10-18 RX ORDER — METOCLOPRAMIDE HYDROCHLORIDE 5 MG/ML
10 INJECTION INTRAMUSCULAR; INTRAVENOUS EVERY 6 HOURS
Status: CANCELLED | OUTPATIENT
Start: 2024-10-18

## 2024-10-18 RX ORDER — SODIUM CHLORIDE, SODIUM LACTATE, POTASSIUM CHLORIDE, CALCIUM CHLORIDE 600; 310; 30; 20 MG/100ML; MG/100ML; MG/100ML; MG/100ML
INJECTION, SOLUTION INTRAVENOUS CONTINUOUS
Status: DISCONTINUED | OUTPATIENT
Start: 2024-10-18 | End: 2024-10-19

## 2024-10-18 RX ADMIN — MORPHINE SULFATE 4 MG: 4 INJECTION, SOLUTION INTRAMUSCULAR; INTRAVENOUS at 01:23

## 2024-10-18 RX ADMIN — ONDANSETRON 4 MG: 2 INJECTION INTRAMUSCULAR; INTRAVENOUS at 12:54

## 2024-10-18 RX ADMIN — FAMOTIDINE 20 MG: 20 TABLET, FILM COATED ORAL at 19:47

## 2024-10-18 RX ADMIN — PROCHLORPERAZINE EDISYLATE 10 MG: 5 INJECTION INTRAMUSCULAR; INTRAVENOUS at 23:04

## 2024-10-18 RX ADMIN — IOPAMIDOL 80 ML: 755 INJECTION, SOLUTION INTRAVENOUS at 01:58

## 2024-10-18 RX ADMIN — MORPHINE SULFATE 1 MG: 2 INJECTION, SOLUTION INTRAMUSCULAR; INTRAVENOUS at 06:32

## 2024-10-18 RX ADMIN — ONDANSETRON 4 MG: 2 INJECTION INTRAMUSCULAR; INTRAVENOUS at 01:23

## 2024-10-18 RX ADMIN — MORPHINE SULFATE 2 MG: 2 INJECTION, SOLUTION INTRAMUSCULAR; INTRAVENOUS at 15:05

## 2024-10-18 RX ADMIN — MORPHINE SULFATE 4 MG: 4 INJECTION, SOLUTION INTRAMUSCULAR; INTRAVENOUS at 04:02

## 2024-10-18 RX ADMIN — SODIUM CHLORIDE, PRESERVATIVE FREE 10 ML: 5 INJECTION INTRAVENOUS at 19:46

## 2024-10-18 RX ADMIN — MORPHINE SULFATE 2 MG: 2 INJECTION, SOLUTION INTRAMUSCULAR; INTRAVENOUS at 10:58

## 2024-10-18 RX ADMIN — SODIUM CHLORIDE, POTASSIUM CHLORIDE, SODIUM LACTATE AND CALCIUM CHLORIDE: 600; 310; 30; 20 INJECTION, SOLUTION INTRAVENOUS at 15:41

## 2024-10-18 RX ADMIN — SODIUM CHLORIDE, POTASSIUM CHLORIDE, SODIUM LACTATE AND CALCIUM CHLORIDE: 600; 310; 30; 20 INJECTION, SOLUTION INTRAVENOUS at 06:02

## 2024-10-18 RX ADMIN — ONDANSETRON 4 MG: 2 INJECTION INTRAMUSCULAR; INTRAVENOUS at 06:32

## 2024-10-18 RX ADMIN — ONDANSETRON 4 MG: 2 INJECTION INTRAMUSCULAR; INTRAVENOUS at 19:39

## 2024-10-18 RX ADMIN — MORPHINE SULFATE 2 MG: 2 INJECTION, SOLUTION INTRAMUSCULAR; INTRAVENOUS at 19:39

## 2024-10-18 ASSESSMENT — PAIN - FUNCTIONAL ASSESSMENT
PAIN_FUNCTIONAL_ASSESSMENT: PREVENTS OR INTERFERES SOME ACTIVE ACTIVITIES AND ADLS
PAIN_FUNCTIONAL_ASSESSMENT: PREVENTS OR INTERFERES SOME ACTIVE ACTIVITIES AND ADLS
PAIN_FUNCTIONAL_ASSESSMENT: 0-10
PAIN_FUNCTIONAL_ASSESSMENT: PREVENTS OR INTERFERES WITH MANY ACTIVE NOT PASSIVE ACTIVITIES
PAIN_FUNCTIONAL_ASSESSMENT: 0-10
PAIN_FUNCTIONAL_ASSESSMENT: 0-10
PAIN_FUNCTIONAL_ASSESSMENT: PREVENTS OR INTERFERES SOME ACTIVE ACTIVITIES AND ADLS

## 2024-10-18 ASSESSMENT — PAIN DESCRIPTION - DESCRIPTORS
DESCRIPTORS: ACHING;STABBING
DESCRIPTORS: ACHING;STABBING
DESCRIPTORS: STABBING
DESCRIPTORS: ACHING;SHARP
DESCRIPTORS: ACHING;STABBING
DESCRIPTORS: SHARP;STABBING

## 2024-10-18 ASSESSMENT — PAIN DESCRIPTION - LOCATION
LOCATION: ABDOMEN

## 2024-10-18 ASSESSMENT — PAIN SCALES - GENERAL
PAINLEVEL_OUTOF10: 6
PAINLEVEL_OUTOF10: 7
PAINLEVEL_OUTOF10: 6
PAINLEVEL_OUTOF10: 8
PAINLEVEL_OUTOF10: 7
PAINLEVEL_OUTOF10: 8
PAINLEVEL_OUTOF10: 6
PAINLEVEL_OUTOF10: 3
PAINLEVEL_OUTOF10: 9
PAINLEVEL_OUTOF10: 0
PAINLEVEL_OUTOF10: 4

## 2024-10-18 ASSESSMENT — PAIN DESCRIPTION - ORIENTATION
ORIENTATION: MID
ORIENTATION: RIGHT;LEFT
ORIENTATION: RIGHT;LEFT
ORIENTATION: UPPER;LEFT;MID
ORIENTATION: MID
ORIENTATION: RIGHT;LEFT
ORIENTATION: MID

## 2024-10-18 ASSESSMENT — PAIN DESCRIPTION - FREQUENCY
FREQUENCY: CONTINUOUS

## 2024-10-18 ASSESSMENT — PAIN DESCRIPTION - PAIN TYPE
TYPE: ACUTE PAIN

## 2024-10-18 ASSESSMENT — PAIN DESCRIPTION - ONSET
ONSET: ON-GOING

## 2024-10-18 NOTE — ED NOTES
Patient resting on cot. Patient covered with blanket. VS Stable. Telemetry in place. Lights dimmed for comfort. Family at bedside.

## 2024-10-18 NOTE — PLAN OF CARE
Problem: Discharge Planning  Goal: Discharge to home or other facility with appropriate resources  Outcome: Progressing  Flowsheets (Taken 10/18/2024 0654)  Discharge to home or other facility with appropriate resources:   Identify barriers to discharge with patient and caregiver   Identify discharge learning needs (meds, wound care, etc)     Problem: Pain  Goal: Verbalizes/displays adequate comfort level or baseline comfort level  Outcome: Progressing  Flowsheets (Taken 10/18/2024 0654)  Verbalizes/displays adequate comfort level or baseline comfort level:   Encourage patient to monitor pain and request assistance   Administer analgesics based on type and severity of pain and evaluate response   Assess pain using appropriate pain scale   Implement non-pharmacological measures as appropriate and evaluate response  Note: Ongoing assessment & interventions provided throughout shift.  Reminded patient to report any pain, pressure, or shortness of breath to the nurse.  Pain medications provided per physician's orders.       Problem: Safety - Adult  Goal: Free from fall injury  Outcome: Progressing  Flowsheets (Taken 10/18/2024 0654)  Free From Fall Injury: Instruct family/caregiver on patient safety  Note: Bed locked & in low position, call light in reach, side-rails up x2, bed/chair alarm utilized, non-slip socks on when ambulating, reminded patient to use call light to call for assistance.       Problem: Gastrointestinal - Adult  Goal: Minimal or absence of nausea and vomiting  Outcome: Progressing  Flowsheets (Taken 10/18/2024 0654)  Minimal or absence of nausea and vomiting:   Administer IV fluids as ordered to ensure adequate hydration   Nasogastric tube to low intermittent suction as ordered   Maintain NPO status until nausea and vomiting are resolved   Administer ordered antiemetic medications as needed   Care plan reviewed with patient.  Patient verbalizes understanding of the care plan and contributed to goal

## 2024-10-18 NOTE — ED NOTES
ED to inpatient nurses report      Chief Complaint:  Chief Complaint   Patient presents with    Abdominal Pain    Emesis     Present to ED from: home    MOA:     LOC: alert and orientated to name, place, date  Mobility: Requires assistance * 1  Oxygen Baseline: room air    Current needs required: room air     Code Status:   Prior    What abnormal results were found and what did you give/do to treat them? SBO- Morphine, zofran  Any procedures or intervention occur? NA    Mental Status:  Level of Consciousness: Alert (0)    Psych Assessment:        Vitals:  Patient Vitals for the past 24 hrs:   BP Temp Temp src Pulse Resp SpO2 Height Weight   10/18/24 0346 133/84 -- -- 85 21 95 % -- --   10/18/24 0233 (!) 152/90 -- -- 81 15 95 % -- --   10/18/24 0129 (!) 114/102 -- -- 86 14 98 % -- --   10/18/24 0033 (!) 165/100 97.6 °F (36.4 °C) Oral 84 18 97 % 1.626 m (5' 4\") 77.1 kg (170 lb)        LDAs:   Peripheral IV 10/18/24 Right Antecubital (Active)   Site Assessment Clean, dry & intact 10/18/24 0129   Line Status Flushed;Normal saline locked 10/18/24 0129   Phlebitis Assessment No symptoms 10/18/24 0129   Infiltration Assessment 0 10/18/24 0129   Dressing Status Clean, dry & intact 10/18/24 0129       Ambulatory Status:  No data recorded    Diagnosis:  DISPOSITION Decision To Admit 10/18/2024 03:57:58 AM   Final diagnoses:   SBO (small bowel obstruction) ([facility])        Consults:  None     Pain Score:  Pain Assessment  Pain Assessment: 0-10  Pain Level: 4  Pain Orientation: Upper, Left, Mid    C-SSRS:   Risk of Suicide: No Risk    Sepsis Screening:       Marshall Fall Risk:       Swallow Screening        Preferred Language:   English      ALLERGIES     Dilaudid [hydromorphone hcl], Iron, Percodan [oxycodone-aspirin], Gluten, Gluten meal, Maxzide [hydrochlorothiazide w-triamterene], Nalfon [fenoprofen calcium], Oxycodone-acetaminophen, Oxycodone-aspirin, Percocet  [oxycodone-acetaminophen], Reglan [metoclopramide], Wheat,

## 2024-10-18 NOTE — ED PROVIDER NOTES
EKG results are individually reviewed and interpreted by me in the clinical context of this patient.  All EKGs are also interpreted by our Cardiology department, final interpretation may not be available as of the writing of this note.      MEDICAL DECISION MAKING   Initial Assessment Summary:   Patient 69-year-old female present emergency department today for emesis, abdominal pain, history of SBO.  Please see ED course section below for continuation and resolution of this initial assessment if applicable.      Comorbid conditions pertinent to this ED encounter:  Multiple abdominal surgeries, history of SBO    Differential Diagnosis includes but is not limited to:  SBO  Volvulus  Internal hernia  Pancreatitis  Gastritis  Hiatal hernia      Decision Rules/Clinical Scores utilized:        Plan:   Labs, EKG, CT abdomen pelvis IV contrast morphine, Zofran pain control      Code Status:  Not addressed during this ED visit    Social determinants of health impacting treatment or disposition:  Not Applicable.      PREVIOUS RECORDS  AND EXTERNAL INFORMATION REVIEWED   History obtained from: chart review and the patient.    Pertinent previous and/or external records reviewed: Noncontributory.    Case discussed with specialties other than Emergency Medicine: Hospitalist for admission.      ED COURSE   ED Medications administered this visit (None if left blank):   Medications   morphine (PF) injection 4 mg (4 mg IntraVENous Given 10/18/24 0123)   ondansetron (ZOFRAN) injection 4 mg (4 mg IntraVENous Given 10/18/24 0123)   iopamidol (ISOVUE-370) 76 % injection 80 mL (80 mLs IntraVENous Given 10/18/24 0158)   morphine (PF) injection 4 mg (4 mg IntraVENous Given 10/18/24 0402)         ED Course as of 10/18/24 0419   Fri Oct 18, 2024   0411 CT ABDOMEN PELVIS W IV CONTRAST Additional Contrast? None  IMPRESSION:  1. High-grade small bowel obstruction, recommend surgical consultation. A   long segment of the mid small bowel is

## 2024-10-18 NOTE — H&P
type II    Ovarian Cancer Paternal Grandmother 50    Brain Cancer Paternal Grandfather 96        brain tumor    Asthma Brother      Problems Brother     Heart Disease Brother     Other Sister         3 days old, pneumonia complications    Colon Cancer Paternal Aunt 70    Colon Cancer Paternal Uncle 70    Colon Cancer Paternal Uncle 70    Cancer Paternal Uncle 70        bone     Prostate Cancer Other 60    Cancer Paternal Aunt 70        bone ca'    Breast Cancer Paternal Aunt 65    Breast Cancer Paternal Cousin 30    Breast Cancer Paternal Aunt 65    Breast Cancer Maternal Aunt 65    Breast Cancer Maternal Aunt 65     Social History     Socioeconomic History    Marital status: Legally      Spouse name: Jimmy    Number of children: 3    Years of education: 12    Highest education level: None   Occupational History    Occupation: disability   Tobacco Use    Smoking status: Never    Smokeless tobacco: Never    Tobacco comments:     Never smoker   Vaping Use    Vaping status: Never Used   Substance and Sexual Activity    Alcohol use: Yes     Comment: socially    Drug use: No    Sexual activity: Not Currently     Partners: Male     Social Determinants of Health     Financial Resource Strain: Low Risk  (10/17/2024)    Overall Financial Resource Strain (CARDIA)     Difficulty of Paying Living Expenses: Not very hard   Food Insecurity: No Food Insecurity (10/17/2024)    Hunger Vital Sign     Worried About Running Out of Food in the Last Year: Never true     Ran Out of Food in the Last Year: Never true   Transportation Needs: Unknown (10/17/2024)    PRAPARE - Transportation     Lack of Transportation (Non-Medical): No   Physical Activity: Insufficiently Active (5/6/2024)    Exercise Vital Sign     Days of Exercise per Week: 2 days     Minutes of Exercise per Session: 20 min   Housing Stability: Unknown (10/17/2024)    Housing Stability Vital Sign     Homeless in the Last Year: No        Code status: Prior

## 2024-10-18 NOTE — PLAN OF CARE
Problem: Discharge Planning  Goal: Discharge to home or other facility with appropriate resources  10/18/2024 0922 by Alondra Mcclelland RN  Outcome: Progressing  Flowsheets (Taken 10/18/2024 0654 by Sapna Moreno, RN)  Discharge to home or other facility with appropriate resources:   Identify barriers to discharge with patient and caregiver   Identify discharge learning needs (meds, wound care, etc)  Note: Home with      Problem: Pain  Goal: Verbalizes/displays adequate comfort level or baseline comfort level  10/18/2024 0922 by Alondra Mcclelland, RN  Outcome: Progressing  Flowsheets (Taken 10/18/2024 0922)  Verbalizes/displays adequate comfort level or baseline comfort level: Encourage patient to monitor pain and request assistance     Problem: Safety - Adult  Goal: Free from fall injury  10/18/2024 0922 by Alondra Mcclelland, RN  Outcome: Progressing  Flowsheets (Taken 10/18/2024 0921)  Free From Fall Injury: Instruct family/caregiver on patient safety     Problem: Gastrointestinal - Adult  Goal: Minimal or absence of nausea and vomiting  10/18/2024 0922 by Alondra Mcclelland, RN  Outcome: Progressing  Flowsheets (Taken 10/18/2024 0922)  Minimal or absence of nausea and vomiting: Nasogastric tube to low intermittent suction as ordered     Problem: ABCDS Injury Assessment  Goal: Absence of physical injury  Outcome: Progressing  Flowsheets (Taken 10/18/2024 0922)  Absence of Physical Injury: Implement safety measures based on patient assessment    Care plan reviewed with patient.  Patient verbalizes understanding of the care plan and contributed to goal setting.

## 2024-10-18 NOTE — CARE COORDINATION
Case Management Assessment Initial Evaluation    Date/Time of Evaluation: 10/18/2024 8:31 AM  Assessment Completed by: Bárbara Lewis RN    If patient is discharged prior to next notation, then this note serves as note for discharge by case management.    Patient Name: Ankita Babcock                   YOB: 1955  Diagnosis: SBO (small bowel obstruction) ([facility]) [K56.609]                   Date / Time: 10/18/2024 12:29 AM  Location: [address] [place] [address]     Patient Admission Status: Inpatient   Readmission Risk Low 0-14, Mod 15-19), High > 20: Readmission Risk Score: 10    Current PCP: Rich Collazo MD  Health Care Decision Makers:   Primary Decision Maker: Nieves Proctor - Child - [phone]    Secondary Decision Maker: hailey wood - Other - [phone]    Additional Case Management Notes: Admitted through ED with abdominal pain. Found to have SBO. NG tube placed. NPO. Surgery consulted. IVF.     Procedures: None    Imaging:   10/18 CT abd/pelvis:   1. High-grade small bowel obstruction, recommend surgical consultation. A   long segment of the mid small bowel is distended with loops measuring up   to 3.8 cm. Some of the obstructed small bowel demonstrates fecalization   most consistent with slow or absent transport. A probable transition point   within the left lower quadrant. No pneumoperitoneum or pneumatosis.  2. Numerous chronic findings.  10/18 KUB: ordered.     Patient Goals/Plan/Treatment Preferences: Spoke with pt. She lives at home with her S.O. Gualberto. She plans to return there at discharge. Monitor for needs, denies any currently.        10/18/24 1031   Service Assessment   Patient Orientation Alert and Oriented   Cognition Alert   History Provided By Patient   Primary Caregiver Self   Accompanied By/Relationship n/a   Support Systems Spouse/Significant Other;Children;Family Members;Worship/[place] [employer]   Patient's Healthcare Decision Maker is: Patient Declined (Legal Next of Kin Remains as

## 2024-10-18 NOTE — ED NOTES
Patient resting on cot. VS stable. Telemetry in place. Call light in reach. Family at bedside. Patient denies needs at this time.

## 2024-10-18 NOTE — ED TRIAGE NOTES
Patient presents to ED from home with C/O abdominal pain and emesis. Patient states she has frequent bowel obstructions but hasn't in the past 5 years. Patient states tonight she had severe abdominal pain and then tonight when she was laying in bed she began vomiting as well. Patient states she has had about 5 episodes of emesis PTA. IV access obtained. VS stable. Telemetry in place. EKG obtained.

## 2024-10-18 NOTE — CONSULTS
[place] [id number]                              CONSULTATION      PATIENT NAME: ANNA MARIE COX           : 1955  MED REC NO: [id number]                       ROOM: [facility]  ACCOUNT NO: [id number]                       ADMIT DATE: 10/18/2024  PROVIDER: Santos Scherer MD      CONSULT DATE: 10/18/2024    CHIEF COMPLAINT:  Small-bowel obstruction.    HISTORY OF PRESENT ILLNESS:  Patient is a 69-year-old female, well known to me, who I have not seen in 6 years since she had had a robotic-assisted removal of a perirenal mass and had an enterotomy at that time.  I had to do an abdominal exploration and lysis of rather complex adhesions as the patient has had rather significant adhesive disease history.  She has had multiple recurrent small-bowel obstructions, and she also suffers from celiac disease.  Nonetheless, she had been doing well apparently over the last 6 years, but then had onset yesterday of increasing abdominal pain and cramping associated with nausea, vomiting.  She presented to the ER.  CT scan was read as high-grade small bowel obstruction with dilated small bowel loops in a section of bowel that was felt to represent fecalization.  No pneumoperitoneum or pneumatosis was seen, however.  The patient was admitted to the medicine service, and Surgery was consulted for the small-bowel obstruction.    PAST MEDICAL HISTORY:  Positive for anxiety, celiac disease, depression, history of complex regional pain syndrome, gastroesophageal reflux disease, hyperlipidemia, hypothyroidism, irritable bowel syndrome, mitral valve prolapse, restless legs syndrome, history of squamous cell cancer of the skin.    PAST SURGICAL HISTORY:  Includes cholecystectomy remotely.  She has had an appendectomy during a hysterectomy.  She has had abdominal exploration, removal of most of the omentum and lysis of adhesions in .  She has had

## 2024-10-19 LAB
ANION GAP SERPL CALC-SCNC: 13 MEQ/L (ref 8–16)
BASOPHILS ABSOLUTE: 0 THOU/MM3 (ref 0–0.1)
BASOPHILS NFR BLD AUTO: 0.3 %
BUN SERPL-MCNC: 15 MG/DL (ref 7–22)
CALCIUM SERPL-MCNC: 9.2 MG/DL (ref 8.5–10.5)
CHLORIDE SERPL-SCNC: 96 MEQ/L (ref 98–111)
CO2 SERPL-SCNC: 23 MEQ/L (ref 23–33)
CREAT SERPL-MCNC: 0.3 MG/DL (ref 0.4–1.2)
DEPRECATED RDW RBC AUTO: 49.3 FL (ref 35–45)
EOSINOPHIL NFR BLD AUTO: 0.2 %
EOSINOPHILS ABSOLUTE: 0 THOU/MM3 (ref 0–0.4)
ERYTHROCYTE [DISTWIDTH] IN BLOOD BY AUTOMATED COUNT: 13.8 % (ref 11.5–14.5)
GFR SERPL CREATININE-BSD FRML MDRD: > 90 ML/MIN/1.73M2
GLUCOSE SERPL-MCNC: 153 MG/DL (ref 70–108)
HCT VFR BLD AUTO: 49.7 % (ref 37–47)
HGB BLD-MCNC: 15.7 GM/DL (ref 12–16)
IMM GRANULOCYTES # BLD AUTO: 0.02 THOU/MM3 (ref 0–0.07)
IMM GRANULOCYTES NFR BLD AUTO: 0.2 %
LACTATE SERPL-SCNC: 1.8 MMOL/L (ref 0.5–2)
LYMPHOCYTES ABSOLUTE: 0.5 THOU/MM3 (ref 1–4.8)
LYMPHOCYTES NFR BLD AUTO: 5.8 %
MCH RBC QN AUTO: 30.5 PG (ref 26–33)
MCHC RBC AUTO-ENTMCNC: 31.6 GM/DL (ref 32.2–35.5)
MCV RBC AUTO: 96.7 FL (ref 81–99)
MONOCYTES ABSOLUTE: 1.2 THOU/MM3 (ref 0.4–1.3)
MONOCYTES NFR BLD AUTO: 12.6 %
NEUTROPHILS ABSOLUTE: 7.5 THOU/MM3 (ref 1.8–7.7)
NEUTROPHILS NFR BLD AUTO: 80.9 %
NRBC BLD AUTO-RTO: 0 /100 WBC
PHOSPHATE SERPL-MCNC: 3.3 MG/DL (ref 2.4–4.7)
PLATELET # BLD AUTO: 241 THOU/MM3 (ref 130–400)
PLATELET BLD QL SMEAR: ADEQUATE
PMV BLD AUTO: 10.2 FL (ref 9.4–12.4)
POTASSIUM SERPL-SCNC: 4.4 MEQ/L (ref 3.5–5.2)
RBC # BLD AUTO: 5.14 MILL/MM3 (ref 4.2–5.4)
SCAN OF BLOOD SMEAR: NORMAL
SODIUM SERPL-SCNC: 132 MEQ/L (ref 135–145)
SODIUM SERPL-SCNC: 132 MEQ/L (ref 135–145)
WBC # BLD AUTO: 9.3 THOU/MM3 (ref 4.8–10.8)

## 2024-10-19 PROCEDURE — 76937 US GUIDE VASCULAR ACCESS: CPT

## 2024-10-19 PROCEDURE — 05HD33Z INSERTION OF INFUSION DEVICE INTO RIGHT CEPHALIC VEIN, PERCUTANEOUS APPROACH: ICD-10-PCS | Performed by: STUDENT IN AN ORGANIZED HEALTH CARE EDUCATION/TRAINING PROGRAM

## 2024-10-19 PROCEDURE — C1751 CATH, INF, PER/CENT/MIDLINE: HCPCS

## 2024-10-19 PROCEDURE — 36410 VNPNXR 3YR/> PHY/QHP DX/THER: CPT

## 2024-10-19 PROCEDURE — 85025 COMPLETE CBC W/AUTO DIFF WBC: CPT

## 2024-10-19 PROCEDURE — 84100 ASSAY OF PHOSPHORUS: CPT

## 2024-10-19 PROCEDURE — 6360000002 HC RX W HCPCS

## 2024-10-19 PROCEDURE — 6370000000 HC RX 637 (ALT 250 FOR IP)

## 2024-10-19 PROCEDURE — 6360000002 HC RX W HCPCS: Performed by: INTERNAL MEDICINE

## 2024-10-19 PROCEDURE — 2580000003 HC RX 258: Performed by: INTERNAL MEDICINE

## 2024-10-19 PROCEDURE — 2580000003 HC RX 258

## 2024-10-19 PROCEDURE — 36415 COLL VENOUS BLD VENIPUNCTURE: CPT

## 2024-10-19 PROCEDURE — 80048 BASIC METABOLIC PNL TOTAL CA: CPT

## 2024-10-19 PROCEDURE — 2140000000 HC CCU INTERMEDIATE R&B

## 2024-10-19 PROCEDURE — 83605 ASSAY OF LACTIC ACID: CPT

## 2024-10-19 PROCEDURE — 99232 SBSQ HOSP IP/OBS MODERATE 35: CPT | Performed by: INTERNAL MEDICINE

## 2024-10-19 PROCEDURE — 84295 ASSAY OF SERUM SODIUM: CPT

## 2024-10-19 PROCEDURE — 87040 BLOOD CULTURE FOR BACTERIA: CPT

## 2024-10-19 PROCEDURE — 6370000000 HC RX 637 (ALT 250 FOR IP): Performed by: INTERNAL MEDICINE

## 2024-10-19 RX ORDER — ACETAMINOPHEN 650 MG/1
650 SUPPOSITORY RECTAL EVERY 6 HOURS PRN
Status: DISCONTINUED | OUTPATIENT
Start: 2024-10-19 | End: 2024-10-23 | Stop reason: HOSPADM

## 2024-10-19 RX ORDER — SODIUM CHLORIDE, SODIUM LACTATE, POTASSIUM CHLORIDE, CALCIUM CHLORIDE 600; 310; 30; 20 MG/100ML; MG/100ML; MG/100ML; MG/100ML
INJECTION, SOLUTION INTRAVENOUS CONTINUOUS
Status: ACTIVE | OUTPATIENT
Start: 2024-10-19 | End: 2024-10-20

## 2024-10-19 RX ADMIN — PANTOPRAZOLE SODIUM 40 MG: 40 TABLET, DELAYED RELEASE ORAL at 06:12

## 2024-10-19 RX ADMIN — PIPERACILLIN AND TAZOBACTAM 3375 MG: 3; .375 INJECTION, POWDER, FOR SOLUTION INTRAVENOUS at 20:00

## 2024-10-19 RX ADMIN — LEVOTHYROXINE SODIUM 125 MCG: 0.12 TABLET ORAL at 06:12

## 2024-10-19 RX ADMIN — PIPERACILLIN AND TAZOBACTAM 4500 MG: 4; .5 INJECTION, POWDER, FOR SOLUTION INTRAVENOUS at 13:21

## 2024-10-19 RX ADMIN — SODIUM CHLORIDE, POTASSIUM CHLORIDE, SODIUM LACTATE AND CALCIUM CHLORIDE: 600; 310; 30; 20 INJECTION, SOLUTION INTRAVENOUS at 13:51

## 2024-10-19 RX ADMIN — SODIUM CHLORIDE, PRESERVATIVE FREE 10 ML: 5 INJECTION INTRAVENOUS at 19:57

## 2024-10-19 RX ADMIN — ACETAMINOPHEN 650 MG: 650 SUPPOSITORY RECTAL at 16:38

## 2024-10-19 RX ADMIN — ONDANSETRON 4 MG: 2 INJECTION INTRAMUSCULAR; INTRAVENOUS at 16:38

## 2024-10-19 RX ADMIN — ACETAMINOPHEN 650 MG: 650 SUPPOSITORY RECTAL at 11:23

## 2024-10-19 ASSESSMENT — PAIN DESCRIPTION - DESCRIPTORS
DESCRIPTORS: ACHING
DESCRIPTORS: ACHING;STABBING;SHARP

## 2024-10-19 ASSESSMENT — PAIN SCALES - WONG BAKER
WONGBAKER_NUMERICALRESPONSE: NO HURT
WONGBAKER_NUMERICALRESPONSE: NO HURT

## 2024-10-19 ASSESSMENT — PAIN SCALES - GENERAL
PAINLEVEL_OUTOF10: 4
PAINLEVEL_OUTOF10: 5

## 2024-10-19 ASSESSMENT — PAIN DESCRIPTION - ORIENTATION
ORIENTATION: RIGHT;LEFT
ORIENTATION: LEFT

## 2024-10-19 ASSESSMENT — PAIN DESCRIPTION - ONSET: ONSET: ON-GOING

## 2024-10-19 ASSESSMENT — PAIN DESCRIPTION - FREQUENCY: FREQUENCY: CONTINUOUS

## 2024-10-19 ASSESSMENT — PAIN DESCRIPTION - LOCATION
LOCATION: ABDOMEN
LOCATION: ABDOMEN

## 2024-10-19 ASSESSMENT — PAIN - FUNCTIONAL ASSESSMENT: PAIN_FUNCTIONAL_ASSESSMENT: PREVENTS OR INTERFERES SOME ACTIVE ACTIVITIES AND ADLS

## 2024-10-19 ASSESSMENT — PAIN DESCRIPTION - PAIN TYPE: TYPE: ACUTE PAIN

## 2024-10-19 NOTE — PLAN OF CARE
Problem: Discharge Planning  Goal: Discharge to home or other facility with appropriate resources  10/19/2024 1039 by Alondra Mcclelland RN  Outcome: Progressing  Flowsheets (Taken 10/18/2024 2225 by Sapna Moreno, RN)  Discharge to home or other facility with appropriate resources:   Identify barriers to discharge with patient and caregiver   Identify discharge learning needs (meds, wound care, etc)     Problem: Pain  Goal: Verbalizes/displays adequate comfort level or baseline comfort level  10/19/2024 1039 by Alondra Mcclelland RN  Outcome: Progressing  Flowsheets (Taken 10/19/2024 1039)  Verbalizes/displays adequate comfort level or baseline comfort level: Encourage patient to monitor pain and request assistance     Problem: Safety - Adult  Goal: Free from fall injury  10/19/2024 1039 by Alondra Mcclelland RN  Outcome: Progressing  Flowsheets (Taken 10/19/2024 1039)  Free From Fall Injury: Instruct family/caregiver on patient safety  Note: Bed locked & in low position, call light in reach, side-rails up x2, bed/chair alarm utilized, non-slip socks on when ambulating, reminded patient to use call light to call for assistance.      Problem: Gastrointestinal - Adult  Goal: Minimal or absence of nausea and vomiting  10/19/2024 1039 by Alondra Mcclelland RN  Outcome: Progressing  Flowsheets (Taken 10/19/2024 1039)  Minimal or absence of nausea and vomiting: Administer IV fluids as ordered to ensure adequate hydration     Problem: ABCDS Injury Assessment  Goal: Absence of physical injury  10/18/2024 2225 by Sapna Moreno, RN  Outcome: Progressing  Flowsheets (Taken 10/18/2024 2225)  Absence of Physical Injury: Implement safety measures based on patient assessment    Care plan reviewed with patient.  Patient verbalizes understanding of the care plan and contributed to goal setting.

## 2024-10-19 NOTE — PLAN OF CARE
Problem: Discharge Planning  Goal: Discharge to home or other facility with appropriate resources  10/18/2024 2225 by Sapna Moreno RN  Outcome: Progressing  Flowsheets (Taken 10/18/2024 2225)  Discharge to home or other facility with appropriate resources:   Identify barriers to discharge with patient and caregiver   Identify discharge learning needs (meds, wound care, etc)     Problem: Pain  Goal: Verbalizes/displays adequate comfort level or baseline comfort level  10/18/2024 2225 by Sapna Moreno RN  Outcome: Progressing  Flowsheets (Taken 10/18/2024 2225)  Verbalizes/displays adequate comfort level or baseline comfort level:   Encourage patient to monitor pain and request assistance   Administer analgesics based on type and severity of pain and evaluate response   Assess pain using appropriate pain scale   Implement non-pharmacological measures as appropriate and evaluate response  Note: Ongoing assessment & interventions provided throughout shift.  Reminded patient to report any pain, pressure, or shortness of breath to the nurse.  Pain medications provided per physician's orders.       Problem: Safety - Adult  Goal: Free from fall injury  10/18/2024 2225 by Sapna Moreno RN  Outcome: Progressing  Flowsheets (Taken 10/18/2024 2225)  Free From Fall Injury: Instruct family/caregiver on patient safety  Note: Bed locked & in low position, call light in reach, side-rails up x2, bed/chair alarm utilized, non-slip socks on when ambulating, reminded patient to use call light to call for assistance.       Problem: Gastrointestinal - Adult  Goal: Minimal or absence of nausea and vomiting  10/18/2024 2225 by Sapna Moreno RN  Outcome: Progressing  Flowsheets (Taken 10/18/2024 2225)  Minimal or absence of nausea and vomiting:   Administer IV fluids as ordered to ensure adequate hydration   Nasogastric tube to low intermittent suction as ordered   Maintain NPO status until nausea and vomiting are resolved

## 2024-10-19 NOTE — MANAGEMENT PLAN
Called in to Shift from On-call, report received from [place] RN on the floor, Sapna. Concur with assessments and current documentation.

## 2024-10-20 ENCOUNTER — APPOINTMENT (OUTPATIENT)
Dept: GENERAL RADIOLOGY | Age: 69
DRG: 388 | End: 2024-10-20
Payer: MEDICARE

## 2024-10-20 LAB
ANION GAP SERPL CALC-SCNC: 8 MEQ/L (ref 8–16)
BASOPHILS ABSOLUTE: 0 THOU/MM3 (ref 0–0.1)
BASOPHILS NFR BLD AUTO: 0.5 %
BUN SERPL-MCNC: 14 MG/DL (ref 7–22)
CALCIUM SERPL-MCNC: 7.9 MG/DL (ref 8.5–10.5)
CHLORIDE SERPL-SCNC: 97 MEQ/L (ref 98–111)
CO2 SERPL-SCNC: 29 MEQ/L (ref 23–33)
CREAT SERPL-MCNC: 0.5 MG/DL (ref 0.4–1.2)
DEPRECATED RDW RBC AUTO: 48.3 FL (ref 35–45)
EOSINOPHIL NFR BLD AUTO: 2.2 %
EOSINOPHILS ABSOLUTE: 0.2 THOU/MM3 (ref 0–0.4)
ERYTHROCYTE [DISTWIDTH] IN BLOOD BY AUTOMATED COUNT: 13.6 % (ref 11.5–14.5)
GFR SERPL CREATININE-BSD FRML MDRD: > 90 ML/MIN/1.73M2
GLUCOSE SERPL-MCNC: 108 MG/DL (ref 70–108)
HCT VFR BLD AUTO: 41.5 % (ref 37–47)
HGB BLD-MCNC: 13.3 GM/DL (ref 12–16)
IMM GRANULOCYTES # BLD AUTO: 0.04 THOU/MM3 (ref 0–0.07)
IMM GRANULOCYTES NFR BLD AUTO: 0.5 %
LYMPHOCYTES ABSOLUTE: 1.2 THOU/MM3 (ref 1–4.8)
LYMPHOCYTES NFR BLD AUTO: 15.7 %
MCH RBC QN AUTO: 30.6 PG (ref 26–33)
MCHC RBC AUTO-ENTMCNC: 32 GM/DL (ref 32.2–35.5)
MCV RBC AUTO: 95.6 FL (ref 81–99)
MONOCYTES ABSOLUTE: 0.8 THOU/MM3 (ref 0.4–1.3)
MONOCYTES NFR BLD AUTO: 9.8 %
NEUTROPHILS ABSOLUTE: 5.6 THOU/MM3 (ref 1.8–7.7)
NEUTROPHILS NFR BLD AUTO: 71.3 %
NRBC BLD AUTO-RTO: 0 /100 WBC
PHOSPHATE SERPL-MCNC: 1.8 MG/DL (ref 2.4–4.7)
PHOSPHATE SERPL-MCNC: 2.1 MG/DL (ref 2.4–4.7)
PHOSPHATE SERPL-MCNC: 2.7 MG/DL (ref 2.4–4.7)
PLATELET # BLD AUTO: 226 THOU/MM3 (ref 130–400)
PMV BLD AUTO: 9.5 FL (ref 9.4–12.4)
POTASSIUM SERPL-SCNC: 4 MEQ/L (ref 3.5–5.2)
RBC # BLD AUTO: 4.34 MILL/MM3 (ref 4.2–5.4)
SCAN OF BLOOD SMEAR: NORMAL
SODIUM SERPL-SCNC: 134 MEQ/L (ref 135–145)
VARIANT LYMPHS BLD QL SMEAR: ABNORMAL %
WBC # BLD AUTO: 7.8 THOU/MM3 (ref 4.8–10.8)

## 2024-10-20 PROCEDURE — 2580000003 HC RX 258: Performed by: PHYSICIAN ASSISTANT

## 2024-10-20 PROCEDURE — 99232 SBSQ HOSP IP/OBS MODERATE 35: CPT | Performed by: HOSPITALIST

## 2024-10-20 PROCEDURE — 71046 X-RAY EXAM CHEST 2 VIEWS: CPT

## 2024-10-20 PROCEDURE — 84100 ASSAY OF PHOSPHORUS: CPT

## 2024-10-20 PROCEDURE — 85025 COMPLETE CBC W/AUTO DIFF WBC: CPT

## 2024-10-20 PROCEDURE — 36415 COLL VENOUS BLD VENIPUNCTURE: CPT

## 2024-10-20 PROCEDURE — 2580000003 HC RX 258

## 2024-10-20 PROCEDURE — 2580000003 HC RX 258: Performed by: INTERNAL MEDICINE

## 2024-10-20 PROCEDURE — 6370000000 HC RX 637 (ALT 250 FOR IP): Performed by: INTERNAL MEDICINE

## 2024-10-20 PROCEDURE — 6360000002 HC RX W HCPCS: Performed by: INTERNAL MEDICINE

## 2024-10-20 PROCEDURE — 74018 RADEX ABDOMEN 1 VIEW: CPT

## 2024-10-20 PROCEDURE — 2500000003 HC RX 250 WO HCPCS: Performed by: PHYSICIAN ASSISTANT

## 2024-10-20 PROCEDURE — 2140000000 HC CCU INTERMEDIATE R&B

## 2024-10-20 PROCEDURE — 80048 BASIC METABOLIC PNL TOTAL CA: CPT

## 2024-10-20 RX ADMIN — SODIUM CHLORIDE, POTASSIUM CHLORIDE, SODIUM LACTATE AND CALCIUM CHLORIDE: 600; 310; 30; 20 INJECTION, SOLUTION INTRAVENOUS at 11:17

## 2024-10-20 RX ADMIN — SODIUM CHLORIDE, POTASSIUM CHLORIDE, SODIUM LACTATE AND CALCIUM CHLORIDE: 600; 310; 30; 20 INJECTION, SOLUTION INTRAVENOUS at 00:26

## 2024-10-20 RX ADMIN — PIPERACILLIN AND TAZOBACTAM 3375 MG: 3; .375 INJECTION, POWDER, FOR SOLUTION INTRAVENOUS at 19:55

## 2024-10-20 RX ADMIN — SODIUM CHLORIDE, PRESERVATIVE FREE 10 ML: 5 INJECTION INTRAVENOUS at 19:56

## 2024-10-20 RX ADMIN — SODIUM PHOSPHATE, MONOBASIC, MONOHYDRATE AND SODIUM PHOSPHATE, DIBASIC, ANHYDROUS 15 MMOL: 142; 276 INJECTION, SOLUTION INTRAVENOUS at 18:22

## 2024-10-20 RX ADMIN — SODIUM PHOSPHATE, MONOBASIC, MONOHYDRATE AND SODIUM PHOSPHATE, DIBASIC, ANHYDROUS 15 MMOL: 142; 276 INJECTION, SOLUTION INTRAVENOUS at 10:01

## 2024-10-20 RX ADMIN — PIPERACILLIN AND TAZOBACTAM 3375 MG: 3; .375 INJECTION, POWDER, FOR SOLUTION INTRAVENOUS at 03:39

## 2024-10-20 RX ADMIN — ACETAMINOPHEN 650 MG: 650 SUPPOSITORY RECTAL at 00:44

## 2024-10-20 RX ADMIN — PIPERACILLIN AND TAZOBACTAM 3375 MG: 3; .375 INJECTION, POWDER, FOR SOLUTION INTRAVENOUS at 11:58

## 2024-10-20 NOTE — PLAN OF CARE
Problem: Discharge Planning  Goal: Discharge to home or other facility with appropriate resources  10/19/2024 2231 by Sapna Moreno RN  Outcome: Progressing  Flowsheets (Taken 10/19/2024 2231)  Discharge to home or other facility with appropriate resources:   Identify barriers to discharge with patient and caregiver   Identify discharge learning needs (meds, wound care, etc)     Problem: Pain  Goal: Verbalizes/displays adequate comfort level or baseline comfort level  10/19/2024 2231 by Sapna Moreno RN  Outcome: Progressing  Flowsheets (Taken 10/19/2024 2231)  Verbalizes/displays adequate comfort level or baseline comfort level:   Encourage patient to monitor pain and request assistance   Administer analgesics based on type and severity of pain and evaluate response   Assess pain using appropriate pain scale   Implement non-pharmacological measures as appropriate and evaluate response  Note: Ongoing assessment & interventions provided throughout shift.  Reminded patient to report any pain, pressure, or shortness of breath to the nurse.  Pain medications provided per physician's orders.       Problem: Safety - Adult  Goal: Free from fall injury  10/19/2024 2231 by Sapna Moreno RN  Outcome: Progressing  Flowsheets (Taken 10/19/2024 2231)  Free From Fall Injury: Instruct family/caregiver on patient safety  Note: Bed locked & in low position, call light in reach, side-rails up x2, bed/chair alarm utilized, non-slip socks on when ambulating, reminded patient to use call light to call for assistance.       Problem: Gastrointestinal - Adult  Goal: Minimal or absence of nausea and vomiting  10/19/2024 2231 by Sapna Moreno RN  Outcome: Progressing  Flowsheets (Taken 10/19/2024 2231)  Minimal or absence of nausea and vomiting:   Administer IV fluids as ordered to ensure adequate hydration   Nasogastric tube to low intermittent suction as ordered   Maintain NPO status until nausea and vomiting are resolved

## 2024-10-20 NOTE — PLAN OF CARE
Problem: Discharge Planning  Goal: Discharge to home or other facility with appropriate resources  10/20/2024 0923 by Alondra Mcclelland RN  Outcome: Progressing  Flowsheets (Taken 10/19/2024 2231 by Sapna Moreno, RN)  Discharge to home or other facility with appropriate resources:   Identify barriers to discharge with patient and caregiver   Identify discharge learning needs (meds, wound care, etc)  Note: From home     Problem: Pain  Goal: Verbalizes/displays adequate comfort level or baseline comfort level  10/20/2024 0923 by Alondra Mcclelland RN  Outcome: Progressing  Flowsheets (Taken 10/20/2024 0923)  Verbalizes/displays adequate comfort level or baseline comfort level: Encourage patient to monitor pain and request assistance     Problem: Safety - Adult  Goal: Free from fall injury  10/20/2024 0923 by Alondra Mcclelland RN  Outcome: Progressing  Flowsheets (Taken 10/19/2024 2231 by Sapna Moreno RN)  Free From Fall Injury: Instruct family/caregiver on patient safety  Note: Bed locked & in low position, call light in reach, side-rails up x2, bed/chair alarm utilized, non-slip socks on when ambulating, reminded patient to use call light to call for assistance.      Problem: Gastrointestinal - Adult  Goal: Minimal or absence of nausea and vomiting  10/20/2024 0923 by Alondra Mcclelland RN  Outcome: Progressing  Flowsheets (Taken 10/20/2024 0923)  Minimal or absence of nausea and vomiting: Administer IV fluids as ordered to ensure adequate hydration  Note: NG clamped- possible removal today      Problem: ABCDS Injury Assessment  Goal: Absence of physical injury  10/20/2024 0923 by Alondra Mcclelland RN  Outcome: Progressing  Flowsheets (Taken 10/19/2024 2231 by Sapna Moreno RN)  Absence of Physical Injury: Implement safety measures based on patient assessment    Care plan reviewed with patient.  Patient verbalizes understanding of the care plan and contributed to goal setting.

## 2024-10-21 LAB
ANION GAP SERPL CALC-SCNC: 11 MEQ/L (ref 8–16)
BASOPHILS ABSOLUTE: 0 THOU/MM3 (ref 0–0.1)
BASOPHILS NFR BLD AUTO: 0.4 %
BUN SERPL-MCNC: 8 MG/DL (ref 7–22)
CALCIUM SERPL-MCNC: 8 MG/DL (ref 8.5–10.5)
CHLORIDE SERPL-SCNC: 103 MEQ/L (ref 98–111)
CO2 SERPL-SCNC: 27 MEQ/L (ref 23–33)
CREAT SERPL-MCNC: 0.4 MG/DL (ref 0.4–1.2)
DEPRECATED RDW RBC AUTO: 45.8 FL (ref 35–45)
EOSINOPHIL NFR BLD AUTO: 2.9 %
EOSINOPHILS ABSOLUTE: 0.2 THOU/MM3 (ref 0–0.4)
ERYTHROCYTE [DISTWIDTH] IN BLOOD BY AUTOMATED COUNT: 13.2 % (ref 11.5–14.5)
GFR SERPL CREATININE-BSD FRML MDRD: > 90 ML/MIN/1.73M2
GLUCOSE SERPL-MCNC: 96 MG/DL (ref 70–108)
HCT VFR BLD AUTO: 39.4 % (ref 37–47)
HGB BLD-MCNC: 13 GM/DL (ref 12–16)
IMM GRANULOCYTES # BLD AUTO: 0.04 THOU/MM3 (ref 0–0.07)
IMM GRANULOCYTES NFR BLD AUTO: 0.5 %
LYMPHOCYTES ABSOLUTE: 1 THOU/MM3 (ref 1–4.8)
LYMPHOCYTES NFR BLD AUTO: 13.4 %
MCH RBC QN AUTO: 31.3 PG (ref 26–33)
MCHC RBC AUTO-ENTMCNC: 33 GM/DL (ref 32.2–35.5)
MCV RBC AUTO: 94.7 FL (ref 81–99)
MONOCYTES ABSOLUTE: 0.7 THOU/MM3 (ref 0.4–1.3)
MONOCYTES NFR BLD AUTO: 9.4 %
NEUTROPHILS ABSOLUTE: 5.6 THOU/MM3 (ref 1.8–7.7)
NEUTROPHILS NFR BLD AUTO: 73.4 %
NRBC BLD AUTO-RTO: 0 /100 WBC
PHOSPHATE SERPL-MCNC: 3.5 MG/DL (ref 2.4–4.7)
PLATELET # BLD AUTO: 229 THOU/MM3 (ref 130–400)
PMV BLD AUTO: 9.7 FL (ref 9.4–12.4)
POTASSIUM SERPL-SCNC: 3.6 MEQ/L (ref 3.5–5.2)
RBC # BLD AUTO: 4.16 MILL/MM3 (ref 4.2–5.4)
SODIUM SERPL-SCNC: 141 MEQ/L (ref 135–145)
WBC # BLD AUTO: 7.6 THOU/MM3 (ref 4.8–10.8)

## 2024-10-21 PROCEDURE — 2580000003 HC RX 258

## 2024-10-21 PROCEDURE — 36415 COLL VENOUS BLD VENIPUNCTURE: CPT

## 2024-10-21 PROCEDURE — 6360000002 HC RX W HCPCS: Performed by: INTERNAL MEDICINE

## 2024-10-21 PROCEDURE — 85025 COMPLETE CBC W/AUTO DIFF WBC: CPT

## 2024-10-21 PROCEDURE — 2580000003 HC RX 258: Performed by: INTERNAL MEDICINE

## 2024-10-21 PROCEDURE — 2140000000 HC CCU INTERMEDIATE R&B

## 2024-10-21 PROCEDURE — 2580000003 HC RX 258: Performed by: PHYSICIAN ASSISTANT

## 2024-10-21 PROCEDURE — 80048 BASIC METABOLIC PNL TOTAL CA: CPT

## 2024-10-21 PROCEDURE — 2500000003 HC RX 250 WO HCPCS: Performed by: PHYSICIAN ASSISTANT

## 2024-10-21 PROCEDURE — 99232 SBSQ HOSP IP/OBS MODERATE 35: CPT | Performed by: HOSPITALIST

## 2024-10-21 PROCEDURE — 84100 ASSAY OF PHOSPHORUS: CPT

## 2024-10-21 RX ADMIN — PIPERACILLIN AND TAZOBACTAM 3375 MG: 3; .375 INJECTION, POWDER, FOR SOLUTION INTRAVENOUS at 20:16

## 2024-10-21 RX ADMIN — SODIUM CHLORIDE, PRESERVATIVE FREE 10 ML: 5 INJECTION INTRAVENOUS at 08:38

## 2024-10-21 RX ADMIN — SODIUM CHLORIDE, PRESERVATIVE FREE 10 ML: 5 INJECTION INTRAVENOUS at 20:18

## 2024-10-21 RX ADMIN — PIPERACILLIN AND TAZOBACTAM 3375 MG: 3; .375 INJECTION, POWDER, FOR SOLUTION INTRAVENOUS at 04:14

## 2024-10-21 RX ADMIN — SODIUM PHOSPHATE, MONOBASIC, MONOHYDRATE AND SODIUM PHOSPHATE, DIBASIC, ANHYDROUS 15 MMOL: 142; 276 INJECTION, SOLUTION INTRAVENOUS at 03:03

## 2024-10-21 RX ADMIN — PIPERACILLIN AND TAZOBACTAM 3375 MG: 3; .375 INJECTION, POWDER, FOR SOLUTION INTRAVENOUS at 13:17

## 2024-10-21 NOTE — CARE COORDINATION
10/21/24, 2:23 PM EDT    DISCHARGE ON GOING EVALUATION    [facility] day: 3  Location: 3B-30/030-A Reason for admit: SBO (small bowel obstruction) ([facility]) [K56.609]     Procedures: None    Imaging since last note:   10/20 KUB:   1. Moderate mount of stool in the colon.  2. Dilated small bowel loop in the left abdomen.  10/20 CXR:   1. Mild blunting of the costophrenic angles likely represents minimal pleural  fluid bilaterally.  2. Mild patchy airspace opacities are seen at the left lung base which may  present mild atelectasis or pneumonia.    Barriers to Discharge: Hospitalist and Surgery following. NG tube removed. Diet increased to full liquids today. Zosyn iv q8hr.     PCP: Rich Collazo MD  Readmission Risk Score: 10.9    Patient Goals/Plan/Treatment Preferences: Plans home with S.O. Gualberto. Monitor for needs.

## 2024-10-22 LAB
ANION GAP SERPL CALC-SCNC: 11 MEQ/L (ref 8–16)
BASOPHILS ABSOLUTE: 0 THOU/MM3 (ref 0–0.1)
BASOPHILS NFR BLD AUTO: 0.2 %
BUN SERPL-MCNC: 5 MG/DL (ref 7–22)
CALCIUM SERPL-MCNC: 8.7 MG/DL (ref 8.5–10.5)
CHLORIDE SERPL-SCNC: 101 MEQ/L (ref 98–111)
CO2 SERPL-SCNC: 27 MEQ/L (ref 23–33)
CREAT SERPL-MCNC: 0.4 MG/DL (ref 0.4–1.2)
DEPRECATED RDW RBC AUTO: 44.9 FL (ref 35–45)
EOSINOPHIL NFR BLD AUTO: 1.5 %
EOSINOPHILS ABSOLUTE: 0.1 THOU/MM3 (ref 0–0.4)
ERYTHROCYTE [DISTWIDTH] IN BLOOD BY AUTOMATED COUNT: 13.2 % (ref 11.5–14.5)
GFR SERPL CREATININE-BSD FRML MDRD: > 90 ML/MIN/1.73M2
GLUCOSE SERPL-MCNC: 108 MG/DL (ref 70–108)
HCT VFR BLD AUTO: 40.2 % (ref 37–47)
HGB BLD-MCNC: 13.3 GM/DL (ref 12–16)
IMM GRANULOCYTES # BLD AUTO: 0.04 THOU/MM3 (ref 0–0.07)
IMM GRANULOCYTES NFR BLD AUTO: 0.4 %
LYMPHOCYTES ABSOLUTE: 1.6 THOU/MM3 (ref 1–4.8)
LYMPHOCYTES NFR BLD AUTO: 17 %
MCH RBC QN AUTO: 31 PG (ref 26–33)
MCHC RBC AUTO-ENTMCNC: 33.1 GM/DL (ref 32.2–35.5)
MCV RBC AUTO: 93.7 FL (ref 81–99)
MONOCYTES ABSOLUTE: 1.1 THOU/MM3 (ref 0.4–1.3)
MONOCYTES NFR BLD AUTO: 11.8 %
NEUTROPHILS ABSOLUTE: 6.4 THOU/MM3 (ref 1.8–7.7)
NEUTROPHILS NFR BLD AUTO: 69.1 %
NRBC BLD AUTO-RTO: 0 /100 WBC
PHOSPHATE SERPL-MCNC: 2.8 MG/DL (ref 2.4–4.7)
PLATELET # BLD AUTO: 287 THOU/MM3 (ref 130–400)
PMV BLD AUTO: 9.8 FL (ref 9.4–12.4)
POTASSIUM SERPL-SCNC: 3.6 MEQ/L (ref 3.5–5.2)
RBC # BLD AUTO: 4.29 MILL/MM3 (ref 4.2–5.4)
SODIUM SERPL-SCNC: 139 MEQ/L (ref 135–145)
WBC # BLD AUTO: 9.3 THOU/MM3 (ref 4.8–10.8)

## 2024-10-22 PROCEDURE — 80048 BASIC METABOLIC PNL TOTAL CA: CPT

## 2024-10-22 PROCEDURE — 84100 ASSAY OF PHOSPHORUS: CPT

## 2024-10-22 PROCEDURE — 99232 SBSQ HOSP IP/OBS MODERATE 35: CPT | Performed by: HOSPITALIST

## 2024-10-22 PROCEDURE — 2140000000 HC CCU INTERMEDIATE R&B

## 2024-10-22 PROCEDURE — 6370000000 HC RX 637 (ALT 250 FOR IP): Performed by: HOSPITALIST

## 2024-10-22 PROCEDURE — 6370000000 HC RX 637 (ALT 250 FOR IP)

## 2024-10-22 PROCEDURE — 6360000002 HC RX W HCPCS: Performed by: INTERNAL MEDICINE

## 2024-10-22 PROCEDURE — 85025 COMPLETE CBC W/AUTO DIFF WBC: CPT

## 2024-10-22 PROCEDURE — 2580000003 HC RX 258: Performed by: INTERNAL MEDICINE

## 2024-10-22 PROCEDURE — 2580000003 HC RX 258

## 2024-10-22 PROCEDURE — 36415 COLL VENOUS BLD VENIPUNCTURE: CPT

## 2024-10-22 RX ORDER — DULOXETIN HYDROCHLORIDE 60 MG/1
120 CAPSULE, DELAYED RELEASE ORAL DAILY
Status: DISCONTINUED | OUTPATIENT
Start: 2024-10-22 | End: 2024-10-23 | Stop reason: HOSPADM

## 2024-10-22 RX ORDER — LEVOTHYROXINE SODIUM 125 UG/1
125 TABLET ORAL DAILY
Status: DISCONTINUED | OUTPATIENT
Start: 2024-10-22 | End: 2024-10-23 | Stop reason: HOSPADM

## 2024-10-22 RX ADMIN — ROSUVASTATIN CALCIUM 20 MG: 20 TABLET, FILM COATED ORAL at 20:05

## 2024-10-22 RX ADMIN — SODIUM CHLORIDE, PRESERVATIVE FREE 10 ML: 5 INJECTION INTRAVENOUS at 20:06

## 2024-10-22 RX ADMIN — AMOXICILLIN AND CLAVULANATE POTASSIUM 1 TABLET: 875; 125 TABLET, FILM COATED ORAL at 13:56

## 2024-10-22 RX ADMIN — AMOXICILLIN AND CLAVULANATE POTASSIUM 1 TABLET: 875; 125 TABLET, FILM COATED ORAL at 20:05

## 2024-10-22 RX ADMIN — PANTOPRAZOLE SODIUM 40 MG: 40 TABLET, DELAYED RELEASE ORAL at 08:16

## 2024-10-22 RX ADMIN — FLUOXETINE HYDROCHLORIDE 40 MG: 20 CAPSULE ORAL at 17:30

## 2024-10-22 RX ADMIN — PIPERACILLIN AND TAZOBACTAM 3375 MG: 3; .375 INJECTION, POWDER, FOR SOLUTION INTRAVENOUS at 02:47

## 2024-10-22 RX ADMIN — SODIUM CHLORIDE, PRESERVATIVE FREE 10 ML: 5 INJECTION INTRAVENOUS at 08:16

## 2024-10-23 VITALS
OXYGEN SATURATION: 96 % | BODY MASS INDEX: 29.23 KG/M2 | TEMPERATURE: 98.1 F | WEIGHT: 171.2 LBS | HEIGHT: 64 IN | RESPIRATION RATE: 17 BRPM | SYSTOLIC BLOOD PRESSURE: 136 MMHG | DIASTOLIC BLOOD PRESSURE: 75 MMHG | HEART RATE: 89 BPM

## 2024-10-23 LAB
ANION GAP SERPL CALC-SCNC: 10 MEQ/L (ref 8–16)
BASOPHILS ABSOLUTE: 0.1 THOU/MM3 (ref 0–0.1)
BASOPHILS NFR BLD AUTO: 0.6 %
BUN SERPL-MCNC: 7 MG/DL (ref 7–22)
CALCIUM SERPL-MCNC: 8.8 MG/DL (ref 8.5–10.5)
CHLORIDE SERPL-SCNC: 103 MEQ/L (ref 98–111)
CO2 SERPL-SCNC: 24 MEQ/L (ref 23–33)
CREAT SERPL-MCNC: 0.4 MG/DL (ref 0.4–1.2)
DEPRECATED RDW RBC AUTO: 45.6 FL (ref 35–45)
EOSINOPHIL NFR BLD AUTO: 1.9 %
EOSINOPHILS ABSOLUTE: 0.2 THOU/MM3 (ref 0–0.4)
ERYTHROCYTE [DISTWIDTH] IN BLOOD BY AUTOMATED COUNT: 13.2 % (ref 11.5–14.5)
GFR SERPL CREATININE-BSD FRML MDRD: > 90 ML/MIN/1.73M2
GLUCOSE SERPL-MCNC: 119 MG/DL (ref 70–108)
HCT VFR BLD AUTO: 42 % (ref 37–47)
HGB BLD-MCNC: 13.8 GM/DL (ref 12–16)
IMM GRANULOCYTES # BLD AUTO: 0.12 THOU/MM3 (ref 0–0.07)
IMM GRANULOCYTES NFR BLD AUTO: 1.3 %
LYMPHOCYTES ABSOLUTE: 1.6 THOU/MM3 (ref 1–4.8)
LYMPHOCYTES NFR BLD AUTO: 16.6 %
MCH RBC QN AUTO: 30.8 PG (ref 26–33)
MCHC RBC AUTO-ENTMCNC: 32.9 GM/DL (ref 32.2–35.5)
MCV RBC AUTO: 93.8 FL (ref 81–99)
MONOCYTES ABSOLUTE: 1.2 THOU/MM3 (ref 0.4–1.3)
MONOCYTES NFR BLD AUTO: 12.9 %
NEUTROPHILS ABSOLUTE: 6.4 THOU/MM3 (ref 1.8–7.7)
NEUTROPHILS NFR BLD AUTO: 66.7 %
NRBC BLD AUTO-RTO: 0 /100 WBC
PHOSPHATE SERPL-MCNC: 3.3 MG/DL (ref 2.4–4.7)
PLATELET # BLD AUTO: 319 THOU/MM3 (ref 130–400)
PMV BLD AUTO: 9.4 FL (ref 9.4–12.4)
POTASSIUM SERPL-SCNC: 3.8 MEQ/L (ref 3.5–5.2)
RBC # BLD AUTO: 4.48 MILL/MM3 (ref 4.2–5.4)
SODIUM SERPL-SCNC: 137 MEQ/L (ref 135–145)
WBC # BLD AUTO: 9.6 THOU/MM3 (ref 4.8–10.8)

## 2024-10-23 PROCEDURE — 6370000000 HC RX 637 (ALT 250 FOR IP): Performed by: HOSPITALIST

## 2024-10-23 PROCEDURE — 84100 ASSAY OF PHOSPHORUS: CPT

## 2024-10-23 PROCEDURE — 36415 COLL VENOUS BLD VENIPUNCTURE: CPT

## 2024-10-23 PROCEDURE — 6370000000 HC RX 637 (ALT 250 FOR IP)

## 2024-10-23 PROCEDURE — 2580000003 HC RX 258

## 2024-10-23 PROCEDURE — 99239 HOSP IP/OBS DSCHRG MGMT >30: CPT | Performed by: HOSPITALIST

## 2024-10-23 PROCEDURE — 80048 BASIC METABOLIC PNL TOTAL CA: CPT

## 2024-10-23 PROCEDURE — 85025 COMPLETE CBC W/AUTO DIFF WBC: CPT

## 2024-10-23 RX ADMIN — SODIUM CHLORIDE, PRESERVATIVE FREE 10 ML: 5 INJECTION INTRAVENOUS at 08:56

## 2024-10-23 RX ADMIN — PANTOPRAZOLE SODIUM 40 MG: 40 TABLET, DELAYED RELEASE ORAL at 08:57

## 2024-10-23 RX ADMIN — LEVOTHYROXINE SODIUM 125 MCG: 0.12 TABLET ORAL at 08:57

## 2024-10-23 RX ADMIN — DULOXETINE HYDROCHLORIDE 120 MG: 60 CAPSULE, DELAYED RELEASE ORAL at 08:57

## 2024-10-23 RX ADMIN — AMOXICILLIN AND CLAVULANATE POTASSIUM 1 TABLET: 875; 125 TABLET, FILM COATED ORAL at 08:57

## 2024-10-23 RX ADMIN — FLUOXETINE HYDROCHLORIDE 40 MG: 20 CAPSULE ORAL at 08:57

## 2024-10-23 NOTE — DISCHARGE SUMMARY
DISCHARGE SUMMARY      Patient Identification:   Ankita Babcock   : 1955  MRN: [id number]   Account: [id number]      Patient's PCP: Rich Collazo MD    Admit Date: 10/18/2024     Discharge Date:   10/23/2024    Admitting Physician: Levy De La Torre MD     Discharge Physician: Ramona Patel MD     Discharge Diagnoses:  High grade small bowel obstruction, improved: Pt with hx of SBO, hx of numerous abdominal surgeries and hx of abdominal adhesion lysis. Last episode of SBO was 5 years and resolved with NGT and no surgical intervention was required. During this admission SBO resolved with NGT and again no surgical intervention required.          Fever, resolved: Pt was treated with Zosyn for 4 days, additionally treated with PO Augmentin for 1 day to complete 5 days of antibiotic treatment. Blood culture reading of no growth. No home antibiotics required.            Hypovolemic vs euvolemic hyponatremia, resolved.    Hypophosphatemia, resolved.    GERD: Continue home medication of Omeprazole 40 mg    Hyperlipidemia: Continue home medication Crestor 20 mg.    Hiatal hernia: Pt would like to establish care with .    Hypothyroidism: Continue home medication of Levothyroxine 125mg.    OA of the right knee: Tylenol as needed for pain.    Depression due to grief, accompanied by anxiety: Pt was seen by spiritual services here. Continue home medication of Prozac 40 mg.    Celiac disease: Pt reports celiac disease, hx notes multiple negative lab results and duodenal biopsy. Due for colonoscopy.     The patient was seen and examined on day of discharge and this discharge summary is in conjunction with any daily progress note from day of discharge.    Hospital Course:   Ankita Babcock is a 69 y.o. female admitted to [facility] on 10/18/2024 for small bowel obstruction.        Per H&P: \"Ankita Babcock is a 69 y.o. female with PMHx of multiple abdominal surgeries with

## 2024-10-23 NOTE — CARE COORDINATION
10/23/24, 11:44 AM EDT    Patient goals/plan/ treatment preferences discussed by  and .  Patient goals/plan/ treatment preferences reviewed with patient/ family.  Patient/ family verbalize understanding of discharge plan and are in agreement with goal/plan/treatment preferences.  Understanding was demonstrated using the teach back method.  AVS provided by RN at time of discharge, which includes all necessary medical information pertaining to the patients current course of illness, treatment, post-discharge goals of care, and treatment preferences.     Services At/After Discharge: None    Spoke with pt. Plans home with S.O. Denies any needs.     Electronically signed by Bárbara Lewis RN on 10/23/2024 at 11:51 AM

## 2024-10-23 NOTE — PLAN OF CARE
Problem: Discharge Planning  Goal: Discharge to home or other facility with appropriate resources  Outcome: Progressing  Flowsheets (Taken 10/19/2024 2231 by Sapna Moreno, RN)  Discharge to home or other facility with appropriate resources:   Identify barriers to discharge with patient and caregiver   Identify discharge learning needs (meds, wound care, etc)     Problem: Pain  Goal: Verbalizes/displays adequate comfort level or baseline comfort level  Outcome: Progressing  Flowsheets (Taken 10/20/2024 0923 by Alondra Mcclelland, RN)  Verbalizes/displays adequate comfort level or baseline comfort level: Encourage patient to monitor pain and request assistance     Problem: Safety - Adult  Goal: Free from fall injury  Outcome: Progressing  Flowsheets (Taken 10/19/2024 2231 by Sapna Moreno, RN)  Free From Fall Injury: Instruct family/caregiver on patient safety     Problem: Gastrointestinal - Adult  Goal: Minimal or absence of nausea and vomiting  Outcome: Progressing  Flowsheets (Taken 10/20/2024 0923 by Alondra Mcclelland, RN)  Minimal or absence of nausea and vomiting: Administer IV fluids as ordered to ensure adequate hydration     Problem: ABCDS Injury Assessment  Goal: Absence of physical injury  Outcome: Progressing  Flowsheets (Taken 10/19/2024 2231 by Sapna Moreno, RN)  Absence of Physical Injury: Implement safety measures based on patient assessment     Problem: Nutrition Deficit:  Goal: Optimize nutritional status  Outcome: Progressing  Flowsheets (Taken 10/23/2024 1107)  Nutrient intake appropriate for improving, restoring, or maintaining nutritional needs: Assess nutritional status and recommend course of action   Care plan reviewed with patient.  Patient verbalizes understanding of the care plan and contributed to goal setting.

## 2024-10-24 ENCOUNTER — CARE COORDINATION (OUTPATIENT)
Dept: FAMILY MEDICINE CLINIC | Age: 69
End: 2024-10-24

## 2024-10-24 LAB
BACTERIA BLD AEROBE CULT: NORMAL
BACTERIA BLD AEROBE CULT: NORMAL

## 2024-10-24 RX ORDER — FLUCONAZOLE 150 MG/1
150 TABLET ORAL ONCE
Qty: 1 TABLET | Refills: 0 | Status: SHIPPED | OUTPATIENT
Start: 2024-10-24 | End: 2024-10-24

## 2024-10-24 NOTE — PROGRESS NOTES
10/22/24 1343   Encounter Summary   Encounter Overview/Reason Spiritual/Emotional Needs   Service Provided For Patient   Referral/Consult From Multi-disciplinary team   Support System Spouse;Family members   Last Encounter  10/22/24   Complexity of Encounter Moderate   Begin Time 1330   End Time  1343   Total Time Calculated 13 min   Spiritual/Emotional needs   Type Spiritual Support   Assessment/Intervention/Outcome   Assessment Calm;Coping   Intervention Active listening   Outcome Comfort;Coping     Assessment:  In my encounter with the 69 yr old patient, while rounding  the unit 3B,  I provided spiritual care to patient through conversation, I also came to assess the patient's spiritual needs present. The pt shared that she had been struggling with bowel issues for about 18 years. The pt was admitted due to SBO/ small bowel obstruction.     Interventions:  I provided prayer, emotional support and words of comfort.  provided a listening presence and encouraged pt to share their beliefs and how I can support them during their hospitalization.      Outcomes:  The patient was encouraged and didn't share any further spiritual needs at this time.     Plan:  Chaplains will follow-up at a later time for assessment of any spiritual care needs present.  
  PROGRESS NOTE      Patient:  Ankita Babcock  Unit/Bed:3B-30/030-A  YOB: 1955  MRN: [id number]   Acct: [id number]    PCP: Rich Collazo MD    Date of Admission: 10/18/2024 LOS: 4    Date of Evaluation:  10/22/2024    Anticipated Discharge: 10/23/2024    Assessment/Plan:    High grade small bowel obstruction, improving: Pt with hx of SBO, hx of numerous abdominal surgeries and hx of abdominal adhesion lysis. Last episode of SBO was 5 years and resolved with NGT and no surgical intervention was required. Pt is improving, decreased pain and abdominal distention. No bowel movements yet, however is passing gas and tolerated full liquid diet.              -Progress to regular diet, gluten free.              -Continue PO home medications.  Fever, resolved: Highest fever 101.8, accompanied by tachycardia with HR of 116. On day 4 of Zosyn, stated on 10/19. CXR showing mild blunting of the costophrenic angles likely represents minimal pleura fluid bilaterally, Mild patchy airspace opacities are seen at the left lung base which may present mild atelectasis or pneumonia.              -Blood cultures pre-liminary reading of no growth.              -Discontinue IV Zosyn.    -Start PO Augmentin for 3 additional dosages, to complete total of 5 days of antibiotic treatment.  Hypovolemic vs euvolemic hyponatremia, resolved: Possible hypovolemia due to NGT loss and emesis on arrive. Pt is asymptomatic. Sodium today is 139.  Hypophosphatemia, resolved: Phosphorus on arrival was 2.1, replenished and today is 3.6. Hypophosphatemia likely due to inadequate intake.     Chronic Conditions  GERD: Continue home medication of Omeprazole 40 mg.  Hyperlipidemia: Continue home medication Crestor 20 mg.  Hiatal hernia: Pt would like to establish care with .  Hypothyroidism: Continue home medication of Levothyroxine 125mg.  OA of the right knee: Tylenol as needed for pain.  Depression due to grief, accompanied by 
  PROGRESS NOTE      Patient:  Ankita Babcock  Unit/Bed:3B-30/030-A  YOB: 1955  MRN: [id number]   Acct: [id number]    PCP: Rich Collazo MD    Date of Admission: 10/18/2024 LOS: 3    Date of Evaluation:  10/21/2024    Anticipated Discharge: 1-2 days, pending clinical course    Assessment/Plan:     High grade small bowel obstruction, improving: Pt with hx of SBO, hx of numerous abdominal surgeries and hx of abdominal adhesion lysis. Last episode of SBO was 5 years and resolved with NGT and no surgical intervention was required. Pt is improving, decreased pain and abdominal distention. No bowel movements yet, however is passing gas and tolerated clear liquid diet.   -Progress to full liquid diet. Will advance as tolerated.   -Morphine as needed, use with caution because may worsen constipation.   -Continue Zofran as needed for nausea.   -Will likely continue home medications tomorrow if pt able to continue to tolerate diet.  Fever, resolved: Highest fever 101.8, accompanied by tachycardia with HR of 116. On day 3 of Zosyn, stated on 10/19. CXR showing mild blunting of the costophrenic angles likely represents minimal pleura fluid bilaterally, Mild patchy airspace opacities are seen at the left lung base which may present mild atelectasis or pneumonia.   -Blood cultures pre-liminary reading of no growth.   -Continue Zosyn.   Hypovolemic vs euvolemic hyponatremia, resolved: Possible hypovolemia due to NGT loss and emesis on arrive. Pt is asymptomatic. Sodium today is 141.    -Continue to monitor.   Hypophosphatemia, resolved: Phosphorus on arrival was 2.1, replenished and today is 3.5. Hypophosphatemia likely due to inadequate intake.    -Continue to monitor.    Chronic Conditions  GERD: Continue home medication of Omeprazole 40 mg.  Hyperlipidemia: Continue to hold home medication.  Hiatal hernia: Pt would like to establish care with .  Hypothyroidism: Continue to hold home 
  Physician Progress Note      PATIENT:               ANNA MARIE COX  [facility] #:                  [id number]  :                       1955  ADMIT DATE:       10/18/2024 12:29 AM  DISCH DATE:        10/23/2024 2:25 PM  RESPONDING  PROVIDER #:        Ramona Patel MD          QUERY TEXT:    Pt admitted with SBO. Pt noted to have possible PNA, fever 101.8, , WBC   11.6, lactic 1.4. If possible, please document in the progress notes and   discharge summary if you are evaluating and /or treating any of the following:    The medical record reflects the following:  Risk Factors: 69 yr old, GERD, HLD,  Clinical Indicators: possible PNA, fever 101.8, , WBC 11.6,  lactic 1.4  Treatment: IV ATBs, lab monitoring, imaging  Options provided:  -- Sepsis, present on admission  -- Sepsis, developed following admission  -- Pneumonia without Sepsis  -- Other - I will add my own diagnosis  -- Disagree - Not applicable / Not valid  -- Disagree - Clinically unable to determine / Unknown  -- Refer to Clinical Documentation Reviewer    PROVIDER RESPONSE TEXT:    This patient has Pneumonia without Sepsis.    Query created by: Nieves Eller on 10/22/2024 10:53 AM      Electronically signed by:  Ramona Patel MD 10/24/2024 1:01 PM          
 PowerGlide Insertion Procedure Note  This line is NOT a central line, please do not use this for central solutions.   Line can be removed by the primary nurse or LPN, but should not be removed by the tech.  Line can be used to collect labs, but is NOT guaranteed to draw blood the entire duration of dwell.   Cathflo CANNOT be used on this device.   Patient CAN discharge home/to SNF with this device if ordered.   PowerGlide device can dwell for up to 29 days.     Ankita Babcock   Admitted- 10/18/2024 12:29 AM  Admission diagnosis- SBO (small bowel obstruction) ([facility]) [K56.609]      Attending Physician- Manuel Arechiga DO    Indication for Insertion: Poor Vascular Access    Catheter Insertion Date- 10/19/2024   Catheter Brand-[email or web address]  Lot Number- [id number]  Gauge-20g  Lumen-single    Insertion Site- ALEKS Cephalic  Vein Diameter- 1.5 mm  Catheter Length- 20 cm  Internal Length- 20 cm     Midline Tip Terminates in the Axillary- Yes  Upper Arm Circumference- 25cm  Easy insertion- Yes  Able to Aspirate blood- Yes  Easy Flush- Yes    Midline insertion successful- Yes  Ultrasound- yes    Okay To Use Midline- Yes      Electronically signed by Edwin Abdalla, RN, RN on 10/19/2024 at 2:02 PM     
Assessment:  I visited Ankita, a 69 year old female admitted on 3B where she is receiving care for small bowel obstruction. Patent has her  in the room providing support. At the time of this visit, patient is in distress with pain. Patient shared with me that her pain level is at 7 during my encounter with her. This visit is in response to a spiritual consult for emotional distress.During this visit, patient is laying in her bed, anxious, tearful and yet able to communicated with saying she was brought to [facility] about two days ago. Patient is receiving support from her  and family members.    Intervention:  During this encounter, I provided active listening, nurtured hope, offered words of encouragement and prayed for healing and strength.     Outcome:  Patient and her  are encouraged,and appreciated the support and encouragement they received from this .      Plan of care:   staff will follow up with patient during next rounding on the unit to provide continue support. Spiritual Care service remain available to patient at this time.   
[facility]   Dr. Santos Scherer MD  Daily Progress Note  Pt Name: Ankita Babcock  Medical Record Number: [id number]  Date of Birth 1955   Today's Date: 10/21/2024    POD# 3    CHIEF COMPLAINT SBO    SUBJECTIVE  Patient feels better daren cl liq x 3    OBJECTIVE  CURRENT VITALS /64   Pulse 76   Temp 98 °F (36.7 °C) (Oral)   Resp 20   Ht 1.626 m (5' 4\")   Wt 85.2 kg (187 lb 13.3 oz)   SpO2 100%   BMI 32.24 kg/m²   LUNGS: Lungs clear   ABDOMEN: soft BS+  WOUNDS: n/a  24 HR INTAKE/OUTPUT :   Intake/Output Summary (Last 24 hours) at 10/21/2024 1314  Last data filed at 10/21/2024 0901  Gross per 24 hour   Intake 500 ml   Output 0 ml   Net 500 ml     DRAIN/TUBE OUTPUT : [REMOVED] NG/OG/NJ/NE Tube 14 fr Right nostril-Output (mL): 250 ml    LABS  CBC :   Lab Results   Component Value Date/Time    WBC 7.6 10/21/2024 06:30 AM    HGB 13.0 10/21/2024 06:30 AM    HGB 10.3 03/19/2012 01:25 PM    HCT 39.4 10/21/2024 06:30 AM     10/21/2024 06:30 AM     BMP:   Lab Results   Component Value Date/Time     10/21/2024 06:30 AM    K 3.6 10/21/2024 06:30 AM     10/21/2024 06:30 AM    CO2 27 10/21/2024 06:30 AM    BUN 8 10/21/2024 06:30 AM    CREATININE 0.4 10/21/2024 06:30 AM    MG 2.1 10/18/2024 12:40 AM    PHOS 3.5 10/21/2024 06:30 AM       ASSESSMENT  1. SBO resolving  inc full liq      PLAN  1. As above      Santos Scherer MD  Electronically signed 10/21/2024 at 1:14 PM    
Comprehensive Nutrition Assessment    Type and Reason for Visit:  Initial, NPO/Clear Liquid (SBO; previous moderate malnutrition)    Nutrition Recommendations/Plan:   Continue diet advancement as GI function allows/as tolerated per surgery  Start ONS: ensure PLUS (TID)   Pt with celiac - Gluten Free diet  Recommend MVI     Malnutrition Assessment:  Malnutrition Status:  At risk for malnutrition (Comment) (10/21/24 3622)    Context:  Acute Illness     Findings of the 6 clinical characteristics of malnutrition:  Energy Intake:   (NPO/Clears 3 days)  Weight Loss:  No significant weight loss     Body Fat Loss:  No significant body fat loss     Muscle Mass Loss:  No significant muscle mass loss    Fluid Accumulation:  No significant fluid accumulation     Strength:  Not Performed    Nutrition Assessment:     Pt. nutritionally compromised AEB NPO/Clears 3 days.  At risk for further nutrition compromise r/t admit with high grade SBO - improving with conservative management, NGT removed 10/21, febrile, and underlying medical condition (PMHx: GERD, HLD, hiatal hernia, hypothyroidism, OA, celiac disease, IBS, anxiety, moderate malnutrition 2018, s/p hysterectomy and appendectomy in 1985, cholecystectomy in 2003, adhesion surgery in 2010, abdominal exploration and removal of omentum in 2013 with Dr Scherer, ex lap/lysis of adhesions in 2018, last SBO ~5 years ago).       Nutrition Related Findings:    Pt. Report/Treatments/Miscellaneous: Pt seen, family visiting. Pt reports improving appetite and abdominal pain. Pt reports not being regimented lately - poor sleep and states she has not been eating \"right.\" She states she wakes up for work at 3:45 AM and does not have anything until she gets to work ~5 AM or later - has decaf coffee with cream and sugar and a GF granola bar and a yogurt. She typically skips lunch. When she gets home her and her  usually get home from work late and have a GF frozen pizza every 
Discussed discharge summary with patient. Instructed patient about medications & follow up appointments. Patient denies any additional questions. Patient was discharged with all belongings. No distress noted. Patient discharged to home. Taken down to the vehicle by transporter per wheelchair.     
[facility]   Dr. Santos Scherer MD  Daily Progress Note  Pt Name: Ankita Babcock  Medical Record Number: [id number]  Date of Birth 1955   Today's Date: 10/22/2024    HD#4    CHIEF COMPLAINT sbo    SUBJECTIVE  Patient feels well.  Daren full liq no n/v    OBJECTIVE  CURRENT VITALS BP (!) 145/91   Pulse 88   Temp 98.1 °F (36.7 °C) (Oral)   Resp 18   Ht 1.626 m (5' 4\")   Wt 77.7 kg (171 lb 3.2 oz)   SpO2 99%   BMI 29.39 kg/m²   LUNGS: Lungs clear   ABDOMEN: soft BS +  WOUNDS: none  24 HR INTAKE/OUTPUT :   Intake/Output Summary (Last 24 hours) at 10/22/2024 1112  Last data filed at 10/22/2024 1021  Gross per 24 hour   Intake 940 ml   Output 0 ml   Net 940 ml     DRAIN/TUBE OUTPUT : [REMOVED] NG/OG/NJ/NE Tube 14 fr Right nostril-Output (mL): 250 ml    LABS  CBC :   Lab Results   Component Value Date/Time    WBC 9.3 10/22/2024 02:50 AM    HGB 13.3 10/22/2024 02:50 AM    HGB 10.3 03/19/2012 01:25 PM    HCT 40.2 10/22/2024 02:50 AM     10/22/2024 02:50 AM     BMP:   Lab Results   Component Value Date/Time     10/22/2024 02:50 AM    K 3.6 10/22/2024 02:50 AM     10/22/2024 02:50 AM    CO2 27 10/22/2024 02:50 AM    BUN 5 10/22/2024 02:50 AM    CREATININE 0.4 10/22/2024 02:50 AM    MG 2.1 10/18/2024 12:40 AM    PHOS 2.8 10/22/2024 02:50 AM       ASSESSMENT  1. SBO resolved inc to reg diet      PLAN  1. If daren ok to d/c      Santos Scherer MD  Electronically signed 10/22/2024 at 11:12 AM    
[facility]   Dr. Santos Scherer MD  Daily Progress Note  Pt Name: Ankita Babcock  Medical Record Number: [id number]  Date of Birth 1955   Today's Date: 10/20/2024    HD#2 + flatus    CHIEF COMPLAINTSBO    SUBJECTIVE  Patient feels well.    OBJECTIVE  CURRENT VITALS /69   Pulse 87   Temp 97.7 °F (36.5 °C) (Oral)   Resp 18   Ht 1.626 m (5' 4\")   Wt 85.7 kg (188 lb 15 oz)   SpO2 97%   BMI 32.43 kg/m²   LUNGS: Lungs clear   ABDOMEN: soft  WOUNDS: n/a  24 HR INTAKE/OUTPUT :   Intake/Output Summary (Last 24 hours) at 10/20/2024 0908  Last data filed at 10/19/2024 2214  Gross per 24 hour   Intake 1724.27 ml   Output 1250 ml   Net 474.27 ml     DRAIN/TUBE OUTPUT : NG/OG/NJ/NE Tube 14 fr Right nostril-Output (mL): 250 ml    LABS  CBC :   Lab Results   Component Value Date/Time    WBC 7.8 10/20/2024 04:07 AM    HGB 13.3 10/20/2024 04:07 AM    HGB 10.3 03/19/2012 01:25 PM    HCT 41.5 10/20/2024 04:07 AM     10/20/2024 04:07 AM     BMP:   Lab Results   Component Value Date/Time     10/20/2024 04:07 AM    K 4.0 10/20/2024 04:07 AM    CL 97 10/20/2024 04:07 AM    CO2 29 10/20/2024 04:07 AM    BUN 14 10/20/2024 04:07 AM    CREATININE 0.5 10/20/2024 04:07 AM    MG 2.1 10/18/2024 12:40 AM    PHOS 1.8 10/20/2024 04:07 AM      Component Value Units   XR ABDOMEN (KUB) (SINGLE AP VIEW) [[id number]]    Collected: 10/20/24 0842    Updated: 10/20/24 0852    Narrative:     PROCEDURE: XR ABDOMEN (KUB) (SINGLE AP VIEW)    CLINICAL INFORMATION: SBO. Generalized abdominal pain.    COMPARISON: Abdominal radiograph and CT abdomen and pelvis 10/18/2024.    TECHNIQUE: A supine AP view of the abdomen was obtained. 2 films.    FINDINGS:    There is an esophageal tube tip in the stomach.    There is a moderate mount of stool throughout the colon. There is a single  distended small bowel loop in the left abdomen. There are no displaced bowel  loops.  There is no gross free air. There are surgical 
Patient arrived per cart to 3B. Heart monitor applied and vitals taken.  Admission paperwork completed.  Explained to patient that [facility] is not responsible for any lost or stolen items.  Patient verbalized understanding. Oriented to room and use of call light and bed controls.  Patient denies pain or needs. No signs of distress noted.  Bed locked & in low position, side-rails up x2.  Call light in reach.  Reminded patient to call nurse if any needs arise.     2 person skin assessment performed by this nurse and Sapna PENA.      
Patient notified this RN of missing TMJ metal retainer. Patient states it was sitting on her tray table wrapped up in a tissue either in a cup or sitting next to a cup. She states it was there before [employer] employee went in room. [employer] notified. [employer] employee looked through patients trash.This RN and several other RN and tech have looked through all trash in patients room, all trash in dirty supply room, patients tray table, patients belongings, patients bed, underneath patients bed. This RN has never seen the metal retainer in patients room. House supervisor notified of patients missing TMJ retainer. On admission charting, no TMJ retainer documented.   
Patient updated on discharge planning. Patient accepted and stated ride will be here around 100 PM today.  
Pt resting in bed. Zofran was giving for c/o of nausea.   
[facility]   Dr. Santos Scherer MD  Daily Progress Note  Pt Name: Ankita Babcock  Medical Record Number: [id number]  Date of Birth 1955   Today's Date: 10/19/2024    POD# 1    CHIEF COMPLAINT SBO    SUBJECTIVE  Patient abd pain better    OBJECTIVE  CURRENT VITALS BP (!) 153/74   Pulse 93   Temp 97.9 °F (36.6 °C) (Oral)   Resp 22   Ht 1.626 m (5' 4\")   Wt 85 kg (187 lb 6.3 oz)   SpO2 92%   BMI 32.17 kg/m²   LUNGS: Lungs clear   ABDOMEN: softer  still absent BS  WOUNDS: n/a  24 HR INTAKE/OUTPUT :   Intake/Output Summary (Last 24 hours) at 10/19/2024 0951  Last data filed at 10/19/2024 0437  Gross per 24 hour   Intake 2130.75 ml   Output 750 ml   Net 1380.75 ml     DRAIN/TUBE OUTPUT : NG/OG/NJ/NE Tube 14 fr Right nostril-Output (mL): 750 ml  I/O last 3 completed shifts:  In: 2130.8 [I.V.:2130.8]  Out: 750 [Emesis/NG output:750]     LABS  CBC :   Lab Results   Component Value Date/Time    WBC 9.3 10/19/2024 04:15 AM    HGB 15.7 10/19/2024 04:15 AM    HGB 10.3 03/19/2012 01:25 PM    HCT 49.7 10/19/2024 04:15 AM     10/19/2024 04:15 AM     BMP:   Lab Results   Component Value Date/Time     10/19/2024 04:15 AM    K 4.4 10/19/2024 04:15 AM    CL 96 10/19/2024 04:15 AM    CO2 23 10/19/2024 04:15 AM    BUN 15 10/19/2024 04:15 AM    CREATININE 0.3 10/19/2024 04:15 AM    MG 2.1 10/18/2024 12:40 AM    PHOS 3.3 10/19/2024 04:15 AM       ASSESSMENT  1. Pt abd softer       PLAN  1. Cont NPO and NG decompression      Santos Scherer MD  Electronically signed 10/19/2024 at 9:51 AM    
medical history, family history, social history and allergies reviewed again and is unchanged since admission.    ROS (All review of systems completed.  Pertinent positives noted. Otherwise All other systems reviewed and negative.)     Medications:  Reviewed    Infusion Medications    sodium chloride      lactated ringers IV soln 100 mL/hr at 10/19/24 0437     Scheduled Medications    sodium chloride flush  5-40 mL IntraVENous 2 times per day    [Held by provider] enoxaparin  40 mg SubCUTAneous Daily    levothyroxine  125 mcg Oral Daily    rosuvastatin  20 mg Oral QPM    famotidine  20 mg Oral Nightly    DULoxetine  120 mg Oral Daily    pantoprazole  40 mg Oral QAM AC    oyster shell calcium w/D  3 tablet Oral QPM     PRN Meds: sodium chloride flush, sodium chloride, potassium chloride **OR** potassium alternative oral replacement **OR** potassium chloride, magnesium sulfate, ondansetron, morphine, diphenhydrAMINE, prochlorperazine      Intake/Output Summary (Last 24 hours) at 10/19/2024 0712  Last data filed at 10/19/2024 0437  Gross per 24 hour   Intake 2130.75 ml   Output 750 ml   Net 1380.75 ml       Diet:  Diet NPO    Physical Exam:  BP (!) 149/73   Pulse 90   Temp 98.6 °F (37 °C) (Oral)   Resp 15   Ht 1.626 m (5' 4\")   Wt 85 kg (187 lb 6.3 oz)   SpO2 91%   BMI 32.17 kg/m²   General appearance: chronically ill appearing   HEENT: Pale conjunctivae, dry mucosa . NGT present   Respiratory:  Normal respiratory effort. CTAB  Cardiovascular: RRR  Abdomen: abd soft, LLQ tenderness. Absent bowel sounds   Musculoskeletal: no LE edema   Skin:  No rashes or lesions.  Neurologic:  moves  extremities independently   Psychiatric: anxious    Labs:   Recent Labs     10/18/24  0040 10/19/24  0415   WBC 11.6* 9.3   HGB 15.8 15.7   HCT 48.9* 49.7*    241     Recent Labs     10/18/24  0040 10/19/24  0415    132*   K 4.1 4.4    96*   CO2 26 23   BUN 13 15   CREATININE 0.6 0.3*   CALCIUM 9.4 9.2   PHOS  --  
DO Diandra PGY-3 on 10/20/2024 at 8:02 AM

## 2024-10-24 NOTE — CARE COORDINATION
Care Transitions Initial Follow Up Call    Call within 2 business days of discharge: Yes     Patient: Ankita Babcock Patient : 1955 MRN: [id number]    Last Discharge Facility       Date Complaint Diagnosis Description Type Department Provider    10/18/24 Abdominal Pain; Vomiting SBO (small bowel obstruction) (HCC) ED to Hosp-Admission (Discharged) (ADMITTED) Octavio Pugh MD; Paulette Vann...            RARS: Readmission Risk Score: 8.5       Spoke with: Zeinab    Discharge department/facility: [facility]    Non-face-to-face services provided:  Scheduled appointment with PCP-yes  Scheduled appointment with Specialist-yes  Obtained and reviewed discharge summary and/or continuity of care documents  Reviewed and followed up on pending diagnostic tests and treatments-yes  Communication with home health agencies or other community services the patient is currently using-na  Communication with specialists who will assume or re-assume care of the patient's system-specific problems-yes  Education of patient/family/caregiver/guardian to support self-management-yes  Assessment and support for treatment adherence and medication management-yes  Establishment or re-establishment of referrals-yes  Assistance in accessing community resources-na    Follow Up  Future Appointments   Date Time Provider Department Center   10/28/2024 12:00 PM [facility] [employer] [facility]   2024 10:00 AM Abimael Patrick MD [facility]  [facility] DEP   2025  9:00 AM Yanna Suero PA-C [facility]   2025  8:00 AM Rich Collazo MD [facility] [address]     Spoke to Zeinab for initial follow up post hospital discharge for Small Bowel Obstruction. NG and ATB IV for fever. SBO resolved, no surgery intervention. Feeling good-back to work Monday. Has follow up with Dr. Collazo Monday, also.   She sees  Dr. Preston-lab work ordered for him, but they had her off her supplements in hospital,

## 2024-10-24 NOTE — TELEPHONE ENCOUNTER
Zeinab senior/ravinder from hospital 10/23-had IV ATB and is requesting Diflucan because now has yeast infection. Had follow up with you Monday 10/28. Thanks

## 2024-10-29 ENCOUNTER — CARE COORDINATION (OUTPATIENT)
Dept: FAMILY MEDICINE CLINIC | Age: 69
End: 2024-10-29

## 2024-10-29 ENCOUNTER — OFFICE VISIT (OUTPATIENT)
Dept: FAMILY MEDICINE CLINIC | Age: 69
End: 2024-10-29

## 2024-10-29 VITALS
HEIGHT: 64 IN | BODY MASS INDEX: 29.39 KG/M2 | DIASTOLIC BLOOD PRESSURE: 60 MMHG | HEART RATE: 84 BPM | RESPIRATION RATE: 16 BRPM | SYSTOLIC BLOOD PRESSURE: 128 MMHG

## 2024-10-29 DIAGNOSIS — J18.9 PNEUMONIA OF LEFT LOWER LOBE DUE TO INFECTIOUS ORGANISM: Primary | ICD-10-CM

## 2024-10-29 DIAGNOSIS — Z09 HOSPITAL DISCHARGE FOLLOW-UP: ICD-10-CM

## 2024-10-29 DIAGNOSIS — K56.609 SBO (SMALL BOWEL OBSTRUCTION) (HCC): ICD-10-CM

## 2024-10-29 PROCEDURE — 99496 TRANSJ CARE MGMT HIGH F2F 7D: CPT | Performed by: FAMILY MEDICINE

## 2024-10-29 NOTE — CARE COORDINATION
Week #1 follow up post hospital discharge for SBO.   Follow up with Dr. Collazo today.  Bowel obstruction resolved.  Back to work.  No surgery needed. Will return visit to Dr. Collazo in May for Medicare Wellness Check.  Will follow next week.

## 2024-10-29 NOTE — PROGRESS NOTES
Post-Discharge Transitional Care Follow Up      Ankita Babcock   YOB: 1955    Date of Office Visit:  10/29/2024  Date of Hospital Admission: 10/18/24  Date of Hospital Discharge: 10/23/24  Readmission Risk Score (high >=14%. Medium >=10%):Readmission Risk Score: 8.5      Care management risk score Rising risk (score 2-5) and Complex Care (Scores >=6): No Risk Score On File     Non face to face  following discharge, date last encounter closed (first attempt may have been earlier): 10/24/2024     Call initiated 2 business days of discharge: Yes     Pneumonia of left lower lobe due to infectious organism  -     XR CHEST (2 VW); Future  SBO (small bowel obstruction) ([facility])      Medical Decision Making: high complexity  No follow-ups on file.           Subjective:   HPI    Inpatient course: Discharge summary reviewed- see chart. We discussed the abd pain and nausea from the SBO. No surgery was needed    Interval history/Current status: no nausea or vomiting, fever or chills. Some constipation    Patient Active Problem List   Diagnosis    Iron deficiency anemia    Thyroid disease    Celiac disease    Gastroesophageal reflux disease    Hyperlipidemia    Environmental allergies    Constipation    SBO (small bowel obstruction) (HCC)    Daytime somnolence    Candida albicans infection    Shoulder pain, right    Abnormal blood chemistry    Abdominal mass    S/P exploratory laparotomy    Complex regional pain syndrome type 1 of right upper extremity    Varicose vein of leg    Restless legs    Migraine with aura    Age-related osteoporosis without current pathological fracture    Chondral loose body of right knee joint    Acute medial meniscus tear, right, initial encounter    Chronic sinusitis    Complete tear of left rotator cuff    Dysfunction of eustachian tube    Impacted cerumen    RAEANN (middle ear effusion), bilateral    Episode of recurrent major depressive disorder (HCC)       Medications listed as

## 2024-11-05 DIAGNOSIS — G47.11 IDIOPATHIC HYPERSOMNIA: Primary | ICD-10-CM

## 2024-11-05 LAB
ALBUMIN: 4.1 G/DL (ref 3.6–5.1)
SEX HORMONE BINDING GLOBULIN: 24.9 NMOL/L (ref 17.3–125)
TESTOSTERONE FREE: 3.4 PG/ML (ref 0.6–3.8)
TESTOSTERONE, LCMS: 16 NG/DL (ref 5–32)

## 2024-11-05 RX ORDER — MODAFINIL 200 MG/1
200 TABLET ORAL DAILY
Qty: 30 TABLET | Refills: 1 | Status: SHIPPED | OUTPATIENT
Start: 2024-11-05 | End: 2025-01-04

## 2024-11-07 ENCOUNTER — CARE COORDINATION (OUTPATIENT)
Dept: FAMILY MEDICINE CLINIC | Age: 69
End: 2024-11-07

## 2024-11-07 NOTE — CARE COORDINATION
Spoke to Zeinab Week#2 follow up post hospital discharge for SBO. Feeling great-back to work. Had follow up with Dr. Collazo last week. To have a CRX in two weeks. Zeinab knows she can call with any questions or concerns.

## 2024-11-14 ENCOUNTER — CARE COORDINATION (OUTPATIENT)
Dept: FAMILY MEDICINE CLINIC | Age: 69
End: 2024-11-14

## 2024-11-14 ENCOUNTER — HOSPITAL ENCOUNTER (OUTPATIENT)
Dept: GENERAL RADIOLOGY | Age: 69
Discharge: HOME | End: 2024-11-14
Payer: MEDICARE

## 2024-11-14 ENCOUNTER — HOSPITAL ENCOUNTER (OUTPATIENT)
Age: 69
Discharge: HOME | End: 2024-11-14
Payer: MEDICARE

## 2024-11-14 DIAGNOSIS — J18.9 PNEUMONIA OF LEFT LOWER LOBE DUE TO INFECTIOUS ORGANISM: ICD-10-CM

## 2024-11-14 PROCEDURE — 71046 X-RAY EXAM CHEST 2 VIEWS: CPT

## 2024-11-14 NOTE — CARE COORDINATION
Spoke with Zeinab Week#3 follow up post hospital discharge for SOB. That has resolved but she is now recovering from a stomach bug, better today. Plans on getting repeat CXR in next couple of days. Appreciative of follow up. Zeinab knows she can call with any questions or concerns.

## 2024-11-16 NOTE — RESULT ENCOUNTER NOTE
Let her know  that the follow  up CXR showed the same reading as the CXR in October which was atelectasis or infiltrate. I looked at the CT of the abd from mid October which mentions atelectasis in the lower lungs and so I feel that she probably never truly had pneumonia  causing the October xray findings but likely had a bronchitis instead. I  don't feel  that the atelectasis needs followed up upon.  I can order a consultation by the pulmonologist for a second opinion if she wishes. If so, which hospital?

## 2024-11-18 ENCOUNTER — TELEPHONE (OUTPATIENT)
Dept: FAMILY MEDICINE CLINIC | Age: 69
End: 2024-11-18

## 2024-11-18 DIAGNOSIS — G47.11 IDIOPATHIC HYPERSOMNIA: ICD-10-CM

## 2024-11-18 RX ORDER — MODAFINIL 200 MG/1
400 TABLET ORAL DAILY
Qty: 180 TABLET | Refills: 0 | Status: SHIPPED | OUTPATIENT
Start: 2024-11-18 | End: 2025-02-16

## 2024-11-18 NOTE — TELEPHONE ENCOUNTER
Patient is taking modafinil 200mg twice every morning. I see her script was called in on 11/5/24 for 1 pill daily. Can you send in 180 pills for 90 day supply to [place] pharmacy for her?   Thanks!

## 2024-11-18 NOTE — TELEPHONE ENCOUNTER
Ankita informed by Phone. She states she does see pulmonology at [facility]. She was going to call them and see what they think before doing anything else.

## 2024-11-18 NOTE — TELEPHONE ENCOUNTER
----- Message from Dr. Rich Collazo MD sent at 11/15/2024 11:12 PM EST -----  Let her know  that the follow  up CXR showed the same reading as the CXR in October which was atelectasis or infiltrate. I looked at the CT of the abd from mid October which mentions atelectasis in the lower lungs and so I feel that she probably never truly had pneumonia  causing the October xray findings but likely had a bronchitis instead. I  don't feel  that the atelectasis needs followed up upon.  I can order a consultation by the pulmonologist for a second opinion if she wishes. If so, which hospital?

## 2024-11-21 ENCOUNTER — CARE COORDINATION (OUTPATIENT)
Dept: FAMILY MEDICINE CLINIC | Age: 69
End: 2024-11-21

## 2024-11-21 DIAGNOSIS — R91.8 OPACITY OF LUNG ON IMAGING STUDY: Primary | ICD-10-CM

## 2024-11-21 NOTE — TELEPHONE ENCOUNTER
I made the referral, I did not see her MD preference till after it was sent could you call there and ask for Dr Cristina.

## 2024-11-21 NOTE — CARE COORDINATION
Spoke to Zeinab Final follow up for post hospital discharge for SOB. Reviewed final CXR. She would like the second opinion with Pulmonology and would like Dr Collazo to make referral. Will send note. Appreciative of the follow ups and Zeinab knows she can call with any questions or concerns.

## 2024-11-28 NOTE — PROGRESS NOTES
items at this visit and Stable items were discussed at this visit;  2. Patients food, drinks, supplements and symptoms were reviewed with the patient,       - Ankita will bring food, drink, supplements and symptoms log to next visit for inclusion in their record      - 75 better food list reviewed & given to patient with the omega 6 food list to avoid      - The 52 Latex foods list was reviewed and given to the patients with the information on carrageenan         - Gluten in corn and oats abstracts sheet reviewed and given to the patient today   3.   Greater than 40 minutes time was spent with the patient face to face on this visit; of which >50% was for counseling and coordination of care, as well as the time spent before and after the visit reviewing the chart, documenting the encounter, reviewing labs,reports, NIH listed studies, making phone calls, etc.Ankita SUTTON Xochiltclifton, was evaluated through a synchronous (real-time) audio-video encounter. The patient (or guardian if applicable) is aware that this is a billable service, which includes applicable co-pays. This Virtual Visit was conducted with patient's (and/or legal guardian's) consent. Patient identification was verified, and a caregiver was present when appropriate. The patient was in their home which was located in a state where the provider was licensed and located in his office at [facility] in [facility] and licensed to provide care.       Patients food and drinks were reviewed with the patient,   - They will bring a food drink symptom log to future visits for inclusion in their record    - 75 better food list reviewed & given to patient along with the omega 6 food list to avoid      - Gluten in corn and oats abstracts sheet reviewed and given to the patient today    - 23 Foods containing Latex-like proteins was reviewed and copy to be taken if desired     - Nutrient Supplements list provided and copyto be taken if

## 2024-11-29 ENCOUNTER — TELEMEDICINE (OUTPATIENT)
Dept: FAMILY MEDICINE CLINIC | Age: 69
End: 2024-11-29

## 2024-11-29 DIAGNOSIS — R73.9 HYPERGLYCEMIA: ICD-10-CM

## 2024-11-29 DIAGNOSIS — F43.21 GRIEF: ICD-10-CM

## 2024-11-29 DIAGNOSIS — E78.5 HYPERLIPIDEMIA, UNSPECIFIED HYPERLIPIDEMIA TYPE: ICD-10-CM

## 2024-11-29 DIAGNOSIS — K56.609 SBO (SMALL BOWEL OBSTRUCTION) (HCC): ICD-10-CM

## 2024-11-29 DIAGNOSIS — K21.00 GASTROESOPHAGEAL REFLUX DISEASE WITH ESOPHAGITIS, UNSPECIFIED WHETHER HEMORRHAGE: ICD-10-CM

## 2024-11-29 DIAGNOSIS — J18.9 PNEUMONIA OF LEFT LOWER LOBE DUE TO INFECTIOUS ORGANISM: ICD-10-CM

## 2024-11-29 DIAGNOSIS — K44.9 HIATAL HERNIA WITH GASTROESOPHAGEAL REFLUX DISEASE WITHOUT ESOPHAGITIS: ICD-10-CM

## 2024-11-29 DIAGNOSIS — R91.8 OPACITY OF LUNG ON IMAGING STUDY: ICD-10-CM

## 2024-11-29 DIAGNOSIS — K90.89 OTHER SPECIFIED INTESTINAL MALABSORPTION: ICD-10-CM

## 2024-11-29 DIAGNOSIS — K21.9 HIATAL HERNIA WITH GASTROESOPHAGEAL REFLUX DISEASE WITHOUT ESOPHAGITIS: ICD-10-CM

## 2024-11-29 DIAGNOSIS — K22.0 INCOMPETENT LOWER ESOPHAGEAL SPHINCTER: ICD-10-CM

## 2024-11-29 DIAGNOSIS — Z71.89 ENCOUNTER FOR HERB AND VITAMIN SUPPLEMENT MANAGEMENT: ICD-10-CM

## 2024-11-29 DIAGNOSIS — G47.9 SLEEP DIFFICULTIES: ICD-10-CM

## 2024-11-29 DIAGNOSIS — E03.9 HYPOTHYROIDISM, UNSPECIFIED TYPE: ICD-10-CM

## 2024-11-29 ASSESSMENT — ENCOUNTER SYMPTOMS
SHORTNESS OF BREATH: 1
ABDOMINAL PAIN: 1
ABDOMINAL DISTENTION: 1

## 2025-01-20 ENCOUNTER — TELEPHONE (OUTPATIENT)
Dept: PULMONOLOGY | Age: 70
End: 2025-01-20

## 2025-01-20 DIAGNOSIS — G47.11 IDIOPATHIC HYPERSOMNIA: Primary | ICD-10-CM

## 2025-01-20 RX ORDER — METHYLPHENIDATE HYDROCHLORIDE 20 MG/1
20 TABLET ORAL 3 TIMES DAILY
Qty: 270 TABLET | Refills: 0 | Status: SHIPPED | OUTPATIENT
Start: 2025-01-20 | End: 2025-04-20

## 2025-01-20 NOTE — TELEPHONE ENCOUNTER
Patient called in stating she needs a refill on her Ritalin 20mg ( 90 day supply ). Last prescribed by Yanna Suero. It will not let me load the medication, however it needs sent to [facility] pharmacy in [place], [place]. Thank you

## 2025-02-17 ENCOUNTER — TELEPHONE (OUTPATIENT)
Dept: PULMONOLOGY | Age: 70
End: 2025-02-17

## 2025-02-17 DIAGNOSIS — G47.11 IDIOPATHIC HYPERSOMNIA: ICD-10-CM

## 2025-02-17 RX ORDER — MODAFINIL 200 MG/1
400 TABLET ORAL DAILY
Qty: 180 TABLET | Refills: 0 | Status: SHIPPED | OUTPATIENT
Start: 2025-02-17 | End: 2025-05-18

## 2025-02-17 NOTE — TELEPHONE ENCOUNTER
She is needing a refill for her Provigil to go to [facility]. She would like 90 day supply. Thank you

## 2025-02-20 ENCOUNTER — APPOINTMENT (OUTPATIENT)
Dept: GENERAL RADIOLOGY | Age: 70
End: 2025-02-20
Payer: MEDICARE

## 2025-02-20 ENCOUNTER — HOSPITAL ENCOUNTER (EMERGENCY)
Age: 70
Discharge: HOME | End: 2025-02-20
Payer: MEDICARE

## 2025-02-20 VITALS
DIASTOLIC BLOOD PRESSURE: 101 MMHG | HEART RATE: 102 BPM | HEIGHT: 64 IN | SYSTOLIC BLOOD PRESSURE: 150 MMHG | WEIGHT: 168 LBS | TEMPERATURE: 97.5 F | BODY MASS INDEX: 28.68 KG/M2 | RESPIRATION RATE: 20 BRPM | OXYGEN SATURATION: 96 %

## 2025-02-20 DIAGNOSIS — J18.9 WALKING PNEUMONIA: ICD-10-CM

## 2025-02-20 DIAGNOSIS — J01.40 ACUTE NON-RECURRENT PANSINUSITIS: Primary | ICD-10-CM

## 2025-02-20 PROCEDURE — 99213 OFFICE O/P EST LOW 20 MIN: CPT

## 2025-02-20 PROCEDURE — 99214 OFFICE O/P EST MOD 30 MIN: CPT

## 2025-02-20 PROCEDURE — 6370000000 HC RX 637 (ALT 250 FOR IP)

## 2025-02-20 PROCEDURE — 71046 X-RAY EXAM CHEST 2 VIEWS: CPT

## 2025-02-20 RX ORDER — AZITHROMYCIN 250 MG/1
500 TABLET, FILM COATED ORAL ONCE
Status: COMPLETED | OUTPATIENT
Start: 2025-02-20 | End: 2025-02-20

## 2025-02-20 RX ORDER — FLUCONAZOLE 150 MG/1
150 TABLET ORAL
Qty: 2 TABLET | Refills: 0 | Status: SHIPPED | OUTPATIENT
Start: 2025-02-20 | End: 2025-02-26

## 2025-02-20 RX ORDER — AZITHROMYCIN 250 MG/1
TABLET, FILM COATED ORAL
Qty: 1 PACKET | Refills: 0 | Status: SHIPPED | OUTPATIENT
Start: 2025-02-20 | End: 2025-02-24

## 2025-02-20 RX ADMIN — AZITHROMYCIN DIHYDRATE 500 MG: 250 TABLET ORAL at 19:57

## 2025-02-20 ASSESSMENT — PAIN DESCRIPTION - PAIN TYPE: TYPE: ACUTE PAIN

## 2025-02-20 ASSESSMENT — PAIN - FUNCTIONAL ASSESSMENT
PAIN_FUNCTIONAL_ASSESSMENT: 0-10
PAIN_FUNCTIONAL_ASSESSMENT: PREVENTS OR INTERFERES WITH MANY ACTIVE NOT PASSIVE ACTIVITIES

## 2025-02-20 ASSESSMENT — PAIN DESCRIPTION - ONSET: ONSET: ON-GOING

## 2025-02-20 ASSESSMENT — PAIN SCALES - GENERAL: PAINLEVEL_OUTOF10: 7

## 2025-02-20 ASSESSMENT — ENCOUNTER SYMPTOMS
SINUS PAIN: 1
CHEST TIGHTNESS: 1
COUGH: 1
SINUS PRESSURE: 1

## 2025-02-20 ASSESSMENT — PAIN DESCRIPTION - LOCATION: LOCATION: HEAD

## 2025-02-20 ASSESSMENT — PAIN DESCRIPTION - DESCRIPTORS: DESCRIPTORS: DISCOMFORT

## 2025-02-20 ASSESSMENT — PAIN DESCRIPTION - FREQUENCY: FREQUENCY: CONTINUOUS

## 2025-02-21 NOTE — ED NOTES
Discharge instructions and prescriptions reviewed with pt. Pt verbalized understanding. Pt ambulated out in stable condition.  Assessment unchanged upon discharge.     Kelly Romero RN  02/20/25 2000

## 2025-02-21 NOTE — ED TRIAGE NOTES
Pt to urgent care due to continued cough from a recent illness of influenza A. She is also having some sinus drainage and is requesting a chest Xray.

## 2025-02-21 NOTE — ED PROVIDER NOTES
[facility] URGENT CARE  Urgent Care Encounter       CHIEF COMPLAINT       Chief Complaint   Patient presents with    Cough    Sinusitis       Nurses Notes reviewed and I agree except as noted in the HPI.  HISTORY OF PRESENT ILLNESS   Ankita Babcock is a 69 y.o. female who presents with complaints of cough and sinus pressure. Pt reports having influenza like symptoms 2 weeks ago. Pt reports sinus pressure, cough, congestion have been going on since. Pt reports blowing green out nose and coughing out green mucus. Pt reports history of pneumonia in November. Pt reports grand son had flu and now has a chest tube in childrens and she is concerned. Pt tearful.     The history is provided by the patient.       REVIEW OF SYSTEMS     Review of Systems   Constitutional:  Positive for fatigue.   HENT:  Positive for congestion, sinus pressure and sinus pain.    Respiratory:  Positive for cough and chest tightness.    All other systems reviewed and are negative.      PAST MEDICAL HISTORY         Diagnosis Date    Age-related osteoporosis without current pathological fracture     Anemia 2000    malabsorption/Celiac disease    Anxiety 02/2018    Dr. Patrick    Arm DVT (deep venous thromboembolism), acute (HCC) 05/15/2013    Broken shoulder 09/06/2016    right    Celiac disease     Celiac disease     Complex regional pain syndrome of right upper extremity     Right shoulder, arm, and hand    CRPS (complex regional pain syndrome type I) 2016    Dr. Ruiz    Depression 09/2016    Dr. Ruiz    Depression     GERD (gastroesophageal reflux disease) 2000    and celiac- Dr. Ya- [facility]    Headache(784.0)     migraines-Dr. Dominguez    Heart murmur     History of blood transfusion     Hx of reactive hypoglycemia     Dr. Goff    Hyperlipidemia 2013    Dr. Collazo    Hypothyroidism 1993    Dr. Ramirez carlton    IBS (irritable bowel syndrome)     PER PT     Irritable bowel syndrome 1997    Dr. Ya at [facility]  (RITALIN) 20 MG tablet Take 1 tablet by mouth 3 times daily for 90 days. Max Daily Amount: 60 mg, Disp-270 tablet, R-0Normal      DHEA 25 MG CAPS Take 1 capsule by mouth daily (with breakfast)Historical Med      MAGNESIUM CITRATE PO Take 400 mg by mouth in the morning and at bedtimeHistorical Med      esomeprazole (NEXIUM) 40 MG delayed release capsule Take 1 capsule by mouth every morning (before breakfast), Disp-90 capsule, R-3Normal      rosuvastatin (CRESTOR) 20 MG tablet Take 1 tablet by mouth daily, Disp-90 tablet, R-3Normal      famotidine (PEPCID) 20 MG tablet Take 1 tablet by mouth nightly, Disp-90 tablet, R-3Normal      SYNTHROID 125 MCG tablet TAKE 1 TABLET BY MOUTH EVERY DAY, Disp-90 tablet, R-3, DAWNormal      DULoxetine (CYMBALTA) 60 MG extended release capsule Take 2 capsules by mouth daily, Disp-180 capsule, R-0Normal      FLUoxetine (PROZAC) 40 MG capsule Take 1 capsule by mouth every morning, Disp-90 capsule, R-0Normal      Calcium Carbonate-Vitamin D 500-3.125 MG-MCG TABS Take 3 tablets by mouth every evening 1 before and as finish dinner and 1 at bedtimeHistorical Med      fluticasone (FLONASE) 50 MCG/ACT nasal spray SPRAY 1 SPRAY INTO EACH NOSTRIL EVERY DAY, Disp-1 Bottle,R-0Normal      Omega-3 Fatty Acids (FISH OIL) 1000 MG CAPS Take 6 capsules by mouth 3 times dailyHistorical Med      magnesium oxide (MAG-OX) 400 MG tablet Take 1.25 tablets by mouth 2 times dailyHistorical Med      ferrous sulfate (IRON 325) 325 (65 Fe) MG tablet Take 1 tablet by mouth daily (with breakfast)Historical Med      Cinnamon 500 MG TABS Take 6 tablets by mouth daily Historical Med      ascorbic acid (VITAMIN C) 1000 MG tablet Take 0.5 tablets by mouth 3 times daily, Disp-30 tablet, R-3OTC      B Complex-Folic Acid (B-100 BALANCED TR PO) Take 100 mg by mouth 3 times daily (before meals) [facility] [place]       vitamin D (CHOLECALCIFEROL) 5000 UNITS CAPS capsule Take 1 capsule by mouth dailyHistorical Med

## 2025-04-02 NOTE — TELEPHONE ENCOUNTER
Pt called requesting a handicap placard renewal letter. She would like to  Monday.     Please call pt with answer at [phone] Stable

## 2025-05-14 DIAGNOSIS — G47.11 IDIOPATHIC HYPERSOMNIA: ICD-10-CM

## 2025-05-14 RX ORDER — METHYLPHENIDATE HYDROCHLORIDE 20 MG/1
20 TABLET ORAL 3 TIMES DAILY
Qty: 270 TABLET | Refills: 0 | Status: SHIPPED | OUTPATIENT
Start: 2025-05-14 | End: 2025-08-12

## 2025-05-14 RX ORDER — MODAFINIL 200 MG/1
400 TABLET ORAL DAILY
Qty: 180 TABLET | Refills: 0 | Status: SHIPPED | OUTPATIENT
Start: 2025-05-14 | End: 2025-08-12

## 2025-05-14 RX ORDER — METHYLPHENIDATE HYDROCHLORIDE 20 MG/1
20 TABLET ORAL 3 TIMES DAILY
COMMUNITY
End: 2025-05-14 | Stop reason: SDUPTHER

## 2025-05-14 NOTE — TELEPHONE ENCOUNTER
Please advise patient called in to r/s her previous appointment with Ann. Patient last saw mathieu for sleep. Meds we last filled by Brent. Patient is requesting 90 day scripts for both to [facility]. Please advise, thanks!

## 2025-06-20 DIAGNOSIS — K44.9 HIATAL HERNIA WITH GASTROESOPHAGEAL REFLUX DISEASE WITHOUT ESOPHAGITIS: ICD-10-CM

## 2025-06-20 DIAGNOSIS — K22.0 INCOMPETENT LOWER ESOPHAGEAL SPHINCTER: ICD-10-CM

## 2025-06-20 DIAGNOSIS — K21.9 HIATAL HERNIA WITH GASTROESOPHAGEAL REFLUX DISEASE WITHOUT ESOPHAGITIS: ICD-10-CM

## 2025-06-20 DIAGNOSIS — E78.5 HYPERLIPIDEMIA, UNSPECIFIED HYPERLIPIDEMIA TYPE: ICD-10-CM

## 2025-06-20 DIAGNOSIS — E03.9 HYPOTHYROIDISM, UNSPECIFIED TYPE: ICD-10-CM

## 2025-06-20 RX ORDER — LEVOTHYROXINE SODIUM 125 MCG
TABLET ORAL
Qty: 90 TABLET | Refills: 0 | Status: SHIPPED | OUTPATIENT
Start: 2025-06-20

## 2025-06-20 RX ORDER — FAMOTIDINE 20 MG/1
20 TABLET, FILM COATED ORAL NIGHTLY
Qty: 90 TABLET | Refills: 0 | Status: SHIPPED | OUTPATIENT
Start: 2025-06-20

## 2025-06-20 RX ORDER — ROSUVASTATIN CALCIUM 20 MG/1
20 TABLET, COATED ORAL DAILY
Qty: 90 TABLET | Refills: 0 | Status: SHIPPED | OUTPATIENT
Start: 2025-06-20

## 2025-06-20 NOTE — TELEPHONE ENCOUNTER
Date of last visit:  10/29/2024  Date of next visit:  7/2/2025    Requested Prescriptions     Pending Prescriptions Disp Refills    rosuvastatin (CRESTOR) 20 MG tablet 90 tablet 3     Sig: Take 1 tablet by mouth daily    famotidine (PEPCID) 20 MG tablet 90 tablet 3     Sig: Take 1 tablet by mouth nightly

## 2025-06-20 NOTE — TELEPHONE ENCOUNTER
Patient called to [facility] an refill on the following    SYNTHROID 125 MCG tablet   TAKE 1 TABLET BY MOUTH EVERY DAY     Please send 90 days to [facility] pharmacy

## 2025-06-20 NOTE — TELEPHONE ENCOUNTER
Date of last visit:  10/29/2024  Date of next visit:  7/2/2025    Requested Prescriptions     Pending Prescriptions Disp Refills    SYNTHROID 125 MCG tablet 90 tablet 3     Sig: TAKE 1 TABLET BY MOUTH EVERY DAY

## 2025-06-23 DIAGNOSIS — E03.9 HYPOTHYROIDISM, UNSPECIFIED TYPE: ICD-10-CM

## 2025-06-23 RX ORDER — LEVOTHYROXINE SODIUM 125 UG/1
TABLET ORAL
Refills: 0 | OUTPATIENT
Start: 2025-06-23

## 2025-07-02 ENCOUNTER — TELEPHONE (OUTPATIENT)
Dept: FAMILY MEDICINE CLINIC | Age: 70
End: 2025-07-02

## 2025-07-02 ENCOUNTER — OFFICE VISIT (OUTPATIENT)
Dept: FAMILY MEDICINE CLINIC | Age: 70
End: 2025-07-02

## 2025-07-02 VITALS
BODY MASS INDEX: 31.34 KG/M2 | WEIGHT: 183.6 LBS | HEIGHT: 64 IN | SYSTOLIC BLOOD PRESSURE: 124 MMHG | DIASTOLIC BLOOD PRESSURE: 68 MMHG | RESPIRATION RATE: 16 BRPM | HEART RATE: 90 BPM

## 2025-07-02 DIAGNOSIS — K44.9 HIATAL HERNIA WITH GASTROESOPHAGEAL REFLUX DISEASE WITHOUT ESOPHAGITIS: ICD-10-CM

## 2025-07-02 DIAGNOSIS — K90.0 CELIAC DISEASE: ICD-10-CM

## 2025-07-02 DIAGNOSIS — E66.09 CLASS 1 OBESITY DUE TO EXCESS CALORIES WITH SERIOUS COMORBIDITY AND BODY MASS INDEX (BMI) OF 31.0 TO 31.9 IN ADULT: ICD-10-CM

## 2025-07-02 DIAGNOSIS — E03.9 HYPOTHYROIDISM, UNSPECIFIED TYPE: ICD-10-CM

## 2025-07-02 DIAGNOSIS — F43.21 GRIEF: ICD-10-CM

## 2025-07-02 DIAGNOSIS — Z78.0 POST-MENOPAUSAL: ICD-10-CM

## 2025-07-02 DIAGNOSIS — K22.0 INCOMPETENT LOWER ESOPHAGEAL SPHINCTER: ICD-10-CM

## 2025-07-02 DIAGNOSIS — Z12.31 VISIT FOR SCREENING MAMMOGRAM: ICD-10-CM

## 2025-07-02 DIAGNOSIS — E78.5 HYPERLIPIDEMIA, UNSPECIFIED HYPERLIPIDEMIA TYPE: ICD-10-CM

## 2025-07-02 DIAGNOSIS — F41.9 ANXIETY AND DEPRESSION: ICD-10-CM

## 2025-07-02 DIAGNOSIS — K21.9 HIATAL HERNIA WITH GASTROESOPHAGEAL REFLUX DISEASE WITHOUT ESOPHAGITIS: ICD-10-CM

## 2025-07-02 DIAGNOSIS — Z00.00 MEDICARE ANNUAL WELLNESS VISIT, SUBSEQUENT: Primary | ICD-10-CM

## 2025-07-02 DIAGNOSIS — E66.811 CLASS 1 OBESITY DUE TO EXCESS CALORIES WITH SERIOUS COMORBIDITY AND BODY MASS INDEX (BMI) OF 31.0 TO 31.9 IN ADULT: ICD-10-CM

## 2025-07-02 DIAGNOSIS — K21.00 GASTROESOPHAGEAL REFLUX DISEASE WITH ESOPHAGITIS, UNSPECIFIED WHETHER HEMORRHAGE: ICD-10-CM

## 2025-07-02 DIAGNOSIS — F32.A ANXIETY AND DEPRESSION: ICD-10-CM

## 2025-07-02 RX ORDER — LEVOTHYROXINE SODIUM 125 MCG
TABLET ORAL
Qty: 90 TABLET | Refills: 1 | Status: SHIPPED | OUTPATIENT
Start: 2025-07-02

## 2025-07-02 RX ORDER — ROSUVASTATIN CALCIUM 20 MG/1
20 TABLET, COATED ORAL DAILY
Qty: 90 TABLET | Refills: 1 | Status: SHIPPED | OUTPATIENT
Start: 2025-07-02

## 2025-07-02 RX ORDER — FAMOTIDINE 20 MG/1
20 TABLET, FILM COATED ORAL NIGHTLY
Qty: 90 TABLET | Refills: 1 | Status: SHIPPED | OUTPATIENT
Start: 2025-07-02

## 2025-07-02 RX ORDER — FLUOXETINE HYDROCHLORIDE 40 MG/1
40 CAPSULE ORAL EVERY MORNING
Qty: 90 CAPSULE | Refills: 1 | Status: SHIPPED | OUTPATIENT
Start: 2025-07-02

## 2025-07-02 RX ORDER — DULOXETIN HYDROCHLORIDE 60 MG/1
120 CAPSULE, DELAYED RELEASE ORAL DAILY
Qty: 180 CAPSULE | Refills: 1 | Status: SHIPPED | OUTPATIENT
Start: 2025-07-02

## 2025-07-02 RX ORDER — ESOMEPRAZOLE MAGNESIUM 40 MG/1
40 CAPSULE, DELAYED RELEASE ORAL
Qty: 90 CAPSULE | Refills: 1 | Status: SHIPPED | OUTPATIENT
Start: 2025-07-02

## 2025-07-02 ASSESSMENT — LIFESTYLE VARIABLES
HOW OFTEN DURING THE LAST YEAR HAVE YOU BEEN UNABLE TO REMEMBER WHAT HAPPENED THE NIGHT BEFORE BECAUSE YOU HAD BEEN DRINKING: NEVER
HAVE YOU OR SOMEONE ELSE BEEN INJURED AS A RESULT OF YOUR DRINKING: NO
HOW OFTEN DURING THE LAST YEAR HAVE YOU FAILED TO DO WHAT WAS NORMALLY EXPECTED FROM YOU BECAUSE OF DRINKING: NEVER
HAS A RELATIVE, FRIEND, DOCTOR, OR ANOTHER HEALTH PROFESSIONAL EXPRESSED CONCERN ABOUT YOUR DRINKING OR SUGGESTED YOU CUT DOWN: NO
HOW MANY STANDARD DRINKS CONTAINING ALCOHOL DO YOU HAVE ON A TYPICAL DAY: 1 OR 2
HOW OFTEN DURING THE LAST YEAR HAVE YOU HAD A FEELING OF GUILT OR REMORSE AFTER DRINKING: NEVER
HOW OFTEN DURING THE LAST YEAR HAVE YOU FOUND THAT YOU WERE NOT ABLE TO STOP DRINKING ONCE YOU HAD STARTED: NEVER
HOW OFTEN DO YOU HAVE A DRINK CONTAINING ALCOHOL: 4 OR MORE TIMES A WEEK
HOW OFTEN DURING THE LAST YEAR HAVE YOU NEEDED AN ALCOHOLIC DRINK FIRST THING IN THE MORNING TO GET YOURSELF GOING AFTER A NIGHT OF HEAVY DRINKING: NEVER

## 2025-07-02 ASSESSMENT — PATIENT HEALTH QUESTIONNAIRE - PHQ9
6. FEELING BAD ABOUT YOURSELF - OR THAT YOU ARE A FAILURE OR HAVE LET YOURSELF OR YOUR FAMILY DOWN: NOT AT ALL
10. IF YOU CHECKED OFF ANY PROBLEMS, HOW DIFFICULT HAVE THESE PROBLEMS MADE IT FOR YOU TO DO YOUR WORK, TAKE CARE OF THINGS AT HOME, OR GET ALONG WITH OTHER PEOPLE: SOMEWHAT DIFFICULT
8. MOVING OR SPEAKING SO SLOWLY THAT OTHER PEOPLE COULD HAVE NOTICED. OR THE OPPOSITE, BEING SO FIGETY OR RESTLESS THAT YOU HAVE BEEN MOVING AROUND A LOT MORE THAN USUAL: NOT AT ALL
5. POOR APPETITE OR OVEREATING: MORE THAN HALF THE DAYS
SUM OF ALL RESPONSES TO PHQ QUESTIONS 1-9: 12
3. TROUBLE FALLING OR STAYING ASLEEP: MORE THAN HALF THE DAYS
4. FEELING TIRED OR HAVING LITTLE ENERGY: MORE THAN HALF THE DAYS
2. FEELING DOWN, DEPRESSED OR HOPELESS: MORE THAN HALF THE DAYS
7. TROUBLE CONCENTRATING ON THINGS, SUCH AS READING THE NEWSPAPER OR WATCHING TELEVISION: MORE THAN HALF THE DAYS
9. THOUGHTS THAT YOU WOULD BE BETTER OFF DEAD, OR OF HURTING YOURSELF: NOT AT ALL
SUM OF ALL RESPONSES TO PHQ QUESTIONS 1-9: 12
SUM OF ALL RESPONSES TO PHQ QUESTIONS 1-9: 12
1. LITTLE INTEREST OR PLEASURE IN DOING THINGS: MORE THAN HALF THE DAYS
SUM OF ALL RESPONSES TO PHQ QUESTIONS 1-9: 12

## 2025-07-02 NOTE — TELEPHONE ENCOUNTER
Referral with records faxed to [facility], they will contact the patient with an appt day and time.

## 2025-07-02 NOTE — TELEPHONE ENCOUNTER
Pt is scheduled for Dexa and Mammogram at [facility] on 7/9/25 @ 1:40pm, arrive 15 mins early no metal. MOM to CB office

## 2025-07-02 NOTE — PROGRESS NOTES
Pt was advised that [facility] pulmonology had tried to reach her with no success so there # was given to pt

## 2025-07-02 NOTE — PROGRESS NOTES
Medicare Annual Wellness Visit    Ankita Babcock is here for Medicare AWV    Assessment & Plan   Hypothyroidism, unspecified type  The following orders have not been finalized:  -     SYNTHROID 125 MCG tablet  Gastroesophageal reflux disease with esophagitis, unspecified whether hemorrhage  The following orders have not been finalized:  -     esomeprazole (NEXIUM) 40 MG delayed release capsule  Hiatal hernia with gastroesophageal reflux disease without esophagitis  The following orders have not been finalized:  -     famotidine (PEPCID) 20 MG tablet  Incompetent lower esophageal sphincter  The following orders have not been finalized:  -     famotidine (PEPCID) 20 MG tablet  Hyperlipidemia, unspecified hyperlipidemia type  The following orders have not been finalized:  -     rosuvastatin (CRESTOR) 20 MG tablet       No follow-ups on file.     Subjective   The following acute and/or chronic problems were also addressed today:  He mother  in  and she continues grieving. She was started on prozac by Viviane Pollock with some help.  She has had some diarrhea the past week  She has gained 10 pounds in the past year. She doesn't eat till night time due to being busy and grief. She loves carbs.  We discussed wegovy and zepbound and to change eating patterns.    Patient's complete Health Risk Assessment and screening values have been reviewed and are found in Flowsheets. The following problems were reviewed today and where indicated follow up appointments were made and/or referrals ordered.    Positive Risk Factor Screenings with Interventions:      Depression:  PHQ-2 Score: 4  PHQ-9 Total Score: 12  Total Score Interpretation: 10-14 = moderate depression    Interventions:  Meds per Viviane Pollock    Alcohol Screening:  AUDIT-C Score: 8  AUDIT Total Score: 8  Total Score Interpretation: 8-12 suggests harmful or hazardous alcohol consumption in women     Interventions:  She doesn't feel that it's an issue

## 2025-07-03 ENCOUNTER — TELEPHONE (OUTPATIENT)
Dept: FAMILY MEDICINE CLINIC | Age: 70
End: 2025-07-03

## 2025-07-03 NOTE — TELEPHONE ENCOUNTER
I spoke with the pt and she has not gotten done. She is actually going to go to [facility] on Monday.

## 2025-07-03 NOTE — TELEPHONE ENCOUNTER
----- Message from Dr. Rich Collazo MD sent at 7/2/2025  2:15 PM EDT -----  See if she did labs at [facility] in April 2025

## 2025-07-03 NOTE — TELEPHONE ENCOUNTER
Denied  PA Detail   Note from payer: We did not find support for use, in your situation. This drug is to be used to help lose weight. Medicare says that drugs used for weight loss are excluded from Part D coverage. Medicare did release a kortney on March 20, 2024. This stated that anti-obesity medications remain excluded from Medicare Part D coverage. But, if that same drug received approval from the FDA for an additional medically accepted indication, then it could be considered a Part D drug- for that specific use only. This is not the case for your diagnosis. Therefore, we are unable to approve coverage under your Part D benefit. Please note: Zepbound is FDA approved to treat moderate to severe obstructive sleep apnea (LEA) in adults with obesity. Learn more in your Evidence of Coverage booklet. Look under- &apos;What types of drugs are not covered by the plan&apos;. Also see the Prescription Drug Benefit Manual, Chapter 6 Section 20.1. You can also reference the Medicare Kortney titled Part D Coverage of Anti-Obesity Medications with Medically Accepted Indications online.  Payer: [employer] - Medicare Case ID: [id number]    [phone]  Electronic appeal: Not supported  Prior auth initiated by: [id number]  [place], [place] - [address] - P [phone] - F [phone]  View History     Notes     Time User Attachment    Attachment received from payer. 7/2/2025  7:33 PM Geoff, Pari Outgoing Prescription Prior Authorization Response Document      Medication Being Authorized    tirzepatide-weight management (ZEPBOUND) 2.5 MG/0.5ML SOAJ subCUTAneous auto-injector pen  Inject 2.5 mg into the skin every 7 days  Dispense: 2 mL Refills: 0   Start: 7/2/2025   Class: Normal Diagnoses: Class 1 obesity due to excess calories with serious comorbidity and body mass index (BMI) of 31.0 to 31.9 in adult   This order has been released to its destination.  To be filled at: [id number] - [place], [place] - [address]

## 2025-07-09 NOTE — PROGRESS NOTES
[facility]      Ankita Babcock                                         [id number]  7/10/2025   Chief Complaint   Patient presents with    Follow-up     1 year idiopathic hypersomnia        Previous patient of Dr. Barron/EMEKA Rodrigues       Assessment/Plan     Assessment & Plan  1. Idiopathic hypersomnia: Chronic. Patient was last evaluated in April 2024.MSLT in 2003 did not achieve REM sleep, ruling out narcolepsy. Chronic pain and weight gain may contribute to fatigue.  - Reviewed case with Dr. Cristina who advised repeat PSG into MSLT and urine drug screen for MSLT as patient has gained nearly 50 lbs since the time of her initial sleep study with AHI 5.5. Discussed with patient who is agreeable to repeat testing  - Discontinue Ritalin and Provigil a few days to 1 week prior to PSG/MSLT. She was advised to notify the sleep lab if she does not stop this medication  - Patient advised 2 week washout of cymbalta and prozac. Patient reports inability to discontinue medications due to her significant depression as well as chronic nerve pain. Advised patient she will need to notify sleep technician regarding inability to discontinue medications on the night of the sleep study.  - Conduct urine drug screen during the sleep test.  - Patient may continue current regimen of Ritalin 20 mg twice daily (patient not taking 3 times daily as prescription is currently written) and Provigil 400 mg daily for treatment of her idiopathic hypersomnia. Patient only received 39 day supply of ritalin from [facility] in may 2025. This is confirmed on PDMP. Patient wishes for next Rx to go to [facility]. Rx for ritalin 20 mg twice daily sent to [facility] per patient request. Patient agreeable for twice daily dosing as this is how she is taking at home. Maria Del Rosario Fields will confirm that current Rx for ritalin at [facility] is cancelled. Patient to notify office when needing refill of provigil to

## 2025-07-10 ENCOUNTER — OFFICE VISIT (OUTPATIENT)
Age: 70
End: 2025-07-10
Payer: MEDICARE

## 2025-07-10 ENCOUNTER — TELEPHONE (OUTPATIENT)
Dept: FAMILY MEDICINE CLINIC | Age: 70
End: 2025-07-10

## 2025-07-10 ENCOUNTER — TELEPHONE (OUTPATIENT)
Dept: PULMONOLOGY | Age: 70
End: 2025-07-10

## 2025-07-10 VITALS
HEART RATE: 101 BPM | DIASTOLIC BLOOD PRESSURE: 72 MMHG | OXYGEN SATURATION: 98 % | TEMPERATURE: 97.6 F | WEIGHT: 182.5 LBS | BODY MASS INDEX: 31.16 KG/M2 | SYSTOLIC BLOOD PRESSURE: 130 MMHG | HEIGHT: 64 IN

## 2025-07-10 DIAGNOSIS — G47.33 OSA (OBSTRUCTIVE SLEEP APNEA): ICD-10-CM

## 2025-07-10 DIAGNOSIS — Z79.899 OTHER LONG TERM (CURRENT) DRUG THERAPY: ICD-10-CM

## 2025-07-10 DIAGNOSIS — G89.29 OTHER CHRONIC PAIN: ICD-10-CM

## 2025-07-10 DIAGNOSIS — E66.9 OBESITY (BMI 30-39.9): ICD-10-CM

## 2025-07-10 DIAGNOSIS — G47.11 IDIOPATHIC HYPERSOMNIA: Primary | ICD-10-CM

## 2025-07-10 DIAGNOSIS — F32.A DEPRESSION, UNSPECIFIED DEPRESSION TYPE: ICD-10-CM

## 2025-07-10 DIAGNOSIS — G47.19 EXCESSIVE DAYTIME SLEEPINESS: ICD-10-CM

## 2025-07-10 PROCEDURE — 1036F TOBACCO NON-USER: CPT

## 2025-07-10 PROCEDURE — G8417 CALC BMI ABV UP PARAM F/U: HCPCS

## 2025-07-10 PROCEDURE — 1090F PRES/ABSN URINE INCON ASSESS: CPT

## 2025-07-10 PROCEDURE — G8427 DOCREV CUR MEDS BY ELIG CLIN: HCPCS

## 2025-07-10 PROCEDURE — 1159F MED LIST DOCD IN RCRD: CPT

## 2025-07-10 PROCEDURE — 1160F RVW MEDS BY RX/DR IN RCRD: CPT

## 2025-07-10 PROCEDURE — G8399 PT W/DXA RESULTS DOCUMENT: HCPCS

## 2025-07-10 PROCEDURE — 3017F COLORECTAL CA SCREEN DOC REV: CPT

## 2025-07-10 PROCEDURE — 99214 OFFICE O/P EST MOD 30 MIN: CPT

## 2025-07-10 PROCEDURE — 1124F ACP DISCUSS-NO DSCNMKR DOCD: CPT

## 2025-07-10 RX ORDER — METHYLPHENIDATE HYDROCHLORIDE 20 MG/1
20 TABLET ORAL 2 TIMES DAILY
Qty: 180 TABLET | Refills: 0 | Status: SHIPPED | OUTPATIENT
Start: 2025-07-10 | End: 2025-10-08

## 2025-07-10 NOTE — TELEPHONE ENCOUNTER
Called and spoke to Nohemy at [facility] pharmacy 7/10/25. Informed her the patient is wanting to receive her ritalin from [facility] for now on, and they can cancel her remaining refills at [facility]. Nohemy voiced understanding and stated it has been discontinued.

## 2025-07-10 NOTE — TELEPHONE ENCOUNTER
Patient informed and stated she had a sleep study done today and they scheduled her Pulmonary appt with Dr Melchor for 7/18/25.

## 2025-07-10 NOTE — TELEPHONE ENCOUNTER
MOM to return call to office     Received a message from [facility] Pulmonology that they tried to call patient 3 times to set up an appt with no response. Does pt still want to go see pulmonology?

## 2025-07-16 ENCOUNTER — TELEPHONE (OUTPATIENT)
Dept: FAMILY MEDICINE CLINIC | Age: 70
End: 2025-07-16

## 2025-07-16 ENCOUNTER — TELEPHONE (OUTPATIENT)
Age: 70
End: 2025-07-16

## 2025-07-16 NOTE — TELEPHONE ENCOUNTER
Let her know that as I was preparing the Rx to send I noticed she was on generic ritalin. I can't Rx the adipex because they are both stimulants. Sorry.

## 2025-07-16 NOTE — TELEPHONE ENCOUNTER
I sent the ritalin almost 1 week ago. Can we call the mail order pharmacy to verify when the medication will arrive? Thanks!

## 2025-07-16 NOTE — TELEPHONE ENCOUNTER
I can send the adipex to the pharm. She needs to come be weighed on our scale once monthly and by 3 months she needs to have lost 18 pounds or the medicine needs to be stopped.

## 2025-07-16 NOTE — TELEPHONE ENCOUNTER
You sent abel's prescriptions through the mail this time and she is out of methylphenidate (RITALIN) 20 MG tablet and she mentioned that she has enough of the medication you sent through the mail but she only has two left of the RITALIN and would like you to send it to [facility] pharmacy here in [place] to hold her over until the one comes through the mail. Please advise.

## 2025-07-16 NOTE — TELEPHONE ENCOUNTER
Patient called stating that Dr Collazo told her he would call in the oral medication for weight loss, she thinks it is Addipex if her insurance would not cover the injectable.  She wants generic and uses [facility].

## 2025-07-17 NOTE — TELEPHONE ENCOUNTER
Pt still has not received. She is totally out. She is not asking for a whole month. Called [facility] and they expidited shipped through [employer]. Tracking #[id number]. Should arrive between 10am and 1250pm. Informed pt

## 2025-07-29 ENCOUNTER — TELEPHONE (OUTPATIENT)
Dept: SLEEP CENTER | Age: 70
End: 2025-07-29

## 2025-08-05 ENCOUNTER — RESULTS FOLLOW-UP (OUTPATIENT)
Age: 70
End: 2025-08-05

## 2025-08-05 ENCOUNTER — LAB (OUTPATIENT)
Dept: LAB | Age: 70
End: 2025-08-05

## 2025-08-05 ENCOUNTER — HOSPITAL ENCOUNTER (OUTPATIENT)
Dept: SLEEP CENTER | Age: 70
Discharge: HOME | End: 2025-08-05

## 2025-08-05 DIAGNOSIS — R73.9 HYPERGLYCEMIA: ICD-10-CM

## 2025-08-05 DIAGNOSIS — F43.21 GRIEF: ICD-10-CM

## 2025-08-05 DIAGNOSIS — K56.609 SBO (SMALL BOWEL OBSTRUCTION) (HCC): ICD-10-CM

## 2025-08-05 DIAGNOSIS — G47.9 SLEEP DIFFICULTIES: ICD-10-CM

## 2025-08-05 DIAGNOSIS — E78.5 HYPERLIPIDEMIA, UNSPECIFIED HYPERLIPIDEMIA TYPE: ICD-10-CM

## 2025-08-05 DIAGNOSIS — R91.8 OPACITY OF LUNG ON IMAGING STUDY: ICD-10-CM

## 2025-08-05 DIAGNOSIS — K21.9 HIATAL HERNIA WITH GASTROESOPHAGEAL REFLUX DISEASE WITHOUT ESOPHAGITIS: ICD-10-CM

## 2025-08-05 DIAGNOSIS — Z71.89 ENCOUNTER FOR HERB AND VITAMIN SUPPLEMENT MANAGEMENT: ICD-10-CM

## 2025-08-05 DIAGNOSIS — K44.9 HIATAL HERNIA WITH GASTROESOPHAGEAL REFLUX DISEASE WITHOUT ESOPHAGITIS: ICD-10-CM

## 2025-08-05 DIAGNOSIS — K22.0 INCOMPETENT LOWER ESOPHAGEAL SPHINCTER: ICD-10-CM

## 2025-08-05 DIAGNOSIS — K21.00 GASTROESOPHAGEAL REFLUX DISEASE WITH ESOPHAGITIS, UNSPECIFIED WHETHER HEMORRHAGE: ICD-10-CM

## 2025-08-05 DIAGNOSIS — E03.9 HYPOTHYROIDISM, UNSPECIFIED TYPE: ICD-10-CM

## 2025-08-05 DIAGNOSIS — J18.9 PNEUMONIA OF LEFT LOWER LOBE DUE TO INFECTIOUS ORGANISM: ICD-10-CM

## 2025-08-05 DIAGNOSIS — K90.89 OTHER SPECIFIED INTESTINAL MALABSORPTION: ICD-10-CM

## 2025-08-05 LAB
25(OH)D3 SERPL-MCNC: 55 NG/ML (ref 30–100)
BASOPHILS ABSOLUTE: 0.1 THOU/MM3 (ref 0–0.1)
BASOPHILS NFR BLD AUTO: 1.1 %
C-REACTIVE PROTEIN, HIGH SENSITIVITY: 1.7 MG/L (ref 0–3)
CALCIUM SERPL-MCNC: 9.4 MG/DL (ref 8.8–10.2)
CHOLEST SERPL-MCNC: 145 MG/DL (ref 100–199)
DEPRECATED MEAN GLUCOSE BLD GHB EST-ACNC: 117 MG/DL (ref 70–126)
DEPRECATED RDW RBC AUTO: 45.2 FL (ref 35–45)
DHEA-S SERPL-MCNC: 244 UG/DL (ref 9.4–246)
EOSINOPHIL NFR BLD AUTO: 4.7 %
EOSINOPHILS ABSOLUTE: 0.3 THOU/MM3 (ref 0–0.4)
ERYTHROCYTE [DISTWIDTH] IN BLOOD BY AUTOMATED COUNT: 13 % (ref 11.5–14.5)
ERYTHROCYTE [SEDIMENTATION RATE] IN BLOOD BY WESTERGREN METHOD: 3 MM/HR (ref 0–20)
ESTRADIOL LEVEL: 12.9 PG/ML
HBA1C MFR BLD HPLC: 5.9 % (ref 4–6)
HCT VFR BLD AUTO: 46.3 % (ref 37–47)
HDLC SERPL-MCNC: 51 MG/DL
HGB BLD-MCNC: 15.3 GM/DL (ref 12–16)
HOMOCYSTEINE: 7.9 UMOL/L (ref 0–15)
IGE SERPL-ACNC: 36 IU/ML (ref 0–100)
IMM GRANULOCYTES # BLD AUTO: 0.01 THOU/MM3 (ref 0–0.07)
IMM GRANULOCYTES NFR BLD AUTO: 0.2 %
LDLC SERPL CALC-MCNC: 78 MG/DL
LYMPHOCYTES ABSOLUTE: 1.4 THOU/MM3 (ref 1–4.8)
LYMPHOCYTES NFR BLD AUTO: 25.7 %
MAGNESIUM SERPL-MCNC: 2.1 MG/DL (ref 1.6–2.6)
MCH RBC QN AUTO: 31.4 PG (ref 26–33)
MCHC RBC AUTO-ENTMCNC: 33 GM/DL (ref 32.2–35.5)
MCV RBC AUTO: 95.1 FL (ref 81–99)
MONOCYTES ABSOLUTE: 0.5 THOU/MM3 (ref 0.4–1.3)
MONOCYTES NFR BLD AUTO: 8.8 %
NEUTROPHILS ABSOLUTE: 3.2 THOU/MM3 (ref 1.8–7.7)
NEUTROPHILS NFR BLD AUTO: 59.5 %
NRBC BLD AUTO-RTO: 0 /100 WBC
PLATELET # BLD AUTO: 283 THOU/MM3 (ref 130–400)
PMV BLD AUTO: 10.6 FL (ref 9.4–12.4)
PTH-INTACT SERPL-MCNC: 29 PG/ML (ref 15–65)
RBC # BLD AUTO: 4.87 MILL/MM3 (ref 4.2–5.4)
TRIGL SERPL-MCNC: 78 MG/DL (ref 0–199)
WBC # BLD AUTO: 5.4 THOU/MM3 (ref 4.8–10.8)

## 2025-08-06 LAB — THYROPEROXIDASE AB SERPL IA-ACNC: < 4 IU/ML (ref 0–25)

## 2025-08-07 LAB — NUCLEAR IGG SER QL IA: NORMAL

## 2025-08-10 LAB — DHEA SERPL-MCNC: 2.52 NG/ML (ref 0.63–4.7)

## 2025-08-13 DIAGNOSIS — G47.11 IDIOPATHIC HYPERSOMNIA: ICD-10-CM

## 2025-08-13 RX ORDER — MODAFINIL 200 MG/1
400 TABLET ORAL DAILY
Qty: 180 TABLET | Refills: 0 | OUTPATIENT
Start: 2025-08-13 | End: 2025-11-11

## 2025-08-15 DIAGNOSIS — G47.11 IDIOPATHIC HYPERSOMNIA: ICD-10-CM

## 2025-08-15 RX ORDER — MODAFINIL 200 MG/1
400 TABLET ORAL DAILY
Qty: 8 TABLET | Refills: 0 | Status: SHIPPED | OUTPATIENT
Start: 2025-08-15 | End: 2025-08-18 | Stop reason: SDUPTHER

## 2025-08-18 ENCOUNTER — TELEPHONE (OUTPATIENT)
Age: 70
End: 2025-08-18

## 2025-08-18 DIAGNOSIS — G47.11 IDIOPATHIC HYPERSOMNIA: ICD-10-CM

## 2025-08-18 RX ORDER — MODAFINIL 200 MG/1
400 TABLET ORAL DAILY
Qty: 20 TABLET | Refills: 0 | Status: SHIPPED | OUTPATIENT
Start: 2025-08-18 | End: 2025-08-28

## 2025-08-18 RX ORDER — MODAFINIL 200 MG/1
400 TABLET ORAL DAILY
Qty: 160 TABLET | Refills: 0 | Status: SHIPPED | OUTPATIENT
Start: 2025-08-18 | End: 2025-11-06

## (undated) DEVICE — GLOVE SURG SZ 65 THK91MIL LTX FREE SYN POLYISOPRENE

## (undated) DEVICE — POSITIONER HD W8XH4XL8.5IN RASPBERRY FOAM SLT

## (undated) DEVICE — CHLORAPREP 26ML ORANGE

## (undated) DEVICE — BLADE LARYNSCP SZ 3 ENH DIR INTUB GLIDESCOPE MCGRATH MAC

## (undated) DEVICE — PREP SOL PVP IODINE 4%  4 OZ/BTL

## (undated) DEVICE — GAUZE,SPONGE,8"X4",12PLY,XRAY,STRL,LF: Brand: MEDLINE

## (undated) DEVICE — ELECTRO LUBE IS A SINGLE PATIENT USE DEVICE THAT IS INTENDED TO BE USED ON ELECTROSURGICAL ELECTRODES TO REDUCE STICKING.: Brand: KEY SURGICAL ELECTRO LUBE

## (undated) DEVICE — AIRSEAL 12 MM ACCESS PORT AND PALM GRIP OBTURATOR WITH BLADELESS OPTICAL TIP, 120 MM LENGTH: Brand: AIRSEAL

## (undated) DEVICE — TOWEL,OR,DSP,ST,BLUE,DLX,4/PK,20PK/CS: Brand: MEDLINE

## (undated) DEVICE — SYRINGE MED 3ML CLR PLAS STD N CTRL LUERLOCK TIP DISP

## (undated) DEVICE — SURE SET SINGLE BASIN-LF: Brand: MEDLINE INDUSTRIES, INC.

## (undated) DEVICE — AGENT HEMSTAT W2XL14IN OXIDIZED REGENERATED CELOS ABSRB FOR

## (undated) DEVICE — DRESSING TRNSPAR W4XL10IN FLM MIC POR SURESITE 123

## (undated) DEVICE — BLANKET THER AD W24XL60IN FAB COVERING SUP SFT ULT THN LTWT

## (undated) DEVICE — SOLUTION SURG PREP POV IOD 7.5% 4 OZ

## (undated) DEVICE — Device

## (undated) DEVICE — INTENDED FOR TISSUE SEPARATION, AND OTHER PROCEDURES THAT REQUIRE A SHARP SURGICAL BLADE TO PUNCTURE OR CUT.: Brand: BARD-PARKER ® CARBON RIB-BACK BLADES

## (undated) DEVICE — LINER SUCT CANSTR 1500CC SEMI RIG W/ POR HYDROPHOBIC SHUT

## (undated) DEVICE — CORE TRUMPET FOR SINGLE SOLUTION BAG: Brand: CORE DYNAMICS

## (undated) DEVICE — SYRINGE MED 10ML LUERLOCK TIP W/O SFTY DISP

## (undated) DEVICE — TRI-LUMEN FILTERED TUBE SET WITH ACTIVATED CHARCOAL FILTER: Brand: AIRSEAL

## (undated) DEVICE — STRIP SKIN CLSR W0.25XL4IN WHT SPUNBOUND FBR NYL HI ADH

## (undated) DEVICE — DISSECTOR LAP DIA5MM BLNT TIP ENDOPATH

## (undated) DEVICE — INSUFFLATION NEEDLE TO ESTABLISH PNEUMOPERITONEUM.: Brand: INSUFFLATION NEEDLE

## (undated) DEVICE — SUTURE VCRL SZ 2-0 L27IN ABSRB UD L26MM SH 1/2 CIR J417H

## (undated) DEVICE — SHEET, T, LAPAROTOMY, STERILE: Brand: MEDLINE

## (undated) DEVICE — GOWN,SIRUS,NONRNF,SETINSLV,XL,20/CS: Brand: MEDLINE

## (undated) DEVICE — TUBING, SUCTION, 1/4" X 20', STRAIGHT: Brand: MEDLINE INDUSTRIES, INC.

## (undated) DEVICE — HYPODERMIC SAFETY NEEDLE: Brand: MAGELLAN

## (undated) DEVICE — PACK,SET UP,NO DRAPES: Brand: MEDLINE

## (undated) DEVICE — PACK,UNIVERSAL,NO GOWNS: Brand: MEDLINE

## (undated) DEVICE — SPONGE GZ W4XL4IN COT 12 PLY TYP VII WVN C FLD DSGN

## (undated) DEVICE — 3M™ STERI-DRAPE™ INSTRUMENT POUCH 1018: Brand: STERI-DRAPE™

## (undated) DEVICE — GOWN,SIRUS,NON REINFRCD,LARGE,SET IN SL: Brand: MEDLINE

## (undated) DEVICE — DRAPE C ARM W36XL30IN RECTANG BND BG AND TAPE

## (undated) DEVICE — TISSUE RETRIEVAL SYSTEM: Brand: INZII RETRIEVAL SYSTEM

## (undated) DEVICE — O.R. CABLE 1X16, 61CM AND EXTENSION

## (undated) DEVICE — PAD,ABDOMINAL,5"X9",ST,LF,25/BX: Brand: MEDLINE INDUSTRIES, INC.

## (undated) DEVICE — BLADE CLIPPER GEN PURP NS

## (undated) DEVICE — BASIC SINGLE BASIN BTC-LF: Brand: MEDLINE INDUSTRIES, INC.

## (undated) DEVICE — TOWEL,OR,DSP,ST,BLUE,STD,4/PK,20PK/CS: Brand: MEDLINE

## (undated) DEVICE — C-ARMOR C-ARM EQUIPMENT COVERS CLEAR STERILE UNIVERSAL FIT 12 PER CASE: Brand: C-ARMOR

## (undated) DEVICE — TIP COVER ACCESSORY

## (undated) DEVICE — Z DISCONTINUED BY MEDLINE USE 2711682 TRAY SKIN PREP DRY W/ PREM GLV

## (undated) DEVICE — TOTAL TRAY, DB, 100% SILI FOLEY, 16FR 10: Brand: MEDLINE

## (undated) DEVICE — BLADELESS OBTURATOR: Brand: WECK VISTA

## (undated) DEVICE — COLUMN DRAPE

## (undated) DEVICE — SPONGE LAP W18XL18IN WHT COT 4 PLY FLD STRUNG RADPQ DISP ST

## (undated) DEVICE — SOLUTION IV 1000ML 0.9% SOD CHL PH 5 INJ USP VIAFLX PLAS

## (undated) DEVICE — SOLUTION IV IRRIG WATER 1000ML POUR BRL 2F7114

## (undated) DEVICE — NEEDLE SPNL L3.5IN PNK HUB S STL REG WALL FIT STYL W/ QNCKE

## (undated) DEVICE — 3M™ TRANSPORE™ WHITE SURGICAL TAPE 1534-1, 1 INCH X 10 YARD (2,5CM X 9,1M), 12 ROLLS/CARTON 10 CARTONS/CASE: Brand: 3M™ TRANSPORE™

## (undated) DEVICE — GLOVE ORANGE PI 7   MSG9070

## (undated) DEVICE — YANKAUER,BULB TIP,W/O VENT,RIGID,STERILE: Brand: MEDLINE

## (undated) DEVICE — SOLUTION ANTIFOG VIS SYS CLEARIFY LAPSCP

## (undated) DEVICE — TUBING PRSS 36 M F

## (undated) DEVICE — DRESSING TRNSPAR W2XL2.75IN FLM SHT SEMIPERMEABLE WIND

## (undated) DEVICE — 3 ML SYRINGE LUER-LOCK TIP: Brand: MONOJECT

## (undated) DEVICE — ARM DRAPE

## (undated) DEVICE — CANNULA SEAL

## (undated) DEVICE — COVER,LIGHT HANDLE,FLX,2/PK: Brand: MEDLINE INDUSTRIES, INC.

## (undated) DEVICE — 3M™ STERI-STRIP™ COMPOUND BENZOIN TINCTURE 40 BAGS/CARTON 4 CARTONS/CASE C1544: Brand: 3M™ STERI-STRIP™

## (undated) DEVICE — NEEDLE SPNL 22GA L3.5IN BLK HUB S STL REG WALL FIT STYL W/

## (undated) DEVICE — APPLIER SUT CLP FOR 2-0 3-0 4-0 VCRL ABSRB SUT

## (undated) DEVICE — GLOVE ORANGE PI 7 1/2   MSG9075

## (undated) DEVICE — CATHETER CV 3 LUMEN MED 7 FRX16 CM MULT MED

## (undated) DEVICE — KIT PT TRL HANDHELD COMB THER WAVEFORM AUTOMATION FOR SPNL

## (undated) DEVICE — Z DUP USE 2641840 CLIP INT L POLYMER LOK LIG HEM O LOK

## (undated) DEVICE — PACK PROCEDURE SURG SET UP SRMC

## (undated) DEVICE — STD, INTRO-FLEX INTRODUCER 9F: Brand: MEDLINE

## (undated) DEVICE — SUTURE V-LOC 180 SZ 3-0 L9IN ABSRB GRN L26MM V-20 1/2 CIR VLOCL0644

## (undated) DEVICE — GAUZE,SPONGE,2"X2",8PLY,STERILE,LF,2'S: Brand: MEDLINE

## (undated) DEVICE — 3M™ WARMING BLANKET, UPPER BODY, 10 PER CASE, 42268: Brand: BAIR HUGGER™

## (undated) DEVICE — GLOVE ORANGE PI 8   MSG9080

## (undated) DEVICE — COVER ARMBRD W13XL28.5IN IMPERV BLU FOR OP RM

## (undated) DEVICE — SOLUTION IV 1000ML LAC RINGERS PH 6.5 INJ USP VIAFLX PLAS

## (undated) DEVICE — SUREFIT, DUAL DISPERSIVE ELECTRODE, CONTACT QUALITY MONITOR: Brand: SUREFIT

## (undated) DEVICE — 3M™ BAIR HUGGER® MULTI ACCESS BLANKET, PEDIATRIC, FULL BODY, 10 PER CASE 31000: Brand: BAIR HUGGER™

## (undated) DEVICE — COAGULATOR ELECSURG BLADE 10 FTX1 IN PTFE STRL ULTRACLEAN